# Patient Record
Sex: FEMALE | Race: BLACK OR AFRICAN AMERICAN | NOT HISPANIC OR LATINO
[De-identification: names, ages, dates, MRNs, and addresses within clinical notes are randomized per-mention and may not be internally consistent; named-entity substitution may affect disease eponyms.]

---

## 2017-01-16 ENCOUNTER — APPOINTMENT (OUTPATIENT)
Dept: NEPHROLOGY | Facility: CLINIC | Age: 77
End: 2017-01-16

## 2017-01-16 VITALS
DIASTOLIC BLOOD PRESSURE: 50 MMHG | HEIGHT: 62 IN | SYSTOLIC BLOOD PRESSURE: 110 MMHG | BODY MASS INDEX: 23.92 KG/M2 | WEIGHT: 130 LBS

## 2017-01-16 LAB
BILIRUB UR QL STRIP: NORMAL
GLUCOSE UR-MCNC: NORMAL
HCG UR QL: 0.2 EU/DL
HGB UR QL STRIP.AUTO: NORMAL
KETONES UR-MCNC: NORMAL
LEUKOCYTE ESTERASE UR QL STRIP: NORMAL
NITRITE UR QL STRIP: NORMAL
PH UR STRIP: 7.5
PROT UR STRIP-MCNC: NORMAL
SP GR UR STRIP: 1.01

## 2017-01-17 LAB
ALBUMIN SERPL ELPH-MCNC: 4.7 G/DL
ANION GAP SERPL CALC-SCNC: 11 MMOL/L
BASOPHILS # BLD AUTO: 0.04 K/UL
BASOPHILS NFR BLD AUTO: 1 %
BUN SERPL-MCNC: 13 MG/DL
CALCIUM SERPL-MCNC: 9.5 MG/DL
CHLORIDE SERPL-SCNC: 93 MMOL/L
CO2 SERPL-SCNC: 30 MMOL/L
CREAT SERPL-MCNC: 1.12 MG/DL
CREAT SPEC-SCNC: 30 MG/DL
CREAT/PROT UR: <0.1 RATIO
EOSINOPHIL # BLD AUTO: 0.17 K/UL
EOSINOPHIL NFR BLD AUTO: 4.4 %
GLUCOSE SERPL-MCNC: 75 MG/DL
HCT VFR BLD CALC: 36.7 %
HGB BLD-MCNC: 11.8 G/DL
IMM GRANULOCYTES NFR BLD AUTO: 0.8 %
LYMPHOCYTES # BLD AUTO: 1.8 K/UL
LYMPHOCYTES NFR BLD AUTO: 46.6 %
MAN DIFF?: NORMAL
MCHC RBC-ENTMCNC: 28.6 PG
MCHC RBC-ENTMCNC: 32.2 GM/DL
MCV RBC AUTO: 88.9 FL
MONOCYTES # BLD AUTO: 0.57 K/UL
MONOCYTES NFR BLD AUTO: 14.8 %
NEUTROPHILS # BLD AUTO: 1.25 K/UL
NEUTROPHILS NFR BLD AUTO: 32.4 %
PHOSPHATE SERPL-MCNC: 3.4 MG/DL
PLATELET # BLD AUTO: 278 K/UL
POTASSIUM SERPL-SCNC: 4.3 MMOL/L
PROT UR-MCNC: <4 MG/DL
RBC # BLD: 4.13 M/UL
RBC # FLD: 13 %
SODIUM SERPL-SCNC: 134 MMOL/L
WBC # FLD AUTO: 3.86 K/UL

## 2017-01-19 ENCOUNTER — FORM ENCOUNTER (OUTPATIENT)
Age: 77
End: 2017-01-19

## 2017-01-20 ENCOUNTER — OUTPATIENT (OUTPATIENT)
Dept: OUTPATIENT SERVICES | Facility: HOSPITAL | Age: 77
LOS: 1 days | End: 2017-01-20
Payer: MEDICARE

## 2017-01-20 ENCOUNTER — APPOINTMENT (OUTPATIENT)
Dept: ULTRASOUND IMAGING | Facility: CLINIC | Age: 77
End: 2017-01-20

## 2017-01-20 DIAGNOSIS — Z00.8 ENCOUNTER FOR OTHER GENERAL EXAMINATION: ICD-10-CM

## 2017-01-20 PROCEDURE — 76775 US EXAM ABDO BACK WALL LIM: CPT

## 2017-01-20 RX ORDER — CALCIUM CARBONATE/VITAMIN D3 500MG-5MCG
500-5 TABLET ORAL
Qty: 170 | Refills: 0 | Status: ACTIVE | COMMUNITY
Start: 2016-07-19

## 2018-02-19 ENCOUNTER — RX RENEWAL (OUTPATIENT)
Age: 78
End: 2018-02-19

## 2018-02-20 ENCOUNTER — RX RENEWAL (OUTPATIENT)
Age: 78
End: 2018-02-20

## 2018-09-05 ENCOUNTER — APPOINTMENT (OUTPATIENT)
Dept: OBGYN | Facility: CLINIC | Age: 78
End: 2018-09-05
Payer: COMMERCIAL

## 2018-09-05 VITALS
HEIGHT: 62 IN | DIASTOLIC BLOOD PRESSURE: 62 MMHG | WEIGHT: 133 LBS | SYSTOLIC BLOOD PRESSURE: 130 MMHG | BODY MASS INDEX: 24.48 KG/M2

## 2018-09-05 DIAGNOSIS — Z90.710 ACQUIRED ABSENCE OF BOTH CERVIX AND UTERUS: ICD-10-CM

## 2018-09-05 DIAGNOSIS — Z90.722 ACQUIRED ABSENCE OF BOTH CERVIX AND UTERUS: ICD-10-CM

## 2018-09-05 DIAGNOSIS — N95.2 POSTMENOPAUSAL ATROPHIC VAGINITIS: ICD-10-CM

## 2018-09-05 DIAGNOSIS — Z90.79 ACQUIRED ABSENCE OF BOTH CERVIX AND UTERUS: ICD-10-CM

## 2018-09-05 LAB
DATE COLLECTED: NORMAL
HEMOCCULT SP1 STL QL: NEGATIVE
QUALITY CONTROL: YES

## 2018-09-05 PROCEDURE — 82270 OCCULT BLOOD FECES: CPT

## 2018-09-05 PROCEDURE — 99204 OFFICE O/P NEW MOD 45 MIN: CPT | Mod: 25

## 2018-09-05 PROCEDURE — G0101: CPT

## 2018-09-11 LAB
CYTOLOGY CVX/VAG DOC THIN PREP: NORMAL
HPV HIGH+LOW RISK DNA PNL CVX: NOT DETECTED

## 2018-10-16 ENCOUNTER — APPOINTMENT (OUTPATIENT)
Dept: SURGERY | Facility: CLINIC | Age: 78
End: 2018-10-16
Payer: COMMERCIAL

## 2018-10-16 VITALS
HEART RATE: 78 BPM | DIASTOLIC BLOOD PRESSURE: 81 MMHG | OXYGEN SATURATION: 97 % | HEIGHT: 62 IN | TEMPERATURE: 97.5 F | SYSTOLIC BLOOD PRESSURE: 174 MMHG

## 2018-10-16 DIAGNOSIS — Z00.00 ENCOUNTER FOR GENERAL ADULT MEDICAL EXAMINATION W/OUT ABNORMAL FINDINGS: ICD-10-CM

## 2018-10-16 PROCEDURE — 99204 OFFICE O/P NEW MOD 45 MIN: CPT

## 2018-10-16 RX ORDER — AMOXICILLIN AND CLAVULANATE POTASSIUM 875; 125 MG/1; MG/1
875-125 TABLET, COATED ORAL
Qty: 20 | Refills: 0 | Status: DISCONTINUED | COMMUNITY
Start: 2016-11-19 | End: 2018-10-16

## 2018-10-16 RX ORDER — POTASSIUM CHLORIDE 1500 MG/1
20 TABLET, EXTENDED RELEASE ORAL
Qty: 180 | Refills: 0 | Status: DISCONTINUED | COMMUNITY
Start: 2016-01-04 | End: 2018-10-16

## 2018-10-29 ENCOUNTER — APPOINTMENT (OUTPATIENT)
Dept: MAMMOGRAPHY | Facility: CLINIC | Age: 78
End: 2018-10-29

## 2018-10-29 ENCOUNTER — APPOINTMENT (OUTPATIENT)
Dept: ULTRASOUND IMAGING | Facility: CLINIC | Age: 78
End: 2018-10-29

## 2018-10-29 ENCOUNTER — OUTPATIENT (OUTPATIENT)
Dept: OUTPATIENT SERVICES | Facility: HOSPITAL | Age: 78
LOS: 1 days | End: 2018-10-29
Payer: COMMERCIAL

## 2018-10-29 DIAGNOSIS — R92.8 OTHER ABNORMAL AND INCONCLUSIVE FINDINGS ON DIAGNOSTIC IMAGING OF BREAST: ICD-10-CM

## 2018-10-29 PROCEDURE — 76641 ULTRASOUND BREAST COMPLETE: CPT

## 2018-10-29 PROCEDURE — G0279: CPT

## 2018-10-29 PROCEDURE — G0279: CPT | Mod: 26

## 2018-10-29 PROCEDURE — 77066 DX MAMMO INCL CAD BI: CPT | Mod: 26

## 2018-10-29 PROCEDURE — 76641 ULTRASOUND BREAST COMPLETE: CPT | Mod: 26,50

## 2018-10-29 PROCEDURE — 77066 DX MAMMO INCL CAD BI: CPT

## 2018-11-02 ENCOUNTER — APPOINTMENT (OUTPATIENT)
Dept: ULTRASOUND IMAGING | Facility: CLINIC | Age: 78
End: 2018-11-02
Payer: COMMERCIAL

## 2018-11-02 ENCOUNTER — OUTPATIENT (OUTPATIENT)
Dept: OUTPATIENT SERVICES | Facility: HOSPITAL | Age: 78
LOS: 1 days | End: 2018-11-02
Payer: COMMERCIAL

## 2018-11-02 ENCOUNTER — RESULT REVIEW (OUTPATIENT)
Age: 78
End: 2018-11-02

## 2018-11-02 DIAGNOSIS — Z00.8 ENCOUNTER FOR OTHER GENERAL EXAMINATION: ICD-10-CM

## 2018-11-02 PROCEDURE — 77065 DX MAMMO INCL CAD UNI: CPT

## 2018-11-02 PROCEDURE — A4648: CPT

## 2018-11-02 PROCEDURE — 77065 DX MAMMO INCL CAD UNI: CPT | Mod: 26,LT

## 2018-11-02 PROCEDURE — 19083 BX BREAST 1ST LESION US IMAG: CPT

## 2018-11-02 PROCEDURE — 19083 BX BREAST 1ST LESION US IMAG: CPT | Mod: LT

## 2018-11-06 LAB — SURGICAL PATHOLOGY FINAL REPORT - CH: SIGNIFICANT CHANGE UP

## 2018-11-12 ENCOUNTER — APPOINTMENT (OUTPATIENT)
Dept: SURGERY | Facility: CLINIC | Age: 78
End: 2018-11-12
Payer: COMMERCIAL

## 2018-11-12 VITALS
DIASTOLIC BLOOD PRESSURE: 78 MMHG | WEIGHT: 130 LBS | BODY MASS INDEX: 23.92 KG/M2 | SYSTOLIC BLOOD PRESSURE: 136 MMHG | HEART RATE: 78 BPM | OXYGEN SATURATION: 98 % | HEIGHT: 62 IN | TEMPERATURE: 98.7 F

## 2018-11-12 PROCEDURE — 99215 OFFICE O/P EST HI 40 MIN: CPT

## 2018-11-16 ENCOUNTER — APPOINTMENT (OUTPATIENT)
Dept: MRI IMAGING | Facility: CLINIC | Age: 78
End: 2018-11-16
Payer: COMMERCIAL

## 2018-11-16 ENCOUNTER — OUTPATIENT (OUTPATIENT)
Dept: OUTPATIENT SERVICES | Facility: HOSPITAL | Age: 78
LOS: 1 days | End: 2018-11-16
Payer: COMMERCIAL

## 2018-11-16 DIAGNOSIS — C50.912 MALIGNANT NEOPLASM OF UNSPECIFIED SITE OF LEFT FEMALE BREAST: ICD-10-CM

## 2018-11-16 DIAGNOSIS — Z00.8 ENCOUNTER FOR OTHER GENERAL EXAMINATION: ICD-10-CM

## 2018-11-16 PROCEDURE — C8908: CPT

## 2018-11-16 PROCEDURE — A9585: CPT

## 2018-11-16 PROCEDURE — 0159T: CPT | Mod: 26

## 2018-11-16 PROCEDURE — C8937: CPT

## 2018-11-16 PROCEDURE — 77059 MRI BREAST BILATERAL: CPT | Mod: 26

## 2018-11-20 ENCOUNTER — APPOINTMENT (OUTPATIENT)
Dept: PLASTIC SURGERY | Facility: CLINIC | Age: 78
End: 2018-11-20

## 2018-11-26 ENCOUNTER — APPOINTMENT (OUTPATIENT)
Dept: SURGERY | Facility: CLINIC | Age: 78
End: 2018-11-26
Payer: COMMERCIAL

## 2018-11-26 VITALS
HEIGHT: 62 IN | HEART RATE: 87 BPM | TEMPERATURE: 97.6 F | DIASTOLIC BLOOD PRESSURE: 75 MMHG | BODY MASS INDEX: 23.92 KG/M2 | OXYGEN SATURATION: 97 % | SYSTOLIC BLOOD PRESSURE: 172 MMHG | WEIGHT: 130 LBS

## 2018-11-26 DIAGNOSIS — R92.8 OTHER ABNORMAL AND INCONCLUSIVE FINDINGS ON DIAGNOSTIC IMAGING OF BREAST: ICD-10-CM

## 2018-11-26 PROCEDURE — 99214 OFFICE O/P EST MOD 30 MIN: CPT

## 2018-11-30 ENCOUNTER — OTHER (OUTPATIENT)
Age: 78
End: 2018-11-30

## 2018-12-04 ENCOUNTER — APPOINTMENT (OUTPATIENT)
Dept: PLASTIC SURGERY | Facility: CLINIC | Age: 78
End: 2018-12-04
Payer: COMMERCIAL

## 2018-12-04 VITALS
HEART RATE: 72 BPM | RESPIRATION RATE: 16 BRPM | OXYGEN SATURATION: 99 % | BODY MASS INDEX: 23.19 KG/M2 | SYSTOLIC BLOOD PRESSURE: 136 MMHG | DIASTOLIC BLOOD PRESSURE: 79 MMHG | WEIGHT: 126.03 LBS | HEIGHT: 62 IN

## 2018-12-04 PROCEDURE — 99204 OFFICE O/P NEW MOD 45 MIN: CPT

## 2018-12-11 ENCOUNTER — OUTPATIENT (OUTPATIENT)
Dept: OUTPATIENT SERVICES | Facility: HOSPITAL | Age: 78
LOS: 1 days | End: 2018-12-11
Payer: MEDICARE

## 2018-12-11 VITALS
HEART RATE: 76 BPM | TEMPERATURE: 98 F | RESPIRATION RATE: 16 BRPM | DIASTOLIC BLOOD PRESSURE: 77 MMHG | WEIGHT: 123.46 LBS | SYSTOLIC BLOOD PRESSURE: 182 MMHG | HEIGHT: 62 IN

## 2018-12-11 DIAGNOSIS — C50.912 MALIGNANT NEOPLASM OF UNSPECIFIED SITE OF LEFT FEMALE BREAST: ICD-10-CM

## 2018-12-11 DIAGNOSIS — C50.919 MALIGNANT NEOPLASM OF UNSPECIFIED SITE OF UNSPECIFIED FEMALE BREAST: ICD-10-CM

## 2018-12-11 DIAGNOSIS — Z90.710 ACQUIRED ABSENCE OF BOTH CERVIX AND UTERUS: Chronic | ICD-10-CM

## 2018-12-11 DIAGNOSIS — Z01.818 ENCOUNTER FOR OTHER PREPROCEDURAL EXAMINATION: ICD-10-CM

## 2018-12-11 PROBLEM — Z86.79 HISTORY OF HYPERTENSION: Status: RESOLVED | Noted: 2018-12-04 | Resolved: 2018-12-11

## 2018-12-11 PROBLEM — Z85.3 HISTORY OF MALIGNANT NEOPLASM OF BREAST: Status: RESOLVED | Noted: 2018-12-04 | Resolved: 2018-12-11

## 2018-12-11 PROBLEM — Z86.39 HISTORY OF HIGH CHOLESTEROL: Status: RESOLVED | Noted: 2018-12-04 | Resolved: 2018-12-11

## 2018-12-11 LAB
ANION GAP SERPL CALC-SCNC: 15 MMOL/L — SIGNIFICANT CHANGE UP (ref 5–17)
BUN SERPL-MCNC: 10 MG/DL — SIGNIFICANT CHANGE UP (ref 8–20)
CALCIUM SERPL-MCNC: 9.8 MG/DL — SIGNIFICANT CHANGE UP (ref 8.6–10.2)
CHLORIDE SERPL-SCNC: 89 MMOL/L — LOW (ref 98–107)
CO2 SERPL-SCNC: 26 MMOL/L — SIGNIFICANT CHANGE UP (ref 22–29)
CREAT SERPL-MCNC: 0.85 MG/DL — SIGNIFICANT CHANGE UP (ref 0.5–1.3)
GLUCOSE SERPL-MCNC: 113 MG/DL — SIGNIFICANT CHANGE UP (ref 70–115)
HCT VFR BLD CALC: 34.4 % — LOW (ref 37–47)
HGB BLD-MCNC: 11.6 G/DL — LOW (ref 12–16)
MCHC RBC-ENTMCNC: 28.7 PG — SIGNIFICANT CHANGE UP (ref 27–31)
MCHC RBC-ENTMCNC: 33.7 G/DL — SIGNIFICANT CHANGE UP (ref 32–36)
MCV RBC AUTO: 85.1 FL — SIGNIFICANT CHANGE UP (ref 81–99)
PLATELET # BLD AUTO: 256 K/UL — SIGNIFICANT CHANGE UP (ref 150–400)
POTASSIUM SERPL-MCNC: 3.4 MMOL/L — LOW (ref 3.5–5.3)
POTASSIUM SERPL-SCNC: 3.4 MMOL/L — LOW (ref 3.5–5.3)
RBC # BLD: 4.04 M/UL — LOW (ref 4.4–5.2)
RBC # FLD: 13.1 % — SIGNIFICANT CHANGE UP (ref 11–15.6)
SODIUM SERPL-SCNC: 130 MMOL/L — LOW (ref 135–145)
WBC # BLD: 5.2 K/UL — SIGNIFICANT CHANGE UP (ref 4.8–10.8)
WBC # FLD AUTO: 5.2 K/UL — SIGNIFICANT CHANGE UP (ref 4.8–10.8)

## 2018-12-11 PROCEDURE — 93005 ELECTROCARDIOGRAM TRACING: CPT

## 2018-12-11 PROCEDURE — 93010 ELECTROCARDIOGRAM REPORT: CPT

## 2018-12-11 PROCEDURE — 80048 BASIC METABOLIC PNL TOTAL CA: CPT

## 2018-12-11 PROCEDURE — 85027 COMPLETE CBC AUTOMATED: CPT

## 2018-12-11 PROCEDURE — 36415 COLL VENOUS BLD VENIPUNCTURE: CPT

## 2018-12-11 NOTE — H&P PST ADULT - ASSESSMENT
78 year old female presents with left breast cancer scheduled for a left mastectomy on 18.  CAPRINI SCORE [CLOT]    AGE RELATED RISK FACTORS                                                       MOBILITY RELATED FACTORS  [ ] Age 41-60 years                                            (1 Point)                  [ ] Bed rest                                                        (1 Point)  [ ] Age: 61-74 years                                           (2 Points)                 [ ] Plaster cast                                                   (2 Points)  [x ] Age= 75 years                                              (3 Points)                 [ ] Bed bound for more than 72 hours                 (2 Points)    DISEASE RELATED RISK FACTORS                                               GENDER SPECIFIC FACTORS  [ ] Edema in the lower extremities                       (1 Point)                  [ ] Pregnancy                                                     (1 Point)  [ ] Varicose veins                                               (1 Point)                  [ ] Post-partum < 6 weeks                                   (1 Point)             [ ] BMI > 25 Kg/m2                                            (1 Point)                  [ ] Hormonal therapy  or oral contraception          (1 Point)                 [ ] Sepsis (in the previous month)                        (1 Point)                  [ ] History of pregnancy complications                 (1 point)  [ ] Pneumonia or serious lung disease                                               [ ] Unexplained or recurrent                     (1 Point)           (in the previous month)                               (1 Point)  [ ] Abnormal pulmonary function test                     (1 Point)                 SURGERY RELATED RISK FACTORS  [ ] Acute myocardial infarction                              (1 Point)                 [ ]  Section                                             (1 Point)  [ ] Congestive heart failure (in the previous month)  (1 Point)               [x ] Minor surgery                                                  (1 Point)   [ ] Inflammatory bowel disease                             (1 Point)                 [ ] Arthroscopic surgery                                        (2 Points)  [ ] Central venous access                                      (2 Points)                [ ] General surgery lasting more than 45 minutes   (2 Points)       [ ] Stroke (in the previous month)                          (5 Points)               [ ] Elective arthroplasty                                         (5 Points)                                                                                                                                               HEMATOLOGY RELATED FACTORS                                                 TRAUMA RELATED RISK FACTORS  [ ] Prior episodes of VTE                                     (3 Points)                 [ ] Fracture of the hip, pelvis, or leg                       (5 Points)  [ ] Positive family history for VTE                         (3 Points)                 [ ] Acute spinal cord injury (in the previous month)  (5 Points)  [ ] Prothrombin 52331 A                                     (3 Points)                 [ ] Paralysis  (less than 1 month)                             (5 Points)  [ ] Factor V Leiden                                             (3 Points)                  [ ] Multiple Trauma within 1 month                        (5 Points)  [ ] Lupus anticoagulants                                     (3 Points)                                                           [ ] Anticardiolipin antibodies                               (3 Points)                                                       [ ] High homocysteine in the blood                      (3 Points)                                             [ ] Other congenital or acquired thrombophilia      (3 Points)                                                [ ] Heparin induced thrombocytopenia                  (3 Points)                                          Total Score [4]  OPIOID RISK TOOL    JACKI EACH BOX THAT APPLIES AND ADD TOTALS AT THE END    FAMILY HISTORY OF SUBSTANCE ABUSE                 FEMALE         MALE                                                Alcohol                             [  ]1 pt          [  ]3pts                                               Illegal Drugs                     [  ]2 pts        [  ]3pts                                               Rx Drugs                           [  ]4 pts        [  ]4 pts    PERSONAL HISTORY OF SUBSTANCE ABUSE                                                                                          Alcohol                             [  ]3 pts       [  ]3 pts                                               Illegal Drugs                     [  ]4 pts        [  ]4 pts                                               Rx Drugs                           [  ]5 pts        [  ]5 pts    AGE BETWEEN 16-45 YEARS                                      [  ]1 pt         [  ]1 pt    HISTORY OF PREADOLESCENT   SEXUAL ABUSE                                                             [  ]3 pts        [  ]0pts    PSYCHOLOGICAL DISEASE                     ADD, OCD, Bipolar, Schizophrenia        [  ]2 pts         [  ]2 pts                      Depression                                               [  ]1 pt           [  ]1 pt           SCORING TOTAL   (add numbers and type here)              (***)                                     A score of 3 or lower indicated LOW risk for future opioid abuse  A score of 4 to 7 indicated moderate risk for future opioid abuse  A score of 8 or higher indicates a high risk for opioid abuse 78 year old female presents with left breast cancer scheduled for a left mastectomy on 18.  CAPRINI SCORE [CLOT]    AGE RELATED RISK FACTORS                                                       MOBILITY RELATED FACTORS  [ ] Age 41-60 years                                            (1 Point)                  [ ] Bed rest                                                        (1 Point)  [ ] Age: 61-74 years                                           (2 Points)                 [ ] Plaster cast                                                   (2 Points)  [x ] Age= 75 years                                              (3 Points)                 [ ] Bed bound for more than 72 hours                 (2 Points)    DISEASE RELATED RISK FACTORS                                               GENDER SPECIFIC FACTORS  [ ] Edema in the lower extremities                       (1 Point)                  [ ] Pregnancy                                                     (1 Point)  [ ] Varicose veins                                               (1 Point)                  [ ] Post-partum < 6 weeks                                   (1 Point)             [ ] BMI > 25 Kg/m2                                            (1 Point)                  [ ] Hormonal therapy  or oral contraception          (1 Point)                 [ ] Sepsis (in the previous month)                        (1 Point)                  [ ] History of pregnancy complications                 (1 point)  [ ] Pneumonia or serious lung disease                                               [ ] Unexplained or recurrent                     (1 Point)           (in the previous month)                               (1 Point)  [ ] Abnormal pulmonary function test                     (1 Point)                 SURGERY RELATED RISK FACTORS  [ ] Acute myocardial infarction                              (1 Point)                 [ ]  Section                                             (1 Point)  [ ] Congestive heart failure (in the previous month)  (1 Point)               [x ] Minor surgery                                                  (1 Point)   [ ] Inflammatory bowel disease                             (1 Point)                 [ ] Arthroscopic surgery                                        (2 Points)  [ ] Central venous access                                      (2 Points)                [ ] General surgery lasting more than 45 minutes   (2 Points)       [ ] Stroke (in the previous month)                          (5 Points)               [ ] Elective arthroplasty                                         (5 Points)                                                                                                                                               HEMATOLOGY RELATED FACTORS                                                 TRAUMA RELATED RISK FACTORS  [ ] Prior episodes of VTE                                     (3 Points)                 [ ] Fracture of the hip, pelvis, or leg                       (5 Points)  [ ] Positive family history for VTE                         (3 Points)                 [ ] Acute spinal cord injury (in the previous month)  (5 Points)  [ ] Prothrombin 60223 A                                     (3 Points)                 [ ] Paralysis  (less than 1 month)                             (5 Points)  [ ] Factor V Leiden                                             (3 Points)                  [ ] Multiple Trauma within 1 month                        (5 Points)  [ ] Lupus anticoagulants                                     (3 Points)                                                           [ ] Anticardiolipin antibodies                               (3 Points)                                                       [ ] High homocysteine in the blood                      (3 Points)                                             [ ] Other congenital or acquired thrombophilia      (3 Points)                                                [ ] Heparin induced thrombocytopenia                  (3 Points)                                          Total Score [4]  OPIOID RISK TOOL    JACKI EACH BOX THAT APPLIES AND ADD TOTALS AT THE END    FAMILY HISTORY OF SUBSTANCE ABUSE                 FEMALE         MALE                                                Alcohol                             [  ]1 pt          [  ]3pts                                               Illegal Drugs                     [  ]2 pts        [  ]3pts                                               Rx Drugs                           [  ]4 pts        [  ]4 pts    PERSONAL HISTORY OF SUBSTANCE ABUSE                                                                                          Alcohol                             [  ]3 pts       [  ]3 pts                                               Illegal Drugs                     [  ]4 pts        [  ]4 pts                                               Rx Drugs                           [  ]5 pts        [  ]5 pts    AGE BETWEEN 16-45 YEARS                                      [  ]1 pt         [  ]1 pt    HISTORY OF PREADOLESCENT   SEXUAL ABUSE                                                             [  ]3 pts        [  ]0pts    PSYCHOLOGICAL DISEASE                     ADD, OCD, Bipolar, Schizophrenia        [  ]2 pts         [  ]2 pts                      Depression                                               [  ]1 pt           [  ]1 pt           SCORING TOTAL   (add numbers and type here)              (0)                                     A score of 3 or lower indicated LOW risk for future opioid abuse  A score of 4 to 7 indicated moderate risk for future opioid abuse  A score of 8 or higher indicates a high risk for opioid abuse

## 2018-12-11 NOTE — H&P PST ADULT - ATTENDING COMMENTS
Patient understands risks v benefits, technical aspects of procedure and potential complications. She understands alternative to surgery

## 2018-12-11 NOTE — H&P PST ADULT - NSANTHOSAYNRD_GEN_A_CORE
No. ISMA screening performed.  STOP BANG Legend: 0-2 = LOW Risk; 3-4 = INTERMEDIATE Risk; 5-8 = HIGH Risk

## 2018-12-11 NOTE — H&P PST ADULT - HISTORY OF PRESENT ILLNESS
78 year old female with a history of hypertension, GERD,  dyslipidemia, and osteoporosis presents with breast cancer scheduled for a left mastectomy and sentinel node biopsy on 12/14/18.

## 2018-12-11 NOTE — H&P PST ADULT - PMH
Breast cancer    Dyslipidemia    GERD (gastroesophageal reflux disease)    Hypertension    Osteoporosis

## 2018-12-12 ENCOUNTER — APPOINTMENT (OUTPATIENT)
Dept: INTERNAL MEDICINE | Facility: CLINIC | Age: 78
End: 2018-12-12
Payer: COMMERCIAL

## 2018-12-12 ENCOUNTER — APPOINTMENT (OUTPATIENT)
Dept: INTERNAL MEDICINE | Facility: CLINIC | Age: 78
End: 2018-12-12

## 2018-12-12 VITALS
DIASTOLIC BLOOD PRESSURE: 82 MMHG | HEIGHT: 62 IN | WEIGHT: 124 LBS | BODY MASS INDEX: 22.82 KG/M2 | RESPIRATION RATE: 12 BRPM | HEART RATE: 78 BPM | SYSTOLIC BLOOD PRESSURE: 140 MMHG

## 2018-12-12 VITALS — HEIGHT: 62 IN | WEIGHT: 124 LBS | BODY MASS INDEX: 22.82 KG/M2

## 2018-12-12 DIAGNOSIS — Z86.79 PERSONAL HISTORY OF OTHER DISEASES OF THE CIRCULATORY SYSTEM: ICD-10-CM

## 2018-12-12 DIAGNOSIS — Z86.39 PERSONAL HISTORY OF OTHER ENDOCRINE, NUTRITIONAL AND METABOLIC DISEASE: ICD-10-CM

## 2018-12-12 DIAGNOSIS — E87.6 HYPOKALEMIA: ICD-10-CM

## 2018-12-12 DIAGNOSIS — E78.5 HYPERLIPIDEMIA, UNSPECIFIED: ICD-10-CM

## 2018-12-12 DIAGNOSIS — I10 ESSENTIAL (PRIMARY) HYPERTENSION: ICD-10-CM

## 2018-12-12 DIAGNOSIS — Z85.3 PERSONAL HISTORY OF MALIGNANT NEOPLASM OF BREAST: ICD-10-CM

## 2018-12-12 PROCEDURE — 99204 OFFICE O/P NEW MOD 45 MIN: CPT

## 2018-12-12 RX ORDER — IRON/IRON ASP GLY/FA/MV-MIN 38 125-25-1MG
TABLET ORAL
Refills: 0 | Status: ACTIVE | COMMUNITY

## 2018-12-12 RX ORDER — DIAZEPAM 5 MG/1
5 TABLET ORAL
Qty: 5 | Refills: 0 | Status: DISCONTINUED | COMMUNITY
Start: 2018-11-15 | End: 2018-12-12

## 2018-12-12 NOTE — ASSESSMENT
[Patient Optimized for Surgery] : Patient optimized for surgery [No Further Testing Recommended] : no further testing recommended [FreeTextEntry4] : Patient is medically stable for planned procedure.  Patient has borderline low potassium to take extra potassium today.  She has no contraindications for surgery and has no history of ashd.  She is high functioning.   [FreeTextEntry7] : hold aspirin, must take bp meds prior to procedure

## 2018-12-12 NOTE — COUNSELING
[Weight management counseling provided] : Weight management [Healthy eating counseling provided] : healthy eating [None] : None

## 2018-12-12 NOTE — HISTORY OF PRESENT ILLNESS
[(Patient denies any chest pain, claudication, dyspnea on exertion, orthopnea, palpitations or syncope)] : Patient denies any chest pain, claudication, dyspnea on exertion, orthopnea, palpitations or syncope [No Adverse Anesthesia Reaction] : no adverse anesthesia reaction in self or family member [Aortic Stenosis] : no aortic stenosis [Atrial Fibrillation] : no atrial fibrillation [Recent Myocardial Infarction] : no recent myocardial infarction [Implantable Device/Pacemaker] : no implantable device/pacemaker [Asthma] : no asthma [COPD] : no COPD [Sleep Apnea] : no sleep apnea [Smoker] : not a smoker [Family Member] : no family member with adverse anesthesia reaction/sudden death [Self] : no previous adverse anesthesia reaction [Chronic Anticoagulation] : no chronic anticoagulation [Chronic Kidney Disease] : no chronic kidney disease [Diabetes] : no diabetes [FreeTextEntry1] : Left Breast Masectomy with sentinel node biopsy and possible axillary dissection [FreeTextEntry2] : 12/14/18 [FreeTextEntry3] : Dr. Carpenter  [FreeTextEntry4] : 78 year old female with recent diagnosis of infiltrating ductal carcinoma of left breast who will undergo left breast mastectomy.  She has a history of\par htn and hld.  She is currently at her baselilne.  She has no symptoms of chest pain sob nvd or palpitations and her cardiac status appears\par stable

## 2018-12-12 NOTE — RESULTS/DATA
[] : results reviewed [de-identified] : b 11.8 [de-identified] : potassium 3.4 [de-identified] : nsr no acute changes, nonspecific twave changes

## 2018-12-14 ENCOUNTER — OUTPATIENT (OUTPATIENT)
Dept: INPATIENT UNIT | Facility: HOSPITAL | Age: 78
LOS: 1 days | End: 2018-12-14
Payer: MEDICARE

## 2018-12-14 ENCOUNTER — APPOINTMENT (OUTPATIENT)
Dept: SURGERY | Facility: HOSPITAL | Age: 78
End: 2018-12-14

## 2018-12-14 ENCOUNTER — RESULT REVIEW (OUTPATIENT)
Age: 78
End: 2018-12-14

## 2018-12-14 VITALS
HEIGHT: 62 IN | OXYGEN SATURATION: 100 % | TEMPERATURE: 97 F | HEART RATE: 81 BPM | RESPIRATION RATE: 16 BRPM | WEIGHT: 123.9 LBS | SYSTOLIC BLOOD PRESSURE: 143 MMHG | DIASTOLIC BLOOD PRESSURE: 82 MMHG

## 2018-12-14 DIAGNOSIS — Z90.710 ACQUIRED ABSENCE OF BOTH CERVIX AND UTERUS: Chronic | ICD-10-CM

## 2018-12-14 DIAGNOSIS — C50.912 MALIGNANT NEOPLASM OF UNSPECIFIED SITE OF LEFT FEMALE BREAST: ICD-10-CM

## 2018-12-14 LAB
ABO RH CONFIRMATION: SIGNIFICANT CHANGE UP
BLD GP AB SCN SERPL QL: SIGNIFICANT CHANGE UP
TYPE + AB SCN PNL BLD: SIGNIFICANT CHANGE UP

## 2018-12-14 PROCEDURE — 88307 TISSUE EXAM BY PATHOLOGIST: CPT | Mod: 26

## 2018-12-14 PROCEDURE — 19303 MAST SIMPLE COMPLETE: CPT | Mod: AS,LT

## 2018-12-14 PROCEDURE — 19303 MAST SIMPLE COMPLETE: CPT | Mod: LT

## 2018-12-14 PROCEDURE — 38525 BIOPSY/REMOVAL LYMPH NODES: CPT | Mod: LT

## 2018-12-14 PROCEDURE — 38792 RA TRACER ID OF SENTINL NODE: CPT | Mod: LT

## 2018-12-14 PROCEDURE — 19366: CPT | Mod: LT

## 2018-12-14 RX ORDER — SODIUM CHLORIDE 9 MG/ML
1000 INJECTION, SOLUTION INTRAVENOUS
Qty: 0 | Refills: 0 | Status: DISCONTINUED | OUTPATIENT
Start: 2018-12-14 | End: 2018-12-14

## 2018-12-14 RX ORDER — LABETALOL HCL 100 MG
200 TABLET ORAL DAILY
Qty: 0 | Refills: 0 | Status: DISCONTINUED | OUTPATIENT
Start: 2018-12-14 | End: 2018-12-29

## 2018-12-14 RX ORDER — CEFAZOLIN SODIUM 1 G
2000 VIAL (EA) INJECTION ONCE
Qty: 0 | Refills: 0 | Status: COMPLETED | OUTPATIENT
Start: 2018-12-14 | End: 2018-12-14

## 2018-12-14 RX ORDER — HYDROMORPHONE HYDROCHLORIDE 2 MG/ML
0.5 INJECTION INTRAMUSCULAR; INTRAVENOUS; SUBCUTANEOUS
Qty: 0 | Refills: 0 | Status: DISCONTINUED | OUTPATIENT
Start: 2018-12-14 | End: 2018-12-14

## 2018-12-14 RX ORDER — LISINOPRIL 2.5 MG/1
40 TABLET ORAL DAILY
Qty: 0 | Refills: 0 | Status: DISCONTINUED | OUTPATIENT
Start: 2018-12-14 | End: 2018-12-29

## 2018-12-14 RX ORDER — ONDANSETRON 8 MG/1
4 TABLET, FILM COATED ORAL EVERY 8 HOURS
Qty: 0 | Refills: 0 | Status: DISCONTINUED | OUTPATIENT
Start: 2018-12-14 | End: 2018-12-14

## 2018-12-14 RX ORDER — OXYCODONE AND ACETAMINOPHEN 5; 325 MG/1; MG/1
1 TABLET ORAL EVERY 4 HOURS
Qty: 0 | Refills: 0 | Status: DISCONTINUED | OUTPATIENT
Start: 2018-12-14 | End: 2018-12-14

## 2018-12-14 RX ORDER — AMLODIPINE BESYLATE 2.5 MG/1
10 TABLET ORAL DAILY
Qty: 0 | Refills: 0 | Status: DISCONTINUED | OUTPATIENT
Start: 2018-12-14 | End: 2018-12-29

## 2018-12-14 RX ORDER — SODIUM CHLORIDE 9 MG/ML
3 INJECTION INTRAMUSCULAR; INTRAVENOUS; SUBCUTANEOUS ONCE
Qty: 0 | Refills: 0 | Status: COMPLETED | OUTPATIENT
Start: 2018-12-14 | End: 2018-12-15

## 2018-12-14 RX ORDER — SIMVASTATIN 20 MG/1
20 TABLET, FILM COATED ORAL AT BEDTIME
Qty: 0 | Refills: 0 | Status: DISCONTINUED | OUTPATIENT
Start: 2018-12-14 | End: 2018-12-29

## 2018-12-14 RX ORDER — OXYCODONE AND ACETAMINOPHEN 5; 325 MG/1; MG/1
2 TABLET ORAL EVERY 6 HOURS
Qty: 0 | Refills: 0 | Status: DISCONTINUED | OUTPATIENT
Start: 2018-12-14 | End: 2018-12-14

## 2018-12-14 RX ADMIN — SIMVASTATIN 20 MILLIGRAM(S): 20 TABLET, FILM COATED ORAL at 21:37

## 2018-12-14 NOTE — BRIEF OPERATIVE NOTE - PROCEDURE
<<-----Click on this checkbox to enter Procedure Closure of wound by plastic surgery  12/14/2018    Active  [facility]

## 2018-12-14 NOTE — ASU DISCHARGE PLAN (ADULT/PEDIATRIC). - SPECIAL INSTRUCTIONS
call offices of Dr. Carpenter ( [phone] ) and Dr. Ferraro ( [phone] ) on Monday ( 12/17/18 ) to schedule follow up appointment during week of 12/17/18.

## 2018-12-14 NOTE — BRIEF OPERATIVE NOTE - PROCEDURE
<<-----Click on this checkbox to enter Procedure Left mastectomy with sentinel lymph node biopsy using gamma probe  12/14/2018  ( 2 sentinal nodes )  Active  [facility]

## 2018-12-15 ENCOUNTER — TRANSCRIPTION ENCOUNTER (OUTPATIENT)
Age: 78
End: 2018-12-15

## 2018-12-15 VITALS
RESPIRATION RATE: 19 BRPM | OXYGEN SATURATION: 98 % | SYSTOLIC BLOOD PRESSURE: 116 MMHG | HEART RATE: 77 BPM | TEMPERATURE: 98 F | DIASTOLIC BLOOD PRESSURE: 57 MMHG

## 2018-12-15 PROCEDURE — 86850 RBC ANTIBODY SCREEN: CPT

## 2018-12-15 PROCEDURE — 36415 COLL VENOUS BLD VENIPUNCTURE: CPT

## 2018-12-15 PROCEDURE — 86901 BLOOD TYPING SEROLOGIC RH(D): CPT

## 2018-12-15 PROCEDURE — A9541: CPT

## 2018-12-15 PROCEDURE — 38792 RA TRACER ID OF SENTINL NODE: CPT

## 2018-12-15 PROCEDURE — 19304: CPT | Mod: LT

## 2018-12-15 PROCEDURE — 88307 TISSUE EXAM BY PATHOLOGIST: CPT

## 2018-12-15 PROCEDURE — 86900 BLOOD TYPING SEROLOGIC ABO: CPT

## 2018-12-15 PROCEDURE — 19366: CPT | Mod: LT

## 2018-12-15 PROCEDURE — 38525 BIOPSY/REMOVAL LYMPH NODES: CPT | Mod: LT

## 2018-12-15 RX ADMIN — AMLODIPINE BESYLATE 10 MILLIGRAM(S): 2.5 TABLET ORAL at 06:01

## 2018-12-15 RX ADMIN — Medication 200 MILLIGRAM(S): at 06:01

## 2018-12-15 RX ADMIN — SODIUM CHLORIDE 3 MILLILITER(S): 9 INJECTION INTRAMUSCULAR; INTRAVENOUS; SUBCUTANEOUS at 06:44

## 2018-12-15 RX ADMIN — LISINOPRIL 40 MILLIGRAM(S): 2.5 TABLET ORAL at 06:01

## 2018-12-15 NOTE — DISCHARGE NOTE ADULT - HOSPITAL COURSE
presented to [facility] for elective surgery.  Underwent successful surgery.  Uneventful post operative course, full follow up instructions given and discharged home .

## 2018-12-15 NOTE — DISCHARGE NOTE ADULT - CARE PLAN
Principal Discharge DX:	Breast cancer  Goal:	wound healing  Assessment and plan of treatment:	improved, out patient follow up

## 2018-12-15 NOTE — DISCHARGE NOTE ADULT - CARE PROVIDER_API CALL
Aure Carpenter), Surgery  [address] [place] [address]  Phone: [phone]  Fax: [phone]    Omar Ferraro (MD), Plastic Surgery; Surgery; Surgery of the Hand  [address]  [address]  [place] [address]  Phone: [phone]  Fax: [phone]

## 2018-12-15 NOTE — DISCHARGE NOTE ADULT - NS AS ACTIVITY OBS
Walking-Outdoors allowed/No Heavy lifting/straining/Walking-Indoors allowed/Do not make important decisions/Do not drive or operate machinery/Stairs allowed

## 2018-12-15 NOTE — DISCHARGE NOTE ADULT - ADDITIONAL INSTRUCTIONS
call office Dr. Carpenter ( [phone] ) and Dr. Ferraro ( [phone] ) Monday ( 12/17/18 ) to schedule follow up week of 12/17/18.

## 2018-12-15 NOTE — DISCHARGE NOTE ADULT - PATIENT PORTAL LINK FT
You can access the [facility] Portal, offered by [facility], by registering with the following website: [email or web address]

## 2018-12-15 NOTE — DISCHARGE NOTE ADULT - MEDICATION SUMMARY - MEDICATIONS TO TAKE
I will START or STAY ON the medications listed below when I get home from the hospital:    Percocet 5/325 oral tablet  -- 1 tab(s) by mouth every 4 hours, As Needed -Mild Pain (1 - 3) - for moderate pain MDD:5   -- Indication: For per PMD    aspirin 81 mg oral tablet  -- 1 tab(s) by mouth once a day  -- Indication: For per PMD    lisinopril 40 mg oral tablet  -- 1 tab(s) by mouth once a day  -- Indication: For per PMD    simvastatin 20 mg oral tablet  -- 1 tab(s) by mouth once a day (at bedtime)  -- Indication: For per PMD    labetalol 200 mg oral tablet  -- orally once a day  -- Indication: For per PMD    alendronate 70 mg oral tablet  -- 1 tab(s) by mouth once a week  -- Indication: For per PMD    amLODIPine 10 mg oral tablet  -- 1 tab(s) by mouth once a day  -- Indication: For per PMD    indapamide 2.5 mg oral tablet  -- 1 tab(s) by mouth once a day (in the morning)  -- Indication: For per PMD    ferrous sulfate 325 mg (65 mg elemental iron) oral delayed release tablet  -- 1 tab(s) by mouth once a day  -- Indication: For per PMD    omeprazole 40 mg oral delayed release capsule  -- 1 cap(s) by mouth once a day  -- Indication: For per PMD    B Complex 50 oral tablet, extended release  -- 1 tab(s) by mouth once a day  -- Indication: For per PMD    Calcium 600+D 600 mg-200 intl units oral tablet  -- orally once a day  -- Indication: For per PMD    Oysco 500 with D 500 mg-200 intl units oral tablet  -- orally once a day  -- Indication: For per PMD

## 2018-12-18 PROBLEM — K21.9 GASTRO-ESOPHAGEAL REFLUX DISEASE WITHOUT ESOPHAGITIS: Chronic | Status: ACTIVE | Noted: 2018-12-11

## 2018-12-18 PROBLEM — I10 ESSENTIAL (PRIMARY) HYPERTENSION: Chronic | Status: ACTIVE | Noted: 2018-12-11

## 2018-12-18 PROBLEM — C50.919 MALIGNANT NEOPLASM OF UNSPECIFIED SITE OF UNSPECIFIED FEMALE BREAST: Chronic | Status: ACTIVE | Noted: 2018-12-11

## 2018-12-18 PROBLEM — E78.5 HYPERLIPIDEMIA, UNSPECIFIED: Chronic | Status: ACTIVE | Noted: 2018-12-11

## 2018-12-18 PROBLEM — M81.0 AGE-RELATED OSTEOPOROSIS WITHOUT CURRENT PATHOLOGICAL FRACTURE: Chronic | Status: ACTIVE | Noted: 2018-12-11

## 2018-12-20 ENCOUNTER — APPOINTMENT (OUTPATIENT)
Dept: PLASTIC SURGERY | Facility: CLINIC | Age: 78
End: 2018-12-20

## 2018-12-20 VITALS
WEIGHT: 124 LBS | HEIGHT: 62 IN | DIASTOLIC BLOOD PRESSURE: 77 MMHG | OXYGEN SATURATION: 99 % | SYSTOLIC BLOOD PRESSURE: 151 MMHG | BODY MASS INDEX: 22.82 KG/M2 | HEART RATE: 76 BPM | TEMPERATURE: 98.1 F

## 2018-12-20 LAB — SURGICAL PATHOLOGY FINAL REPORT - CH: SIGNIFICANT CHANGE UP

## 2019-01-02 ENCOUNTER — APPOINTMENT (OUTPATIENT)
Dept: BREAST CENTER | Facility: CLINIC | Age: 79
End: 2019-01-02
Payer: COMMERCIAL

## 2019-01-02 VITALS
TEMPERATURE: 98 F | RESPIRATION RATE: 16 BRPM | HEART RATE: 71 BPM | SYSTOLIC BLOOD PRESSURE: 172 MMHG | DIASTOLIC BLOOD PRESSURE: 78 MMHG

## 2019-01-02 PROCEDURE — 99024 POSTOP FOLLOW-UP VISIT: CPT

## 2019-01-10 ENCOUNTER — APPOINTMENT (OUTPATIENT)
Dept: RADIATION ONCOLOGY | Facility: CLINIC | Age: 79
End: 2019-01-10
Payer: COMMERCIAL

## 2019-01-10 ENCOUNTER — OUTPATIENT (OUTPATIENT)
Dept: OUTPATIENT SERVICES | Facility: HOSPITAL | Age: 79
LOS: 1 days | Discharge: HOME | End: 2019-01-10

## 2019-01-10 VITALS
BODY MASS INDEX: 23 KG/M2 | OXYGEN SATURATION: 98 % | SYSTOLIC BLOOD PRESSURE: 175 MMHG | HEIGHT: 62 IN | TEMPERATURE: 98 F | WEIGHT: 125 LBS | RESPIRATION RATE: 16 BRPM | HEART RATE: 78 BPM | DIASTOLIC BLOOD PRESSURE: 75 MMHG

## 2019-01-10 DIAGNOSIS — Z90.710 ACQUIRED ABSENCE OF BOTH CERVIX AND UTERUS: Chronic | ICD-10-CM

## 2019-01-10 DIAGNOSIS — Z80.9 FAMILY HISTORY OF MALIGNANT NEOPLASM, UNSPECIFIED: ICD-10-CM

## 2019-01-10 PROCEDURE — 99205 OFFICE O/P NEW HI 60 MIN: CPT | Mod: 25

## 2019-01-10 NOTE — DISEASE MANAGEMENT
[Pathological] : TNM Stage: p [II] : II [TTNM] : 2 [NTNM] : 0 [MTNM] : 0 [de-identified] : 5000cGy [de-identified] : left chest wall

## 2019-01-10 NOTE — VITALS
[Least Pain Intensity: 0/10] : 0/10 [NoTreatment Scheduled] : no treatment scheduled [80: Normal activity with effort; some signs or symptoms of disease.] : 80: Normal activity with effort; some signs or symptoms of disease.  [1 - Distress Level] : Distress Level: 1 [Maximal Pain Intensity: 1/10] : 1/10 [ECOG Performance Status: 1 - Restricted in physically strenuous activity but ambulatory and able to carry out work of a light or sedentary nature] : Performance Status: 1 - Restricted in physically strenuous activity but ambulatory and able to carry out work of a light or sedentary nature, e.g., light house work, office work

## 2019-01-10 NOTE — REASON FOR VISIT
[Consideration of Curative Therapy] : consideration of curative therapy for [Breast Cancer] : breast cancer [Other: _____] : [unfilled]

## 2019-01-10 NOTE — LETTER CLOSING
[Consult Closing:] : Thank you for allowing me to participate in the care of this patient.  If you have any questions, please do not hesitate to contact me. [Sincerely yours,] : Sincerely yours, [FreeTextEntry3] : Chalino Lomeli MD\par Physician in Chief\par Department of Radiation Medicine\par [facility] [place]\par [facility] [place]\par \par  of Radiation Medicine\par [facility]\par at  [facility]\par \par Radiation \par [facility]/\par [facility] [place]\par [address]\par [place], [place] [address]\par \par Tel: [phone]\par Fax: [phone]\par

## 2019-01-10 NOTE — HISTORY OF PRESENT ILLNESS
[FreeTextEntry1] : Tenzin Nieto presents to office for radiation medicine consultation, unaccompanied.  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   She reports a lateral left breast lump which she has had for years but recently felt different.  She reports having mammogram every year since age 40.\par She recently underwent MRI which demonstrated enhancement of 5.3  cm area.  She is now s/p left mastectomy with left SLN biopsy an oncoplastic closure by Dr. Carpenter on 12/14/2018.12/14/2018.\par \par  Final Surgical Pathology:\par 1. Left [place] lymph node #1 hot and blue: One lymph node, negative for metastatic carcinoma. (0/1)\par 2. Left sentinel lymph node #2 blue: One lymph node, negative for metastatic carcinoma.\par 3. 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 Deep surgical resection margin-corresponding to the chest wall, is positive. The anterior surgical resection margin corresponding to anterior skin surface. Both anterior and posterior positive margin belong to the upper outer quadrant, see also gross descriptions. Ductal carcinoma in situ, intermediate nuclear grade, solid/cribriform, with focal calcifications and necrosis.  Negative nipple areolar complex and representative skin.\par \par Of note, specimen is ER/DE negative and Her2 negative. She also has a positive deep margin of resection.\par \par \par \par \par \par

## 2019-01-10 NOTE — PHYSICAL EXAM
[Normal] : normal skin color and pigmentation and no rash [de-identified] : s/p left MRM . surgical scar well healed. [FreeTextEntry1] : deferred [de-identified] : deferred

## 2019-01-10 NOTE — LETTER GREETING
[Dear Doctor] : Dear Doctor, [Consult Letter:] : Your patient, [unfilled] was seen in my office today for consultation. [Please see my note below.] : Please see my note below. [FreeTextEntry2] : Jayjay Kaiser MD\par Aure Carpenter MD

## 2019-01-15 ENCOUNTER — APPOINTMENT (OUTPATIENT)
Dept: PLASTIC SURGERY | Facility: CLINIC | Age: 79
End: 2019-01-15
Payer: COMMERCIAL

## 2019-01-15 VITALS
DIASTOLIC BLOOD PRESSURE: 76 MMHG | TEMPERATURE: 98.8 F | OXYGEN SATURATION: 96 % | HEART RATE: 76 BPM | WEIGHT: 126 LBS | SYSTOLIC BLOOD PRESSURE: 171 MMHG | BODY MASS INDEX: 23.19 KG/M2 | HEIGHT: 62 IN

## 2019-01-15 DIAGNOSIS — N63.0 UNSPECIFIED LUMP IN UNSPECIFIED BREAST: ICD-10-CM

## 2019-01-15 PROCEDURE — 99024 POSTOP FOLLOW-UP VISIT: CPT

## 2019-01-15 NOTE — HISTORY OF PRESENT ILLNESS
[FreeTextEntry1] : 79 y/o woman with left breast cancer s/p left mastectomy with oncoplastic closure on 12/14/18.\par \par She is doing well. \par She c/o tenderness and firmness left breast.

## 2019-01-15 NOTE — PHYSICAL EXAM
[de-identified] : Left incision healing well.\par Mild firmness and discoloration left lower breast.\par HB was present during exam.

## 2019-01-15 NOTE — ADDENDUM
[FreeTextEntry1] : I, Taco Goode, acted solely as a scribe for Dr. Omar Ferraro on this date 1/15/19.

## 2019-01-17 NOTE — ASU PATIENT PROFILE, ADULT - LEARNING ASSESSMENT (PATIENT) ADDITIONAL COMMENTS
01/17/19 [facility] 11:57 01/17/19 [facility]OM 11:57 1/18/19- telephone interview with pt, instructed pt on pre-op instructions, tips for safer surgery, pain management scale, take Amlodipine, Labetalol, Indapamide, Lisinopril with a sip of water the morning of surgery, stop ASA 5 days prior to surgery, understanding of all instructions verbalized.

## 2019-01-23 ENCOUNTER — OUTPATIENT (OUTPATIENT)
Dept: INPATIENT UNIT | Facility: HOSPITAL | Age: 79
LOS: 1 days | Discharge: HOME | End: 2019-01-23
Payer: MEDICARE

## 2019-01-23 VITALS
HEART RATE: 76 BPM | WEIGHT: 126.99 LBS | DIASTOLIC BLOOD PRESSURE: 74 MMHG | HEIGHT: 62 IN | TEMPERATURE: 97 F | SYSTOLIC BLOOD PRESSURE: 160 MMHG | OXYGEN SATURATION: 100 % | RESPIRATION RATE: 16 BRPM

## 2019-01-23 VITALS
OXYGEN SATURATION: 98 % | HEART RATE: 72 BPM | RESPIRATION RATE: 16 BRPM | SYSTOLIC BLOOD PRESSURE: 138 MMHG | DIASTOLIC BLOOD PRESSURE: 70 MMHG

## 2019-01-23 DIAGNOSIS — E83.10 DISORDER OF IRON METABOLISM, UNSPECIFIED: ICD-10-CM

## 2019-01-23 DIAGNOSIS — N18.1 CHRONIC KIDNEY DISEASE, STAGE 1: ICD-10-CM

## 2019-01-23 DIAGNOSIS — Z90.710 ACQUIRED ABSENCE OF BOTH CERVIX AND UTERUS: Chronic | ICD-10-CM

## 2019-01-23 DIAGNOSIS — C50.412 MALIGNANT NEOPLASM OF UPPER-OUTER QUADRANT OF LEFT FEMALE BREAST: ICD-10-CM

## 2019-01-23 PROCEDURE — 36561 INSERT TUNNELED CV CATH: CPT

## 2019-01-23 PROCEDURE — C1788: CPT

## 2019-01-23 PROCEDURE — 77001 FLUOROGUIDE FOR VEIN DEVICE: CPT

## 2019-01-23 PROCEDURE — 77001 FLUOROGUIDE FOR VEIN DEVICE: CPT | Mod: 26

## 2019-01-23 PROCEDURE — C1769: CPT

## 2019-01-23 PROCEDURE — 76937 US GUIDE VASCULAR ACCESS: CPT | Mod: 26

## 2019-01-23 PROCEDURE — 76942 ECHO GUIDE FOR BIOPSY: CPT

## 2019-01-23 RX ORDER — CEFAZOLIN SODIUM 1 G
2000 VIAL (EA) INJECTION ONCE
Qty: 0 | Refills: 0 | Status: DISCONTINUED | OUTPATIENT
Start: 2019-01-23 | End: 2019-02-07

## 2019-01-23 RX ORDER — SODIUM CHLORIDE 9 MG/ML
3 INJECTION INTRAMUSCULAR; INTRAVENOUS; SUBCUTANEOUS EVERY 8 HOURS
Qty: 0 | Refills: 0 | Status: DISCONTINUED | OUTPATIENT
Start: 2019-01-23 | End: 2019-02-07

## 2019-01-23 NOTE — ASU DISCHARGE PLAN (ADULT/PEDIATRIC). - MEDICATION SUMMARY - MEDICATIONS TO TAKE
I will START or STAY ON the medications listed below when I get home from the hospital:    Percocet 5/325 oral tablet  -- 1 tab(s) by mouth every 4 hours, As Needed -Mild Pain (1 - 3) - for moderate pain MDD:5   -- Indication: For per primary care physician    aspirin 81 mg oral tablet  -- 1 tab(s) by mouth once a day  -- Indication: For per primary care physician    lisinopril 40 mg oral tablet  -- 1 tab(s) by mouth once a day  -- Indication: For per primary care physician    simvastatin 20 mg oral tablet  -- 1 tab(s) by mouth once a day (at bedtime)  -- Indication: For per primary care physician    labetalol 200 mg oral tablet  -- orally once a day  -- Indication: For per primary care physician    alendronate 70 mg oral tablet  -- 1 tab(s) by mouth once a week  -- Indication: For per primary care physician    amLODIPine 10 mg oral tablet  -- 1 tab(s) by mouth once a day  -- Indication: For per primary care physician    indapamide 2.5 mg oral tablet  -- 1 tab(s) by mouth once a day (in the morning)  -- Indication: For per primary care physician    ferrous sulfate 325 mg (65 mg elemental iron) oral delayed release tablet  -- 1 tab(s) by mouth once a day  -- Indication: For per primary care physician    omeprazole 40 mg oral delayed release capsule  -- 1 cap(s) by mouth once a day  -- Indication: For per primary care physician    B Complex 50 oral tablet, extended release  -- 1 tab(s) by mouth once a day  -- Indication: For per primary care physician    Calcium 600+D 600 mg-200 intl units oral tablet  -- orally once a day  -- Indication: For per primary care physician    Oysco 500 with D 500 mg-200 intl units oral tablet  -- orally once a day  -- Indication: For per primary care physician

## 2019-01-23 NOTE — ASU DISCHARGE PLAN (ADULT/PEDIATRIC). - NOTIFY
Bleeding that does not stop/Swelling that continues/Pain not relieved by Medications/Persistent Nausea and Vomiting/Fever greater than 101

## 2019-01-23 NOTE — CHART NOTE - NSCHARTNOTEFT_GEN_A_CORE
Interventional Radiology Brief- Operative Note    Operators: Francois Miranda MD    Procedure: RIJ port catheter placement    Post-op Dx: left breast cancer    EBL: 10 mL    Medications: 1% lidocaine with epinephrine; MAC    Contrast: 0 mL    Complications: no immediate complications    Findings/Plan:  Patent RIJ  Successful RIJ approach port catheter placement with the tip in the superior cavoatrial junction  Port is ready for immediate use

## 2019-01-23 NOTE — ASU DISCHARGE PLAN (ADULT/PEDIATRIC). - SPECIAL INSTRUCTIONS
Please go to the nearest ED if you are having any bleeding, worsening pain, swelling, or redness at the port site

## 2019-01-29 ENCOUNTER — APPOINTMENT (OUTPATIENT)
Dept: SURGERY | Facility: CLINIC | Age: 79
End: 2019-01-29
Payer: COMMERCIAL

## 2019-01-29 VITALS
HEIGHT: 62 IN | WEIGHT: 7.88 LBS | TEMPERATURE: 98.4 F | SYSTOLIC BLOOD PRESSURE: 166 MMHG | DIASTOLIC BLOOD PRESSURE: 85 MMHG | HEART RATE: 77 BPM | BODY MASS INDEX: 1.45 KG/M2 | OXYGEN SATURATION: 96 %

## 2019-01-29 PROCEDURE — 99024 POSTOP FOLLOW-UP VISIT: CPT

## 2019-02-01 ENCOUNTER — OUTPATIENT (OUTPATIENT)
Dept: OUTPATIENT SERVICES | Facility: HOSPITAL | Age: 79
LOS: 1 days | End: 2019-02-01
Payer: COMMERCIAL

## 2019-02-01 DIAGNOSIS — E83.10 DISORDER OF IRON METABOLISM, UNSPECIFIED: ICD-10-CM

## 2019-02-01 DIAGNOSIS — C50.412 MALIGNANT NEOPLASM OF UPPER-OUTER QUADRANT OF LEFT FEMALE BREAST: ICD-10-CM

## 2019-02-01 DIAGNOSIS — N18.1 CHRONIC KIDNEY DISEASE, STAGE 1: ICD-10-CM

## 2019-02-01 DIAGNOSIS — Z90.710 ACQUIRED ABSENCE OF BOTH CERVIX AND UTERUS: Chronic | ICD-10-CM

## 2019-02-01 PROCEDURE — A9560: CPT

## 2019-02-01 PROCEDURE — 78472 GATED HEART PLANAR SINGLE: CPT | Mod: 26

## 2019-02-01 PROCEDURE — 78472 GATED HEART PLANAR SINGLE: CPT

## 2019-02-06 NOTE — PHYSICAL EXAM
[Normocephalic] : normocephalic [Atraumatic] : atraumatic [EOMI] : extra ocular movement intact [PERRL] : pupils equal, round and reactive to light [Sclera nonicteric] : sclera nonicteric [Supple] : supple [No Supraclavicular Adenopathy] : no supraclavicular adenopathy [Examined in the supine and seated position] : examined in the supine and seated position [No dominant masses] : no dominant masses in right breast  [No Nipple Retraction] : no right nipple retraction [No Nipple Discharge] : no right nipple discharge [Breast Nipple Inversion Right] : nipple not inverted [Breast Nipple Retraction Right] : nipple not retracted [Breast Nipple Flattening Right] : nipple not flattened [Breast Nipple Fissures Right] : nipple not fissured [Breast Abnormal Lactation (Galactorrhea) Right] : no galactorrhea [Breast Abnormal Secretion Bloody Fluid Right] : no bloody discharge [Breast Abnormal Secretion Serous Fluid Right] : no serous discharge [Breast Abnormal Secretion Opalescent Fluid Right] : no milky discharge [No Axillary Lymphadenopathy] : no left axillary lymphadenopathy [No Edema] : no edema [No Rashes] : no rashes [No Ulceration] : no ulceration [de-identified] : No supraclavicular or axillary adenopathy. No dominant masses, normal to palpation. Everted nipple without discharge. No skin changes.\par  [de-identified] : Mastectomy flap healing well/

## 2019-02-06 NOTE — ASSESSMENT
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  Gerson is healing well with no signs and symptoms of infection.  She understands the invasive tumor measured 2.6 cm in length and 0/2 lymph nodes were negative for metastatic carcinoma.  She further understands the deep surgical resection margin corresponding to the chest wall, is positive.  Both anterior and posterior positive margins belong to the upper outer quadrant.  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  She met with both medical and radiation oncologist.  She will proceed with chemotherapy followed by radiation.  Recommendation for follow up in 8 weeks.\par 1. Follow up with medical oncologist\par 2. Follow up with radiation oncologist\par 3. Follow up in 8 weeks\par 4.  Continue annual screening mammogram/sonogram right breast

## 2019-02-06 NOTE — HISTORY OF PRESENT ILLNESS
[FreeTextEntry1] : Referring Physician: Dr. Patton\par I had the pleasure of seeing RAÚL JASMINE in the office today for a post operative visit secondary left breast mastectomy with left SLNB with oncoplastic closure on 12/14/2018.\par \par Tenzin Nieto is kristine 77 yo postmenopausal female recently diagnosed with left breast 1 cm invasive ductal carcinoma grade 2 ER negative VA negative Her2 negative from irregular mass seen on ultrasound.  She recently underwent an MRI which demonstrated enhancement of 5.3cm area and discussion of mastectomy v lumpectomy was thoroughly undertaken.  She underwent left breast mastectomy with left SLNB and oncoplastic closure on 12/14/2018.\par \par She denies skin changes or nipple discharge. She reports a left breast lump which she has had for years but recently felt different. She undergoes mammogram every year since age 40.\par \par G0. Menses began at age 12.\par She denies personal history of malignancy.\par Family history is significant for sister diagnosed with colon cancer.\par \par Imaging: [facility] at [place]\par MRI of the breast\par 11/16/2018 At the 2:00 posteriorly an irregular mass is seen which spans 5.3 x 1.8 x 3.1 cm.  No axillary adenopathy noted. BIRADS 6\par \par \par [facility]\par 10/30/2017 Bilateral screening mammogram with 3-D imaging\par Impression: Heterogenously dense breasts. Focal asymmetric density in the lateral left breast. No evidence of suspicious calcifications. Recommend diagnostic mammogram of left breast and bilateral breast ultrasound. BIRAS 0 incomplete.\par \par 12/1/2017 Unilateral diagnostic mammography of the left breast including 3-D imaging. \par Impression: No evidence of mass lesions. Please see separate breast ultrasound report. The patient may return to annual screening mammography. BIRADS 1 negative.\par \par 12/1/2017 Ultrasound of both breasts\par Impression: Normal ultrasound examination of both breasts. BIRADS 1 negative.\par \par 10/29/2018 Diagnostic mammogram and sonogram\par Impression: 1 cm irregular palpable mass in the left breast at the 2:00 position, corresponding to mammographically visible findings. Ultrasound guided core biopsy advised. BIRADS 5\par \par 12/14/2018  Final Surgical Pathology\par 1.  Left [place] lymph node #1 hot and blue:  One lymph node, negative for metastatic carcinoma. (0/1)\par 2.  Left sentinel lymph node #2 blue:  One lymph node, negative for metastatic carcinoma.\par 3.  Left breast short superior long lateral, mastectomy:  Invasive mammary duct carcinoma, intermediate histological grade 2/3, SBR score 6/9, measuring 2.6 cm in length.  Deep surgical resection margin-corresponding to the chest wall, is positive.  The anterior surgical resection margin corresponding to anterior skin surface.  Both anterior and posterior positive margin belong to the upper outer quadrant, see also gross descriptions.  Ductal carcinoma in situ, intermediate nuclear grade, solid/cribriform, with focal calcifications and necrosis.Negative nipple areolar complex and representative skin.\par \par \par We reviewed and discussed clinical exam and final surgical pathology.  Raúl is healing well with no signs and symptoms of infection.  She understands the invasive tumor measured 2.6 cm in length and 0/2 lymph nodes were negative for metastatic carcinoma.  She further understands the deep surgical resection margin corresponding to the chest wall, is positive.  Both anterior and posterior positive margins belong to the upper outer quadrant.  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  She met with radiation oncologist and recommendation for radiation after chemotherapy for triple negative breast cancer is having care done at [facility] with Dr. Jayjay Kaiser.  She had her port placed and is undergoing echocardiogram on Friday.  Recommendation for follow up in 8 weeks.  She understands and agrees to plan.  All questions answered.

## 2019-02-07 ENCOUNTER — APPOINTMENT (OUTPATIENT)
Dept: ULTRASOUND IMAGING | Facility: CLINIC | Age: 79
End: 2019-02-07
Payer: COMMERCIAL

## 2019-02-07 ENCOUNTER — OUTPATIENT (OUTPATIENT)
Dept: OUTPATIENT SERVICES | Facility: HOSPITAL | Age: 79
LOS: 1 days | End: 2019-02-07
Payer: COMMERCIAL

## 2019-02-07 DIAGNOSIS — N63.0 UNSPECIFIED LUMP IN UNSPECIFIED BREAST: ICD-10-CM

## 2019-02-07 DIAGNOSIS — Z90.710 ACQUIRED ABSENCE OF BOTH CERVIX AND UTERUS: Chronic | ICD-10-CM

## 2019-02-07 PROCEDURE — 76641 ULTRASOUND BREAST COMPLETE: CPT

## 2019-02-07 PROCEDURE — 76641 ULTRASOUND BREAST COMPLETE: CPT | Mod: 26,LT

## 2019-02-12 ENCOUNTER — OUTPATIENT (OUTPATIENT)
Dept: OUTPATIENT SERVICES | Facility: HOSPITAL | Age: 79
LOS: 1 days | Discharge: HOME | End: 2019-02-12

## 2019-02-12 DIAGNOSIS — Z90.710 ACQUIRED ABSENCE OF BOTH CERVIX AND UTERUS: Chronic | ICD-10-CM

## 2019-02-12 DIAGNOSIS — C50.919 MALIGNANT NEOPLASM OF UNSPECIFIED SITE OF UNSPECIFIED FEMALE BREAST: ICD-10-CM

## 2019-02-13 ENCOUNTER — RX RENEWAL (OUTPATIENT)
Age: 79
End: 2019-02-13

## 2019-02-21 ENCOUNTER — RESULT REVIEW (OUTPATIENT)
Age: 79
End: 2019-02-21

## 2019-02-21 ENCOUNTER — APPOINTMENT (OUTPATIENT)
Dept: HEMATOLOGY ONCOLOGY | Facility: CLINIC | Age: 79
End: 2019-02-21
Payer: COMMERCIAL

## 2019-02-21 VITALS
DIASTOLIC BLOOD PRESSURE: 62 MMHG | BODY MASS INDEX: 23.38 KG/M2 | OXYGEN SATURATION: 95 % | SYSTOLIC BLOOD PRESSURE: 117 MMHG | WEIGHT: 127.04 LBS | HEART RATE: 78 BPM | HEIGHT: 62 IN

## 2019-02-21 LAB
ANISOCYTOSIS BLD QL: SLIGHT — SIGNIFICANT CHANGE UP
BASO STIPL BLD QL SMEAR: PRESENT — SIGNIFICANT CHANGE UP
BASOPHILS # BLD AUTO: 0.1 K/UL — SIGNIFICANT CHANGE UP (ref 0–0.2)
BASOPHILS NFR BLD AUTO: 1 % — SIGNIFICANT CHANGE UP (ref 0–2)
EOSINOPHIL # BLD AUTO: 0.2 K/UL — SIGNIFICANT CHANGE UP (ref 0–0.5)
EOSINOPHIL NFR BLD AUTO: 4 % — SIGNIFICANT CHANGE UP (ref 0–6)
GIANT PLATELETS BLD QL SMEAR: PRESENT — SIGNIFICANT CHANGE UP
HCT VFR BLD CALC: 35.3 % — SIGNIFICANT CHANGE UP (ref 34.5–45)
HGB BLD-MCNC: 11.8 G/DL — SIGNIFICANT CHANGE UP (ref 11.5–15.5)
LG PLATELETS BLD QL AUTO: SLIGHT — SIGNIFICANT CHANGE UP
LYMPHOCYTES # BLD AUTO: 1.7 K/UL — SIGNIFICANT CHANGE UP (ref 1–3.3)
LYMPHOCYTES # BLD AUTO: 22 % — SIGNIFICANT CHANGE UP (ref 13–44)
MCHC RBC-ENTMCNC: 29.2 PG — SIGNIFICANT CHANGE UP (ref 27–34)
MCHC RBC-ENTMCNC: 33.4 G/DL — SIGNIFICANT CHANGE UP (ref 32–36)
MCV RBC AUTO: 87.2 FL — SIGNIFICANT CHANGE UP (ref 80–100)
METAMYELOCYTES # FLD: 4 % — HIGH (ref 0–0)
MONOCYTES # BLD AUTO: 1.2 K/UL — HIGH (ref 0–0.9)
MONOCYTES NFR BLD AUTO: 15 % — HIGH (ref 2–14)
MYELOCYTES NFR BLD: 2 % — HIGH (ref 0–0)
NEUTROPHILS # BLD AUTO: 2.5 K/UL — SIGNIFICANT CHANGE UP (ref 1.8–7.4)
NEUTROPHILS NFR BLD AUTO: 49 % — SIGNIFICANT CHANGE UP (ref 43–77)
NEUTS BAND # BLD: 2 % — SIGNIFICANT CHANGE UP (ref 0–8)
NRBC # BLD: 1 /100 — HIGH (ref 0–0)
PLAT MORPH BLD: NORMAL — SIGNIFICANT CHANGE UP
PLATELET # BLD AUTO: 194 K/UL — SIGNIFICANT CHANGE UP (ref 150–400)
POLYCHROMASIA BLD QL SMEAR: SLIGHT — SIGNIFICANT CHANGE UP
PROMYELOCYTES # FLD: 1 % — HIGH (ref 0–0)
RBC # BLD: 4.04 M/UL — SIGNIFICANT CHANGE UP (ref 3.8–5.2)
RBC # FLD: 11.8 % — SIGNIFICANT CHANGE UP (ref 10.3–14.5)
RBC BLD AUTO: NORMAL — SIGNIFICANT CHANGE UP
WBC # BLD: 5.7 K/UL — SIGNIFICANT CHANGE UP (ref 3.8–10.5)
WBC # FLD AUTO: 5.7 K/UL — SIGNIFICANT CHANGE UP (ref 3.8–10.5)

## 2019-02-21 PROCEDURE — 99204 OFFICE O/P NEW MOD 45 MIN: CPT

## 2019-02-22 ENCOUNTER — RESULT REVIEW (OUTPATIENT)
Age: 79
End: 2019-02-22

## 2019-02-24 LAB
ALBUMIN SERPL ELPH-MCNC: 4.5 G/DL
ALP BLD-CCNC: 51 U/L
ALT SERPL-CCNC: 20 U/L
ANION GAP SERPL CALC-SCNC: 12 MMOL/L
APTT BLD: 28.3 SEC
AST SERPL-CCNC: 30 U/L
BILIRUB SERPL-MCNC: 0.4 MG/DL
BUN SERPL-MCNC: 14 MG/DL
CALCIUM SERPL-MCNC: 9.5 MG/DL
CHLORIDE SERPL-SCNC: 87 MMOL/L
CO2 SERPL-SCNC: 28 MMOL/L
CREAT SERPL-MCNC: 1.15 MG/DL
GLUCOSE SERPL-MCNC: 110 MG/DL
HBV CORE IGG+IGM SER QL: NONREACTIVE
HBV SURFACE AB SER QL: NONREACTIVE
HBV SURFACE AG SER QL: NONREACTIVE
HCV AB SER QL: NONREACTIVE
HCV S/CO RATIO: 0.13 S/CO
INR PPP: 1.07 RATIO
POTASSIUM SERPL-SCNC: 3.8 MMOL/L
PROT SERPL-MCNC: 7.7 G/DL
PT BLD: 12.3 SEC
SODIUM SERPL-SCNC: 127 MMOL/L

## 2019-02-24 NOTE — ADDENDUM
[FreeTextEntry1] : All medical record entries made by hang Rothman were at Dr. Conner Vee's direction and personally dictated by me on (2/21/2019).

## 2019-02-24 NOTE — HISTORY OF PRESENT ILLNESS
[de-identified] : Ms. Gerson Dave is a 78 year old female presenting for an initial consultation for invasive ductal carcinoma of the left breast. Gerson originally discovered an irregular mass via ultrasound, and then completed an MRI and left core needle biopsy. 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 She then underwent a left breast mastectomy on 12/14/18 with left SLNB and oncoplastic closure.\par \par Breast ultrasound on 10/29/18 revealed: 1.0 cm irregular palpable hypoechoic mass in the left breast at the 2:00 position, corresponding to mammographically visible finding of a left breast mass\par \par Pathology report on 11/6/18 of ultrasound guided left breast core needle biopsy revealed: infiltrating ductal carcinoma, Lena score = 6/9, positive E-Cadherin staining, ER/ID negative, HER2 negative\par \par MRI on 11/16/18 revealed: known left breast carcinoma with enhancement spanning 5.3 cm in the AP dimension. No suspicious enhancement of right breast, and no axillary or internal mammary adenopathy.\par \par Pathology report on 12/21/18 of left breast short superior long lateral mastectomy revealed: ([id number]) invasive mammary duct carcinoma, histological grade 2/3, SBR score 6/9, measuring 2.6 cm in length. Deep surgical resection margin is positive. Anterior surgical resection margin is positive. Both anterior and posterior positive margins belong to upper outer quadrant. Ductal carcinoma in situ, intermediate nuclear grade, solid/cribriform, with calcifications and necrosis. Two sentinel lymph nodes negative for metastatic carcinoma.\par \par  [de-identified] : She states that she transferred care from Dr. Kaiser due to insurance changes\par She mentions that Dr. Kaiser discussed chemotherapy with her, and that she was willing to proceed\par She denies having any cardiac history, but has h/o high blood pressure\par She reports that she generally feels well and believes she healed well from surgery

## 2019-02-26 ENCOUNTER — APPOINTMENT (OUTPATIENT)
Dept: SURGERY | Facility: CLINIC | Age: 79
End: 2019-02-26

## 2019-02-26 ENCOUNTER — RX RENEWAL (OUTPATIENT)
Age: 79
End: 2019-02-26

## 2019-02-26 ENCOUNTER — APPOINTMENT (OUTPATIENT)
Dept: BREAST CENTER | Facility: CLINIC | Age: 79
End: 2019-02-26
Payer: COMMERCIAL

## 2019-02-26 VITALS
TEMPERATURE: 97.8 F | DIASTOLIC BLOOD PRESSURE: 74 MMHG | HEART RATE: 73 BPM | WEIGHT: 127.06 LBS | SYSTOLIC BLOOD PRESSURE: 172 MMHG | BODY MASS INDEX: 23.38 KG/M2 | HEIGHT: 62 IN

## 2019-02-26 PROCEDURE — 99024 POSTOP FOLLOW-UP VISIT: CPT

## 2019-02-26 NOTE — PHYSICAL EXAM
[Normocephalic] : normocephalic [Atraumatic] : atraumatic [EOMI] : extra ocular movement intact [PERRL] : pupils equal, round and reactive to light [Sclera nonicteric] : sclera nonicteric [Supple] : supple [No Supraclavicular Adenopathy] : no supraclavicular adenopathy [Examined in the supine and seated position] : examined in the supine and seated position [No dominant masses] : no dominant masses in right breast  [No Nipple Retraction] : no right nipple retraction [No Nipple Discharge] : no right nipple discharge [Breast Nipple Inversion Right] : nipple not inverted [Breast Nipple Retraction Right] : nipple not retracted [Breast Nipple Flattening Right] : nipple not flattened [Breast Nipple Fissures Right] : nipple not fissured [Breast Abnormal Lactation (Galactorrhea) Right] : no galactorrhea [Breast Abnormal Secretion Bloody Fluid Right] : no bloody discharge [Breast Abnormal Secretion Serous Fluid Right] : no serous discharge [Breast Abnormal Secretion Opalescent Fluid Right] : no milky discharge [No Axillary Lymphadenopathy] : no left axillary lymphadenopathy [No Edema] : no edema [No Rashes] : no rashes [No Ulceration] : no ulceration [de-identified] : No supraclavicular or axillary adenopathy. No dominant masses, normal to palpation. Everted nipple without discharge. No skin changes.\par  [de-identified] : Mastectomy flap healing well/

## 2019-02-26 NOTE — HISTORY OF PRESENT ILLNESS
[FreeTextEntry1] : Referring Physician: Dr. Patton\par I had the pleasure of seeing GERSON JASMINE in the office today for a post operative visit secondary left breast mastectomy with left SLNB with oncoplastic closure on 12/14/2018.\par \par Tenzin Nieto is kristine 77 yo postmenopausal female recently diagnosed with left breast 1 cm invasive ductal carcinoma grade 2 ER negative PA negative Her2 negative from irregular mass seen on ultrasound.  She recently underwent an MRI which demonstrated enhancement of 5.3cm area and discussion of mastectomy v lumpectomy was thoroughly undertaken.  She underwent left breast mastectomy with left SLNB and oncoplastic closure on 12/14/2018.  She is to begin chemotherapy on March 7th 2019  and will be followed by radiation due to positive margins of mastectomy.\par \par She denies skin changes or nipple discharge. She reports a left breast lump which she has had for years but recently felt different. She undergoes mammogram every year since age 40.\par \par G0. Menses began at age 12.\par She denies personal history of malignancy.\par Family history is significant for sister diagnosed with colon cancer.\par \par Imaging: [facility] at [place]\par MRI of the breast\par 11/16/2018 At the 2:00 posteriorly an irregular mass is seen which spans 5.3 x 1.8 x 3.1 cm.  No axillary adenopathy noted. BIRADS 6\par \par \par [facility]\par 10/30/2017 Bilateral screening mammogram with 3-D imaging\par Impression: Heterogenously dense breasts. Focal asymmetric density in the lateral left breast. No evidence of suspicious calcifications. Recommend diagnostic mammogram of left breast and bilateral breast ultrasound. BIRAS 0 incomplete.\par \par 12/1/2017 Unilateral diagnostic mammography of the left breast including 3-D imaging. \par Impression: No evidence of mass lesions. Please see separate breast ultrasound report. The patient may return to annual screening mammography. BIRADS 1 negative.\par \par 12/1/2017 Ultrasound of both breasts\par Impression: Normal ultrasound examination of both breasts. BIRADS 1 negative.\par \par 10/29/2018 Diagnostic mammogram and sonogram\par Impression: 1 cm irregular palpable mass in the left breast at the 2:00 position, corresponding to mammographically visible findings. Ultrasound guided core biopsy advised. BIRADS 5\par \par 12/14/2018  Final Surgical Pathology\par 1.  Left [place] lymph node #1 hot and blue:  One lymph node, negative for metastatic carcinoma. (0/1)\par 2.  Left sentinel lymph node #2 blue:  One lymph node, negative for metastatic carcinoma.\par 3.  Left breast short superior long lateral, mastectomy:  Invasive mammary duct carcinoma, intermediate histological grade 2/3, SBR score 6/9, measuring 2.6 cm in length.  Deep surgical resection margin-corresponding to the chest wall, is positive.  The anterior surgical resection margin corresponding to anterior skin surface.  Both anterior and posterior positive margin belong to the upper outer quadrant, see also gross descriptions.  Ductal carcinoma in situ, intermediate nuclear grade, solid/cribriform, with focal calcifications and necrosis.Negative nipple areolar complex and representative skin.\par \par \par We reviewed and discussed clinical exam and final surgical pathology.  Gerson is healing well with no signs and symptoms of infection.  She understands the invasive tumor measured 2.6 cm in length and 0/2 lymph nodes were negative for metastatic carcinoma.  She further understands the deep surgical resection margin corresponding to the chest wall, is positive.  Both anterior and posterior positive margins belong to the upper outer quadrant.  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  She met with radiation oncologist and recommendation for radiation after chemotherapy for triple negative breast cancer (Dr. Vee is medical oncologist).  She is to begin Chemotherapy on March 7th and anticipates to complete by 5/9/2019.  She will then undergo radiation.  Recommendation for follow up at the end of April.  She understands and agrees to plan.  All questions answered.

## 2019-02-26 NOTE — ASSESSMENT
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  Gerson is healing well with no signs and symptoms of infection.  She understands the invasive tumor measured 2.6 cm in length and 0/2 lymph nodes were negative for metastatic carcinoma.  She further understands the deep surgical resection margin corresponding to the chest wall, is positive.  Both anterior and posterior positive margins belong to the upper outer quadrant.  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  She met with both medical and radiation oncologist.  She will proceed with chemotherapy followed by radiation.  Recommendation for follow up in 8 weeks.\par 1. Follow up with medical oncologist (Chemotherapy 3/7-5/9/2019\par 2. Follow up with radiation oncologist\par 3. Follow up in 8 weeks\par 4.  Continue annual screening mammogram/sonogram right breast

## 2019-03-07 ENCOUNTER — APPOINTMENT (OUTPATIENT)
Age: 79
End: 2019-03-07

## 2019-03-07 ENCOUNTER — RESULT REVIEW (OUTPATIENT)
Age: 79
End: 2019-03-07

## 2019-03-07 ENCOUNTER — LABORATORY RESULT (OUTPATIENT)
Age: 79
End: 2019-03-07

## 2019-03-07 LAB
BASOPHILS # BLD AUTO: 0 K/UL — SIGNIFICANT CHANGE UP (ref 0–0.2)
BASOPHILS NFR BLD AUTO: 0.1 % — SIGNIFICANT CHANGE UP (ref 0–2)
EOSINOPHIL # BLD AUTO: 0 K/UL — SIGNIFICANT CHANGE UP (ref 0–0.5)
EOSINOPHIL NFR BLD AUTO: 0.1 % — SIGNIFICANT CHANGE UP (ref 0–6)
HCT VFR BLD CALC: 36 % — SIGNIFICANT CHANGE UP (ref 34.5–45)
HGB BLD-MCNC: 11.7 G/DL — SIGNIFICANT CHANGE UP (ref 11.5–15.5)
LYMPHOCYTES # BLD AUTO: 1.1 K/UL — SIGNIFICANT CHANGE UP (ref 1–3.3)
LYMPHOCYTES # BLD AUTO: 13.3 % — SIGNIFICANT CHANGE UP (ref 13–44)
MCHC RBC-ENTMCNC: 28.5 PG — SIGNIFICANT CHANGE UP (ref 27–34)
MCHC RBC-ENTMCNC: 32.4 G/DL — SIGNIFICANT CHANGE UP (ref 32–36)
MCV RBC AUTO: 87.9 FL — SIGNIFICANT CHANGE UP (ref 80–100)
MONOCYTES # BLD AUTO: 0.5 K/UL — SIGNIFICANT CHANGE UP (ref 0–0.9)
MONOCYTES NFR BLD AUTO: 6 % — SIGNIFICANT CHANGE UP (ref 2–14)
NEUTROPHILS # BLD AUTO: 6.7 K/UL — SIGNIFICANT CHANGE UP (ref 1.8–7.4)
NEUTROPHILS NFR BLD AUTO: 80.6 % — HIGH (ref 43–77)
PLATELET # BLD AUTO: 249 K/UL — SIGNIFICANT CHANGE UP (ref 150–400)
RBC # BLD: 4.09 M/UL — SIGNIFICANT CHANGE UP (ref 3.8–5.2)
RBC # FLD: 11.9 % — SIGNIFICANT CHANGE UP (ref 10.3–14.5)
WBC # BLD: 8.3 K/UL — SIGNIFICANT CHANGE UP (ref 3.8–10.5)
WBC # FLD AUTO: 8.3 K/UL — SIGNIFICANT CHANGE UP (ref 3.8–10.5)

## 2019-03-07 RX ORDER — AMLODIPINE BESYLATE 2.5 MG/1
1 TABLET ORAL
Qty: 0 | Refills: 0 | COMMUNITY

## 2019-03-07 RX ORDER — FERROUS SULFATE 325(65) MG
1 TABLET ORAL
Qty: 0 | Refills: 0 | COMMUNITY

## 2019-03-07 RX ORDER — SIMVASTATIN 20 MG/1
1 TABLET, FILM COATED ORAL
Qty: 0 | Refills: 0 | COMMUNITY

## 2019-03-07 RX ORDER — ALENDRONATE SODIUM 70 MG/1
1 TABLET ORAL
Qty: 0 | Refills: 0 | COMMUNITY

## 2019-03-07 RX ORDER — INDAPAMIDE 1.25 MG
1 TABLET ORAL
Qty: 0 | Refills: 0 | COMMUNITY

## 2019-03-07 RX ORDER — LISINOPRIL 2.5 MG/1
1 TABLET ORAL
Qty: 0 | Refills: 0 | COMMUNITY

## 2019-03-08 DIAGNOSIS — R11.2 NAUSEA WITH VOMITING, UNSPECIFIED: ICD-10-CM

## 2019-03-08 DIAGNOSIS — Z51.11 ENCOUNTER FOR ANTINEOPLASTIC CHEMOTHERAPY: ICD-10-CM

## 2019-03-08 DIAGNOSIS — Z51.89 ENCOUNTER FOR OTHER SPECIFIED AFTERCARE: ICD-10-CM

## 2019-03-11 ENCOUNTER — OUTPATIENT (OUTPATIENT)
Dept: OUTPATIENT SERVICES | Facility: HOSPITAL | Age: 79
LOS: 1 days | Discharge: HOME | End: 2019-03-11

## 2019-03-11 DIAGNOSIS — Z51.89 ENCOUNTER FOR OTHER SPECIFIED AFTERCARE: ICD-10-CM

## 2019-03-11 DIAGNOSIS — C50.919 MALIGNANT NEOPLASM OF UNSPECIFIED SITE OF UNSPECIFIED FEMALE BREAST: ICD-10-CM

## 2019-03-11 DIAGNOSIS — Z90.710 ACQUIRED ABSENCE OF BOTH CERVIX AND UTERUS: Chronic | ICD-10-CM

## 2019-03-20 ENCOUNTER — APPOINTMENT (OUTPATIENT)
Dept: HEMATOLOGY ONCOLOGY | Facility: CLINIC | Age: 79
End: 2019-03-20
Payer: COMMERCIAL

## 2019-03-20 ENCOUNTER — RESULT REVIEW (OUTPATIENT)
Age: 79
End: 2019-03-20

## 2019-03-20 VITALS
OXYGEN SATURATION: 98 % | TEMPERATURE: 97.8 F | BODY MASS INDEX: 23.42 KG/M2 | WEIGHT: 127.25 LBS | SYSTOLIC BLOOD PRESSURE: 143 MMHG | DIASTOLIC BLOOD PRESSURE: 68 MMHG | HEIGHT: 62 IN | HEART RATE: 87 BPM

## 2019-03-20 LAB
ANISOCYTOSIS BLD QL: SLIGHT — SIGNIFICANT CHANGE UP
BASOPHILS # BLD AUTO: 0.2 K/UL — SIGNIFICANT CHANGE UP (ref 0–0.2)
EOSINOPHIL # BLD AUTO: 0 K/UL — SIGNIFICANT CHANGE UP (ref 0–0.5)
GIANT PLATELETS BLD QL SMEAR: PRESENT — SIGNIFICANT CHANGE UP
HCT VFR BLD CALC: 32.4 % — LOW (ref 34.5–45)
HGB BLD-MCNC: 10.5 G/DL — LOW (ref 11.5–15.5)
HYPOCHROMIA BLD QL: SLIGHT — SIGNIFICANT CHANGE UP
LG PLATELETS BLD QL AUTO: SLIGHT — SIGNIFICANT CHANGE UP
LYMPHOCYTES # BLD AUTO: 1.6 K/UL — SIGNIFICANT CHANGE UP (ref 1–3.3)
LYMPHOCYTES # BLD AUTO: 7 % — LOW (ref 13–44)
MACROCYTES BLD QL: SLIGHT — SIGNIFICANT CHANGE UP
MCHC RBC-ENTMCNC: 28.8 PG — SIGNIFICANT CHANGE UP (ref 27–34)
MCHC RBC-ENTMCNC: 32.5 G/DL — SIGNIFICANT CHANGE UP (ref 32–36)
MCV RBC AUTO: 88.5 FL — SIGNIFICANT CHANGE UP (ref 80–100)
METAMYELOCYTES # FLD: 2 % — HIGH (ref 0–0)
MICROCYTES BLD QL: SLIGHT — SIGNIFICANT CHANGE UP
MONOCYTES # BLD AUTO: 1.7 K/UL — HIGH (ref 0–0.9)
MONOCYTES NFR BLD AUTO: 4 % — SIGNIFICANT CHANGE UP (ref 2–14)
MYELOCYTES NFR BLD: 1 % — HIGH (ref 0–0)
NEUTROPHILS # BLD AUTO: 18 K/UL — HIGH (ref 1.8–7.4)
NEUTROPHILS NFR BLD AUTO: 84 % — HIGH (ref 43–77)
NEUTS BAND # BLD: 2 % — SIGNIFICANT CHANGE UP (ref 0–8)
NRBC # BLD: 1 /100 — HIGH (ref 0–0)
PLAT MORPH BLD: NORMAL — SIGNIFICANT CHANGE UP
PLATELET # BLD AUTO: 207 K/UL — SIGNIFICANT CHANGE UP (ref 150–400)
POIKILOCYTOSIS BLD QL AUTO: SLIGHT — SIGNIFICANT CHANGE UP
POLYCHROMASIA BLD QL SMEAR: SLIGHT — SIGNIFICANT CHANGE UP
RBC # BLD: 3.66 M/UL — LOW (ref 3.8–5.2)
RBC # FLD: 12 % — SIGNIFICANT CHANGE UP (ref 10.3–14.5)
RBC BLD AUTO: SIGNIFICANT CHANGE UP
TOXIC GRANULES BLD QL SMEAR: PRESENT — SIGNIFICANT CHANGE UP
WBC # BLD: 21.6 K/UL — HIGH (ref 3.8–10.5)
WBC # FLD AUTO: 21.6 K/UL — HIGH (ref 3.8–10.5)

## 2019-03-20 PROCEDURE — 99213 OFFICE O/P EST LOW 20 MIN: CPT

## 2019-03-20 NOTE — HISTORY OF PRESENT ILLNESS
[de-identified] : Ms. Gerson Dave is a 78 year old female following up for invasive ductal carcinoma of the left breast. Gerson originally discovered an irregular mass via ultrasound, and then completed an MRI and left core needle biopsy. 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 She then underwent a left breast mastectomy on 12/14/18 with left SLNB and oncoplastic closure. Gerson has started treatment with Taxotere/Cytoxan chemotherapy, and is anticipated to undergo radiation therapy with Dr. Lomeli afterwards due to positive margins.\par \par Pathology report on 11/6/18 of ultrasound guided left breast core needle biopsy revealed: infiltrating ductal carcinoma, Brie score = 6/9, positive E-Cadherin staining, ER/SC negative, HER2 negative\par \par Pathology report on 12/21/18 of left breast short superior long lateral mastectomy revealed: ([id number]) invasive mammary duct carcinoma, histological grade 2/3, SBR score 6/9, measuring 2.6 cm in length. Deep surgical resection margin is positive. Anterior surgical resection margin is positive. Both anterior and posterior positive margins belong to upper outer quadrant. Ductal carcinoma in situ, intermediate nuclear grade, solid/cribriform, with calcifications and necrosis. Two sentinel lymph nodes negative for metastatic carcinoma.\par \par  [de-identified] : She presents as a follow up s/p her first cycle of chemotherapy\par She states that she was fatigued 2 days after her cycle, and this lasted about 2 days. Otherwise, she tolerated well\par She mentions that her hair has been falling out

## 2019-03-20 NOTE — ASSESSMENT
[FreeTextEntry1] : Triple negative invasive mammary duct carcinoma of left breast, positive margins, s/p mastectomy. Started on Taxotere/Cytoxan, anticipating RT afterwards.\par \par Plan:\par -Continue chemotherapy regimen with Taxotere/Cytoxan\par -follow up based on treatment schedule\par

## 2019-03-20 NOTE — ADDENDUM
[FreeTextEntry1] : All medical record entries made by hang Rothman were at Dr. Conner Vee's direction and personally dictated by me on (3/20/2019).

## 2019-03-21 LAB
ALBUMIN SERPL ELPH-MCNC: 4.2 G/DL
ALP BLD-CCNC: 116 U/L
ALT SERPL-CCNC: 23 U/L
ANION GAP SERPL CALC-SCNC: 19 MMOL/L
AST SERPL-CCNC: 29 U/L
BILIRUB SERPL-MCNC: 0.2 MG/DL
BUN SERPL-MCNC: 11 MG/DL
CALCIUM SERPL-MCNC: 9.3 MG/DL
CHLORIDE SERPL-SCNC: 94 MMOL/L
CO2 SERPL-SCNC: 24 MMOL/L
CREAT SERPL-MCNC: 1.05 MG/DL
POTASSIUM SERPL-SCNC: 3.3 MMOL/L
PROT SERPL-MCNC: 6.8 G/DL
SODIUM SERPL-SCNC: 137 MMOL/L

## 2019-03-28 ENCOUNTER — RESULT REVIEW (OUTPATIENT)
Age: 79
End: 2019-03-28

## 2019-03-28 ENCOUNTER — APPOINTMENT (OUTPATIENT)
Age: 79
End: 2019-03-28

## 2019-03-28 ENCOUNTER — LABORATORY RESULT (OUTPATIENT)
Age: 79
End: 2019-03-28

## 2019-03-28 LAB
BASOPHILS # BLD AUTO: 0.1 K/UL — SIGNIFICANT CHANGE UP (ref 0–0.2)
BASOPHILS NFR BLD AUTO: 1.3 % — SIGNIFICANT CHANGE UP (ref 0–2)
EOSINOPHIL # BLD AUTO: 0 K/UL — SIGNIFICANT CHANGE UP (ref 0–0.5)
EOSINOPHIL NFR BLD AUTO: 0.1 % — SIGNIFICANT CHANGE UP (ref 0–6)
HCT VFR BLD CALC: 32.5 % — LOW (ref 34.5–45)
HGB BLD-MCNC: 10.4 G/DL — LOW (ref 11.5–15.5)
LYMPHOCYTES # BLD AUTO: 0.9 K/UL — LOW (ref 1–3.3)
LYMPHOCYTES # BLD AUTO: 9 % — LOW (ref 13–44)
MCHC RBC-ENTMCNC: 28 PG — SIGNIFICANT CHANGE UP (ref 27–34)
MCHC RBC-ENTMCNC: 31.9 G/DL — LOW (ref 32–36)
MCV RBC AUTO: 87.7 FL — SIGNIFICANT CHANGE UP (ref 80–100)
MONOCYTES # BLD AUTO: 0.8 K/UL — SIGNIFICANT CHANGE UP (ref 0–0.9)
MONOCYTES NFR BLD AUTO: 8.2 % — SIGNIFICANT CHANGE UP (ref 2–14)
NEUTROPHILS # BLD AUTO: 8 K/UL — HIGH (ref 1.8–7.4)
NEUTROPHILS NFR BLD AUTO: 81.4 % — HIGH (ref 43–77)
PLATELET # BLD AUTO: 364 K/UL — SIGNIFICANT CHANGE UP (ref 150–400)
RBC # BLD: 3.71 M/UL — LOW (ref 3.8–5.2)
RBC # FLD: 12.9 % — SIGNIFICANT CHANGE UP (ref 10.3–14.5)
WBC # BLD: 9.8 K/UL — SIGNIFICANT CHANGE UP (ref 3.8–10.5)
WBC # FLD AUTO: 9.8 K/UL — SIGNIFICANT CHANGE UP (ref 3.8–10.5)

## 2019-03-29 DIAGNOSIS — Z51.11 ENCOUNTER FOR ANTINEOPLASTIC CHEMOTHERAPY: ICD-10-CM

## 2019-03-29 DIAGNOSIS — R11.2 NAUSEA WITH VOMITING, UNSPECIFIED: ICD-10-CM

## 2019-04-09 ENCOUNTER — OUTPATIENT (OUTPATIENT)
Dept: OUTPATIENT SERVICES | Facility: HOSPITAL | Age: 79
LOS: 1 days | Discharge: HOME | End: 2019-04-09

## 2019-04-09 DIAGNOSIS — C50.919 MALIGNANT NEOPLASM OF UNSPECIFIED SITE OF UNSPECIFIED FEMALE BREAST: ICD-10-CM

## 2019-04-09 DIAGNOSIS — Z90.710 ACQUIRED ABSENCE OF BOTH CERVIX AND UTERUS: Chronic | ICD-10-CM

## 2019-04-10 ENCOUNTER — APPOINTMENT (OUTPATIENT)
Dept: HEMATOLOGY ONCOLOGY | Facility: CLINIC | Age: 79
End: 2019-04-10
Payer: MEDICARE

## 2019-04-10 ENCOUNTER — RESULT REVIEW (OUTPATIENT)
Age: 79
End: 2019-04-10

## 2019-04-10 VITALS
WEIGHT: 125.04 LBS | BODY MASS INDEX: 23.01 KG/M2 | DIASTOLIC BLOOD PRESSURE: 65 MMHG | TEMPERATURE: 98.1 F | HEIGHT: 62 IN | HEART RATE: 87 BPM | OXYGEN SATURATION: 94 % | SYSTOLIC BLOOD PRESSURE: 131 MMHG

## 2019-04-10 LAB
ANISOCYTOSIS BLD QL: SLIGHT — SIGNIFICANT CHANGE UP
BASOPHILS # BLD AUTO: 0.4 K/UL — HIGH (ref 0–0.2)
BASOPHILS NFR BLD AUTO: 2 % — SIGNIFICANT CHANGE UP (ref 0–2)
EOSINOPHIL # BLD AUTO: 0.1 K/UL — SIGNIFICANT CHANGE UP (ref 0–0.5)
EOSINOPHIL NFR BLD AUTO: 2 % — SIGNIFICANT CHANGE UP (ref 0–6)
GIANT PLATELETS BLD QL SMEAR: PRESENT — SIGNIFICANT CHANGE UP
HCT VFR BLD CALC: 28.9 % — LOW (ref 34.5–45)
HGB BLD-MCNC: 9.6 G/DL — LOW (ref 11.5–15.5)
HYPOCHROMIA BLD QL: SLIGHT — SIGNIFICANT CHANGE UP
LG PLATELETS BLD QL AUTO: SLIGHT — SIGNIFICANT CHANGE UP
LYMPHOCYTES # BLD AUTO: 2 K/UL — SIGNIFICANT CHANGE UP (ref 1–3.3)
LYMPHOCYTES # BLD AUTO: 8 % — LOW (ref 13–44)
MACROCYTES BLD QL: SLIGHT — SIGNIFICANT CHANGE UP
MCHC RBC-ENTMCNC: 29.3 PG — SIGNIFICANT CHANGE UP (ref 27–34)
MCHC RBC-ENTMCNC: 33.1 G/DL — SIGNIFICANT CHANGE UP (ref 32–36)
MCV RBC AUTO: 88.5 FL — SIGNIFICANT CHANGE UP (ref 80–100)
METAMYELOCYTES # FLD: 1 % — HIGH (ref 0–0)
MICROCYTES BLD QL: SLIGHT — SIGNIFICANT CHANGE UP
MONOCYTES # BLD AUTO: 1.4 K/UL — HIGH (ref 0–0.9)
MONOCYTES NFR BLD AUTO: 7 % — SIGNIFICANT CHANGE UP (ref 2–14)
MYELOCYTES NFR BLD: 4 % — HIGH (ref 0–0)
NEUTROPHILS # BLD AUTO: 16.6 K/UL — HIGH (ref 1.8–7.4)
NEUTROPHILS NFR BLD AUTO: 76 % — SIGNIFICANT CHANGE UP (ref 43–77)
NRBC # BLD: 2 /100 — HIGH (ref 0–0)
PLAT MORPH BLD: NORMAL — SIGNIFICANT CHANGE UP
PLATELET # BLD AUTO: 295 K/UL — SIGNIFICANT CHANGE UP (ref 150–400)
POIKILOCYTOSIS BLD QL AUTO: SLIGHT — SIGNIFICANT CHANGE UP
POLYCHROMASIA BLD QL SMEAR: SLIGHT — SIGNIFICANT CHANGE UP
RBC # BLD: 3.26 M/UL — LOW (ref 3.8–5.2)
RBC # FLD: 13 % — SIGNIFICANT CHANGE UP (ref 10.3–14.5)
RBC BLD AUTO: SIGNIFICANT CHANGE UP
TOXIC GRANULES BLD QL SMEAR: PRESENT — SIGNIFICANT CHANGE UP
WBC # BLD: 20.6 K/UL — HIGH (ref 3.8–10.5)
WBC # FLD AUTO: 20.6 K/UL — HIGH (ref 3.8–10.5)

## 2019-04-10 PROCEDURE — 99214 OFFICE O/P EST MOD 30 MIN: CPT

## 2019-04-12 NOTE — ADDENDUM
[FreeTextEntry1] : All medical record entries made by hang Rothman were at Dr. Conner Vee's direction and personally dictated by me on (4/10/2019).

## 2019-04-12 NOTE — HISTORY OF PRESENT ILLNESS
[de-identified] : She presents as a follow up s/p her second cycle of chemotherapy\par She states that the second cycle was more difficult than the first\par She complains that after chemotherapy she is unable to properly taste food\par She mentions that her diet is not as healthy now\par She reports she had one incident where minimal blood was seen on a tissue after blowing nose [de-identified] : Ms. Gerson Dave is a 79 year old female following up for invasive ductal carcinoma of the left breast. Gerson originally discovered an irregular mass via ultrasound, and then completed an MRI and left core needle biopsy. 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 She then underwent a left breast mastectomy on 12/14/18 with left SLNB and oncoplastic closure, however, margins were positive. Gerson has started treatment with Taxotere/Cytoxan chemotherapy, and is anticipated to undergo radiation therapy with Dr. Lomeli afterwards due to positive margins.\par \par Pathology report on 11/6/18 of ultrasound guided left breast core needle biopsy revealed: infiltrating ductal carcinoma, Brie score = 6/9, positive E-Cadherin staining, ER/MA negative, HER2 negative\par \par Pathology report on 12/21/18 of left breast short superior long lateral mastectomy revealed: ([id number]) invasive mammary duct carcinoma, histological grade 2/3, SBR score 6/9, measuring 2.6 cm in length. Deep surgical resection margin is positive. Anterior surgical resection margin is positive. Both anterior and posterior positive margins belong to upper outer quadrant. Ductal carcinoma in situ, intermediate nuclear grade, solid/cribriform, with calcifications and necrosis. Two sentinel lymph nodes negative for metastatic carcinoma.\par \par

## 2019-04-12 NOTE — ASSESSMENT
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 Tolerating Taxotere/Cytoxan well, planned for 4 cycles, followed by RT with Dr. Lomeli.\par \par Plan:\par -Continue chemotherapy regimen with Taxotere/Cytoxan\par -Follow up based on treatment schedule\par

## 2019-04-18 ENCOUNTER — LABORATORY RESULT (OUTPATIENT)
Age: 79
End: 2019-04-18

## 2019-04-18 ENCOUNTER — APPOINTMENT (OUTPATIENT)
Age: 79
End: 2019-04-18

## 2019-04-18 ENCOUNTER — RESULT REVIEW (OUTPATIENT)
Age: 79
End: 2019-04-18

## 2019-04-18 LAB
BASOPHILS # BLD AUTO: 0.3 K/UL — HIGH (ref 0–0.2)
BASOPHILS NFR BLD AUTO: 2.3 % — HIGH (ref 0–2)
EOSINOPHIL # BLD AUTO: 0.1 K/UL — SIGNIFICANT CHANGE UP (ref 0–0.5)
EOSINOPHIL NFR BLD AUTO: 0.5 % — SIGNIFICANT CHANGE UP (ref 0–6)
HCT VFR BLD CALC: 29.3 % — LOW (ref 34.5–45)
HGB BLD-MCNC: 9.5 G/DL — LOW (ref 11.5–15.5)
LYMPHOCYTES # BLD AUTO: 1.5 K/UL — SIGNIFICANT CHANGE UP (ref 1–3.3)
LYMPHOCYTES # BLD AUTO: 12.4 % — LOW (ref 13–44)
MCHC RBC-ENTMCNC: 28.8 PG — SIGNIFICANT CHANGE UP (ref 27–34)
MCHC RBC-ENTMCNC: 32.6 G/DL — SIGNIFICANT CHANGE UP (ref 32–36)
MCV RBC AUTO: 88.3 FL — SIGNIFICANT CHANGE UP (ref 80–100)
MONOCYTES # BLD AUTO: 1 K/UL — HIGH (ref 0–0.9)
MONOCYTES NFR BLD AUTO: 8 % — SIGNIFICANT CHANGE UP (ref 2–14)
NEUTROPHILS # BLD AUTO: 9.3 K/UL — HIGH (ref 1.8–7.4)
NEUTROPHILS NFR BLD AUTO: 76.9 % — SIGNIFICANT CHANGE UP (ref 43–77)
PLATELET # BLD AUTO: 332 K/UL — SIGNIFICANT CHANGE UP (ref 150–400)
RBC # BLD: 3.32 M/UL — LOW (ref 3.8–5.2)
RBC # FLD: 14 % — SIGNIFICANT CHANGE UP (ref 10.3–14.5)
WBC # BLD: 12 K/UL — HIGH (ref 3.8–10.5)
WBC # FLD AUTO: 12 K/UL — HIGH (ref 3.8–10.5)

## 2019-04-19 DIAGNOSIS — Z51.11 ENCOUNTER FOR ANTINEOPLASTIC CHEMOTHERAPY: ICD-10-CM

## 2019-04-19 DIAGNOSIS — R11.2 NAUSEA WITH VOMITING, UNSPECIFIED: ICD-10-CM

## 2019-04-19 DIAGNOSIS — Z51.89 ENCOUNTER FOR OTHER SPECIFIED AFTERCARE: ICD-10-CM

## 2019-04-30 ENCOUNTER — APPOINTMENT (OUTPATIENT)
Dept: SURGERY | Facility: CLINIC | Age: 79
End: 2019-04-30

## 2019-04-30 ENCOUNTER — APPOINTMENT (OUTPATIENT)
Dept: SURGERY | Facility: CLINIC | Age: 79
End: 2019-04-30
Payer: COMMERCIAL

## 2019-04-30 VITALS
BODY MASS INDEX: 23.23 KG/M2 | HEIGHT: 62 IN | SYSTOLIC BLOOD PRESSURE: 117 MMHG | WEIGHT: 126.25 LBS | DIASTOLIC BLOOD PRESSURE: 66 MMHG | HEART RATE: 88 BPM

## 2019-04-30 PROCEDURE — 99214 OFFICE O/P EST MOD 30 MIN: CPT

## 2019-05-02 ENCOUNTER — RESULT REVIEW (OUTPATIENT)
Age: 79
End: 2019-05-02

## 2019-05-02 ENCOUNTER — APPOINTMENT (OUTPATIENT)
Dept: HEMATOLOGY ONCOLOGY | Facility: CLINIC | Age: 79
End: 2019-05-02
Payer: MEDICARE

## 2019-05-02 VITALS
BODY MASS INDEX: 23.19 KG/M2 | TEMPERATURE: 98.2 F | WEIGHT: 126 LBS | OXYGEN SATURATION: 98 % | HEIGHT: 62 IN | HEART RATE: 76 BPM | DIASTOLIC BLOOD PRESSURE: 72 MMHG | SYSTOLIC BLOOD PRESSURE: 129 MMHG

## 2019-05-02 LAB
BASOPHILS # BLD AUTO: 0.1 K/UL — SIGNIFICANT CHANGE UP (ref 0–0.2)
BASOPHILS NFR BLD AUTO: 1 % — SIGNIFICANT CHANGE UP (ref 0–2)
EOSINOPHIL # BLD AUTO: 0.1 K/UL — SIGNIFICANT CHANGE UP (ref 0–0.5)
EOSINOPHIL NFR BLD AUTO: 0.5 % — SIGNIFICANT CHANGE UP (ref 0–6)
HCT VFR BLD CALC: 29.6 % — LOW (ref 34.5–45)
HGB BLD-MCNC: 9.3 G/DL — LOW (ref 11.5–15.5)
LYMPHOCYTES # BLD AUTO: 1.7 K/UL — SIGNIFICANT CHANGE UP (ref 1–3.3)
LYMPHOCYTES # BLD AUTO: 14.5 % — SIGNIFICANT CHANGE UP (ref 13–44)
MCHC RBC-ENTMCNC: 28.5 PG — SIGNIFICANT CHANGE UP (ref 27–34)
MCHC RBC-ENTMCNC: 31.4 G/DL — LOW (ref 32–36)
MCV RBC AUTO: 90.6 FL — SIGNIFICANT CHANGE UP (ref 80–100)
MONOCYTES # BLD AUTO: 0.9 K/UL — SIGNIFICANT CHANGE UP (ref 0–0.9)
MONOCYTES NFR BLD AUTO: 7.7 % — SIGNIFICANT CHANGE UP (ref 2–14)
NEUTROPHILS # BLD AUTO: 9.1 K/UL — HIGH (ref 1.8–7.4)
NEUTROPHILS NFR BLD AUTO: 76.3 % — SIGNIFICANT CHANGE UP (ref 43–77)
PLATELET # BLD AUTO: 277 K/UL — SIGNIFICANT CHANGE UP (ref 150–400)
RBC # BLD: 3.26 M/UL — LOW (ref 3.8–5.2)
RBC # FLD: 14.8 % — HIGH (ref 10.3–14.5)
WBC # BLD: 11.9 K/UL — HIGH (ref 3.8–10.5)
WBC # FLD AUTO: 11.9 K/UL — HIGH (ref 3.8–10.5)

## 2019-05-02 PROCEDURE — 99214 OFFICE O/P EST MOD 30 MIN: CPT

## 2019-05-03 LAB
ALBUMIN SERPL ELPH-MCNC: 4.1 G/DL
ALP BLD-CCNC: 91 U/L
ALT SERPL-CCNC: 16 U/L
ANION GAP SERPL CALC-SCNC: 14 MMOL/L
AST SERPL-CCNC: 18 U/L
BILIRUB SERPL-MCNC: 0.4 MG/DL
BUN SERPL-MCNC: 13 MG/DL
CALCIUM SERPL-MCNC: 9.5 MG/DL
CHLORIDE SERPL-SCNC: 95 MMOL/L
CO2 SERPL-SCNC: 26 MMOL/L
CREAT SERPL-MCNC: 1.05 MG/DL
GLUCOSE SERPL-MCNC: 80 MG/DL
POTASSIUM SERPL-SCNC: 3.6 MMOL/L
PROT SERPL-MCNC: 6.6 G/DL
SODIUM SERPL-SCNC: 135 MMOL/L

## 2019-05-04 NOTE — ASSESSMENT
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 Tolerating Taxotere/Cytoxan well, planned for 4 cycles, followed by RT with Dr. Lomeli.\par \par Plan:\par -Continue chemotherapy regimen with Taxotere/Cytoxan (one more cycle)\par -Follow up with Dr. Lomeli \par -RTO based on treatment schedule\par

## 2019-05-04 NOTE — HISTORY OF PRESENT ILLNESS
[de-identified] : Ms. Gerson Dave is a 79 year old female following up for invasive ductal carcinoma of the left breast. Originally discovered an irregular mass via ultrasound, and then completed an MRI and left core needle biopsy. 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 She then underwent a left breast mastectomy on 12/14/18 with left SLNB and oncoplastic closure, however, margins were positive. Initiated treatment with Taxotere/Cytoxan chemotherapy, and is anticipated to undergo radiation therapy with Dr. Lomeli afterwards due to positive margins.\par \par Pathology report on 11/6/18 of ultrasound guided left breast core needle biopsy revealed: infiltrating ductal carcinoma, Brie score = 6/9, positive E-Cadherin staining, ER/NV negative, HER2 negative\par \par Pathology report on 12/21/18 of left breast short superior long lateral mastectomy revealed: ([id number]) invasive mammary duct carcinoma, histological grade 2/3, SBR score 6/9, measuring 2.6 cm in length. Deep surgical resection margin is positive. Anterior surgical resection margin is positive. Both anterior and posterior positive margins belong to upper outer quadrant. Ductal carcinoma in situ, intermediate nuclear grade, solid/cribriform, with calcifications and necrosis. Two sentinel lymph nodes negative for metastatic carcinoma.\par \par  [de-identified] : Feeling well.\par Has poor appetite the first week after treatment, but eats well once appetite returns.

## 2019-05-04 NOTE — ADDENDUM
[FreeTextEntry1] : All medical record entries made by hang Rothman were at Dr. Conner Vee's direction and personally dictated by me on (5/2/2019).

## 2019-05-09 ENCOUNTER — LABORATORY RESULT (OUTPATIENT)
Age: 79
End: 2019-05-09

## 2019-05-09 ENCOUNTER — RESULT REVIEW (OUTPATIENT)
Age: 79
End: 2019-05-09

## 2019-05-09 ENCOUNTER — APPOINTMENT (OUTPATIENT)
Age: 79
End: 2019-05-09

## 2019-05-09 LAB
BASOPHILS # BLD AUTO: 0.1 K/UL — SIGNIFICANT CHANGE UP (ref 0–0.2)
BASOPHILS NFR BLD AUTO: 1 % — SIGNIFICANT CHANGE UP (ref 0–2)
EOSINOPHIL # BLD AUTO: 0 K/UL — SIGNIFICANT CHANGE UP (ref 0–0.5)
EOSINOPHIL NFR BLD AUTO: 0 % — SIGNIFICANT CHANGE UP (ref 0–6)
HCT VFR BLD CALC: 30.6 % — LOW (ref 34.5–45)
HGB BLD-MCNC: 9.6 G/DL — LOW (ref 11.5–15.5)
LYMPHOCYTES # BLD AUTO: 0.7 K/UL — LOW (ref 1–3.3)
LYMPHOCYTES # BLD AUTO: 7.3 % — LOW (ref 13–44)
MCHC RBC-ENTMCNC: 28.4 PG — SIGNIFICANT CHANGE UP (ref 27–34)
MCHC RBC-ENTMCNC: 31.3 G/DL — LOW (ref 32–36)
MCV RBC AUTO: 91 FL — SIGNIFICANT CHANGE UP (ref 80–100)
MONOCYTES # BLD AUTO: 1.1 K/UL — HIGH (ref 0–0.9)
MONOCYTES NFR BLD AUTO: 12.3 % — SIGNIFICANT CHANGE UP (ref 2–14)
NEUTROPHILS # BLD AUTO: 7.1 K/UL — SIGNIFICANT CHANGE UP (ref 1.8–7.4)
NEUTROPHILS NFR BLD AUTO: 79.4 % — HIGH (ref 43–77)
PLATELET # BLD AUTO: 256 K/UL — SIGNIFICANT CHANGE UP (ref 150–400)
RBC # BLD: 3.37 M/UL — LOW (ref 3.8–5.2)
RBC # FLD: 15.3 % — HIGH (ref 10.3–14.5)
WBC # BLD: 9 K/UL — SIGNIFICANT CHANGE UP (ref 3.8–10.5)
WBC # FLD AUTO: 9 K/UL — SIGNIFICANT CHANGE UP (ref 3.8–10.5)

## 2019-05-11 ENCOUNTER — RX RENEWAL (OUTPATIENT)
Age: 79
End: 2019-05-11

## 2019-05-21 ENCOUNTER — OUTPATIENT (OUTPATIENT)
Dept: OUTPATIENT SERVICES | Facility: HOSPITAL | Age: 79
LOS: 1 days | Discharge: HOME | End: 2019-05-21

## 2019-05-21 DIAGNOSIS — Z90.710 ACQUIRED ABSENCE OF BOTH CERVIX AND UTERUS: Chronic | ICD-10-CM

## 2019-05-21 DIAGNOSIS — C50.919 MALIGNANT NEOPLASM OF UNSPECIFIED SITE OF UNSPECIFIED FEMALE BREAST: ICD-10-CM

## 2019-05-23 ENCOUNTER — APPOINTMENT (OUTPATIENT)
Dept: HEMATOLOGY ONCOLOGY | Facility: CLINIC | Age: 79
End: 2019-05-23
Payer: MEDICARE

## 2019-05-23 VITALS
OXYGEN SATURATION: 100 % | BODY MASS INDEX: 23.38 KG/M2 | SYSTOLIC BLOOD PRESSURE: 102 MMHG | DIASTOLIC BLOOD PRESSURE: 62 MMHG | HEIGHT: 62 IN | TEMPERATURE: 98.3 F | HEART RATE: 76 BPM | WEIGHT: 127.02 LBS

## 2019-05-23 PROCEDURE — 99214 OFFICE O/P EST MOD 30 MIN: CPT

## 2019-05-28 ENCOUNTER — OUTPATIENT (OUTPATIENT)
Dept: OUTPATIENT SERVICES | Facility: HOSPITAL | Age: 79
LOS: 1 days | Discharge: HOME | End: 2019-05-28
Payer: MEDICARE

## 2019-05-28 DIAGNOSIS — Z90.710 ACQUIRED ABSENCE OF BOTH CERVIX AND UTERUS: Chronic | ICD-10-CM

## 2019-05-28 NOTE — HISTORY OF PRESENT ILLNESS
[de-identified] : 79 year old female following up for invasive ductal carcinoma of the left breast. Originally discovered an irregular mass via ultrasound, and then completed an MRI and left core needle biopsy. 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 She then underwent a left breast mastectomy on 12/14/18 with left SLNB and oncoplastic closure, however, margins were positive. Initiated treatment with Taxotere/Cytoxan chemotherapy, and is anticipated to undergo radiation therapy with Dr. Lomeli afterwards due to positive margins.\par \par Pathology report on 11/6/18 of ultrasound guided left breast core needle biopsy revealed: infiltrating ductal carcinoma, Brie score = 6/9, positive E-Cadherin staining, ER/MI negative, HER2 negative\par \par Pathology report on 12/21/18 of left breast short superior long lateral mastectomy revealed: ([id number]) invasive mammary duct carcinoma, histological grade 2/3, SBR score 6/9, measuring 2.6 cm in length. Deep surgical resection margin is positive. Anterior surgical resection margin is positive. Both anterior and posterior positive margins belong to upper outer quadrant. Ductal carcinoma in situ, intermediate nuclear grade, solid/cribriform, with calcifications and necrosis. Two sentinel lymph nodes negative for metastatic carcinoma.\par \par  [de-identified] : Feeling well. She is 2 weeks out from her last treatment and complaining of bilateral eye excess tear production. \par \par Labs taken 5/9/2019: \par o CMP: Sodium 134, potassium 4.0, chloride 97, CO2 23, Glucose 97, BUN 17, Creatinine 1.08, TP 7.0, Albumin 4.0, serum calcium 9.6, total bilirubin 0.4, AST 25, ALT 13, Alk Phos 59\par o CBC: WBC: 9.0, HGB 9.6, RBC 3.37, HCT 30.6, \par

## 2019-05-28 NOTE — ASSESSMENT
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 Tolerated Taxotere/Cytoxan well. Finished last treatment 2 weeks ago. She is followed by RT with Dr. Lomeli.\par \par Plan:\par -s/p chemotherapy with Taxotere/Cytoxan, last cycle 2 wks ago\par -Follow up with Dr. Lomeli \par -Dexamethasone ophthalmic drops were prescribed\par -Pt will FU here in 3 months.

## 2019-05-28 NOTE — ADDENDUM
[FreeTextEntry1] : I, Mendoza Mary, acted solely as a scribe for Dr. Conner Vee on this date 05/23/2019.

## 2019-05-30 ENCOUNTER — APPOINTMENT (OUTPATIENT)
Dept: RADIATION ONCOLOGY | Facility: CLINIC | Age: 79
End: 2019-05-30
Payer: MEDICARE

## 2019-05-30 ENCOUNTER — APPOINTMENT (OUTPATIENT)
Age: 79
End: 2019-05-30

## 2019-05-30 VITALS
BODY MASS INDEX: 24.29 KG/M2 | DIASTOLIC BLOOD PRESSURE: 62 MMHG | HEART RATE: 88 BPM | OXYGEN SATURATION: 98 % | TEMPERATURE: 97.5 F | HEIGHT: 62 IN | SYSTOLIC BLOOD PRESSURE: 98 MMHG | RESPIRATION RATE: 16 BRPM | WEIGHT: 132 LBS

## 2019-05-30 PROCEDURE — 77263 THER RADIOLOGY TX PLNG CPLX: CPT

## 2019-05-30 PROCEDURE — 99213 OFFICE O/P EST LOW 20 MIN: CPT | Mod: 25

## 2019-05-30 PROCEDURE — 99215 OFFICE O/P EST HI 40 MIN: CPT | Mod: 25

## 2019-05-31 NOTE — DISEASE MANAGEMENT
[Pathological] : TNM Stage: p [IIA] : IIA [FreeTextEntry4] : IDCA [TTNM] : 2 [NTNM] : 0 [MTNM] : 0 [de-identified] : 4706 [de-identified] : left chest

## 2019-05-31 NOTE — PHYSICAL EXAM
[Normal] : no palpable adenopathy [de-identified] : status post left mastectomy [FreeTextEntry1] : deferred [de-identified] : deferred [de-identified] : b/l ankle edema, right > left

## 2019-05-31 NOTE — ASSESSMENT
[No evidence of disease] : No evidence of disease [FreeTextEntry1] : minimal chemotherapy related sequelae.

## 2019-05-31 NOTE — REVIEW OF SYSTEMS
[Patient Intake Form Reviewed] : Patient intake form was reviewed [Negative] : Heme/Lymph [de-identified] : s/p chemotherapy

## 2019-05-31 NOTE — LETTER CLOSING
[Sincerely yours,] : Sincerely yours, [FreeTextEntry3] : Chalino Lomeli MD\par Physician in Chief\par Department of Radiation Medicine\par [facility] [place]\par [facility] [place]\par \par  of Radiation Medicine\par [facility]\par at  [facility]\par \par Radiation \par [facility]/\par [facility] [place]\par [address]\par [place], [place] [address]\par \par Tel: [phone]\par Fax: [phone]\par

## 2019-05-31 NOTE — HISTORY OF PRESENT ILLNESS
[FreeTextEntry1] : This 79 year-old female presents for follow-up for invasive ductal carcinoma of the left breast. Originally,  an irregular mass was discovered via ultrasound, and then completed an MRI and left core needle biopsy. 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 She then underwent a left breast mastectomy on 12/14/18 with left SLNB and oncoplastic closure, tumor size 2.6cm , 2 negative sentinel LNs,  however, margins were positive.  Dr. Vee Initiated treatment with Taxotere/Cytoxan chemotherapy.  She is now s/p 4 cycles chemotherapy and is ready to plan for radiation therapy.\par \par \par  \par

## 2019-05-31 NOTE — LETTER GREETING
[Dear Doctor] : Dear Doctor, [Follow-Up] : Your patient, [unfilled] was seen in my office today for follow-up [Please see my note below.] : Please see my note below. [FreeTextEntry2] : Conner Vee MD

## 2019-06-06 PROCEDURE — 77334 RADIATION TREATMENT AID(S): CPT | Mod: 26

## 2019-06-06 PROCEDURE — 77290 THER RAD SIMULAJ FIELD CPLX: CPT | Mod: 26

## 2019-06-13 PROCEDURE — 77334 RADIATION TREATMENT AID(S): CPT | Mod: 26

## 2019-06-13 PROCEDURE — 77300 RADIATION THERAPY DOSE PLAN: CPT | Mod: 26

## 2019-06-13 PROCEDURE — 77295 3-D RADIOTHERAPY PLAN: CPT | Mod: 26

## 2019-06-18 PROCEDURE — 77280 THER RAD SIMULAJ FIELD SMPL: CPT | Mod: 26

## 2019-06-19 PROCEDURE — G6017: CPT

## 2019-06-20 PROCEDURE — G6017: CPT

## 2019-06-21 PROCEDURE — G6017: CPT

## 2019-06-24 VITALS
WEIGHT: 127 LBS | RESPIRATION RATE: 16 BRPM | BODY MASS INDEX: 23.37 KG/M2 | OXYGEN SATURATION: 97 % | DIASTOLIC BLOOD PRESSURE: 92 MMHG | SYSTOLIC BLOOD PRESSURE: 161 MMHG | TEMPERATURE: 97.5 F | HEART RATE: 79 BPM | HEIGHT: 62 IN

## 2019-06-24 PROCEDURE — G6017: CPT

## 2019-06-25 PROCEDURE — G6017: CPT

## 2019-06-25 PROCEDURE — 77427 RADIATION TX MANAGEMENT X5: CPT

## 2019-06-26 PROCEDURE — G6017: CPT

## 2019-06-27 PROCEDURE — G6017: CPT

## 2019-06-28 PROCEDURE — G6017: CPT

## 2019-07-01 VITALS
HEART RATE: 79 BPM | OXYGEN SATURATION: 97 % | TEMPERATURE: 98.5 F | RESPIRATION RATE: 16 BRPM | WEIGHT: 127 LBS | HEIGHT: 62 IN | BODY MASS INDEX: 23.37 KG/M2 | DIASTOLIC BLOOD PRESSURE: 68 MMHG | SYSTOLIC BLOOD PRESSURE: 134 MMHG

## 2019-07-01 PROCEDURE — G6017: CPT

## 2019-07-01 NOTE — PHYSICAL EXAM
[Normal] : no JVD, no calf tenderness [de-identified] : Mastectomy left chest, incision well healed.  mild hyerpigmentation left chest wall; no desquamation

## 2019-07-01 NOTE — VITALS
[Least Pain Intensity: 0/10] : 0/10 [Maximal Pain Intensity: 0/10] : 0/10 [NoTreatment Scheduled] : no treatment scheduled [90: Able to carry normal activity; minor signs or symptoms of disease.] : 90: Able to carry normal activity; minor signs or symptoms of disease.

## 2019-07-01 NOTE — REVIEW OF SYSTEMS
[Skin Hyperpigmentation: Grade 1 - Hyperpigmentation covering <10% BSA; no psychosocial impact] : Skin Hyperpigmentation: Grade 1 - Hyperpigmentation covering <10% BSA; no psychosocial impact

## 2019-07-01 NOTE — DISEASE MANAGEMENT
[TTNM] : 2 [NTNM] : 0 [MTNM] : 0 [de-identified] : 1,800  cGy [de-identified] : 5,000 cGy [de-identified] : left chest wall

## 2019-07-01 NOTE — PHYSICAL EXAM
[Normal] : no palpable adenopathy [de-identified] : Mastectomy left chest, incision well healed.  mild hyperpigmentation of left cw; no desquamation [de-identified] : left chest wall skin intact without erythema.

## 2019-07-01 NOTE — DISEASE MANAGEMENT
[Pathological] : TNM Stage: p [IIA] : IIA [TTNM] : 2 [NTNM] : 0 [MTNM] : 0 [de-identified] : 600 cGy [de-identified] : 5,000 cGy [de-identified] : left chest wall

## 2019-07-02 PROCEDURE — G6017: CPT

## 2019-07-02 PROCEDURE — 77427 RADIATION TX MANAGEMENT X5: CPT

## 2019-07-03 ENCOUNTER — INPATIENT (INPATIENT)
Facility: HOSPITAL | Age: 79
LOS: 8 days | Discharge: NURSING, REHAB OR LONG-TERM CARE | DRG: 964 | End: 2019-07-12
Attending: SURGERY | Admitting: SURGERY
Payer: COMMERCIAL

## 2019-07-03 VITALS
TEMPERATURE: 98 F | SYSTOLIC BLOOD PRESSURE: 154 MMHG | HEIGHT: 62 IN | OXYGEN SATURATION: 92 % | RESPIRATION RATE: 18 BRPM | HEART RATE: 81 BPM | DIASTOLIC BLOOD PRESSURE: 75 MMHG | WEIGHT: 126.99 LBS

## 2019-07-03 DIAGNOSIS — Z90.12 ACQUIRED ABSENCE OF LEFT BREAST AND NIPPLE: Chronic | ICD-10-CM

## 2019-07-03 DIAGNOSIS — S32.9XXA FRACTURE OF UNSPECIFIED PARTS OF LUMBOSACRAL SPINE AND PELVIS, INITIAL ENCOUNTER FOR CLOSED FRACTURE: ICD-10-CM

## 2019-07-03 DIAGNOSIS — Z90.710 ACQUIRED ABSENCE OF BOTH CERVIX AND UTERUS: Chronic | ICD-10-CM

## 2019-07-03 LAB
ALBUMIN SERPL ELPH-MCNC: 3.9 G/DL — SIGNIFICANT CHANGE UP (ref 3.3–5.2)
ALBUMIN SERPL ELPH-MCNC: 4.1 G/DL — SIGNIFICANT CHANGE UP (ref 3.3–5.2)
ALP SERPL-CCNC: 55 U/L — SIGNIFICANT CHANGE UP (ref 40–120)
ALP SERPL-CCNC: 65 U/L — SIGNIFICANT CHANGE UP (ref 40–120)
ALT FLD-CCNC: 109 U/L — HIGH
ALT FLD-CCNC: 122 U/L — HIGH
ANION GAP SERPL CALC-SCNC: 11 MMOL/L — SIGNIFICANT CHANGE UP (ref 5–17)
ANION GAP SERPL CALC-SCNC: 15 MMOL/L — SIGNIFICANT CHANGE UP (ref 5–17)
ANISOCYTOSIS BLD QL: SLIGHT — SIGNIFICANT CHANGE UP
APTT BLD: 28.6 SEC — SIGNIFICANT CHANGE UP (ref 27.5–36.3)
AST SERPL-CCNC: 188 U/L — HIGH
AST SERPL-CCNC: 209 U/L — HIGH
BASE EXCESS BLDA CALC-SCNC: 1.8 MMOL/L — SIGNIFICANT CHANGE UP (ref -3–3)
BASOPHILS # BLD AUTO: 0 K/UL — SIGNIFICANT CHANGE UP (ref 0–0.2)
BASOPHILS # BLD AUTO: 0.02 K/UL — SIGNIFICANT CHANGE UP (ref 0–0.2)
BASOPHILS NFR BLD AUTO: 0 % — SIGNIFICANT CHANGE UP (ref 0–2)
BASOPHILS NFR BLD AUTO: 0.2 % — SIGNIFICANT CHANGE UP (ref 0–2)
BILIRUB SERPL-MCNC: 0.8 MG/DL — SIGNIFICANT CHANGE UP (ref 0.4–2)
BILIRUB SERPL-MCNC: 1 MG/DL — SIGNIFICANT CHANGE UP (ref 0.4–2)
BLD GP AB SCN SERPL QL: SIGNIFICANT CHANGE UP
BLOOD GAS COMMENTS ARTERIAL: SIGNIFICANT CHANGE UP
BUN SERPL-MCNC: 15 MG/DL — SIGNIFICANT CHANGE UP (ref 8–20)
BUN SERPL-MCNC: 16 MG/DL — SIGNIFICANT CHANGE UP (ref 8–20)
CALCIUM SERPL-MCNC: 9.1 MG/DL — SIGNIFICANT CHANGE UP (ref 8.6–10.2)
CALCIUM SERPL-MCNC: 9.7 MG/DL — SIGNIFICANT CHANGE UP (ref 8.6–10.2)
CHLORIDE SERPL-SCNC: 94 MMOL/L — LOW (ref 98–107)
CHLORIDE SERPL-SCNC: 94 MMOL/L — LOW (ref 98–107)
CO2 SERPL-SCNC: 23 MMOL/L — SIGNIFICANT CHANGE UP (ref 22–29)
CO2 SERPL-SCNC: 25 MMOL/L — SIGNIFICANT CHANGE UP (ref 22–29)
CREAT SERPL-MCNC: 0.96 MG/DL — SIGNIFICANT CHANGE UP (ref 0.5–1.3)
CREAT SERPL-MCNC: 0.97 MG/DL — SIGNIFICANT CHANGE UP (ref 0.5–1.3)
ELLIPTOCYTES BLD QL SMEAR: SLIGHT — SIGNIFICANT CHANGE UP
EOSINOPHIL # BLD AUTO: 0 K/UL — SIGNIFICANT CHANGE UP (ref 0–0.5)
EOSINOPHIL # BLD AUTO: 0.01 K/UL — SIGNIFICANT CHANGE UP (ref 0–0.5)
EOSINOPHIL NFR BLD AUTO: 0 % — SIGNIFICANT CHANGE UP (ref 0–6)
EOSINOPHIL NFR BLD AUTO: 0.1 % — SIGNIFICANT CHANGE UP (ref 0–6)
GAS PNL BLDA: SIGNIFICANT CHANGE UP
GIANT PLATELETS BLD QL SMEAR: PRESENT — SIGNIFICANT CHANGE UP
GLUCOSE SERPL-MCNC: 118 MG/DL — HIGH (ref 70–115)
GLUCOSE SERPL-MCNC: 119 MG/DL — HIGH (ref 70–115)
HCO3 BLDA-SCNC: 26 MMOL/L — SIGNIFICANT CHANGE UP (ref 20–26)
HCT VFR BLD CALC: 30.9 % — LOW (ref 34.5–45)
HCT VFR BLD CALC: 32.4 % — LOW (ref 34.5–45)
HGB BLD-MCNC: 10.1 G/DL — LOW (ref 11.5–15.5)
HGB BLD-MCNC: 10.7 G/DL — LOW (ref 11.5–15.5)
HOROWITZ INDEX BLDA+IHG-RTO: 21 — SIGNIFICANT CHANGE UP
IMM GRANULOCYTES NFR BLD AUTO: 0.3 % — SIGNIFICANT CHANGE UP (ref 0–1.5)
INR BLD: 1.17 RATIO — HIGH (ref 0.88–1.16)
LYMPHOCYTES # BLD AUTO: 0.19 K/UL — LOW (ref 1–3.3)
LYMPHOCYTES # BLD AUTO: 0.4 K/UL — LOW (ref 1–3.3)
LYMPHOCYTES # BLD AUTO: 1.7 % — LOW (ref 13–44)
LYMPHOCYTES # BLD AUTO: 4.6 % — LOW (ref 13–44)
MACROCYTES BLD QL: SLIGHT — SIGNIFICANT CHANGE UP
MANUAL SMEAR VERIFICATION: SIGNIFICANT CHANGE UP
MCHC RBC-ENTMCNC: 29 PG — SIGNIFICANT CHANGE UP (ref 27–34)
MCHC RBC-ENTMCNC: 29.9 PG — SIGNIFICANT CHANGE UP (ref 27–34)
MCHC RBC-ENTMCNC: 32.7 GM/DL — SIGNIFICANT CHANGE UP (ref 32–36)
MCHC RBC-ENTMCNC: 33 GM/DL — SIGNIFICANT CHANGE UP (ref 32–36)
MCV RBC AUTO: 88.8 FL — SIGNIFICANT CHANGE UP (ref 80–100)
MCV RBC AUTO: 90.5 FL — SIGNIFICANT CHANGE UP (ref 80–100)
MICROCYTES BLD QL: SLIGHT — SIGNIFICANT CHANGE UP
MONOCYTES # BLD AUTO: 0.4 K/UL — SIGNIFICANT CHANGE UP (ref 0–0.9)
MONOCYTES # BLD AUTO: 0.41 K/UL — SIGNIFICANT CHANGE UP (ref 0–0.9)
MONOCYTES NFR BLD AUTO: 3.5 % — SIGNIFICANT CHANGE UP (ref 2–14)
MONOCYTES NFR BLD AUTO: 4.7 % — SIGNIFICANT CHANGE UP (ref 2–14)
NEUTROPHILS # BLD AUTO: 10.87 K/UL — HIGH (ref 1.8–7.4)
NEUTROPHILS # BLD AUTO: 7.92 K/UL — HIGH (ref 1.8–7.4)
NEUTROPHILS NFR BLD AUTO: 90.1 % — HIGH (ref 43–77)
NEUTROPHILS NFR BLD AUTO: 94.8 % — HIGH (ref 43–77)
OVALOCYTES BLD QL SMEAR: SLIGHT — SIGNIFICANT CHANGE UP
PCO2 BLDA: 34 MMHG — LOW (ref 35–45)
PH BLDA: 7.48 — HIGH (ref 7.35–7.45)
PLAT MORPH BLD: NORMAL — SIGNIFICANT CHANGE UP
PLATELET # BLD AUTO: 126 K/UL — LOW (ref 150–400)
PLATELET # BLD AUTO: 152 K/UL — SIGNIFICANT CHANGE UP (ref 150–400)
PO2 BLDA: 64 MMHG — LOW (ref 83–108)
POTASSIUM SERPL-MCNC: 3.7 MMOL/L — SIGNIFICANT CHANGE UP (ref 3.5–5.3)
POTASSIUM SERPL-MCNC: 3.8 MMOL/L — SIGNIFICANT CHANGE UP (ref 3.5–5.3)
POTASSIUM SERPL-SCNC: 3.7 MMOL/L — SIGNIFICANT CHANGE UP (ref 3.5–5.3)
POTASSIUM SERPL-SCNC: 3.8 MMOL/L — SIGNIFICANT CHANGE UP (ref 3.5–5.3)
PROT SERPL-MCNC: 7 G/DL — SIGNIFICANT CHANGE UP (ref 6.6–8.7)
PROT SERPL-MCNC: 7 G/DL — SIGNIFICANT CHANGE UP (ref 6.6–8.7)
PROTHROM AB SERPL-ACNC: 13.5 SEC — HIGH (ref 10–12.9)
RBC # BLD: 3.48 M/UL — LOW (ref 3.8–5.2)
RBC # BLD: 3.58 M/UL — LOW (ref 3.8–5.2)
RBC # FLD: 12.6 % — SIGNIFICANT CHANGE UP (ref 10.3–14.5)
RBC # FLD: 12.6 % — SIGNIFICANT CHANGE UP (ref 10.3–14.5)
RBC BLD AUTO: ABNORMAL
SAO2 % BLDA: 94 % — LOW (ref 95–99)
SCHISTOCYTES BLD QL AUTO: SLIGHT — SIGNIFICANT CHANGE UP
SODIUM SERPL-SCNC: 130 MMOL/L — LOW (ref 135–145)
SODIUM SERPL-SCNC: 132 MMOL/L — LOW (ref 135–145)
TYPE + AB SCN PNL BLD: SIGNIFICANT CHANGE UP
WBC # BLD: 11.47 K/UL — HIGH (ref 3.8–10.5)
WBC # BLD: 8.79 K/UL — SIGNIFICANT CHANGE UP (ref 3.8–10.5)
WBC # FLD AUTO: 11.47 K/UL — HIGH (ref 3.8–10.5)
WBC # FLD AUTO: 8.79 K/UL — SIGNIFICANT CHANGE UP (ref 3.8–10.5)

## 2019-07-03 PROCEDURE — 71101 X-RAY EXAM UNILAT RIBS/CHEST: CPT | Mod: 26

## 2019-07-03 PROCEDURE — 72125 CT NECK SPINE W/O DYE: CPT | Mod: 26

## 2019-07-03 PROCEDURE — 74177 CT ABD & PELVIS W/CONTRAST: CPT | Mod: 26

## 2019-07-03 PROCEDURE — 99291 CRITICAL CARE FIRST HOUR: CPT

## 2019-07-03 PROCEDURE — 71260 CT THORAX DX C+: CPT | Mod: 26

## 2019-07-03 PROCEDURE — 73502 X-RAY EXAM HIP UNI 2-3 VIEWS: CPT | Mod: 26,RT

## 2019-07-03 PROCEDURE — G6017: CPT

## 2019-07-03 PROCEDURE — 99223 1ST HOSP IP/OBS HIGH 75: CPT

## 2019-07-03 PROCEDURE — 99233 SBSQ HOSP IP/OBS HIGH 50: CPT

## 2019-07-03 PROCEDURE — 70450 CT HEAD/BRAIN W/O DYE: CPT | Mod: 26

## 2019-07-03 RX ORDER — PANTOPRAZOLE SODIUM 20 MG/1
40 TABLET, DELAYED RELEASE ORAL
Refills: 0 | Status: DISCONTINUED | OUTPATIENT
Start: 2019-07-03 | End: 2019-07-12

## 2019-07-03 RX ORDER — ONDANSETRON 8 MG/1
4 TABLET, FILM COATED ORAL EVERY 6 HOURS
Refills: 0 | Status: DISCONTINUED | OUTPATIENT
Start: 2019-07-03 | End: 2019-07-12

## 2019-07-03 RX ORDER — ACETAMINOPHEN 500 MG
650 TABLET ORAL EVERY 6 HOURS
Refills: 0 | Status: DISCONTINUED | OUTPATIENT
Start: 2019-07-03 | End: 2019-07-12

## 2019-07-03 RX ORDER — ACETAMINOPHEN 500 MG
650 TABLET ORAL ONCE
Refills: 0 | Status: COMPLETED | OUTPATIENT
Start: 2019-07-03 | End: 2019-07-03

## 2019-07-03 RX ORDER — MORPHINE SULFATE 50 MG/1
2 CAPSULE, EXTENDED RELEASE ORAL ONCE
Refills: 0 | Status: DISCONTINUED | OUTPATIENT
Start: 2019-07-03 | End: 2019-07-03

## 2019-07-03 RX ORDER — SODIUM CHLORIDE 9 MG/ML
1000 INJECTION, SOLUTION INTRAVENOUS
Refills: 0 | Status: DISCONTINUED | OUTPATIENT
Start: 2019-07-03 | End: 2019-07-03

## 2019-07-03 RX ORDER — SODIUM CHLORIDE 9 MG/ML
1000 INJECTION INTRAMUSCULAR; INTRAVENOUS; SUBCUTANEOUS
Refills: 0 | Status: DISCONTINUED | OUTPATIENT
Start: 2019-07-03 | End: 2019-07-04

## 2019-07-03 RX ORDER — OXYCODONE HYDROCHLORIDE 5 MG/1
5 TABLET ORAL EVERY 4 HOURS
Refills: 0 | Status: DISCONTINUED | OUTPATIENT
Start: 2019-07-03 | End: 2019-07-06

## 2019-07-03 RX ORDER — HYDROMORPHONE HYDROCHLORIDE 2 MG/ML
1 INJECTION INTRAMUSCULAR; INTRAVENOUS; SUBCUTANEOUS EVERY 4 HOURS
Refills: 0 | Status: DISCONTINUED | OUTPATIENT
Start: 2019-07-03 | End: 2019-07-05

## 2019-07-03 RX ORDER — LIDOCAINE 4 G/100G
1 CREAM TOPICAL EVERY 24 HOURS
Refills: 0 | Status: DISCONTINUED | OUTPATIENT
Start: 2019-07-03 | End: 2019-07-04

## 2019-07-03 RX ORDER — SENNA PLUS 8.6 MG/1
2 TABLET ORAL AT BEDTIME
Refills: 0 | Status: DISCONTINUED | OUTPATIENT
Start: 2019-07-03 | End: 2019-07-12

## 2019-07-03 RX ORDER — DOCUSATE SODIUM 100 MG
100 CAPSULE ORAL EVERY 8 HOURS
Refills: 0 | Status: DISCONTINUED | OUTPATIENT
Start: 2019-07-03 | End: 2019-07-12

## 2019-07-03 RX ADMIN — Medication 650 MILLIGRAM(S): at 14:48

## 2019-07-03 RX ADMIN — MORPHINE SULFATE 2 MILLIGRAM(S): 50 CAPSULE, EXTENDED RELEASE ORAL at 16:32

## 2019-07-03 RX ADMIN — SODIUM CHLORIDE 100 MILLILITER(S): 9 INJECTION INTRAMUSCULAR; INTRAVENOUS; SUBCUTANEOUS at 23:28

## 2019-07-03 RX ADMIN — LIDOCAINE 1 PATCH: 4 CREAM TOPICAL at 23:27

## 2019-07-03 RX ADMIN — Medication 650 MILLIGRAM(S): at 15:18

## 2019-07-03 RX ADMIN — Medication 100 MILLIGRAM(S): at 23:26

## 2019-07-03 RX ADMIN — SODIUM CHLORIDE 100 MILLILITER(S): 9 INJECTION INTRAMUSCULAR; INTRAVENOUS; SUBCUTANEOUS at 20:29

## 2019-07-03 RX ADMIN — SENNA PLUS 2 TABLET(S): 8.6 TABLET ORAL at 23:27

## 2019-07-03 RX ADMIN — MORPHINE SULFATE 2 MILLIGRAM(S): 50 CAPSULE, EXTENDED RELEASE ORAL at 17:00

## 2019-07-03 RX ADMIN — Medication 650 MILLIGRAM(S): at 23:27

## 2019-07-03 NOTE — H&P ADULT - NSHPPHYSICALEXAM_GEN_ALL_CORE
Constitutional: Well-developed well nourished female in no acute distress  HEENT: Head is normocephalic and atraumatic, maxillofacial structures stable, no blood or discharge from nares or oral cavity, no knowles sign / raccoon eyes, EOMI b/l, pupils 3mm round and reactive to light b/l, no active drainage or redness  Neck: trachea midline  Respiratory: Breath sounds CTA b/l respirations are unlabored, no accessory muscle use, no conversational dyspnea  Cardiovascular: Regular rate & rhythm, +S1, S2  Chest: Chest wall is non-tender to palpation, no subQ emphysema or crepitus palpated  Gastrointestinal: Abdomen soft, non-tender, non-distended, no rebound tenderness / guarding, no ecchymosis or external signs of abdominal trauma  Extremities: moving all extremities spontaneously, no point tenderness or deformity noted to upper or lower extremities b/l. ecchymosis noted over right lateral hip  Pelvis: stable  Vascular: 2+ radial, femoral, and DP pulses b/l  Neurological: GCS: 15 (4/5/6). A&O x 3; no gross sensory / motor / coordination deficits  Musculoskeletal: 5/5 strength of upper and lower extremities b/l  Back: no C/T/LS spine tenderness to palpation, no step-offs or signs of external trauma to the back

## 2019-07-03 NOTE — ED ADULT NURSE REASSESSMENT NOTE - NS ED NURSE REASSESS COMMENT FT1
pt medicated with morphine for pain, pt aware of hip fracture, waiting to see ortho pa's. family at bedside updated on plan of care. questions asked and answered.
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
## 2019-07-03 NOTE — H&P ADULT - NSICDXPASTMEDICALHX_GEN_ALL_CORE_FT
PAST MEDICAL HISTORY:  Breast cancer     Dyslipidemia     GERD (gastroesophageal reflux disease)     Hypertension     Osteoporosis

## 2019-07-03 NOTE — H&P ADULT - NSHPREVIEWOFSYSTEMS_GEN_ALL_CORE
denies fevers, chills, headache, blurry vision, chest pain, shortness of breath, palpitations, abdominal pain, dysuria, diarrhea, myalgias
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
## 2019-07-03 NOTE — ED PROVIDER NOTE - CARE PLAN
Principal Discharge DX:	Pelvic fracture  Secondary Diagnosis:	Rib fractures  Secondary Diagnosis:	Pulmonary contusion  Secondary Diagnosis:	Subcapsular hematoma of liver
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
## 2019-07-03 NOTE — CONSULT NOTE ADULT - ATTENDING COMMENTS
Xrays show right iliac wing comminuted fracture without obvious intra-articular or pelvic ring extension.  RLE NVID on exam.  Recommend CT scan to further evaluate extent of fracture.  WB recs pending CT scan.  Likely nonoperative management.

## 2019-07-03 NOTE — ED PROVIDER NOTE - PHYSICAL EXAMINATION
Gen: No acute distress, non toxic  HEENT: Mucous membranes moist, pink conjunctivae, EOMI  CV: RRR, nl s1/s2. mild tenderness right rib cage  Resp: CTAB, normal rate and effort  GI: Abdomen soft, NT, ND. No rebound, no guarding  : No CVAT  Neuro: A&O x 3, moving all 4 extremities  MSK: right hip tender, no shortening/rotation. pain with flexion/movement. neurovasuclarly intact, no other extremity pain  Skin: abrasino right elbow and posterior calf without significant ttp.
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
## 2019-07-03 NOTE — ED ADULT NURSE REASSESSMENT NOTE - NS ED NURSE REASSESS COMMENT FT1
pt placed on bedpan, pericare provide, pt turned and repositioned, due to pain in her hip. pt yevgeniy it well.

## 2019-07-03 NOTE — ED ADULT NURSE NOTE - NSIMPLEMENTINTERV_GEN_ALL_ED
Implemented All Fall with Harm Risk Interventions:  [place] to call system. Call bell, personal items and telephone within reach. Instruct patient to call for assistance. Room bathroom lighting operational. Non-slip footwear when patient is off stretcher. Physically safe environment: no spills, clutter or unnecessary equipment. Stretcher in lowest position, wheels locked, appropriate side rails in place. Provide visual cue, wrist band, yellow gown, etc. Monitor gait and stability. Monitor for mental status changes and reorient to person, place, and time. Review medications for side effects contributing to fall risk. Reinforce activity limits and safety measures with patient and family. Provide visual clues: red socks.
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
## 2019-07-04 LAB
ALBUMIN SERPL ELPH-MCNC: 3.9 G/DL — SIGNIFICANT CHANGE UP (ref 3.3–5.2)
ALP SERPL-CCNC: 51 U/L — SIGNIFICANT CHANGE UP (ref 40–120)
ALT FLD-CCNC: 97 U/L — HIGH
ANION GAP SERPL CALC-SCNC: 12 MMOL/L — SIGNIFICANT CHANGE UP (ref 5–17)
ANION GAP SERPL CALC-SCNC: 13 MMOL/L — SIGNIFICANT CHANGE UP (ref 5–17)
APTT BLD: 27.9 SEC — SIGNIFICANT CHANGE UP (ref 27.5–36.3)
AST SERPL-CCNC: 163 U/L — HIGH
BASOPHILS # BLD AUTO: 0.01 K/UL — SIGNIFICANT CHANGE UP (ref 0–0.2)
BASOPHILS # BLD AUTO: 0.02 K/UL — SIGNIFICANT CHANGE UP (ref 0–0.2)
BASOPHILS NFR BLD AUTO: 0.1 % — SIGNIFICANT CHANGE UP (ref 0–2)
BASOPHILS NFR BLD AUTO: 0.3 % — SIGNIFICANT CHANGE UP (ref 0–2)
BILIRUB SERPL-MCNC: 1 MG/DL — SIGNIFICANT CHANGE UP (ref 0.4–2)
BUN SERPL-MCNC: 18 MG/DL — SIGNIFICANT CHANGE UP (ref 8–20)
BUN SERPL-MCNC: 19 MG/DL — SIGNIFICANT CHANGE UP (ref 8–20)
CALCIUM SERPL-MCNC: 8.6 MG/DL — SIGNIFICANT CHANGE UP (ref 8.6–10.2)
CALCIUM SERPL-MCNC: 8.9 MG/DL — SIGNIFICANT CHANGE UP (ref 8.6–10.2)
CHLORIDE SERPL-SCNC: 95 MMOL/L — LOW (ref 98–107)
CHLORIDE SERPL-SCNC: 96 MMOL/L — LOW (ref 98–107)
CO2 SERPL-SCNC: 24 MMOL/L — SIGNIFICANT CHANGE UP (ref 22–29)
CO2 SERPL-SCNC: 24 MMOL/L — SIGNIFICANT CHANGE UP (ref 22–29)
CREAT SERPL-MCNC: 0.84 MG/DL — SIGNIFICANT CHANGE UP (ref 0.5–1.3)
CREAT SERPL-MCNC: 0.98 MG/DL — SIGNIFICANT CHANGE UP (ref 0.5–1.3)
EOSINOPHIL # BLD AUTO: 0.02 K/UL — SIGNIFICANT CHANGE UP (ref 0–0.5)
EOSINOPHIL # BLD AUTO: 0.1 K/UL — SIGNIFICANT CHANGE UP (ref 0–0.5)
EOSINOPHIL NFR BLD AUTO: 0.3 % — SIGNIFICANT CHANGE UP (ref 0–6)
EOSINOPHIL NFR BLD AUTO: 1.4 % — SIGNIFICANT CHANGE UP (ref 0–6)
GLUCOSE SERPL-MCNC: 117 MG/DL — HIGH (ref 70–115)
GLUCOSE SERPL-MCNC: 128 MG/DL — HIGH (ref 70–115)
HCT VFR BLD CALC: 26.5 % — LOW (ref 34.5–45)
HCT VFR BLD CALC: 28 % — LOW (ref 34.5–45)
HGB BLD-MCNC: 8.9 G/DL — LOW (ref 11.5–15.5)
HGB BLD-MCNC: 9.3 G/DL — LOW (ref 11.5–15.5)
IMM GRANULOCYTES NFR BLD AUTO: 0.6 % — SIGNIFICANT CHANGE UP (ref 0–1.5)
IMM GRANULOCYTES NFR BLD AUTO: 0.6 % — SIGNIFICANT CHANGE UP (ref 0–1.5)
INR BLD: 1.2 RATIO — HIGH (ref 0.88–1.16)
LYMPHOCYTES # BLD AUTO: 0.49 K/UL — LOW (ref 1–3.3)
LYMPHOCYTES # BLD AUTO: 0.6 K/UL — LOW (ref 1–3.3)
LYMPHOCYTES # BLD AUTO: 7.3 % — LOW (ref 13–44)
LYMPHOCYTES # BLD AUTO: 8.6 % — LOW (ref 13–44)
MAGNESIUM SERPL-MCNC: 1.7 MG/DL — SIGNIFICANT CHANGE UP (ref 1.6–2.6)
MCHC RBC-ENTMCNC: 29.5 PG — SIGNIFICANT CHANGE UP (ref 27–34)
MCHC RBC-ENTMCNC: 29.9 PG — SIGNIFICANT CHANGE UP (ref 27–34)
MCHC RBC-ENTMCNC: 33.2 GM/DL — SIGNIFICANT CHANGE UP (ref 32–36)
MCHC RBC-ENTMCNC: 33.6 GM/DL — SIGNIFICANT CHANGE UP (ref 32–36)
MCV RBC AUTO: 88.9 FL — SIGNIFICANT CHANGE UP (ref 80–100)
MCV RBC AUTO: 88.9 FL — SIGNIFICANT CHANGE UP (ref 80–100)
MONOCYTES # BLD AUTO: 0.44 K/UL — SIGNIFICANT CHANGE UP (ref 0–0.9)
MONOCYTES # BLD AUTO: 0.58 K/UL — SIGNIFICANT CHANGE UP (ref 0–0.9)
MONOCYTES NFR BLD AUTO: 6.5 % — SIGNIFICANT CHANGE UP (ref 2–14)
MONOCYTES NFR BLD AUTO: 8.3 % — SIGNIFICANT CHANGE UP (ref 2–14)
NEUTROPHILS # BLD AUTO: 5.66 K/UL — SIGNIFICANT CHANGE UP (ref 1.8–7.4)
NEUTROPHILS # BLD AUTO: 5.72 K/UL — SIGNIFICANT CHANGE UP (ref 1.8–7.4)
NEUTROPHILS NFR BLD AUTO: 81 % — HIGH (ref 43–77)
NEUTROPHILS NFR BLD AUTO: 85 % — HIGH (ref 43–77)
PHOSPHATE SERPL-MCNC: 5 MG/DL — HIGH (ref 2.4–4.7)
PLATELET # BLD AUTO: 109 K/UL — LOW (ref 150–400)
PLATELET # BLD AUTO: 121 K/UL — LOW (ref 150–400)
POTASSIUM SERPL-MCNC: 3.4 MMOL/L — LOW (ref 3.5–5.3)
POTASSIUM SERPL-MCNC: 3.6 MMOL/L — SIGNIFICANT CHANGE UP (ref 3.5–5.3)
POTASSIUM SERPL-SCNC: 3.4 MMOL/L — LOW (ref 3.5–5.3)
POTASSIUM SERPL-SCNC: 3.6 MMOL/L — SIGNIFICANT CHANGE UP (ref 3.5–5.3)
PROT SERPL-MCNC: 6.6 G/DL — SIGNIFICANT CHANGE UP (ref 6.6–8.7)
PROTHROM AB SERPL-ACNC: 13.9 SEC — HIGH (ref 10–12.9)
RBC # BLD: 2.98 M/UL — LOW (ref 3.8–5.2)
RBC # BLD: 3.15 M/UL — LOW (ref 3.8–5.2)
RBC # FLD: 12.6 % — SIGNIFICANT CHANGE UP (ref 10.3–14.5)
RBC # FLD: 12.7 % — SIGNIFICANT CHANGE UP (ref 10.3–14.5)
SODIUM SERPL-SCNC: 131 MMOL/L — LOW (ref 135–145)
SODIUM SERPL-SCNC: 133 MMOL/L — LOW (ref 135–145)
TROPONIN T SERPL-MCNC: <0.01 NG/ML — SIGNIFICANT CHANGE UP (ref 0–0.06)
WBC # BLD: 6.73 K/UL — SIGNIFICANT CHANGE UP (ref 3.8–10.5)
WBC # BLD: 6.99 K/UL — SIGNIFICANT CHANGE UP (ref 3.8–10.5)
WBC # FLD AUTO: 6.73 K/UL — SIGNIFICANT CHANGE UP (ref 3.8–10.5)
WBC # FLD AUTO: 6.99 K/UL — SIGNIFICANT CHANGE UP (ref 3.8–10.5)

## 2019-07-04 PROCEDURE — 93010 ELECTROCARDIOGRAM REPORT: CPT

## 2019-07-04 PROCEDURE — 99233 SBSQ HOSP IP/OBS HIGH 50: CPT

## 2019-07-04 PROCEDURE — 71045 X-RAY EXAM CHEST 1 VIEW: CPT | Mod: 26

## 2019-07-04 RX ORDER — LABETALOL HCL 100 MG
100 TABLET ORAL
Refills: 0 | Status: DISCONTINUED | OUTPATIENT
Start: 2019-07-04 | End: 2019-07-05

## 2019-07-04 RX ORDER — POTASSIUM CHLORIDE 20 MEQ
10 PACKET (EA) ORAL
Refills: 0 | Status: DISCONTINUED | OUTPATIENT
Start: 2019-07-04 | End: 2019-07-04

## 2019-07-04 RX ORDER — MAGNESIUM SULFATE 500 MG/ML
2 VIAL (ML) INJECTION ONCE
Refills: 0 | Status: COMPLETED | OUTPATIENT
Start: 2019-07-04 | End: 2019-07-04

## 2019-07-04 RX ORDER — ENOXAPARIN SODIUM 100 MG/ML
40 INJECTION SUBCUTANEOUS
Refills: 0 | Status: DISCONTINUED | OUTPATIENT
Start: 2019-07-04 | End: 2019-07-12

## 2019-07-04 RX ORDER — LIDOCAINE 4 G/100G
2 CREAM TOPICAL DAILY
Refills: 0 | Status: DISCONTINUED | OUTPATIENT
Start: 2019-07-04 | End: 2019-07-12

## 2019-07-04 RX ORDER — POTASSIUM CHLORIDE 20 MEQ
20 PACKET (EA) ORAL ONCE
Refills: 0 | Status: COMPLETED | OUTPATIENT
Start: 2019-07-04 | End: 2019-07-04

## 2019-07-04 RX ORDER — POTASSIUM CHLORIDE 20 MEQ
40 PACKET (EA) ORAL ONCE
Refills: 0 | Status: COMPLETED | OUTPATIENT
Start: 2019-07-04 | End: 2019-07-04

## 2019-07-04 RX ORDER — POTASSIUM CHLORIDE 20 MEQ
40 PACKET (EA) ORAL ONCE
Refills: 0 | Status: DISCONTINUED | OUTPATIENT
Start: 2019-07-04 | End: 2019-07-04

## 2019-07-04 RX ORDER — GABAPENTIN 400 MG/1
100 CAPSULE ORAL THREE TIMES A DAY
Refills: 0 | Status: DISCONTINUED | OUTPATIENT
Start: 2019-07-04 | End: 2019-07-12

## 2019-07-04 RX ADMIN — Medication 50 GRAM(S): at 08:37

## 2019-07-04 RX ADMIN — OXYCODONE HYDROCHLORIDE 5 MILLIGRAM(S): 5 TABLET ORAL at 16:45

## 2019-07-04 RX ADMIN — Medication 100 MILLIGRAM(S): at 13:32

## 2019-07-04 RX ADMIN — Medication 100 MILLIGRAM(S): at 06:26

## 2019-07-04 RX ADMIN — Medication 1 DROP(S): at 21:59

## 2019-07-04 RX ADMIN — Medication 40 MILLIEQUIVALENT(S): at 08:36

## 2019-07-04 RX ADMIN — OXYCODONE HYDROCHLORIDE 5 MILLIGRAM(S): 5 TABLET ORAL at 12:00

## 2019-07-04 RX ADMIN — Medication 650 MILLIGRAM(S): at 00:00

## 2019-07-04 RX ADMIN — SENNA PLUS 2 TABLET(S): 8.6 TABLET ORAL at 21:58

## 2019-07-04 RX ADMIN — Medication 20 MILLIEQUIVALENT(S): at 15:48

## 2019-07-04 RX ADMIN — Medication 100 MILLIGRAM(S): at 17:01

## 2019-07-04 RX ADMIN — GABAPENTIN 100 MILLIGRAM(S): 400 CAPSULE ORAL at 13:32

## 2019-07-04 RX ADMIN — OXYCODONE HYDROCHLORIDE 5 MILLIGRAM(S): 5 TABLET ORAL at 15:45

## 2019-07-04 RX ADMIN — ENOXAPARIN SODIUM 40 MILLIGRAM(S): 100 INJECTION SUBCUTANEOUS at 21:59

## 2019-07-04 RX ADMIN — OXYCODONE HYDROCHLORIDE 5 MILLIGRAM(S): 5 TABLET ORAL at 10:23

## 2019-07-04 RX ADMIN — LIDOCAINE 1 PATCH: 4 CREAM TOPICAL at 12:19

## 2019-07-04 RX ADMIN — GABAPENTIN 100 MILLIGRAM(S): 400 CAPSULE ORAL at 21:59

## 2019-07-04 RX ADMIN — Medication 100 MILLIGRAM(S): at 21:58

## 2019-07-04 RX ADMIN — PANTOPRAZOLE SODIUM 40 MILLIGRAM(S): 20 TABLET, DELAYED RELEASE ORAL at 06:26

## 2019-07-04 RX ADMIN — LIDOCAINE 1 PATCH: 4 CREAM TOPICAL at 08:32

## 2019-07-04 RX ADMIN — SODIUM CHLORIDE 100 MILLILITER(S): 9 INJECTION INTRAMUSCULAR; INTRAVENOUS; SUBCUTANEOUS at 06:26

## 2019-07-04 RX ADMIN — Medication 650 MILLIGRAM(S): at 17:47

## 2019-07-04 RX ADMIN — Medication 650 MILLIGRAM(S): at 07:00

## 2019-07-04 RX ADMIN — Medication 650 MILLIGRAM(S): at 17:00

## 2019-07-04 RX ADMIN — Medication 650 MILLIGRAM(S): at 06:26

## 2019-07-04 NOTE — PATIENT PROFILE ADULT - FALL HARM RISK
bones(Osteoporosis,prev fx,steroid use,metastatic bone ca)/coagulation(Bleeding disorder R/T clinical cond/anti-coags)/other

## 2019-07-04 NOTE — CHART NOTE - NSCHARTNOTEFT_GEN_A_CORE
Serial Abd Exam Note:          Patient rexamined at bedside. Patient HR and BP WNL. Saturating 97% on RA. Mild right sided chest and abd tenderness, rates it as 2/10. Abd soft, nondistended, no rebound/guarding.  Will continue to monitor

## 2019-07-04 NOTE — PROGRESS NOTE ADULT - SUBJECTIVE AND OBJECTIVE BOX
CT reviewed.  May be WBAT RLE and follow up in 2 weeks as outpatient for repeat radiographs.  DVT ppx per primary team.
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
## 2019-07-05 LAB
ALBUMIN SERPL ELPH-MCNC: 3.5 G/DL — SIGNIFICANT CHANGE UP (ref 3.3–5.2)
ALP SERPL-CCNC: 48 U/L — SIGNIFICANT CHANGE UP (ref 40–120)
ALT FLD-CCNC: 72 U/L — HIGH
ANION GAP SERPL CALC-SCNC: 10 MMOL/L — SIGNIFICANT CHANGE UP (ref 5–17)
ANISOCYTOSIS BLD QL: SIGNIFICANT CHANGE UP
AST SERPL-CCNC: 104 U/L — HIGH
BASO STIPL BLD QL SMEAR: PRESENT — SIGNIFICANT CHANGE UP
BASOPHILS # BLD AUTO: 0.02 K/UL — SIGNIFICANT CHANGE UP (ref 0–0.2)
BASOPHILS NFR BLD AUTO: 0.3 % — SIGNIFICANT CHANGE UP (ref 0–2)
BILIRUB SERPL-MCNC: 0.5 MG/DL — SIGNIFICANT CHANGE UP (ref 0.4–2)
BUN SERPL-MCNC: 18 MG/DL — SIGNIFICANT CHANGE UP (ref 8–20)
CALCIUM SERPL-MCNC: 8.8 MG/DL — SIGNIFICANT CHANGE UP (ref 8.6–10.2)
CHLORIDE SERPL-SCNC: 98 MMOL/L — SIGNIFICANT CHANGE UP (ref 98–107)
CHLORIDE UR-SCNC: 42 MMOL/L — SIGNIFICANT CHANGE UP
CO2 SERPL-SCNC: 26 MMOL/L — SIGNIFICANT CHANGE UP (ref 22–29)
CREAT ?TM UR-MCNC: 69 MG/DL — SIGNIFICANT CHANGE UP
CREAT SERPL-MCNC: 0.93 MG/DL — SIGNIFICANT CHANGE UP (ref 0.5–1.3)
EOSINOPHIL # BLD AUTO: 0.3 K/UL — SIGNIFICANT CHANGE UP (ref 0–0.5)
EOSINOPHIL NFR BLD AUTO: 4.2 % — SIGNIFICANT CHANGE UP (ref 0–6)
GIANT PLATELETS BLD QL SMEAR: PRESENT — SIGNIFICANT CHANGE UP
GLUCOSE SERPL-MCNC: 95 MG/DL — SIGNIFICANT CHANGE UP (ref 70–115)
HCT VFR BLD CALC: 24.1 % — LOW (ref 34.5–45)
HGB BLD-MCNC: 8 G/DL — LOW (ref 11.5–15.5)
IMM GRANULOCYTES NFR BLD AUTO: 0.4 % — SIGNIFICANT CHANGE UP (ref 0–1.5)
LYMPHOCYTES # BLD AUTO: 1.08 K/UL — SIGNIFICANT CHANGE UP (ref 1–3.3)
LYMPHOCYTES # BLD AUTO: 15.1 % — SIGNIFICANT CHANGE UP (ref 13–44)
MACROCYTES BLD QL: SIGNIFICANT CHANGE UP
MAGNESIUM SERPL-MCNC: 2.2 MG/DL — SIGNIFICANT CHANGE UP (ref 1.8–2.6)
MANUAL SMEAR VERIFICATION: SIGNIFICANT CHANGE UP
MCHC RBC-ENTMCNC: 29.9 PG — SIGNIFICANT CHANGE UP (ref 27–34)
MCHC RBC-ENTMCNC: 33.2 GM/DL — SIGNIFICANT CHANGE UP (ref 32–36)
MCV RBC AUTO: 89.9 FL — SIGNIFICANT CHANGE UP (ref 80–100)
MONOCYTES # BLD AUTO: 0.79 K/UL — SIGNIFICANT CHANGE UP (ref 0–0.9)
MONOCYTES NFR BLD AUTO: 11.1 % — SIGNIFICANT CHANGE UP (ref 2–14)
NEUTROPHILS # BLD AUTO: 4.92 K/UL — SIGNIFICANT CHANGE UP (ref 1.8–7.4)
NEUTROPHILS NFR BLD AUTO: 68.9 % — SIGNIFICANT CHANGE UP (ref 43–77)
OSMOLALITY SERPL: 284 MOSM/KG — SIGNIFICANT CHANGE UP (ref 280–300)
OSMOLALITY UR: 377 MOSM/KG — SIGNIFICANT CHANGE UP (ref 300–1000)
OVALOCYTES BLD QL SMEAR: SLIGHT — SIGNIFICANT CHANGE UP
PHOSPHATE SERPL-MCNC: 3.1 MG/DL — SIGNIFICANT CHANGE UP (ref 2.4–4.7)
PLAT MORPH BLD: NORMAL — SIGNIFICANT CHANGE UP
PLATELET # BLD AUTO: 69 K/UL — LOW (ref 150–400)
PLATELET COUNT - ESTIMATE: ABNORMAL
POTASSIUM SERPL-MCNC: 3.5 MMOL/L — SIGNIFICANT CHANGE UP (ref 3.5–5.3)
POTASSIUM SERPL-SCNC: 3.5 MMOL/L — SIGNIFICANT CHANGE UP (ref 3.5–5.3)
PROT SERPL-MCNC: 6.4 G/DL — LOW (ref 6.6–8.7)
RBC # BLD: 2.68 M/UL — LOW (ref 3.8–5.2)
RBC # FLD: 12.7 % — SIGNIFICANT CHANGE UP (ref 10.3–14.5)
RBC BLD AUTO: ABNORMAL
SODIUM SERPL-SCNC: 134 MMOL/L — LOW (ref 135–145)
SODIUM UR-SCNC: <30 MMOL/L — SIGNIFICANT CHANGE UP
TROPONIN T SERPL-MCNC: <0.01 NG/ML — SIGNIFICANT CHANGE UP (ref 0–0.06)
WBC # BLD: 7.14 K/UL — SIGNIFICANT CHANGE UP (ref 3.8–10.5)
WBC # FLD AUTO: 7.14 K/UL — SIGNIFICANT CHANGE UP (ref 3.8–10.5)

## 2019-07-05 PROCEDURE — 99233 SBSQ HOSP IP/OBS HIGH 50: CPT

## 2019-07-05 RX ORDER — OXYCODONE HYDROCHLORIDE 5 MG/1
10 TABLET ORAL EVERY 4 HOURS
Refills: 0 | Status: DISCONTINUED | OUTPATIENT
Start: 2019-07-05 | End: 2019-07-12

## 2019-07-05 RX ORDER — POTASSIUM CHLORIDE 20 MEQ
40 PACKET (EA) ORAL EVERY 4 HOURS
Refills: 0 | Status: COMPLETED | OUTPATIENT
Start: 2019-07-05 | End: 2019-07-05

## 2019-07-05 RX ORDER — IPRATROPIUM/ALBUTEROL SULFATE 18-103MCG
3 AEROSOL WITH ADAPTER (GRAM) INHALATION EVERY 6 HOURS
Refills: 0 | Status: DISCONTINUED | OUTPATIENT
Start: 2019-07-05 | End: 2019-07-10

## 2019-07-05 RX ORDER — POTASSIUM CHLORIDE 20 MEQ
40 PACKET (EA) ORAL
Refills: 0 | Status: DISCONTINUED | OUTPATIENT
Start: 2019-07-05 | End: 2019-07-05

## 2019-07-05 RX ORDER — LABETALOL HCL 100 MG
200 TABLET ORAL
Refills: 0 | Status: DISCONTINUED | OUTPATIENT
Start: 2019-07-05 | End: 2019-07-12

## 2019-07-05 RX ADMIN — GABAPENTIN 100 MILLIGRAM(S): 400 CAPSULE ORAL at 05:57

## 2019-07-05 RX ADMIN — OXYCODONE HYDROCHLORIDE 5 MILLIGRAM(S): 5 TABLET ORAL at 15:44

## 2019-07-05 RX ADMIN — Medication 650 MILLIGRAM(S): at 17:36

## 2019-07-05 RX ADMIN — SENNA PLUS 2 TABLET(S): 8.6 TABLET ORAL at 21:42

## 2019-07-05 RX ADMIN — GABAPENTIN 100 MILLIGRAM(S): 400 CAPSULE ORAL at 21:44

## 2019-07-05 RX ADMIN — Medication 1 DROP(S): at 14:06

## 2019-07-05 RX ADMIN — Medication 650 MILLIGRAM(S): at 12:46

## 2019-07-05 RX ADMIN — PANTOPRAZOLE SODIUM 40 MILLIGRAM(S): 20 TABLET, DELAYED RELEASE ORAL at 06:01

## 2019-07-05 RX ADMIN — Medication 100 MILLIGRAM(S): at 14:18

## 2019-07-05 RX ADMIN — Medication 100 MILLIGRAM(S): at 05:57

## 2019-07-05 RX ADMIN — Medication 200 MILLIGRAM(S): at 17:38

## 2019-07-05 RX ADMIN — Medication 650 MILLIGRAM(S): at 13:26

## 2019-07-05 RX ADMIN — LIDOCAINE 2 PATCH: 4 CREAM TOPICAL at 21:25

## 2019-07-05 RX ADMIN — Medication 3 MILLILITER(S): at 20:26

## 2019-07-05 RX ADMIN — Medication 1 DROP(S): at 05:58

## 2019-07-05 RX ADMIN — Medication 650 MILLIGRAM(S): at 18:06

## 2019-07-05 RX ADMIN — Medication 3 MILLILITER(S): at 15:45

## 2019-07-05 RX ADMIN — LIDOCAINE 2 PATCH: 4 CREAM TOPICAL at 12:47

## 2019-07-05 RX ADMIN — Medication 650 MILLIGRAM(S): at 05:57

## 2019-07-05 RX ADMIN — Medication 100 MILLIGRAM(S): at 21:42

## 2019-07-05 RX ADMIN — Medication 40 MILLIEQUIVALENT(S): at 10:07

## 2019-07-05 RX ADMIN — Medication 40 MILLIEQUIVALENT(S): at 08:16

## 2019-07-05 RX ADMIN — Medication 650 MILLIGRAM(S): at 02:00

## 2019-07-05 RX ADMIN — Medication 650 MILLIGRAM(S): at 06:45

## 2019-07-05 RX ADMIN — Medication 1 DROP(S): at 21:46

## 2019-07-05 RX ADMIN — Medication 650 MILLIGRAM(S): at 01:15

## 2019-07-05 RX ADMIN — ENOXAPARIN SODIUM 40 MILLIGRAM(S): 100 INJECTION SUBCUTANEOUS at 21:42

## 2019-07-05 RX ADMIN — LIDOCAINE 2 PATCH: 4 CREAM TOPICAL at 23:11

## 2019-07-05 RX ADMIN — GABAPENTIN 100 MILLIGRAM(S): 400 CAPSULE ORAL at 14:18

## 2019-07-05 RX ADMIN — OXYCODONE HYDROCHLORIDE 5 MILLIGRAM(S): 5 TABLET ORAL at 15:14

## 2019-07-05 NOTE — CHART NOTE - NSCHARTNOTEFT_GEN_A_CORE
Upon Nutritional Assessment by the Registered Dietitian your patient was determined to meet criteria / has evidence of the following diagnosis/diagnoses:          [ ]  Mild Protein Calorie Malnutrition        [x ]  Moderate Protein Calorie Malnutrition        [ ] Severe Protein Calorie Malnutrition        [ ] Unspecified Protein Calorie Malnutrition        [ ] Underweight / BMI <19        [ ] Morbid Obesity / BMI > 40      Findings as based on:  •  Comprehensive nutrition assessment and consultation  •  Calorie counts (nutrient intake analysis)  •  Food acceptance and intake status from observations by staff  •  Follow up  •  Patient education  •  Intervention secondary to interdisciplinary rounds  •   concerns      Treatment:    The following diet has been recommended:    Ensure TID   PROVIDER Section:     By signing this assessment you are acknowledging and agree with the diagnosis/diagnoses assigned by the Registered Dietitian    Comments:
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
## 2019-07-05 NOTE — PROGRESS NOTE ADULT - SUBJECTIVE AND OBJECTIVE BOX
78yo F examined at bedside and found in no acute distress. No overnight events. Started on Lovenox yesterday. Tolerating diet. No complaints except for tender right hip on movement. Denies fever, chills, nausea, vomiting, diarrhea, constipation, chest pain, and sob.     Vital Signs Last 24 Hrs  T(C): 37 (2019 16:00), Max: 37.1 (2019 08:00)  T(F): 98.6 (2019 16:00), Max: 98.7 (2019 08:00)  HR: 102 (2019 17:00) (80 - 109)  BP: 124/59 (2019 17:00) (100/75 - 162/76)  BP(mean): 84 (:00) (72 - 110)  RR: 19 (2019 17:00) (14 - 42)  SpO2: 97% (2019 17:00) (97% - 100%)    I&O's Detail    2019 07:01  -  2019 07:00  --------------------------------------------------------  IN:    Oral Fluid: 980 mL    sodium chloride 0.9%: 900 mL  Total IN: 1880 mL    OUT:    Incontinent per Collection Ba mL    Voided: 250 mL  Total OUT: 1650 mL    Total NET: 230 mL      2019 07:01  -  2019 17:21  --------------------------------------------------------  IN:    Oral Fluid: 480 mL  Total IN: 480 mL  OUT:  Total OUT: 0 mL  Total NET: 480 mL    Ins - IVF type & rate, TNP, PO, TUbe feeds type and rate   Outs - Urine, BM, Drains, NG tube, Chest tube (amount & kind) FEMI#1 right put out 75cc serosang fluid    Constitutional: patient resting comfortably in bed, in no acute distress  Neuro: GCS: 15  HEENT: EOMI / PERRLA, MMM  Respiratory: non labored breathing. IS: 500  Cardiovascular: RRR  Gastrointestinal: Abdomen soft, non-tender, non-distended, no rebound tenderness / guarding  Musculoskeletal: RLE tender to palpation on movements because of left hip fracture.   LABS:                        8.0    7.14  )-----------( 69       ( 2019 05:30 )             24.1     07-05    134<L>  |  98  |  18.0  ----------------------------<  95  3.5   |  26.0  |  0.93    Ca    8.8      2019 05:30  Phos  3.1     07-05  Mg     2.2     07-05    TPro  6.4<L>  /  Alb  3.5  /  TBili  0.5  /  DBili  x   /  AST  104<H>  /  ALT  72<H>  /  AlkPhos  48  07-05    PT/INR - ( 2019 04:05 )   PT: 13.9 sec;   INR: 1.20 ratio         PTT - ( 2019 04:05 )  PTT:27.9 sec      MEDICATIONS  (STANDING):  acetaminophen   Tablet .. 650 milliGRAM(s) Oral every 6 hours  ALBUTerol/ipratropium for Nebulization 3 milliLiter(s) Nebulizer every 6 hours  artificial tears (preservative free) Ophthalmic Solution 1 Drop(s) Both EYES three times a day  docusate sodium 100 milliGRAM(s) Oral every 8 hours  enoxaparin Injectable 40 milliGRAM(s) SubCutaneous <User Schedule>  gabapentin 100 milliGRAM(s) Oral three times a day  labetalol 200 milliGRAM(s) Oral two times a day  lidocaine   Patch 2 Patch Transdermal daily  pantoprazole    Tablet 40 milliGRAM(s) Oral before breakfast  senna 2 Tablet(s) Oral at bedtime    MEDICATIONS  (PRN):  ondansetron Injectable 4 milliGRAM(s) IV Push every 6 hours PRN Nausea and/or Vomiting  oxyCODONE    IR 5 milliGRAM(s) Oral every 4 hours PRN Moderate Pain (4 - 6)  oxyCODONE    IR 10 milliGRAM(s) Oral every 4 hours PRN Severe Pain (7 - 10)
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
## 2019-07-05 NOTE — PROGRESS NOTE ADULT - SUBJECTIVE AND OBJECTIVE BOX
INTERVAL HPI/OVERNIGHT EVENTS/SUBJECTIVE:  No events.  Pain controlled.      ICU Vital Signs Last 24 Hrs  T(C): 37.1 (2019 08:00), Max: 37.3 (2019 16:00)  T(F): 98.7 (2019 08:00), Max: 99.1 (2019 16:00)  HR: 84 (2019 09:00) (80 - 108)  BP: 139/62 (2019 09:00) (100/75 - 165/79)  BP(mean): 89 (2019 09:00) (72 - 118)  ABP: --  ABP(mean): --  RR: 22 (2019 09:00) (14 - 41)  SpO2: 100% (2019 09:00) (95% - 100%)      I&O's Detail    2019 07:01  -  2019 07:00  --------------------------------------------------------  IN:    Oral Fluid: 980 mL    sodium chloride 0.9%: 900 mL  Total IN: 1880 mL    OUT:    Incontinent per Collection Ba mL    Voided: 250 mL  Total OUT: 1650 mL    Total NET: 230 mL      2019 07:01  -  2019 09:22  --------------------------------------------------------  IN:    Oral Fluid: 120 mL  Total IN: 120 mL    OUT:  Total OUT: 0 mL    Total NET: 120 mL                           MEDICATIONS  (STANDING):  acetaminophen   Tablet .. 650 milliGRAM(s) Oral every 6 hours  ALBUTerol/ipratropium for Nebulization 3 milliLiter(s) Nebulizer every 6 hours  artificial tears (preservative free) Ophthalmic Solution 1 Drop(s) Both EYES three times a day  docusate sodium 100 milliGRAM(s) Oral every 8 hours  enoxaparin Injectable 40 milliGRAM(s) SubCutaneous <User Schedule>  gabapentin 100 milliGRAM(s) Oral three times a day  labetalol 100 milliGRAM(s) Oral two times a day  lidocaine   Patch 2 Patch Transdermal daily  pantoprazole    Tablet 40 milliGRAM(s) Oral before breakfast  potassium chloride    Tablet ER 40 milliEquivalent(s) Oral every 4 hours  senna 2 Tablet(s) Oral at bedtime    MEDICATIONS  (PRN):  HYDROmorphone  Injectable 1 milliGRAM(s) IV Push every 4 hours PRN Severe Pain (7 - 10)  ondansetron Injectable 4 milliGRAM(s) IV Push every 6 hours PRN Nausea and/or Vomiting  oxyCODONE    IR 5 milliGRAM(s) Oral every 4 hours PRN Moderate Pain (4 - 6)        PHYSICAL EXAM:    Gen:  NAD    Eyes: PERRLA    Neurological: RASS 0, aaox4.  non focal    ENMT: MMM    Neck: Trachea midline      Pulmonary: Tachypnea, shallow resp.      Cardiovascular: RRR    Gastrointestinal: Soft        LABS:  CBC Full  -  ( 2019 05:30 )  WBC Count : 7.14 K/uL  RBC Count : 2.68 M/uL  Hemoglobin : 8.0 g/dL  Hematocrit : 24.1 %  Platelet Count - Automated : 69 K/uL  Mean Cell Volume : 89.9 fl  Mean Cell Hemoglobin : 29.9 pg  Mean Cell Hemoglobin Concentration : 33.2 gm/dL  Auto Neutrophil # : 4.92 K/uL  Auto Lymphocyte # : 1.08 K/uL  Auto Monocyte # : 0.79 K/uL  Auto Eosinophil # : 0.30 K/uL  Auto Basophil # : 0.02 K/uL  Auto Neutrophil % : 68.9 %  Auto Lymphocyte % : 15.1 %  Auto Monocyte % : 11.1 %  Auto Eosinophil % : 4.2 %  Auto Basophil % : 0.3 %    07-    134<L>  |  98  |  18.0  ----------------------------<  95  3.5   |  26.0  |  0.93    Ca    8.8      2019 05:30  Phos  3.1     07-05  Mg     2.2     07-05    TPro  6.4<L>  /  Alb  3.5  /  TBili  0.5  /  DBili  x   /  AST  104<H>  /  ALT  72<H>  /  AlkPhos  48  07-05    PT/INR - ( 2019 04:05 )   PT: 13.9 sec;   INR: 1.20 ratio         PTT - ( 2019 04:05 )  PTT:27.9 sec    RECENT CULTURES:      LIVER FUNCTIONS - ( 2019 05:30 )  Alb: 3.5 g/dL / Pro: 6.4 g/dL / ALK PHOS: 48 U/L / ALT: 72 U/L / AST: 104 U/L / GGT: x           CARDIAC MARKERS ( 2019 05:30 )  x     / <0.01 ng/mL / x     / x     / x      CARDIAC MARKERS ( 2019 12:42 )  x     / <0.01 ng/mL / x     / x     / x          CAPILLARY BLOOD GLUCOSE      RADIOLOGY & ADDITIONAL STUDIES:
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
## 2019-07-05 NOTE — PHYSICAL THERAPY INITIAL EVALUATION ADULT - CRITERIA FOR SKILLED THERAPEUTIC INTERVENTIONS
rehab potential/anticipated discharge recommendation/functional limitations in following categories/impairments found

## 2019-07-05 NOTE — DIETITIAN INITIAL EVALUATION ADULT. - PERTINENT LABORATORY DATA
07-05 Na134 mmol/L<L> Glu 95 mg/dL K+ 3.5 mmol/L Cr  0.93 mg/dL BUN 18.0 mg/dL Phos 3.1 mg/dL Alb 3.5 g/dL PAB n/a     n/a  HgbA1C
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
## 2019-07-05 NOTE — DIETITIAN INITIAL EVALUATION ADULT. - MALNUTRITION
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

## 2019-07-06 LAB
ANION GAP SERPL CALC-SCNC: 9 MMOL/L — SIGNIFICANT CHANGE UP (ref 5–17)
BASOPHILS # BLD AUTO: 0.02 K/UL — SIGNIFICANT CHANGE UP (ref 0–0.2)
BASOPHILS NFR BLD AUTO: 0.3 % — SIGNIFICANT CHANGE UP (ref 0–2)
BUN SERPL-MCNC: 12 MG/DL — SIGNIFICANT CHANGE UP (ref 8–20)
CALCIUM SERPL-MCNC: 8.8 MG/DL — SIGNIFICANT CHANGE UP (ref 8.6–10.2)
CHLORIDE SERPL-SCNC: 99 MMOL/L — SIGNIFICANT CHANGE UP (ref 98–107)
CO2 SERPL-SCNC: 26 MMOL/L — SIGNIFICANT CHANGE UP (ref 22–29)
CREAT SERPL-MCNC: 0.81 MG/DL — SIGNIFICANT CHANGE UP (ref 0.5–1.3)
EOSINOPHIL # BLD AUTO: 0.2 K/UL — SIGNIFICANT CHANGE UP (ref 0–0.5)
EOSINOPHIL NFR BLD AUTO: 2.7 % — SIGNIFICANT CHANGE UP (ref 0–6)
GLUCOSE SERPL-MCNC: 119 MG/DL — HIGH (ref 70–115)
HCT VFR BLD CALC: 21.5 % — LOW (ref 34.5–45)
HCT VFR BLD CALC: 27.7 % — LOW (ref 34.5–45)
HGB BLD-MCNC: 7 G/DL — CRITICAL LOW (ref 11.5–15.5)
HGB BLD-MCNC: 9.1 G/DL — LOW (ref 11.5–15.5)
IMM GRANULOCYTES NFR BLD AUTO: 0.4 % — SIGNIFICANT CHANGE UP (ref 0–1.5)
INR BLD: 1.09 RATIO — SIGNIFICANT CHANGE UP (ref 0.88–1.16)
LYMPHOCYTES # BLD AUTO: 0.94 K/UL — LOW (ref 1–3.3)
LYMPHOCYTES # BLD AUTO: 12.8 % — LOW (ref 13–44)
MAGNESIUM SERPL-MCNC: 2 MG/DL — SIGNIFICANT CHANGE UP (ref 1.6–2.6)
MCHC RBC-ENTMCNC: 29.5 PG — SIGNIFICANT CHANGE UP (ref 27–34)
MCHC RBC-ENTMCNC: 29.8 PG — SIGNIFICANT CHANGE UP (ref 27–34)
MCHC RBC-ENTMCNC: 32.6 GM/DL — SIGNIFICANT CHANGE UP (ref 32–36)
MCHC RBC-ENTMCNC: 32.9 GM/DL — SIGNIFICANT CHANGE UP (ref 32–36)
MCV RBC AUTO: 90.7 FL — SIGNIFICANT CHANGE UP (ref 80–100)
MCV RBC AUTO: 90.8 FL — SIGNIFICANT CHANGE UP (ref 80–100)
MONOCYTES # BLD AUTO: 0.85 K/UL — SIGNIFICANT CHANGE UP (ref 0–0.9)
MONOCYTES NFR BLD AUTO: 11.6 % — SIGNIFICANT CHANGE UP (ref 2–14)
NEUTROPHILS # BLD AUTO: 5.3 K/UL — SIGNIFICANT CHANGE UP (ref 1.8–7.4)
NEUTROPHILS NFR BLD AUTO: 72.2 % — SIGNIFICANT CHANGE UP (ref 43–77)
PHOSPHATE SERPL-MCNC: 2.7 MG/DL — SIGNIFICANT CHANGE UP (ref 2.4–4.7)
PLATELET # BLD AUTO: 105 K/UL — LOW (ref 150–400)
PLATELET # BLD AUTO: 96 K/UL — LOW (ref 150–400)
POTASSIUM SERPL-MCNC: 3.8 MMOL/L — SIGNIFICANT CHANGE UP (ref 3.5–5.3)
POTASSIUM SERPL-SCNC: 3.8 MMOL/L — SIGNIFICANT CHANGE UP (ref 3.5–5.3)
PROTHROM AB SERPL-ACNC: 12.6 SEC — SIGNIFICANT CHANGE UP (ref 10–12.9)
RBC # BLD: 2.37 M/UL — LOW (ref 3.8–5.2)
RBC # BLD: 3.05 M/UL — LOW (ref 3.8–5.2)
RBC # FLD: 12.9 % — SIGNIFICANT CHANGE UP (ref 10.3–14.5)
RBC # FLD: 12.9 % — SIGNIFICANT CHANGE UP (ref 10.3–14.5)
SODIUM SERPL-SCNC: 134 MMOL/L — LOW (ref 135–145)
WBC # BLD: 6.15 K/UL — SIGNIFICANT CHANGE UP (ref 3.8–10.5)
WBC # BLD: 7.34 K/UL — SIGNIFICANT CHANGE UP (ref 3.8–10.5)
WBC # FLD AUTO: 6.15 K/UL — SIGNIFICANT CHANGE UP (ref 3.8–10.5)
WBC # FLD AUTO: 7.34 K/UL — SIGNIFICANT CHANGE UP (ref 3.8–10.5)

## 2019-07-06 PROCEDURE — 99231 SBSQ HOSP IP/OBS SF/LOW 25: CPT

## 2019-07-06 RX ORDER — SODIUM,POTASSIUM PHOSPHATES 278-250MG
1 POWDER IN PACKET (EA) ORAL ONCE
Refills: 0 | Status: COMPLETED | OUTPATIENT
Start: 2019-07-06 | End: 2019-07-07

## 2019-07-06 RX ADMIN — Medication 200 MILLIGRAM(S): at 17:47

## 2019-07-06 RX ADMIN — SENNA PLUS 2 TABLET(S): 8.6 TABLET ORAL at 22:20

## 2019-07-06 RX ADMIN — Medication 3 MILLILITER(S): at 15:23

## 2019-07-06 RX ADMIN — Medication 650 MILLIGRAM(S): at 22:21

## 2019-07-06 RX ADMIN — Medication 100 MILLIGRAM(S): at 05:00

## 2019-07-06 RX ADMIN — Medication 650 MILLIGRAM(S): at 17:47

## 2019-07-06 RX ADMIN — Medication 100 MILLIGRAM(S): at 22:20

## 2019-07-06 RX ADMIN — LIDOCAINE 2 PATCH: 4 CREAM TOPICAL at 11:57

## 2019-07-06 RX ADMIN — Medication 650 MILLIGRAM(S): at 22:51

## 2019-07-06 RX ADMIN — Medication 650 MILLIGRAM(S): at 18:29

## 2019-07-06 RX ADMIN — Medication 1 DROP(S): at 22:23

## 2019-07-06 RX ADMIN — Medication 200 MILLIGRAM(S): at 05:00

## 2019-07-06 RX ADMIN — Medication 3 MILLILITER(S): at 08:54

## 2019-07-06 RX ADMIN — GABAPENTIN 100 MILLIGRAM(S): 400 CAPSULE ORAL at 04:59

## 2019-07-06 RX ADMIN — Medication 650 MILLIGRAM(S): at 11:57

## 2019-07-06 RX ADMIN — LIDOCAINE 2 PATCH: 4 CREAM TOPICAL at 21:56

## 2019-07-06 RX ADMIN — ENOXAPARIN SODIUM 40 MILLIGRAM(S): 100 INJECTION SUBCUTANEOUS at 22:20

## 2019-07-06 RX ADMIN — OXYCODONE HYDROCHLORIDE 5 MILLIGRAM(S): 5 TABLET ORAL at 01:30

## 2019-07-06 RX ADMIN — Medication 650 MILLIGRAM(S): at 05:01

## 2019-07-06 RX ADMIN — GABAPENTIN 100 MILLIGRAM(S): 400 CAPSULE ORAL at 22:22

## 2019-07-06 RX ADMIN — Medication 650 MILLIGRAM(S): at 00:54

## 2019-07-06 RX ADMIN — Medication 3 MILLILITER(S): at 03:31

## 2019-07-06 RX ADMIN — LIDOCAINE 2 PATCH: 4 CREAM TOPICAL at 17:38

## 2019-07-06 RX ADMIN — Medication 3 MILLILITER(S): at 20:18

## 2019-07-06 RX ADMIN — Medication 650 MILLIGRAM(S): at 01:24

## 2019-07-06 RX ADMIN — Medication 650 MILLIGRAM(S): at 12:45

## 2019-07-06 RX ADMIN — Medication 1 DROP(S): at 05:01

## 2019-07-06 RX ADMIN — PANTOPRAZOLE SODIUM 40 MILLIGRAM(S): 20 TABLET, DELAYED RELEASE ORAL at 05:00

## 2019-07-06 RX ADMIN — Medication 1 DROP(S): at 13:49

## 2019-07-06 RX ADMIN — GABAPENTIN 100 MILLIGRAM(S): 400 CAPSULE ORAL at 13:49

## 2019-07-06 RX ADMIN — Medication 100 MILLIGRAM(S): at 13:49

## 2019-07-06 RX ADMIN — OXYCODONE HYDROCHLORIDE 5 MILLIGRAM(S): 5 TABLET ORAL at 00:53

## 2019-07-06 NOTE — PROVIDER CONTACT NOTE (CRITICAL VALUE NOTIFICATION) - TEST AND RESULT REPORTED:
Anticoagulation Clinic Progress Note    Anticoagulation Summary  As of 3/4/2019    INR goal:   2.0-3.0   TTR:   35.8 % (4.5 mo)   INR used for dosin.43! (3/4/2019)   Warfarin maintenance plan:   5 mg on Sun, Tue, Thu; 2.5 mg all other days   Weekly warfarin total:   25 mg   Plan last modified:   Bob Oconnor RPH (2019)   Next INR check:   3/11/2019   Priority:   High   Target end date:   Indefinite    Indications    Long term (current) use of anticoagulants [Z79.01]             Anticoagulation Episode Summary     INR check location:       Preferred lab:       Send INR reminders to:    [facility]    Comments:         Anticoagulation Care Providers     Provider Role Specialty Phone number    Amrit Mast MD Referring Cardiology [phone]          Clinic Interview:      INR History:  Anticoagulation Monitoring 2019 2019 3/4/2019   INR 4.8 3.5 4.43   INR Date 2019 2019 3/4/2019   INR Goal 2.0-3.0 2.0-3.0 2.0-3.0   Trend Down Down Same   Last Week Total 32.5 mg 25 mg 30 mg   Next Week Total 25 mg 25 mg 17.5 mg   Sun 5 mg 5 mg 5 mg   Mon 5 mg () 2.5 mg Hold (3/4)   Tue Hold () 5 mg Hold (3/5)   Wed 2.5 mg 2.5 mg 2.5 mg   Thu 5 mg 5 mg 5 mg   Fri 2.5 mg 2.5 mg 2.5 mg   Sat 5 mg 2.5 mg 2.5 mg   Visit Report - - -   Some recent data might be hidden       Plan:  1. INR is Supratherapeutic today- see above in Anticoagulation Summary.  Patient was taking warfarin per previous dosing which was higher than we had noted as instructions given.  Will instruct Hernando Caro to hold x 2 days and Change their warfarin regimen- see above in Anticoagulation Summary.  2. Follow up in 1 week  3. Patient declines warfarin refills.  4. Verbal and written information provided. Patient expresses understanding and has no further questions at this time.    Jil Lerner [facility]   Hgb 7.0 Hct 21.5

## 2019-07-06 NOTE — PROGRESS NOTE ADULT - SUBJECTIVE AND OBJECTIVE BOX
HPI/OVERNIGHT EVENTS:    Patient examined at bedside sitting comfortably in bed.  No acute overnight events.  Patient endorses generalized body pain managed with meds.  She is voiding appropriately with Hopper in place.    Denies n /v /fever/chills /sob /dysuria/ hematuria  MEDICATIONS  (STANDING):  acetaminophen   Tablet .. 650 milliGRAM(s) Oral every 6 hours  ALBUTerol/ipratropium for Nebulization 3 milliLiter(s) Nebulizer every 6 hours  artificial tears (preservative free) Ophthalmic Solution 1 Drop(s) Both EYES three times a day  docusate sodium 100 milliGRAM(s) Oral every 8 hours  enoxaparin Injectable 40 milliGRAM(s) SubCutaneous <User Schedule>  gabapentin 100 milliGRAM(s) Oral three times a day  labetalol 200 milliGRAM(s) Oral two times a day  lidocaine   Patch 2 Patch Transdermal daily  pantoprazole    Tablet 40 milliGRAM(s) Oral before breakfast  senna 2 Tablet(s) Oral at bedtime    MEDICATIONS  (PRN):  ondansetron Injectable 4 milliGRAM(s) IV Push every 6 hours PRN Nausea and/or Vomiting  oxyCODONE    IR 5 milliGRAM(s) Oral every 4 hours PRN Moderate Pain (4 - 6)  oxyCODONE    IR 10 milliGRAM(s) Oral every 4 hours PRN Severe Pain (7 - 10)      Vital Signs Last 24 Hrs  T(C): 37.2 (2019 01:00), Max: 37.2 (2019 01:00)  T(F): 99 (2019 01:00), Max: 99 (2019 01:00)  HR: 97 (2019 03:34) (81 - 109)  BP: 137/67 (2019 01:00) (107/53 - 162/76)  BP(mean): 97 (2019 22:00) (74 - 109)  RR: 18 (2019 01:00) (14 - 42)  SpO2: 97% (2019 03:34) (94% - 100%)    Constitutional: patient resting comfortably in bed, in no acute distress  HEENT: EOMI / PERRL b/l, no active drainage or redness  Neck: No JVD, full ROM without pain  Respiratory: respirations are unlabored, no accessory muscle use, no conversational dyspnea  Cardiovascular: regular rate & rhythm  Gastrointestinal: Abdomen soft, non-tender, non-distended, no rebound tenderness / guarding  Neurological: A&O x 3; no gross sensory / motor / coordination deficits  Psychiatric: Normal mood, normal affect  Musculoskeletal: No joint pain, swelling or deformity; no limitation of movement      I&O's Detail    2019 07:01  -  2019 07:00  --------------------------------------------------------  IN:    Oral Fluid: 980 mL    sodium chloride 0.9%: 900 mL  Total IN: 1880 mL    OUT:    Incontinent per Collection Ba mL    Voided: 250 mL  Total OUT: 1650 mL    Total NET: 230 mL      2019 07:01  -  2019 04:23  --------------------------------------------------------  IN:    Oral Fluid: 600 mL  Total IN: 600 mL    OUT:    Incontinent per Collection Ba mL  Total OUT: 850 mL    Total NET: -250 mL          LABS:                        8.0    7.14  )-----------( 69       ( 2019 05:30 )             24.1     07-05    134<L>  |  98  |  18.0  ----------------------------<  95  3.5   |  26.0  |  0.93    Ca    8.8      2019 05:30  Phos  3.1     07-05  Mg     2.2     07-05    TPro  6.4<L>  /  Alb  3.5  /  TBili  0.5  /  DBili  x   /  AST  104<H>  /  ALT  72<H>  /  AlkPhos  48  07-05

## 2019-07-07 LAB
ANION GAP SERPL CALC-SCNC: 13 MMOL/L — SIGNIFICANT CHANGE UP (ref 5–17)
BASOPHILS # BLD AUTO: 0.04 K/UL — SIGNIFICANT CHANGE UP (ref 0–0.2)
BASOPHILS NFR BLD AUTO: 0.5 % — SIGNIFICANT CHANGE UP (ref 0–2)
BUN SERPL-MCNC: 13 MG/DL — SIGNIFICANT CHANGE UP (ref 8–20)
CALCIUM SERPL-MCNC: 9 MG/DL — SIGNIFICANT CHANGE UP (ref 8.6–10.2)
CHLORIDE SERPL-SCNC: 100 MMOL/L — SIGNIFICANT CHANGE UP (ref 98–107)
CO2 SERPL-SCNC: 23 MMOL/L — SIGNIFICANT CHANGE UP (ref 22–29)
CREAT SERPL-MCNC: 0.71 MG/DL — SIGNIFICANT CHANGE UP (ref 0.5–1.3)
EOSINOPHIL # BLD AUTO: 0.36 K/UL — SIGNIFICANT CHANGE UP (ref 0–0.5)
EOSINOPHIL NFR BLD AUTO: 4.4 % — SIGNIFICANT CHANGE UP (ref 0–6)
GLUCOSE SERPL-MCNC: 102 MG/DL — SIGNIFICANT CHANGE UP (ref 70–115)
HCT VFR BLD CALC: 28.5 % — LOW (ref 34.5–45)
HGB BLD-MCNC: 9.4 G/DL — LOW (ref 11.5–15.5)
IMM GRANULOCYTES NFR BLD AUTO: 0.4 % — SIGNIFICANT CHANGE UP (ref 0–1.5)
LYMPHOCYTES # BLD AUTO: 0.78 K/UL — LOW (ref 1–3.3)
LYMPHOCYTES # BLD AUTO: 9.5 % — LOW (ref 13–44)
MAGNESIUM SERPL-MCNC: 1.8 MG/DL — SIGNIFICANT CHANGE UP (ref 1.6–2.6)
MCHC RBC-ENTMCNC: 30.1 PG — SIGNIFICANT CHANGE UP (ref 27–34)
MCHC RBC-ENTMCNC: 33 GM/DL — SIGNIFICANT CHANGE UP (ref 32–36)
MCV RBC AUTO: 91.3 FL — SIGNIFICANT CHANGE UP (ref 80–100)
MONOCYTES # BLD AUTO: 0.78 K/UL — SIGNIFICANT CHANGE UP (ref 0–0.9)
MONOCYTES NFR BLD AUTO: 9.5 % — SIGNIFICANT CHANGE UP (ref 2–14)
NEUTROPHILS # BLD AUTO: 6.24 K/UL — SIGNIFICANT CHANGE UP (ref 1.8–7.4)
NEUTROPHILS NFR BLD AUTO: 75.7 % — SIGNIFICANT CHANGE UP (ref 43–77)
PHOSPHATE SERPL-MCNC: 4 MG/DL — SIGNIFICANT CHANGE UP (ref 2.4–4.7)
PLATELET # BLD AUTO: 124 K/UL — LOW (ref 150–400)
POTASSIUM SERPL-MCNC: 3.8 MMOL/L — SIGNIFICANT CHANGE UP (ref 3.5–5.3)
POTASSIUM SERPL-SCNC: 3.8 MMOL/L — SIGNIFICANT CHANGE UP (ref 3.5–5.3)
RBC # BLD: 3.12 M/UL — LOW (ref 3.8–5.2)
RBC # FLD: 13.1 % — SIGNIFICANT CHANGE UP (ref 10.3–14.5)
SODIUM SERPL-SCNC: 136 MMOL/L — SIGNIFICANT CHANGE UP (ref 135–145)
WBC # BLD: 8.23 K/UL — SIGNIFICANT CHANGE UP (ref 3.8–10.5)
WBC # FLD AUTO: 8.23 K/UL — SIGNIFICANT CHANGE UP (ref 3.8–10.5)

## 2019-07-07 PROCEDURE — 99233 SBSQ HOSP IP/OBS HIGH 50: CPT

## 2019-07-07 RX ORDER — POTASSIUM CHLORIDE 20 MEQ
20 PACKET (EA) ORAL ONCE
Refills: 0 | Status: COMPLETED | OUTPATIENT
Start: 2019-07-07 | End: 2019-07-07

## 2019-07-07 RX ORDER — MAGNESIUM OXIDE 400 MG ORAL TABLET 241.3 MG
400 TABLET ORAL ONCE
Refills: 0 | Status: COMPLETED | OUTPATIENT
Start: 2019-07-07 | End: 2019-07-07

## 2019-07-07 RX ADMIN — GABAPENTIN 100 MILLIGRAM(S): 400 CAPSULE ORAL at 23:07

## 2019-07-07 RX ADMIN — GABAPENTIN 100 MILLIGRAM(S): 400 CAPSULE ORAL at 05:59

## 2019-07-07 RX ADMIN — OXYCODONE HYDROCHLORIDE 10 MILLIGRAM(S): 5 TABLET ORAL at 00:38

## 2019-07-07 RX ADMIN — MAGNESIUM OXIDE 400 MG ORAL TABLET 400 MILLIGRAM(S): 241.3 TABLET ORAL at 12:26

## 2019-07-07 RX ADMIN — Medication 20 MILLIEQUIVALENT(S): at 12:26

## 2019-07-07 RX ADMIN — Medication 650 MILLIGRAM(S): at 13:00

## 2019-07-07 RX ADMIN — Medication 100 MILLIGRAM(S): at 05:59

## 2019-07-07 RX ADMIN — Medication 1 DROP(S): at 23:08

## 2019-07-07 RX ADMIN — Medication 650 MILLIGRAM(S): at 12:25

## 2019-07-07 RX ADMIN — LIDOCAINE 2 PATCH: 4 CREAM TOPICAL at 23:08

## 2019-07-07 RX ADMIN — PANTOPRAZOLE SODIUM 40 MILLIGRAM(S): 20 TABLET, DELAYED RELEASE ORAL at 06:00

## 2019-07-07 RX ADMIN — Medication 650 MILLIGRAM(S): at 23:34

## 2019-07-07 RX ADMIN — Medication 1 DROP(S): at 06:00

## 2019-07-07 RX ADMIN — Medication 3 MILLILITER(S): at 20:12

## 2019-07-07 RX ADMIN — Medication 1 PACKET(S): at 05:58

## 2019-07-07 RX ADMIN — Medication 200 MILLIGRAM(S): at 05:59

## 2019-07-07 RX ADMIN — Medication 650 MILLIGRAM(S): at 23:04

## 2019-07-07 RX ADMIN — Medication 200 MILLIGRAM(S): at 16:50

## 2019-07-07 RX ADMIN — Medication 10 MILLIGRAM(S): at 12:26

## 2019-07-07 RX ADMIN — OXYCODONE HYDROCHLORIDE 10 MILLIGRAM(S): 5 TABLET ORAL at 01:08

## 2019-07-07 RX ADMIN — Medication 650 MILLIGRAM(S): at 05:59

## 2019-07-07 RX ADMIN — Medication 1 DROP(S): at 12:27

## 2019-07-07 RX ADMIN — GABAPENTIN 100 MILLIGRAM(S): 400 CAPSULE ORAL at 12:26

## 2019-07-07 RX ADMIN — ENOXAPARIN SODIUM 40 MILLIGRAM(S): 100 INJECTION SUBCUTANEOUS at 23:05

## 2019-07-07 RX ADMIN — SENNA PLUS 2 TABLET(S): 8.6 TABLET ORAL at 23:04

## 2019-07-07 RX ADMIN — Medication 100 MILLIGRAM(S): at 12:26

## 2019-07-07 RX ADMIN — Medication 100 MILLIGRAM(S): at 23:05

## 2019-07-07 RX ADMIN — Medication 650 MILLIGRAM(S): at 16:50

## 2019-07-07 RX ADMIN — LIDOCAINE 2 PATCH: 4 CREAM TOPICAL at 12:26

## 2019-07-07 RX ADMIN — Medication 650 MILLIGRAM(S): at 17:34

## 2019-07-07 NOTE — PROGRESS NOTE ADULT - SUBJECTIVE AND OBJECTIVE BOX
HPI/OVERNIGHT EVENTS:    Hemoglobin reported 7.0. The pt got a transfusion of 1U of RBC. Type and screen done less than 72h ago before the transfusion.  Hemoglobin in the afternoon reported as 9.1.       MEDICATIONS  (STANDING):  acetaminophen   Tablet .. 650 milliGRAM(s) Oral every 6 hours  ALBUTerol/ipratropium for Nebulization 3 milliLiter(s) Nebulizer every 6 hours  artificial tears (preservative free) Ophthalmic Solution 1 Drop(s) Both EYES three times a day  docusate sodium 100 milliGRAM(s) Oral every 8 hours  enoxaparin Injectable 40 milliGRAM(s) SubCutaneous <User Schedule>  gabapentin 100 milliGRAM(s) Oral three times a day  labetalol 200 milliGRAM(s) Oral two times a day  lidocaine   Patch 2 Patch Transdermal daily  pantoprazole    Tablet 40 milliGRAM(s) Oral before breakfast  potassium phosphate / sodium phosphate powder 1 Packet(s) Oral once  senna 2 Tablet(s) Oral at bedtime    MEDICATIONS  (PRN):  ondansetron Injectable 4 milliGRAM(s) IV Push every 6 hours PRN Nausea and/or Vomiting  oxyCODONE    IR 5 milliGRAM(s) Oral every 4 hours PRN Moderate Pain (4 - 6)  oxyCODONE    IR 10 milliGRAM(s) Oral every 4 hours PRN Severe Pain (7 - 10)      Vital Signs Last 24 Hrs  T(C): 37.3 (2019 23:21), Max: 37.5 (2019 11:30)  T(F): 99.2 (2019 23:21), Max: 99.5 (2019 11:30)  HR: 100 (2019 23:21) (82 - 112)  BP: 150/77 (2019 23:21) (110/70 - 154/83)  BP(mean): --  RR: 18 (2019 23:21) (16 - 18)  SpO2: 96% (2019 20:20) (95% - 99%)    Constitutional: patient resting comfortably in bed, in no acute distress  HEENT: EOMI / PERRL b/l, no active drainage or redness  Neck: No JVD, full ROM without pain  Respiratory: respirations are unlabored, no accessory muscle use, no conversational dyspnea  Cardiovascular: regular rate & rhythm  Gastrointestinal: Abdomen soft, non-tender, non-distended, no rebound tenderness / guarding  Neurological: GCS: 15 (4/5/6). A&O x 3; no gross sensory / motor / coordination deficits  Psychiatric: Normal mood, normal affect  Musculoskeletal: No joint pain, swelling or deformity; no limitation of movement      I&O's Detail    2019 07:01  -  2019 07:00  --------------------------------------------------------  IN:    Oral Fluid: 600 mL  Total IN: 600 mL    OUT:    Incontinent per Collection Ba mL    Voided: 600 mL  Total OUT: 1450 mL    Total NET: -850 mL      2019 07:  -  2019 02:01  --------------------------------------------------------  IN:    Oral Fluid: 480 mL    Packed Red Blood Cells: 307 mL  Total IN: 787 mL    OUT:    Incontinent per Collection Ba mL  Total OUT: 850 mL    Total NET: -63 mL          LABS:                        9.1    7.34  )-----------( 105      ( 2019 18:22 )             27.7     07-06    134<L>  |  99  |  12.0  ----------------------------<  119<H>  3.8   |  26.0  |  0.81    Ca    8.8      2019 09:42  Phos  2.7     07-06  Mg     2.0     07-06    TPro  6.4<L>  /  Alb  3.5  /  TBili  0.5  /  DBili  x   /  AST  104<H>  /  ALT  72<H>  /  AlkPhos  48  07-05    PT/INR - ( 2019 09:42 )   PT: 12.6 sec;   INR: 1.09 ratio

## 2019-07-08 LAB
ANION GAP SERPL CALC-SCNC: 12 MMOL/L — SIGNIFICANT CHANGE UP (ref 5–17)
BUN SERPL-MCNC: 16 MG/DL — SIGNIFICANT CHANGE UP (ref 8–20)
CALCIUM SERPL-MCNC: 8.9 MG/DL — SIGNIFICANT CHANGE UP (ref 8.6–10.2)
CHLORIDE SERPL-SCNC: 98 MMOL/L — SIGNIFICANT CHANGE UP (ref 98–107)
CO2 SERPL-SCNC: 23 MMOL/L — SIGNIFICANT CHANGE UP (ref 22–29)
CREAT SERPL-MCNC: 0.76 MG/DL — SIGNIFICANT CHANGE UP (ref 0.5–1.3)
GLUCOSE SERPL-MCNC: 141 MG/DL — HIGH (ref 70–115)
HCT VFR BLD CALC: 28.7 % — LOW (ref 34.5–45)
HGB BLD-MCNC: 9.3 G/DL — LOW (ref 11.5–15.5)
MAGNESIUM SERPL-MCNC: 2.1 MG/DL — SIGNIFICANT CHANGE UP (ref 1.6–2.6)
MCHC RBC-ENTMCNC: 30.1 PG — SIGNIFICANT CHANGE UP (ref 27–34)
MCHC RBC-ENTMCNC: 32.4 GM/DL — SIGNIFICANT CHANGE UP (ref 32–36)
MCV RBC AUTO: 92.9 FL — SIGNIFICANT CHANGE UP (ref 80–100)
PHOSPHATE SERPL-MCNC: 3.7 MG/DL — SIGNIFICANT CHANGE UP (ref 2.4–4.7)
PLATELET # BLD AUTO: 122 K/UL — LOW (ref 150–400)
POTASSIUM SERPL-MCNC: 3.7 MMOL/L — SIGNIFICANT CHANGE UP (ref 3.5–5.3)
POTASSIUM SERPL-SCNC: 3.7 MMOL/L — SIGNIFICANT CHANGE UP (ref 3.5–5.3)
RBC # BLD: 3.09 M/UL — LOW (ref 3.8–5.2)
RBC # FLD: 13.2 % — SIGNIFICANT CHANGE UP (ref 10.3–14.5)
SODIUM SERPL-SCNC: 133 MMOL/L — LOW (ref 135–145)
WBC # BLD: 7.41 K/UL — SIGNIFICANT CHANGE UP (ref 3.8–10.5)
WBC # FLD AUTO: 7.41 K/UL — SIGNIFICANT CHANGE UP (ref 3.8–10.5)

## 2019-07-08 PROCEDURE — 99223 1ST HOSP IP/OBS HIGH 75: CPT | Mod: GC

## 2019-07-08 PROCEDURE — 99231 SBSQ HOSP IP/OBS SF/LOW 25: CPT

## 2019-07-08 RX ORDER — POTASSIUM CHLORIDE 20 MEQ
40 PACKET (EA) ORAL ONCE
Refills: 0 | Status: COMPLETED | OUTPATIENT
Start: 2019-07-08 | End: 2019-07-08

## 2019-07-08 RX ADMIN — Medication 650 MILLIGRAM(S): at 17:47

## 2019-07-08 RX ADMIN — Medication 1 DROP(S): at 06:15

## 2019-07-08 RX ADMIN — Medication 100 MILLIGRAM(S): at 22:29

## 2019-07-08 RX ADMIN — Medication 650 MILLIGRAM(S): at 06:40

## 2019-07-08 RX ADMIN — Medication 650 MILLIGRAM(S): at 12:44

## 2019-07-08 RX ADMIN — SENNA PLUS 2 TABLET(S): 8.6 TABLET ORAL at 22:29

## 2019-07-08 RX ADMIN — Medication 3 MILLILITER(S): at 14:52

## 2019-07-08 RX ADMIN — Medication 200 MILLIGRAM(S): at 06:11

## 2019-07-08 RX ADMIN — Medication 100 MILLIGRAM(S): at 06:10

## 2019-07-08 RX ADMIN — GABAPENTIN 100 MILLIGRAM(S): 400 CAPSULE ORAL at 14:25

## 2019-07-08 RX ADMIN — LIDOCAINE 2 PATCH: 4 CREAM TOPICAL at 19:00

## 2019-07-08 RX ADMIN — PANTOPRAZOLE SODIUM 40 MILLIGRAM(S): 20 TABLET, DELAYED RELEASE ORAL at 06:11

## 2019-07-08 RX ADMIN — Medication 3 MILLILITER(S): at 20:20

## 2019-07-08 RX ADMIN — Medication 100 MILLIGRAM(S): at 14:25

## 2019-07-08 RX ADMIN — Medication 650 MILLIGRAM(S): at 06:10

## 2019-07-08 RX ADMIN — Medication 650 MILLIGRAM(S): at 18:17

## 2019-07-08 RX ADMIN — GABAPENTIN 100 MILLIGRAM(S): 400 CAPSULE ORAL at 22:29

## 2019-07-08 RX ADMIN — Medication 40 MILLIEQUIVALENT(S): at 14:25

## 2019-07-08 RX ADMIN — Medication 1 DROP(S): at 22:35

## 2019-07-08 RX ADMIN — LIDOCAINE 2 PATCH: 4 CREAM TOPICAL at 12:16

## 2019-07-08 RX ADMIN — Medication 1 DROP(S): at 14:29

## 2019-07-08 RX ADMIN — Medication 200 MILLIGRAM(S): at 17:47

## 2019-07-08 RX ADMIN — ENOXAPARIN SODIUM 40 MILLIGRAM(S): 100 INJECTION SUBCUTANEOUS at 22:30

## 2019-07-08 RX ADMIN — Medication 650 MILLIGRAM(S): at 12:14

## 2019-07-08 RX ADMIN — GABAPENTIN 100 MILLIGRAM(S): 400 CAPSULE ORAL at 06:15

## 2019-07-08 NOTE — CHART NOTE - NSCHARTNOTEFT_GEN_A_CORE
Source: Patient    Current Diet: DASH + Ensure TID  (likes and takes supplements--at least 1 per day)     Patient reports good appetite/intake. Menu assistance provided.     PO intake:  50-75%   (normal for Patient)     Current Weight:   (7/3) 57kg    % Weight Change     Pertinent Medications: MEDICATIONS  (STANDING):  acetaminophen   Tablet .. 650 milliGRAM(s) Oral every 6 hours  ALBUTerol/ipratropium for Nebulization 3 milliLiter(s) Nebulizer every 6 hours  artificial tears (preservative free) Ophthalmic Solution 1 Drop(s) Both EYES three times a day  docusate sodium 100 milliGRAM(s) Oral every 8 hours  enoxaparin Injectable 40 milliGRAM(s) SubCutaneous <User Schedule>  gabapentin 100 milliGRAM(s) Oral three times a day  labetalol 200 milliGRAM(s) Oral two times a day  lidocaine   Patch 2 Patch Transdermal daily  pantoprazole    Tablet 40 milliGRAM(s) Oral before breakfast  potassium chloride    Tablet ER 40 milliEquivalent(s) Oral once  senna 2 Tablet(s) Oral at bedtime    MEDICATIONS  (PRN):  ondansetron Injectable 4 milliGRAM(s) IV Push every 6 hours PRN Nausea and/or Vomiting  oxyCODONE    IR 5 milliGRAM(s) Oral every 4 hours PRN Moderate Pain (4 - 6)  oxyCODONE    IR 10 milliGRAM(s) Oral every 4 hours PRN Severe Pain (7 - 10)    Pertinent Labs: CBC Full  -  ( 08 Jul 2019 09:56 )  WBC Count : 7.41 K/uL  RBC Count : 3.09 M/uL  Hemoglobin : 9.3 g/dL  Hematocrit : 28.7 %  Platelet Count - Automated : 122 K/uL  Mean Cell Volume : 92.9 fl  Mean Cell Hemoglobin : 30.1 pg  Mean Cell Hemoglobin Concentration : 32.4 gm/dL      Skin: intact     Nutrition focused physical exam previously conducted - found signs of malnutrition [ ]absent [ x]present    Subcutaneous fat loss: [ ] Orbital fat pads region, [ ]Buccal fat region, [ x]Triceps region,  [ ]Ribs region    Muscle wasting: [ ]Temples region, [x ]Clavicle region, [x ]Shoulder region, [ ]Scapula region, [ ]Interosseous region,  [ ]thigh region, [ ]Calf region    Estimated Needs:   [ x] no change since previous assessment  [ ] recalculated:     Current Nutrition Diagnosis: Malnutrition moderate-chronic Related to inadequate protein-energy intake with increased needs in setting of breast cancer on radiation, now admitted with multi trauma as evidenced by wt loss of 5#(3.8%)over pst few weeks, and physical signs of muscle/fat loss.    Recommendations:   1. Continue current nutrition plan  2. Encourage PO     Monitoring and Evaluation:   [ x] PO intake [ x] Tolerance to diet prescription [X] Weights  [X] Follow up per protocol [X] Labs:
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
## 2019-07-08 NOTE — PROGRESS NOTE ADULT - SUBJECTIVE AND OBJECTIVE BOX
No acute events overnight. Pain is controlled. No further transfusions required. tolerating diet. passing gas and having bowel function. pulling 1000 on IS  denies fevers, chills, nausea, vomiting, chest pain, shortness of breath, dysuria, myalgias      MEDICATIONS  (STANDING):  acetaminophen   Tablet .. 650 milliGRAM(s) Oral every 6 hours  ALBUTerol/ipratropium for Nebulization 3 milliLiter(s) Nebulizer every 6 hours  artificial tears (preservative free) Ophthalmic Solution 1 Drop(s) Both EYES three times a day  docusate sodium 100 milliGRAM(s) Oral every 8 hours  enoxaparin Injectable 40 milliGRAM(s) SubCutaneous <User Schedule>  gabapentin 100 milliGRAM(s) Oral three times a day  labetalol 200 milliGRAM(s) Oral two times a day  lidocaine   Patch 2 Patch Transdermal daily  pantoprazole    Tablet 40 milliGRAM(s) Oral before breakfast  senna 2 Tablet(s) Oral at bedtime    MEDICATIONS  (PRN):  ondansetron Injectable 4 milliGRAM(s) IV Push every 6 hours PRN Nausea and/or Vomiting  oxyCODONE    IR 5 milliGRAM(s) Oral every 4 hours PRN Moderate Pain (4 - 6)  oxyCODONE    IR 10 milliGRAM(s) Oral every 4 hours PRN Severe Pain (7 - 10)      Vital Signs Last 24 Hrs  T(C): 36.8 (2019 23:02), Max: 37.1 (2019 07:00)  T(F): 98.3 (2019 23:02), Max: 98.8 (2019 07:00)  HR: 98 (2019 23:02) (92 - 102)  BP: 155/81 (2019 23:02) (148/71 - 155/81)  BP(mean): --  RR: 18 (2019 23:02) (18 - 18)  SpO2: 93% (2019 23:02) (93% - 98%)    Physical Exam:    general: no acute distress, AOx3  Respiratory: Breath Sounds equal & clear to auscultation, no accessory muscle use  Cardiovascular: Regular rate & rhythm, normal S1, S2; no murmurs, gallops or rubs  Gastrointestinal: Soft, non-tender, normal bowel sounds  Extremities: No peripheral edema, No cyanosis, clubbing   Vascular: Equal and normal pulses: 2+ peripheral pulses throughout  Musculoskeletal: No joint pain, swelling or deformity; no limitation of movement    Skin: No rashes      I&O's Detail    2019 07:01  -  2019 07:00  --------------------------------------------------------  IN:    Oral Fluid: 480 mL    Packed Red Blood Cells: 307 mL  Total IN: 787 mL    OUT:    Incontinent per Collection Ba mL  Total OUT: 1200 mL    Total NET: -413 mL          LABS:                        9.4    8.23  )-----------( 124      ( 2019 08:39 )             28.5     07-07    136  |  100  |  13.0  ----------------------------<  102  3.8   |  23.0  |  0.71    Ca    9.0      2019 08:39  Phos  4.0     07-07  Mg     1.8     07-07      PT/INR - ( 2019 09:42 )   PT: 12.6 sec;   INR: 1.09 ratio               RADIOLOGY & ADDITIONAL STUDIES:

## 2019-07-08 NOTE — CONSULT NOTE ADULT - ATTENDING COMMENTS
Patient seen and examined and cased discussed with resident. Agree with recommendations.    As above, patient meets criteria for AR, however, her dispo will likely be limited to [facility] if a local facility is willing to provide her with transportation.     CM made aware.     Will continue to follow. Functional progress will determine ongoing rehab dispo recommendations, which may change.    Continue bedside therapy as well as OOB throughout the day with mobilization by staff to maintain cardiopulmonary function and prevention of secondary complications related to debility. Patient seen and examined and cased discussed with resident. Agree with recommendations.    As above, patient meets criteria for AR. However, patient has one of two options in considering rehab. Either patient chooses 1) AR with the understanding that her RT will have be placed on hold until safe for DC HOME or 2) a local [facility] that will provide ongoing transportation to continue ongoing RT while receiving rehab.     CM made aware. Clarfied above to ACS.     Will continue to follow. Functional progress will determine ongoing rehab dispo recommendations, which may change.    Continue bedside therapy as well as OOB throughout the day with mobilization by staff to maintain cardiopulmonary function and prevention of secondary complications related to debility.

## 2019-07-09 ENCOUNTER — TRANSCRIPTION ENCOUNTER (OUTPATIENT)
Age: 79
End: 2019-07-09

## 2019-07-09 PROCEDURE — 99231 SBSQ HOSP IP/OBS SF/LOW 25: CPT

## 2019-07-09 PROCEDURE — 99232 SBSQ HOSP IP/OBS MODERATE 35: CPT

## 2019-07-09 RX ADMIN — Medication 650 MILLIGRAM(S): at 17:10

## 2019-07-09 RX ADMIN — GABAPENTIN 100 MILLIGRAM(S): 400 CAPSULE ORAL at 13:20

## 2019-07-09 RX ADMIN — OXYCODONE HYDROCHLORIDE 10 MILLIGRAM(S): 5 TABLET ORAL at 00:10

## 2019-07-09 RX ADMIN — Medication 650 MILLIGRAM(S): at 00:11

## 2019-07-09 RX ADMIN — Medication 100 MILLIGRAM(S): at 20:50

## 2019-07-09 RX ADMIN — Medication 3 MILLILITER(S): at 20:04

## 2019-07-09 RX ADMIN — Medication 650 MILLIGRAM(S): at 06:26

## 2019-07-09 RX ADMIN — Medication 650 MILLIGRAM(S): at 11:51

## 2019-07-09 RX ADMIN — Medication 1 DROP(S): at 13:21

## 2019-07-09 RX ADMIN — Medication 200 MILLIGRAM(S): at 06:26

## 2019-07-09 RX ADMIN — Medication 650 MILLIGRAM(S): at 11:50

## 2019-07-09 RX ADMIN — SENNA PLUS 2 TABLET(S): 8.6 TABLET ORAL at 20:50

## 2019-07-09 RX ADMIN — Medication 650 MILLIGRAM(S): at 00:40

## 2019-07-09 RX ADMIN — Medication 100 MILLIGRAM(S): at 13:20

## 2019-07-09 RX ADMIN — PANTOPRAZOLE SODIUM 40 MILLIGRAM(S): 20 TABLET, DELAYED RELEASE ORAL at 06:27

## 2019-07-09 RX ADMIN — OXYCODONE HYDROCHLORIDE 10 MILLIGRAM(S): 5 TABLET ORAL at 00:40

## 2019-07-09 RX ADMIN — GABAPENTIN 100 MILLIGRAM(S): 400 CAPSULE ORAL at 06:27

## 2019-07-09 RX ADMIN — Medication 650 MILLIGRAM(S): at 17:09

## 2019-07-09 RX ADMIN — Medication 1 DROP(S): at 20:51

## 2019-07-09 RX ADMIN — LIDOCAINE 2 PATCH: 4 CREAM TOPICAL at 00:00

## 2019-07-09 RX ADMIN — Medication 650 MILLIGRAM(S): at 23:12

## 2019-07-09 RX ADMIN — Medication 100 MILLIGRAM(S): at 06:26

## 2019-07-09 RX ADMIN — LIDOCAINE 2 PATCH: 4 CREAM TOPICAL at 11:51

## 2019-07-09 RX ADMIN — GABAPENTIN 100 MILLIGRAM(S): 400 CAPSULE ORAL at 20:50

## 2019-07-09 RX ADMIN — ENOXAPARIN SODIUM 40 MILLIGRAM(S): 100 INJECTION SUBCUTANEOUS at 20:51

## 2019-07-09 RX ADMIN — Medication 1 DROP(S): at 06:27

## 2019-07-09 RX ADMIN — Medication 200 MILLIGRAM(S): at 17:09

## 2019-07-09 NOTE — DISCHARGE NOTE PROVIDER - NSDCCPCAREPLAN_GEN_ALL_CORE_FT
PRINCIPAL DISCHARGE DIAGNOSIS  Diagnosis: Rib fractures  Assessment and Plan of Treatment: Follow up: Please call and make an appointment with the Acute Care Surgery Clinic 10-14 days after discharge. Also, please call and make an appointment with your primary care physician as per your usual schedule.   Activity: Refrain from heavy lifting. WBAT to b/l lower extremities. Continue physical therapy at rehab. DO NOT participate in any high impact / strenuous activities / contact sports.   Diet: May continue regular diet.  Medications: Please take all home medications as prescribed by your primary care doctor. Pain medication has been prescribed for you. Please, take it as it has been prescribed, do not drive or operate heavy machinery while taking narcotics.  You are encouraged to take over-the-counter tylenol and/or ibuprofen for pain relief when you feel your pain no longer warrants the use of narcotic pain medications, however DO NOT TAKE percocet and tylenol at the same time as they contain the same active ingredient (acetaminophen). Take only percocet OR tylenol.  Wound Care: Please, keep wound site clean and dry. You may shower, but do not bathe.  Patient is advised to RETURN TO THE EMERGENCY DEPARTMENT for any of the following - worsening pain, fever/chills, nausea/vomiting, altered mental status, chest pain, shortness of breath, or any other new / worsening symptom.      SECONDARY DISCHARGE DIAGNOSES  Diagnosis: Pelvic fracture  Assessment and Plan of Treatment: Please call and make a follow-up appointment with orthopedic surgeon Dr. Ly 2 weeks after discharge. You may remain weight bearing as tolerated.    Diagnosis: Subcapsular hematoma of liver  Assessment and Plan of Treatment: DO NOT participate in any high impact / strenuous activities / contact sports. Follow-up at the acute care surgery clinic 10-14 days after discharge.    Diagnosis: Infiltrating duct carcinoma  Assessment and Plan of Treatment: Please continue radiation therapy & follow-up with your oncologist as per your usual schedule.

## 2019-07-09 NOTE — DISCHARGE NOTE PROVIDER - CARE PROVIDER_API CALL
Redd Ly)  Orthopaedic Surgery  [address]  [place] [address]  Phone: [phone]  Fax: [phone]  Follow Up Time:     [facility],   [address] [place] [address] - [address] [place] [address]  Phone: [phone]  Fax: (   )    -  Follow Up Time:
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
## 2019-07-09 NOTE — DISCHARGE NOTE PROVIDER - CARE PROVIDERS DIRECT ADDRESSES
[email or web address]

## 2019-07-09 NOTE — PROGRESS NOTE ADULT - SUBJECTIVE AND OBJECTIVE BOX
HPI/OVERNIGHT EVENTS:    No acute events overnight. Pain is controlled. No further transfusions required. tolerating diet. passing gas and having bowel function. Denies fevers, chills, nausea, vomiting, chest pain, shortness of breath, lightheadedness.    MEDICATIONS  (STANDING):  acetaminophen   Tablet .. 650 milliGRAM(s) Oral every 6 hours  ALBUTerol/ipratropium for Nebulization 3 milliLiter(s) Nebulizer every 6 hours  artificial tears (preservative free) Ophthalmic Solution 1 Drop(s) Both EYES three times a day  docusate sodium 100 milliGRAM(s) Oral every 8 hours  enoxaparin Injectable 40 milliGRAM(s) SubCutaneous <User Schedule>  gabapentin 100 milliGRAM(s) Oral three times a day  labetalol 200 milliGRAM(s) Oral two times a day  lidocaine   Patch 2 Patch Transdermal daily  pantoprazole    Tablet 40 milliGRAM(s) Oral before breakfast  senna 2 Tablet(s) Oral at bedtime    MEDICATIONS  (PRN):  ondansetron Injectable 4 milliGRAM(s) IV Push every 6 hours PRN Nausea and/or Vomiting  oxyCODONE    IR 5 milliGRAM(s) Oral every 4 hours PRN Moderate Pain (4 - 6)  oxyCODONE    IR 10 milliGRAM(s) Oral every 4 hours PRN Severe Pain (7 - 10)      Vital Signs Last 24 Hrs  T(C): 36.9 (2019 08:40), Max: 36.9 (2019 08:40)  T(F): 98.4 (2019 08:40), Max: 98.4 (2019 08:40)  HR: 92 (2019 08:40) (71 - 103)  BP: 146/83 (2019 08:40) (146/83 - 158/79)  BP(mean): --  RR: 18 (2019 08:40) (18 - 18)  SpO2: 98% (2019 08:40) (97% - 98%)    Constitutional: patient resting comfortably in bed, in no acute distress  HEENT: EOMI / PERRL b/l, no active drainage or redness  Neck: No JVD, full ROM without pain  Respiratory: respirations are unlabored, no accessory muscle use, no conversational dyspnea  Cardiovascular: regular rate & rhythm  Gastrointestinal: Abdomen soft, non-tender, non-distended, no rebound tenderness / guarding  Neurological: GCS: 15 (4/5/6). A&O x 3; no gross sensory / motor / coordination deficits  Psychiatric: Normal mood, normal affect  Musculoskeletal: No joint pain, swelling or deformity; no limitation of movement    I&O's Detail    2019 07:01  -  2019 07:00  --------------------------------------------------------  IN:  Total IN: 0 mL    OUT:    Incontinent per Collection Ba mL  Total OUT: 650 mL    Total NET: -650 mL          LABS:                        9.3    7.41  )-----------( 122      ( 2019 09:56 )             28.7     07-08    133<L>  |  98  |  16.0  ----------------------------<  141<H>  3.7   |  23.0  |  0.76    Ca    8.9      2019 09:56  Phos  3.7     07-08  Mg     2.1     07-08
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
## 2019-07-09 NOTE — DISCHARGE NOTE PROVIDER - PROVIDER TOKENS
PROVIDER:[TOKEN:[[id number]]],FREE:[LAST:[[facility]],PHONE:[phone]],FAX:[(   [phone]    -],ADDRESS:[[address] [place] [address]]]

## 2019-07-10 PROCEDURE — 99231 SBSQ HOSP IP/OBS SF/LOW 25: CPT

## 2019-07-10 PROCEDURE — 99232 SBSQ HOSP IP/OBS MODERATE 35: CPT

## 2019-07-10 RX ORDER — IPRATROPIUM/ALBUTEROL SULFATE 18-103MCG
3 AEROSOL WITH ADAPTER (GRAM) INHALATION EVERY 6 HOURS
Refills: 0 | Status: DISCONTINUED | OUTPATIENT
Start: 2019-07-10 | End: 2019-07-12

## 2019-07-10 RX ORDER — TIOTROPIUM BROMIDE 18 UG/1
1 CAPSULE ORAL; RESPIRATORY (INHALATION) DAILY
Refills: 0 | Status: DISCONTINUED | OUTPATIENT
Start: 2019-07-10 | End: 2019-07-12

## 2019-07-10 RX ORDER — ALBUTEROL 90 UG/1
1 AEROSOL, METERED ORAL EVERY 4 HOURS
Refills: 0 | Status: DISCONTINUED | OUTPATIENT
Start: 2019-07-10 | End: 2019-07-12

## 2019-07-10 RX ADMIN — Medication 3 MILLILITER(S): at 08:45

## 2019-07-10 RX ADMIN — Medication 650 MILLIGRAM(S): at 14:45

## 2019-07-10 RX ADMIN — Medication 200 MILLIGRAM(S): at 05:47

## 2019-07-10 RX ADMIN — GABAPENTIN 100 MILLIGRAM(S): 400 CAPSULE ORAL at 21:43

## 2019-07-10 RX ADMIN — Medication 650 MILLIGRAM(S): at 13:51

## 2019-07-10 RX ADMIN — PANTOPRAZOLE SODIUM 40 MILLIGRAM(S): 20 TABLET, DELAYED RELEASE ORAL at 05:49

## 2019-07-10 RX ADMIN — LIDOCAINE 2 PATCH: 4 CREAM TOPICAL at 21:46

## 2019-07-10 RX ADMIN — Medication 1 DROP(S): at 21:46

## 2019-07-10 RX ADMIN — GABAPENTIN 100 MILLIGRAM(S): 400 CAPSULE ORAL at 13:53

## 2019-07-10 RX ADMIN — Medication 650 MILLIGRAM(S): at 17:10

## 2019-07-10 RX ADMIN — Medication 100 MILLIGRAM(S): at 13:50

## 2019-07-10 RX ADMIN — Medication 1 DROP(S): at 05:49

## 2019-07-10 RX ADMIN — SENNA PLUS 2 TABLET(S): 8.6 TABLET ORAL at 21:43

## 2019-07-10 RX ADMIN — Medication 650 MILLIGRAM(S): at 05:47

## 2019-07-10 RX ADMIN — ENOXAPARIN SODIUM 40 MILLIGRAM(S): 100 INJECTION SUBCUTANEOUS at 21:44

## 2019-07-10 RX ADMIN — GABAPENTIN 100 MILLIGRAM(S): 400 CAPSULE ORAL at 05:48

## 2019-07-10 RX ADMIN — Medication 1 DROP(S): at 13:54

## 2019-07-10 RX ADMIN — LIDOCAINE 2 PATCH: 4 CREAM TOPICAL at 13:51

## 2019-07-10 RX ADMIN — Medication 200 MILLIGRAM(S): at 17:09

## 2019-07-10 RX ADMIN — Medication 100 MILLIGRAM(S): at 05:47
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
## 2019-07-10 NOTE — PROGRESS NOTE ADULT - SUBJECTIVE AND OBJECTIVE BOX
SUBJECTIVE:  No acute events overnight. Pain is controlled. Tolerating diet. Passing flatus and having bowel function. Denies fevers, chills, nausea, vomiting, chest pain, shortness of breath, lightheadedness.    MEDICATIONS  (STANDING):  acetaminophen   Tablet .. 650 milliGRAM(s) Oral every 6 hours  ALBUTerol/ipratropium for Nebulization 3 milliLiter(s) Nebulizer every 6 hours  artificial tears (preservative free) Ophthalmic Solution 1 Drop(s) Both EYES three times a day  docusate sodium 100 milliGRAM(s) Oral every 8 hours  enoxaparin Injectable 40 milliGRAM(s) SubCutaneous <User Schedule>  gabapentin 100 milliGRAM(s) Oral three times a day  labetalol 200 milliGRAM(s) Oral two times a day  lidocaine   Patch 2 Patch Transdermal daily  pantoprazole    Tablet 40 milliGRAM(s) Oral before breakfast  senna 2 Tablet(s) Oral at bedtime    MEDICATIONS  (PRN):  ondansetron Injectable 4 milliGRAM(s) IV Push every 6 hours PRN Nausea and/or Vomiting  oxyCODONE    IR 5 milliGRAM(s) Oral every 4 hours PRN Moderate Pain (4 - 6)  oxyCODONE    IR 10 milliGRAM(s) Oral every 4 hours PRN Severe Pain (7 - 10)      Vital Signs Last 24 Hrs  T(C): 36.8 (2019 23:11), Max: 36.9 (2019 08:40)  T(F): 98.3 (2019 23:11), Max: 98.4 (2019 08:40)  HR: 90 (2019 23:11) (80 - 92)  BP: 147/64 (2019 23:11) (146/83 - 147/64)  BP(mean): --  RR: 18 (2019 23:11) (18 - 18)  SpO2: 98% (2019 23:11) (96% - 98%)    PE  Constitutional: patient resting comfortably in bed, in no acute distress  HEENT: EOMI / PERRL b/l, no active drainage or redness  Neck: No JVD, full ROM without pain  Respiratory: respirations are unlabored, no accessory muscle use, no conversational dyspnea  Cardiovascular: regular rate & rhythm  Gastrointestinal: Abdomen soft, non-tender, non-distended, no rebound tenderness / guarding  Neurological: GCS: 15 (4/5/6). A&O x 3; no gross sensory / motor / coordination deficits  Psychiatric: Normal mood, normal affect  Musculoskeletal: No joint pain, swelling or deformity; no limitation of movement    I&O's Detail    2019 07:01  -  2019 07:00  --------------------------------------------------------  IN:  Total IN: 0 mL    OUT:    Incontinent per Collection Ba mL  Total OUT: 650 mL    Total NET: -650 mL          LABS:                        9.3    7.41  )-----------( 122      ( 2019 09:56 )             28.7     07-08    133<L>  |  98  |  16.0  ----------------------------<  141<H>  3.7   |  23.0  |  0.76    Ca    8.9      2019 09:56  Phos  3.7     07-08  Mg     2.1     07-08            RADIOLOGY & ADDITIONAL STUDIES:

## 2019-07-11 ENCOUNTER — TRANSCRIPTION ENCOUNTER (OUTPATIENT)
Age: 79
End: 2019-07-11

## 2019-07-11 PROCEDURE — 99231 SBSQ HOSP IP/OBS SF/LOW 25: CPT

## 2019-07-11 PROCEDURE — 99232 SBSQ HOSP IP/OBS MODERATE 35: CPT

## 2019-07-11 RX ORDER — OXYCODONE HYDROCHLORIDE 5 MG/1
1 TABLET ORAL
Qty: 0 | Refills: 0 | DISCHARGE
Start: 2019-07-11

## 2019-07-11 RX ORDER — GABAPENTIN 400 MG/1
1 CAPSULE ORAL
Qty: 0 | Refills: 0 | DISCHARGE
Start: 2019-07-11

## 2019-07-11 RX ORDER — ACETAMINOPHEN 500 MG
2 TABLET ORAL
Qty: 0 | Refills: 0 | DISCHARGE
Start: 2019-07-11

## 2019-07-11 RX ORDER — TIOTROPIUM BROMIDE 18 UG/1
1 CAPSULE ORAL; RESPIRATORY (INHALATION)
Qty: 0 | Refills: 0 | DISCHARGE
Start: 2019-07-11

## 2019-07-11 RX ORDER — LIDOCAINE 4 G/100G
2 CREAM TOPICAL
Qty: 0 | Refills: 0 | DISCHARGE
Start: 2019-07-11

## 2019-07-11 RX ORDER — PANTOPRAZOLE SODIUM 20 MG/1
1 TABLET, DELAYED RELEASE ORAL
Qty: 0 | Refills: 0 | DISCHARGE
Start: 2019-07-11

## 2019-07-11 RX ORDER — LABETALOL HCL 100 MG
0 TABLET ORAL
Qty: 0 | Refills: 0 | DISCHARGE

## 2019-07-11 RX ORDER — ASPIRIN/CALCIUM CARB/MAGNESIUM 324 MG
1 TABLET ORAL
Qty: 0 | Refills: 0 | DISCHARGE

## 2019-07-11 RX ORDER — IPRATROPIUM/ALBUTEROL SULFATE 18-103MCG
3 AEROSOL WITH ADAPTER (GRAM) INHALATION
Qty: 0 | Refills: 0 | DISCHARGE
Start: 2019-07-11

## 2019-07-11 RX ORDER — LABETALOL HCL 100 MG
1 TABLET ORAL
Qty: 0 | Refills: 0 | DISCHARGE
Start: 2019-07-11

## 2019-07-11 RX ORDER — METOPROLOL TARTRATE 50 MG
5 TABLET ORAL ONCE
Refills: 0 | Status: COMPLETED | OUTPATIENT
Start: 2019-07-11 | End: 2019-07-11

## 2019-07-11 RX ORDER — ENOXAPARIN SODIUM 100 MG/ML
40 INJECTION SUBCUTANEOUS
Qty: 0 | Refills: 0 | DISCHARGE
Start: 2019-07-11

## 2019-07-11 RX ORDER — ALBUTEROL 90 UG/1
1 AEROSOL, METERED ORAL
Qty: 0 | Refills: 0 | DISCHARGE
Start: 2019-07-11

## 2019-07-11 RX ORDER — DOCUSATE SODIUM 100 MG
1 CAPSULE ORAL
Qty: 0 | Refills: 0 | DISCHARGE
Start: 2019-07-11

## 2019-07-11 RX ORDER — LABETALOL HCL 100 MG
200 TABLET ORAL ONCE
Refills: 0 | Status: COMPLETED | OUTPATIENT
Start: 2019-07-11 | End: 2019-07-11

## 2019-07-11 RX ORDER — OMEPRAZOLE 10 MG/1
1 CAPSULE, DELAYED RELEASE ORAL
Qty: 0 | Refills: 0 | DISCHARGE

## 2019-07-11 RX ORDER — SENNA PLUS 8.6 MG/1
2 TABLET ORAL
Qty: 0 | Refills: 0 | DISCHARGE
Start: 2019-07-11

## 2019-07-11 RX ADMIN — LIDOCAINE 2 PATCH: 4 CREAM TOPICAL at 11:36

## 2019-07-11 RX ADMIN — Medication 1 DROP(S): at 13:13

## 2019-07-11 RX ADMIN — PANTOPRAZOLE SODIUM 40 MILLIGRAM(S): 20 TABLET, DELAYED RELEASE ORAL at 05:25

## 2019-07-11 RX ADMIN — Medication 650 MILLIGRAM(S): at 11:36

## 2019-07-11 RX ADMIN — Medication 200 MILLIGRAM(S): at 05:25

## 2019-07-11 RX ADMIN — LIDOCAINE 2 PATCH: 4 CREAM TOPICAL at 01:25

## 2019-07-11 RX ADMIN — Medication 650 MILLIGRAM(S): at 01:26

## 2019-07-11 RX ADMIN — Medication 200 MILLIGRAM(S): at 15:11

## 2019-07-11 RX ADMIN — Medication 100 MILLIGRAM(S): at 21:11

## 2019-07-11 RX ADMIN — ENOXAPARIN SODIUM 40 MILLIGRAM(S): 100 INJECTION SUBCUTANEOUS at 21:11

## 2019-07-11 RX ADMIN — Medication 100 MILLIGRAM(S): at 05:25

## 2019-07-11 RX ADMIN — Medication 650 MILLIGRAM(S): at 17:39

## 2019-07-11 RX ADMIN — Medication 650 MILLIGRAM(S): at 00:20

## 2019-07-11 RX ADMIN — Medication 5 MILLIGRAM(S): at 17:37

## 2019-07-11 RX ADMIN — Medication 1 DROP(S): at 06:52

## 2019-07-11 RX ADMIN — GABAPENTIN 100 MILLIGRAM(S): 400 CAPSULE ORAL at 21:11

## 2019-07-11 RX ADMIN — GABAPENTIN 100 MILLIGRAM(S): 400 CAPSULE ORAL at 13:13

## 2019-07-11 RX ADMIN — GABAPENTIN 100 MILLIGRAM(S): 400 CAPSULE ORAL at 05:25

## 2019-07-11 RX ADMIN — Medication 650 MILLIGRAM(S): at 05:25

## 2019-07-11 RX ADMIN — Medication 1 DROP(S): at 21:12

## 2019-07-11 RX ADMIN — Medication 100 MILLIGRAM(S): at 11:35

## 2019-07-11 NOTE — DISCHARGE NOTE NURSING/CASE MANAGEMENT/SOCIAL WORK - NSDCPNDISPN_GEN_ALL_CORE
Activities of daily living, including home environment that might     exacerbate pain or reduce effectiveness of the pain management plan of care as well as strategies to address these issues/Side effects of pain management treatment/Education provided on the pain management plan of care
WDL

## 2019-07-11 NOTE — PROGRESS NOTE ADULT - SUBJECTIVE AND OBJECTIVE BOX
INTERVAL HPI/OVERNIGHT EVENTS:    SUBJECTIVE: No acute events. Being prepped for discharge and receiveing PT daily; Walked 50 feet with roller yesterday; May potentially be able to go home within next few days. No complaints      MEDICATIONS  (STANDING):  acetaminophen   Tablet .. 650 milliGRAM(s) Oral every 6 hours  ALBUTerol    90 MICROgram(s) HFA Inhaler 1 Puff(s) Inhalation every 4 hours  artificial tears (preservative free) Ophthalmic Solution 1 Drop(s) Both EYES three times a day  docusate sodium 100 milliGRAM(s) Oral every 8 hours  enoxaparin Injectable 40 milliGRAM(s) SubCutaneous <User Schedule>  gabapentin 100 milliGRAM(s) Oral three times a day  labetalol 200 milliGRAM(s) Oral two times a day  lidocaine   Patch 2 Patch Transdermal daily  pantoprazole    Tablet 40 milliGRAM(s) Oral before breakfast  senna 2 Tablet(s) Oral at bedtime  tiotropium 18 MICROgram(s) Capsule 1 Capsule(s) Inhalation daily    MEDICATIONS  (PRN):  ALBUTerol/ipratropium for Nebulization 3 milliLiter(s) Nebulizer every 6 hours PRN Shortness of Breath and/or Wheezing  ondansetron Injectable 4 milliGRAM(s) IV Push every 6 hours PRN Nausea and/or Vomiting  oxyCODONE    IR 5 milliGRAM(s) Oral every 4 hours PRN Moderate Pain (4 - 6)  oxyCODONE    IR 10 milliGRAM(s) Oral every 4 hours PRN Severe Pain (7 - 10)      Vital Signs Last 24 Hrs  T(C): 37.2 (10 Jul 2019 23:58), Max: 37.2 (10 Jul 2019 23:58)  T(F): 98.9 (10 Jul 2019 23:58), Max: 98.9 (10 Jul 2019 23:58)  HR: 101 (10 Jul 2019 23:58) (80 - 101)  BP: 132/69 (10 Jul 2019 23:58) (132/69 - 154/92)  BP(mean): --  RR: 18 (10 Jul 2019 23:58) (18 - 18)  SpO2: 98% (10 Jul 2019 23:58) (97% - 98%)      PE  Constitutional: patient resting comfortably in bed, in no acute distress  HEENT: EOMI / PERRL b/l, no active drainage or redness  Neck: No JVD, full ROM without pain  Respiratory: respirations are unlabored, no accessory muscle use, no conversational dyspnea  Cardiovascular: regular rate & rhythm  Gastrointestinal: Abdomen soft, non-tender, non-distended, no rebound tenderness / guarding  Neurological: GCS: 15 (4/5/6). A&O x 3; no gross sensory / motor / coordination deficits  Psychiatric: Normal mood, normal affect  Musculoskeletal: No joint pain, swelling or deformity; no limitation of movement        I&O's Detail    2019 07:01  -  10 Jul 2019 07:00  --------------------------------------------------------  IN:  Total IN: 0 mL    OUT:    Incontinent per Collection Ba mL  Total OUT: 600 mL    Total NET: -600 mL      10 Jul 2019 07:01  -  2019 02:01  --------------------------------------------------------  IN:  Total IN: 0 mL    OUT:    Incontinent per Collection Ba mL  Total OUT: 1100 mL    Total NET: -1100 mL          LABS:                RADIOLOGY & ADDITIONAL STUDIES:

## 2019-07-11 NOTE — DISCHARGE NOTE NURSING/CASE MANAGEMENT/SOCIAL WORK - NSDCDPATPORTLINK_GEN_ALL_CORE
You can access the [facility] Portal, offered by [facility], by registering with the following website: [email or web address]
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
## 2019-07-12 VITALS
HEART RATE: 88 BPM | TEMPERATURE: 98 F | SYSTOLIC BLOOD PRESSURE: 152 MMHG | OXYGEN SATURATION: 98 % | RESPIRATION RATE: 19 BRPM | DIASTOLIC BLOOD PRESSURE: 80 MMHG

## 2019-07-12 PROCEDURE — 85610 PROTHROMBIN TIME: CPT

## 2019-07-12 PROCEDURE — 99285 EMERGENCY DEPT VISIT HI MDM: CPT | Mod: 25

## 2019-07-12 PROCEDURE — 83735 ASSAY OF MAGNESIUM: CPT

## 2019-07-12 PROCEDURE — 84100 ASSAY OF PHOSPHORUS: CPT

## 2019-07-12 PROCEDURE — 97167 OT EVAL HIGH COMPLEX 60 MIN: CPT

## 2019-07-12 PROCEDURE — 71045 X-RAY EXAM CHEST 1 VIEW: CPT

## 2019-07-12 PROCEDURE — 82803 BLOOD GASES ANY COMBINATION: CPT

## 2019-07-12 PROCEDURE — 83935 ASSAY OF URINE OSMOLALITY: CPT

## 2019-07-12 PROCEDURE — 84300 ASSAY OF URINE SODIUM: CPT

## 2019-07-12 PROCEDURE — 94640 AIRWAY INHALATION TREATMENT: CPT

## 2019-07-12 PROCEDURE — 84484 ASSAY OF TROPONIN QUANT: CPT

## 2019-07-12 PROCEDURE — 71101 X-RAY EXAM UNILAT RIBS/CHEST: CPT

## 2019-07-12 PROCEDURE — 93306 TTE W/DOPPLER COMPLETE: CPT

## 2019-07-12 PROCEDURE — 99232 SBSQ HOSP IP/OBS MODERATE 35: CPT

## 2019-07-12 PROCEDURE — 72125 CT NECK SPINE W/O DYE: CPT

## 2019-07-12 PROCEDURE — 85027 COMPLETE CBC AUTOMATED: CPT

## 2019-07-12 PROCEDURE — 70450 CT HEAD/BRAIN W/O DYE: CPT

## 2019-07-12 PROCEDURE — 94760 N-INVAS EAR/PLS OXIMETRY 1: CPT

## 2019-07-12 PROCEDURE — 82570 ASSAY OF URINE CREATININE: CPT

## 2019-07-12 PROCEDURE — 85730 THROMBOPLASTIN TIME PARTIAL: CPT

## 2019-07-12 PROCEDURE — 80048 BASIC METABOLIC PNL TOTAL CA: CPT

## 2019-07-12 PROCEDURE — 97116 GAIT TRAINING THERAPY: CPT

## 2019-07-12 PROCEDURE — 86901 BLOOD TYPING SEROLOGIC RH(D): CPT

## 2019-07-12 PROCEDURE — 93005 ELECTROCARDIOGRAM TRACING: CPT

## 2019-07-12 PROCEDURE — 74177 CT ABD & PELVIS W/CONTRAST: CPT

## 2019-07-12 PROCEDURE — 36430 TRANSFUSION BLD/BLD COMPNT: CPT

## 2019-07-12 PROCEDURE — 83930 ASSAY OF BLOOD OSMOLALITY: CPT

## 2019-07-12 PROCEDURE — 36415 COLL VENOUS BLD VENIPUNCTURE: CPT

## 2019-07-12 PROCEDURE — 86923 COMPATIBILITY TEST ELECTRIC: CPT

## 2019-07-12 PROCEDURE — 97163 PT EVAL HIGH COMPLEX 45 MIN: CPT

## 2019-07-12 PROCEDURE — 97530 THERAPEUTIC ACTIVITIES: CPT

## 2019-07-12 PROCEDURE — P9016: CPT

## 2019-07-12 PROCEDURE — 71260 CT THORAX DX C+: CPT

## 2019-07-12 PROCEDURE — 97535 SELF CARE MNGMENT TRAINING: CPT

## 2019-07-12 PROCEDURE — 86900 BLOOD TYPING SEROLOGIC ABO: CPT

## 2019-07-12 PROCEDURE — 96374 THER/PROPH/DIAG INJ IV PUSH: CPT | Mod: XU

## 2019-07-12 PROCEDURE — 80053 COMPREHEN METABOLIC PANEL: CPT

## 2019-07-12 PROCEDURE — 99024 POSTOP FOLLOW-UP VISIT: CPT

## 2019-07-12 PROCEDURE — 97110 THERAPEUTIC EXERCISES: CPT

## 2019-07-12 PROCEDURE — 73502 X-RAY EXAM HIP UNI 2-3 VIEWS: CPT

## 2019-07-12 PROCEDURE — 86850 RBC ANTIBODY SCREEN: CPT

## 2019-07-12 PROCEDURE — 82436 ASSAY OF URINE CHLORIDE: CPT

## 2019-07-12 RX ADMIN — Medication 650 MILLIGRAM(S): at 11:51

## 2019-07-12 RX ADMIN — Medication 100 MILLIGRAM(S): at 06:11

## 2019-07-12 RX ADMIN — PANTOPRAZOLE SODIUM 40 MILLIGRAM(S): 20 TABLET, DELAYED RELEASE ORAL at 06:11

## 2019-07-12 RX ADMIN — LIDOCAINE 2 PATCH: 4 CREAM TOPICAL at 11:50

## 2019-07-12 RX ADMIN — Medication 200 MILLIGRAM(S): at 06:11

## 2019-07-12 RX ADMIN — Medication 1 DROP(S): at 06:13

## 2019-07-12 RX ADMIN — Medication 650 MILLIGRAM(S): at 06:11

## 2019-07-12 RX ADMIN — GABAPENTIN 100 MILLIGRAM(S): 400 CAPSULE ORAL at 06:11

## 2019-07-12 RX ADMIN — LIDOCAINE 2 PATCH: 4 CREAM TOPICAL at 00:11

## 2019-07-12 NOTE — PROGRESS NOTE ADULT - SUBJECTIVE AND OBJECTIVE BOX
HPI/OVERNIGHT EVENTS:    No acute events, no complains.    MEDICATIONS  (STANDING):  acetaminophen   Tablet .. 650 milliGRAM(s) Oral every 6 hours  ALBUTerol    90 MICROgram(s) HFA Inhaler 1 Puff(s) Inhalation every 4 hours  artificial tears (preservative free) Ophthalmic Solution 1 Drop(s) Both EYES three times a day  docusate sodium 100 milliGRAM(s) Oral every 8 hours  enoxaparin Injectable 40 milliGRAM(s) SubCutaneous <User Schedule>  gabapentin 100 milliGRAM(s) Oral three times a day  labetalol 200 milliGRAM(s) Oral two times a day  lidocaine   Patch 2 Patch Transdermal daily  pantoprazole    Tablet 40 milliGRAM(s) Oral before breakfast  senna 2 Tablet(s) Oral at bedtime  tiotropium 18 MICROgram(s) Capsule 1 Capsule(s) Inhalation daily    MEDICATIONS  (PRN):  ALBUTerol/ipratropium for Nebulization 3 milliLiter(s) Nebulizer every 6 hours PRN Shortness of Breath and/or Wheezing  ondansetron Injectable 4 milliGRAM(s) IV Push every 6 hours PRN Nausea and/or Vomiting  oxyCODONE    IR 10 milliGRAM(s) Oral every 4 hours PRN Severe Pain (7 - 10)      Vital Signs Last 24 Hrs  T(C): 36.9 (2019 23:05), Max: 37.2 (2019 14:30)  T(F): 98.4 (2019 23:05), Max: 98.9 (2019 14:30)  HR: 97 (2019 23:05) (84 - 97)  BP: 161/74 (2019 23:05) (130/68 - 187/83)  BP(mean): --  RR: 20 (2019 23:05) (18 - 20)  SpO2: 97% (2019 23:05) (96% - 98%)    Constitutional: patient resting comfortably in bed, in no acute distress  HEENT: EOMI / PERRL b/l, no active drainage or redness  Neck: No JVD, full ROM without pain  Respiratory: respirations are unlabored, no accessory muscle use, no conversational dyspnea  Cardiovascular: regular rate & rhythm  Gastrointestinal: Abdomen soft, non-tender, non-distended, no rebound tenderness / guarding  Neurological: GCS: 15 (4/5/6). A&O x 3; no gross sensory / motor / coordination deficits  Psychiatric: Normal mood, normal affect  Musculoskeletal: No joint pain, swelling or deformity; no limitation of movement      I&O's Detail    10 Jul 2019 07:01  -  2019 07:00  --------------------------------------------------------  IN:  Total IN: 0 mL    OUT:    Incontinent per Collection Ba mL  Total OUT: 1100 mL    Total NET: -1100 mL          LABS:
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
## 2019-07-15 VITALS
HEIGHT: 62 IN | SYSTOLIC BLOOD PRESSURE: 158 MMHG | OXYGEN SATURATION: 96 % | DIASTOLIC BLOOD PRESSURE: 78 MMHG | HEART RATE: 97 BPM | RESPIRATION RATE: 16 BRPM

## 2019-07-15 PROCEDURE — G6017: CPT

## 2019-07-15 NOTE — PHYSICAL EXAM
[Normal] : no palpable adenopathy [de-identified] : Mastectomy left chest, incision well healed.  mild hyperpigmentation of left cw; no desquamation [de-identified] : left chest wall skin intact without erythema.

## 2019-07-15 NOTE — REVIEW OF SYSTEMS
[Dyspepsia: Grade 0] : Dyspepsia: Grade 0 [Dysphagia: Grade 0] : Dysphagia: Grade 0 [Esophagitis: Grade 0] : Esophagitis: Grade 0 [Fatigue: Grade 0] : Fatigue: Grade 0 [Breast Pain: Grade 0] : Breast Pain: Grade 0 [Skin Hyperpigmentation: Grade 1 - Hyperpigmentation covering <10% BSA; no psychosocial impact] : Skin Hyperpigmentation: Grade 1 - Hyperpigmentation covering <10% BSA; no psychosocial impact [Dermatitis Radiation: Grade 0] : Dermatitis Radiation: Grade 0

## 2019-07-15 NOTE — VITALS
[Maximal Pain Intensity: 0/10] : 0/10 [Least Pain Intensity: 0/10] : 0/10 [Pain Duration: ___] : Pain duration: [unfilled] [Pain Interferes with ADLs] : Pain does not interfere with activities of daily living [80: Normal activity with effort; some signs or symptoms of disease.] : 80: Normal activity with effort; some signs or symptoms of disease.

## 2019-07-15 NOTE — DISEASE MANAGEMENT
[Pathological] : TNM Stage: p [TTNM] : 2 [NTNM] : 0 [MTNM] : 0 [IIA] : IIA [de-identified] : 2,200  cGy [de-identified] : 5,000 cGy [de-identified] : left chest wall

## 2019-07-16 ENCOUNTER — CHART COPY (OUTPATIENT)
Age: 79
End: 2019-07-16

## 2019-07-16 PROCEDURE — G6017: CPT

## 2019-07-17 PROCEDURE — G6017: CPT

## 2019-07-18 PROCEDURE — 77427 RADIATION TX MANAGEMENT X5: CPT

## 2019-07-18 PROCEDURE — G6017: CPT

## 2019-07-19 PROCEDURE — G6017: CPT

## 2019-07-22 VITALS
RESPIRATION RATE: 16 BRPM | OXYGEN SATURATION: 97 % | HEART RATE: 78 BPM | WEIGHT: 117 LBS | DIASTOLIC BLOOD PRESSURE: 78 MMHG | BODY MASS INDEX: 21.53 KG/M2 | SYSTOLIC BLOOD PRESSURE: 135 MMHG | HEIGHT: 62 IN

## 2019-07-22 PROCEDURE — G6017: CPT

## 2019-07-22 NOTE — PHYSICAL EXAM
[Normal] : no palpable adenopathy [de-identified] : Mastectomy left chest, incision well healed.  mild hyperpigmentation of left cw; no desquamation [de-identified] : left chest wall skin intact without erythema.

## 2019-07-22 NOTE — VITALS
[Maximal Pain Intensity: 0/10] : 0/10 [Least Pain Intensity: 0/10] : 0/10 [Pain Duration: ___] : Pain duration: [unfilled] [80: Normal activity with effort; some signs or symptoms of disease.] : 80: Normal activity with effort; some signs or symptoms of disease.  [Pain Interferes with ADLs] : Pain does not interfere with activities of daily living

## 2019-07-22 NOTE — DISEASE MANAGEMENT
[Pathological] : TNM Stage: p [IIA] : IIA [TTNM] : 2 [NTNM] : 0 [MTNM] : 0 [de-identified] : 3,400  cGy [de-identified] : 5,000 cGy [de-identified] : left chest wall

## 2019-07-23 ENCOUNTER — APPOINTMENT (OUTPATIENT)
Dept: SURGERY | Facility: CLINIC | Age: 79
End: 2019-07-23

## 2019-07-23 PROCEDURE — G6017: CPT

## 2019-07-24 PROCEDURE — G6017: CPT

## 2019-07-25 ENCOUNTER — APPOINTMENT (OUTPATIENT)
Dept: TRAUMA SURGERY | Facility: CLINIC | Age: 79
End: 2019-07-25
Payer: COMMERCIAL

## 2019-07-25 VITALS
SYSTOLIC BLOOD PRESSURE: 145 MMHG | DIASTOLIC BLOOD PRESSURE: 75 MMHG | HEIGHT: 62 IN | HEART RATE: 78 BPM | OXYGEN SATURATION: 98 %

## 2019-07-25 DIAGNOSIS — S36.114S: ICD-10-CM

## 2019-07-25 PROCEDURE — 99024 POSTOP FOLLOW-UP VISIT: CPT

## 2019-07-25 PROCEDURE — G6017: CPT

## 2019-07-25 PROCEDURE — 77427 RADIATION TX MANAGEMENT X5: CPT

## 2019-07-25 NOTE — PHYSICAL EXAM
[Normal Breath Sounds] : Normal breath sounds [de-identified] : NAD,  [de-identified] : soft, ND, NT
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
## 2019-07-26 PROCEDURE — G6017: CPT

## 2019-07-30 ENCOUNTER — OTHER (OUTPATIENT)
Age: 79
End: 2019-07-30

## 2019-07-30 ENCOUNTER — APPOINTMENT (OUTPATIENT)
Dept: ORTHOPEDIC SURGERY | Facility: CLINIC | Age: 79
End: 2019-07-30
Payer: MEDICARE

## 2019-07-30 VITALS
OXYGEN SATURATION: 97 % | HEIGHT: 62 IN | HEART RATE: 80 BPM | DIASTOLIC BLOOD PRESSURE: 66 MMHG | SYSTOLIC BLOOD PRESSURE: 103 MMHG | WEIGHT: 115 LBS | RESPIRATION RATE: 16 BRPM | BODY MASS INDEX: 21.16 KG/M2 | TEMPERATURE: 98.1 F

## 2019-07-30 VITALS
DIASTOLIC BLOOD PRESSURE: 63 MMHG | SYSTOLIC BLOOD PRESSURE: 121 MMHG | HEART RATE: 79 BPM | WEIGHT: 115 LBS | TEMPERATURE: 98.3 F | HEIGHT: 61 IN | BODY MASS INDEX: 21.71 KG/M2

## 2019-07-30 PROCEDURE — G6017: CPT

## 2019-07-30 PROCEDURE — 72190 X-RAY EXAM OF PELVIS: CPT

## 2019-07-30 PROCEDURE — 27197 CLSD TX PELVIC RING FX: CPT

## 2019-07-30 PROCEDURE — 99204 OFFICE O/P NEW MOD 45 MIN: CPT | Mod: 25
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
## 2019-07-30 NOTE — PHYSICAL EXAM
[de-identified] : Physical Exam:\par General: Well appearing, no acute distress, A&O\par Neurologic: A&Ox3, No focal deficits\par Head: NCAT without abrasions, lacerations, or ecchymosis to head, face, or scalp\par Eyes: No scleral icterus, no gross abnormalities\par Respiratory: Equal chest wall expansion bilaterally, no accessory muscle use\par Lymphatic: No lymphadenopathy palpated\par Skin: Warm and dry\par Psychiatric: Normal mood and affect\par \par Exam of the right hip and LE - pt is able to make a SLR.  ROM of hip without pain.  TTP to Right iliac crest, along IT band .  Knee ROM without pain.  Distal motor and sensation intact.  [de-identified] : pelvis xray today show healing iliac crest/wing fracture
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
## 2019-07-30 NOTE — DISEASE MANAGEMENT
[Pathological] : TNM Stage: p [IIA] : IIA [TTNM] : 2 [MTNM] : 0 [NTNM] : 0 [de-identified] : 4,400  cGy [de-identified] : 5,000 cGy [de-identified] : left chest wall

## 2019-07-30 NOTE — PHYSICAL EXAM
[Normal] : no palpable adenopathy [de-identified] : left chest wall skin intact without erythema. [de-identified] : Mastectomy left chest, incision well healed.  mild hyperpigmentation of left cw; no desquamation

## 2019-07-31 PROCEDURE — G6017: CPT

## 2019-07-31 NOTE — DISCUSSION/SUMMARY
[Cancer Type / Location / Histology Subtype: ________] : Cancer Type / Location / Histology Subtype: [unfilled] [Diagnosis Date (year): ____] : Diagnosis Date (year): [unfilled] [II] : II [Surgery] : Surgery: Yes [Surgery Date(s) (year): ____] : Surgery Date(s) (year): [unfilled] [Surgical Procedure / Location / Findings: _________] : Surgical Procedure / Location / Findings: [unfilled] [Radiation] : Radiation: Yes [Body Area Treated: _________] : Body Area Treated: [unfilled] [End Date (year): ____] : End Date (year): [unfilled] [Follow up with Radiation MD in _____] : Follow up with Radiation MD in [unfilled] [FreeTextEntry8] : Marci Price

## 2019-08-01 PROCEDURE — G6017: CPT

## 2019-08-01 PROCEDURE — 77280 THER RAD SIMULAJ FIELD SMPL: CPT | Mod: 26

## 2019-08-02 PROCEDURE — 77427 RADIATION TX MANAGEMENT X5: CPT

## 2019-08-02 PROCEDURE — G6017: CPT

## 2019-08-05 VITALS
OXYGEN SATURATION: 97 % | RESPIRATION RATE: 16 BRPM | HEART RATE: 76 BPM | HEIGHT: 61 IN | DIASTOLIC BLOOD PRESSURE: 73 MMHG | BODY MASS INDEX: 21.71 KG/M2 | TEMPERATURE: 98 F | SYSTOLIC BLOOD PRESSURE: 120 MMHG | WEIGHT: 115 LBS

## 2019-08-05 DIAGNOSIS — L58.0 ACUTE RADIODERMATITIS: ICD-10-CM

## 2019-08-05 NOTE — PHYSICAL EXAM
[Normal] : no palpable adenopathy [de-identified] : Mastectomy left chest, incision well healed.  Brisk erythema of left chest wall and left axilla with dry desquamation.   [de-identified] : see breast

## 2019-08-05 NOTE — DISEASE MANAGEMENT
[Pathological] : TNM Stage: p [IIA] : IIA [TTNM] : 2 [NTNM] : 0 [MTNM] : 0 [de-identified] : 4,400  cGy [de-identified] : 5,000 cGy [de-identified] : left chest wall

## 2019-08-05 NOTE — REVIEW OF SYSTEMS
[Dyspepsia: Grade 0] : Dyspepsia: Grade 0 [Dysphagia: Grade 0] : Dysphagia: Grade 0 [Esophagitis: Grade 0] : Esophagitis: Grade 0 [Fatigue: Grade 0] : Fatigue: Grade 0 [Breast Pain: Grade 0] : Breast Pain: Grade 0 [Skin Hyperpigmentation: Grade 1 - Hyperpigmentation covering <10% BSA; no psychosocial impact] : Skin Hyperpigmentation: Grade 1 - Hyperpigmentation covering <10% BSA; no psychosocial impact [Dermatitis Radiation: Grade 2 - Moderate to brisk erythema; patchy moist desquamation, mostly confined to skin folds and creases; moderate edema] : Dermatitis Radiation: Grade 2 - Moderate to brisk erythema; patchy moist desquamation, mostly confined to skin folds and creases; moderate edema

## 2019-08-06 ENCOUNTER — RX RENEWAL (OUTPATIENT)
Age: 79
End: 2019-08-06

## 2019-08-06 RX ORDER — ASPIRIN 81 MG/1
81 TABLET, CHEWABLE ORAL DAILY
Qty: 90 | Refills: 0 | Status: ACTIVE | COMMUNITY
Start: 2017-01-16 | End: 1900-01-01

## 2019-08-09 PROCEDURE — 77427 RADIATION TX MANAGEMENT X5: CPT

## 2019-08-20 ENCOUNTER — OUTPATIENT (OUTPATIENT)
Dept: OUTPATIENT SERVICES | Facility: HOSPITAL | Age: 79
LOS: 1 days | Discharge: HOME | End: 2019-08-20

## 2019-08-20 DIAGNOSIS — Z90.710 ACQUIRED ABSENCE OF BOTH CERVIX AND UTERUS: Chronic | ICD-10-CM

## 2019-08-20 DIAGNOSIS — C50.919 MALIGNANT NEOPLASM OF UNSPECIFIED SITE OF UNSPECIFIED FEMALE BREAST: ICD-10-CM

## 2019-08-20 DIAGNOSIS — Z90.12 ACQUIRED ABSENCE OF LEFT BREAST AND NIPPLE: Chronic | ICD-10-CM

## 2019-08-21 ENCOUNTER — APPOINTMENT (OUTPATIENT)
Dept: HEMATOLOGY ONCOLOGY | Facility: CLINIC | Age: 79
End: 2019-08-21

## 2019-08-26 NOTE — ASSESSMENT
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  Gerson is healing well with no signs and symptoms of infection.  She understands the invasive tumor measured 2.6 cm in length and 0/2 lymph nodes were negative for metastatic carcinoma.  She further understands the deep surgical resection margin corresponding to the chest wall, is positive.  Both anterior and posterior positive margins belong to the upper outer quadrant.  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  She will be completing chemotherapy on 5/9/2019 and will be followed by radiation therapy.  She is to follow up in July after radiation is completed. \par 1. Follow up with medical oncologist\par 2. Followup with radiation oncologist\par 3. Follow up in 12 weeks\par 4.  Continue annual screening mammogram/sonogram right breast 10/30/2019

## 2019-08-26 NOTE — HISTORY OF PRESENT ILLNESS
[FreeTextEntry1] : Referring Physician: Dr. 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  She recently underwent an MRI which demonstrated enhancement of 5.3cm area and discussion of mastectomy v lumpectomy was thoroughly undertaken.  She underwent left breast mastectomy with left SLNB and oncoplastic closure on 12/14/2018.\par \par She denies skin changes or nipple discharge. She reports a left breast lump which she has had for years but recently felt different. She undergoes mammogram every year since age 40.\par \par G0. Menses began at age 12.\par She denies personal history of malignancy.\par Family history is significant for sister diagnosed with colon cancer.\par \par Imaging: [facility] [place]\par MRI of the breast\par 11/16/2018 At the 2:00 posteriorly an irregular mass is seen which spans 5.3 x 1.8 x 3.1 cm.  No axillary adenopathy noted. BIRADS 6\par \par \par [facility]\par 10/30/2017 Bilateral screening mammogram with 3-D imaging\par Impression: Heterogenously dense breasts. Focal asymmetric density in the lateral left breast. No evidence of suspicious calcifications. Recommend diagnostic mammogram of left breast and bilateral breast ultrasound. BIRAS 0 incomplete.\par \par 12/1/2017 Unilateral diagnostic mammography of the left breast including 3-D imaging. \par Impression: No evidence of mass lesions. Please see separate breast ultrasound report. The patient may return to annual screening mammography. BIRADS 1 negative.\par \par 12/1/2017 Ultrasound of both breasts\par Impression: Normal ultrasound examination of both breasts. BIRADS 1 negative.\par \par 10/29/2018 Diagnostic mammogram and sonogram\par Impression: 1 cm irregular palpable mass in the left breast at the 2:00 position, corresponding to mammographically visible findings. Ultrasound guided core biopsy advised. BIRADS 5\par \par 12/14/2018  Final Surgical Pathology\par 1.  Left [place] lymph node #1 hot and blue:  One lymph node, negative for metastatic carcinoma. (0/1)\par 2.  Left sentinel lymph node #2 blue:  One lymph node, negative for metastatic carcinoma.\par 3.  Left breast short superior long lateral, mastectomy:  Invasive mammary duct carcinoma, intermediate histological grade 2/3, SBR score 6/9, measuring 2.6 cm in length.  Deep surgical resection margin-corresponding to the chest wall, is positive.  The anterior surgical resection margin corresponding to anterior skin surface.  Both anterior and posterior positive margin belong to the upper outer quadrant, see also gross descriptions.  Ductal carcinoma in situ, intermediate nuclear grade, solid/cribriform, with focal calcifications and necrosis.Negative nipple areolar complex and representative skin.\par \par \par We reviewed and discussed clinical breast exam.  She is well healed.  She is undergoing chemotherapy by Dr. Vee and anticipates to finish on 5/9/2019.  She will then undergo radiation treatment and will have follow up with radiation oncologist.  Recommendation for follow up in July after completion of chemotherapy and radiation therapy.  She understands and agrees to plan.  All questions answered.

## 2019-08-26 NOTE — PHYSICAL EXAM
[Normocephalic] : normocephalic [Atraumatic] : atraumatic [EOMI] : extra ocular movement intact [Sclera nonicteric] : sclera nonicteric [PERRL] : pupils equal, round and reactive to light [No Supraclavicular Adenopathy] : no supraclavicular adenopathy [Supple] : supple [No dominant masses] : no dominant masses in right breast  [Examined in the supine and seated position] : examined in the supine and seated position [No Nipple Discharge] : no right nipple discharge [No Nipple Retraction] : no right nipple retraction [Breast Nipple Retraction Right] : nipple not retracted [Breast Nipple Inversion Right] : nipple not inverted [Breast Nipple Flattening Right] : nipple not flattened [Breast Nipple Fissures Right] : nipple not fissured [Breast Abnormal Lactation (Galactorrhea) Right] : no galactorrhea [Breast Abnormal Secretion Serous Fluid Right] : no serous discharge [Breast Abnormal Secretion Bloody Fluid Right] : no bloody discharge [Breast Abnormal Secretion Opalescent Fluid Right] : no milky discharge [No Axillary Lymphadenopathy] : no left axillary lymphadenopathy [No Ulceration] : no ulceration [No Edema] : no edema [No Rashes] : no rashes [de-identified] : No supraclavicular or axillary adenopathy. No dominant masses, normal to palpation. Everted nipple without discharge. No skin changes.\par  [de-identified] : Mastectomy flap healing well/

## 2019-09-11 ENCOUNTER — APPOINTMENT (OUTPATIENT)
Dept: OBGYN | Facility: CLINIC | Age: 79
End: 2019-09-11
Payer: MEDICARE

## 2019-09-11 VITALS
DIASTOLIC BLOOD PRESSURE: 64 MMHG | BODY MASS INDEX: 22.66 KG/M2 | HEIGHT: 61 IN | SYSTOLIC BLOOD PRESSURE: 146 MMHG | WEIGHT: 120 LBS

## 2019-09-11 LAB
DATE COLLECTED: NORMAL
HEMOCCULT SP1 STL QL: NEGATIVE
QUALITY CONTROL: YES

## 2019-09-11 PROCEDURE — 99215 OFFICE O/P EST HI 40 MIN: CPT

## 2019-09-11 PROCEDURE — 82270 OCCULT BLOOD FECES: CPT

## 2019-09-11 PROCEDURE — 36415 COLL VENOUS BLD VENIPUNCTURE: CPT

## 2019-09-11 RX ORDER — ENOXAPARIN SODIUM 300 MG/3ML
INJECTION INTRAVENOUS; SUBCUTANEOUS
Refills: 0 | Status: DISCONTINUED | COMMUNITY
End: 2019-09-11

## 2019-09-11 RX ORDER — DEXAMETHASONE 4 MG/1
4 TABLET ORAL
Qty: 24 | Refills: 3 | Status: DISCONTINUED | COMMUNITY
Start: 2019-02-26 | End: 2019-09-11

## 2019-09-11 RX ORDER — DEXAMETHASONE SODIUM PHOSPHATE 1 MG/ML
0.1 SOLUTION/ DROPS OPHTHALMIC TWICE DAILY
Qty: 1 | Refills: 1 | Status: DISCONTINUED | COMMUNITY
Start: 2019-05-23 | End: 2019-09-11

## 2019-09-11 RX ORDER — ONDANSETRON 8 MG/1
8 TABLET ORAL
Qty: 30 | Refills: 5 | Status: DISCONTINUED | COMMUNITY
Start: 2019-02-26 | End: 2019-09-11

## 2019-09-11 RX ORDER — MONTELUKAST 10 MG/1
10 TABLET, FILM COATED ORAL
Qty: 90 | Refills: 0 | Status: DISCONTINUED | COMMUNITY
Start: 2016-11-01 | End: 2019-09-11

## 2019-09-11 RX ORDER — OMEPRAZOLE 20 MG/1
20 CAPSULE, DELAYED RELEASE ORAL
Qty: 180 | Refills: 0 | Status: DISCONTINUED | COMMUNITY
Start: 2016-09-20 | End: 2019-09-11

## 2019-09-11 RX ORDER — PROCHLORPERAZINE MALEATE 10 MG/1
10 TABLET ORAL EVERY 6 HOURS
Qty: 28 | Refills: 2 | Status: DISCONTINUED | COMMUNITY
Start: 2019-02-26 | End: 2019-09-11

## 2019-09-11 NOTE — PHYSICAL EXAM
[Awake] : awake [Alert] : alert [Soft] : soft [Oriented x3] : oriented to person, place, and time [Normal] : vagina [Labia Majora] : labia major [Labia Minora] : labia minora [No Bleeding] : there was no active vaginal bleeding [Absent] : absent [Adnexa Absent] : absent bilaterally [No Tenderness] : no rectal tenderness [Acute Distress] : no acute distress [LAD] : no lymphadenopathy [Thyroid Nodule] : no thyroid nodule [Goiter] : no goiter [Mass] : no breast mass [Nipple Discharge] : no nipple discharge [Distended] : not distended [Tender] : non tender [Axillary LAD] : no axillary lymphadenopathy [H/Smegaly] : no hepatosplenomegaly [Flat Affect] : affect not flat [Depressed Mood] : not depressed [Adnexa Tenderness] : were not tender [Occult Blood] : occult blood test from digital rectal exam was negative [Ovarian Mass (___ Cm)] : there were no adnexal masses [de-identified] : breast exam: supine and upright    had L mastectomy   L breast skin scabs from recent radiation

## 2019-09-17 ENCOUNTER — APPOINTMENT (OUTPATIENT)
Dept: RADIATION ONCOLOGY | Facility: CLINIC | Age: 79
End: 2019-09-17
Payer: MEDICARE

## 2019-09-17 VITALS
WEIGHT: 124 LBS | HEIGHT: 61 IN | OXYGEN SATURATION: 97 % | RESPIRATION RATE: 16 BRPM | SYSTOLIC BLOOD PRESSURE: 123 MMHG | HEART RATE: 81 BPM | BODY MASS INDEX: 23.41 KG/M2 | DIASTOLIC BLOOD PRESSURE: 69 MMHG

## 2019-09-17 PROCEDURE — 99024 POSTOP FOLLOW-UP VISIT: CPT

## 2019-09-17 NOTE — DISEASE MANAGEMENT
[Pathological] : TNM Stage: p [IIA] : IIA [TTNM] : 2 [NTNM] : 0 [MTNM] : 0 [de-identified] : 5,000 [de-identified] : left chest wall

## 2019-09-17 NOTE — ASSESSMENT
[No evidence of disease] : No evidence of disease [FreeTextEntry1] : no significant treatment related sequelae.

## 2019-09-17 NOTE — VITALS
[Least Pain Intensity: 0/10] : 0/10 [Maximal Pain Intensity: 0/10] : 0/10 [NoTreatment Scheduled] : no treatment scheduled [ECOG Performance Status: 1 - Restricted in physically strenuous activity but ambulatory and able to carry out work of a light or sedentary nature] : Performance Status: 1 - Restricted in physically strenuous activity but ambulatory and able to carry out work of a light or sedentary nature, e.g., light house work, office work [80: Normal activity with effort; some signs or symptoms of disease.] : 80: Normal activity with effort; some signs or symptoms of disease.

## 2019-09-17 NOTE — PHYSICAL EXAM
[Normal] : oriented to person, place and time, the affect was normal, the mood was normal and not anxious [de-identified] : mastectomy left chest, incisions well healed.

## 2019-09-17 NOTE — HISTORY OF PRESENT ILLNESS
[FreeTextEntry1] : This 79 year-old woman presents for post-treatment evaluation.  Completed radiation therapy to the left chest wall for IDC [id number], stage IIA, following left mastectomy/SLNBx and adjuvant taxotere/cytoxan chemotherapy x4.\par \par Denies hand or arm edema, fatigue, pain.\par \par Mastectomy left chest, incision well healed.  Brisk erythema of left chest wall and left axilla with dry desquamation resolved. Small scab remains on left chest wall.  Continue Silvadene on this area, daily moisturizer other parts.  \par

## 2019-10-10 ENCOUNTER — APPOINTMENT (OUTPATIENT)
Dept: OBGYN | Facility: CLINIC | Age: 79
End: 2019-10-10
Payer: MEDICARE

## 2019-10-10 DIAGNOSIS — Z15.09 GENETIC SUSCEPTIBILITY TO OTHER DISEASE: ICD-10-CM

## 2019-10-10 DIAGNOSIS — Z15.89 GENETIC SUSCEPTIBILITY TO OTHER DISEASE: ICD-10-CM

## 2019-10-10 PROCEDURE — 99214 OFFICE O/P EST MOD 30 MIN: CPT

## 2019-10-16 ENCOUNTER — OUTPATIENT (OUTPATIENT)
Dept: OUTPATIENT SERVICES | Facility: HOSPITAL | Age: 79
LOS: 1 days | End: 2019-10-16
Payer: MEDICARE

## 2019-10-16 ENCOUNTER — APPOINTMENT (OUTPATIENT)
Dept: MAMMOGRAPHY | Facility: CLINIC | Age: 79
End: 2019-10-16
Payer: MEDICARE

## 2019-10-16 ENCOUNTER — APPOINTMENT (OUTPATIENT)
Dept: ULTRASOUND IMAGING | Facility: CLINIC | Age: 79
End: 2019-10-16
Payer: MEDICARE

## 2019-10-16 DIAGNOSIS — Z90.710 ACQUIRED ABSENCE OF BOTH CERVIX AND UTERUS: Chronic | ICD-10-CM

## 2019-10-16 DIAGNOSIS — Z00.8 ENCOUNTER FOR OTHER GENERAL EXAMINATION: ICD-10-CM

## 2019-10-16 DIAGNOSIS — Z90.12 ACQUIRED ABSENCE OF LEFT BREAST AND NIPPLE: Chronic | ICD-10-CM

## 2019-10-16 PROCEDURE — G0279: CPT | Mod: 26

## 2019-10-16 PROCEDURE — 76641 ULTRASOUND BREAST COMPLETE: CPT | Mod: 26,RT

## 2019-10-16 PROCEDURE — 76641 ULTRASOUND BREAST COMPLETE: CPT

## 2019-10-16 PROCEDURE — G0279: CPT

## 2019-10-16 PROCEDURE — 77065 DX MAMMO INCL CAD UNI: CPT | Mod: 26,RT

## 2019-10-16 PROCEDURE — 77065 DX MAMMO INCL CAD UNI: CPT

## 2019-10-17 ENCOUNTER — OUTPATIENT (OUTPATIENT)
Dept: OUTPATIENT SERVICES | Facility: HOSPITAL | Age: 79
LOS: 1 days | Discharge: HOME | End: 2019-10-17

## 2019-10-17 DIAGNOSIS — Z90.12 ACQUIRED ABSENCE OF LEFT BREAST AND NIPPLE: Chronic | ICD-10-CM

## 2019-10-17 DIAGNOSIS — C50.919 MALIGNANT NEOPLASM OF UNSPECIFIED SITE OF UNSPECIFIED FEMALE BREAST: ICD-10-CM

## 2019-10-17 DIAGNOSIS — Z90.710 ACQUIRED ABSENCE OF BOTH CERVIX AND UTERUS: Chronic | ICD-10-CM

## 2019-10-21 ENCOUNTER — RESULT REVIEW (OUTPATIENT)
Age: 79
End: 2019-10-21

## 2019-10-21 ENCOUNTER — APPOINTMENT (OUTPATIENT)
Dept: HEMATOLOGY ONCOLOGY | Facility: CLINIC | Age: 79
End: 2019-10-21
Payer: MEDICARE

## 2019-10-21 VITALS
DIASTOLIC BLOOD PRESSURE: 83 MMHG | SYSTOLIC BLOOD PRESSURE: 183 MMHG | OXYGEN SATURATION: 98 % | HEIGHT: 61 IN | HEART RATE: 79 BPM | BODY MASS INDEX: 23.6 KG/M2 | TEMPERATURE: 98.9 F | WEIGHT: 125 LBS

## 2019-10-21 LAB
BASOPHILS # BLD AUTO: 0.1 K/UL — SIGNIFICANT CHANGE UP (ref 0–0.2)
BASOPHILS NFR BLD AUTO: 1.5 % — SIGNIFICANT CHANGE UP (ref 0–2)
EOSINOPHIL # BLD AUTO: 0.1 K/UL — SIGNIFICANT CHANGE UP (ref 0–0.5)
EOSINOPHIL NFR BLD AUTO: 3.4 % — SIGNIFICANT CHANGE UP (ref 0–6)
HCT VFR BLD CALC: 34.2 % — LOW (ref 34.5–45)
HGB BLD-MCNC: 11.1 G/DL — LOW (ref 11.5–15.5)
LYMPHOCYTES # BLD AUTO: 1.3 K/UL — SIGNIFICANT CHANGE UP (ref 1–3.3)
LYMPHOCYTES # BLD AUTO: 31.8 % — SIGNIFICANT CHANGE UP (ref 13–44)
MCHC RBC-ENTMCNC: 28.8 PG — SIGNIFICANT CHANGE UP (ref 27–34)
MCHC RBC-ENTMCNC: 32.4 G/DL — SIGNIFICANT CHANGE UP (ref 32–36)
MCV RBC AUTO: 88.7 FL — SIGNIFICANT CHANGE UP (ref 80–100)
MONOCYTES # BLD AUTO: 0.5 K/UL — SIGNIFICANT CHANGE UP (ref 0–0.9)
MONOCYTES NFR BLD AUTO: 12.3 % — SIGNIFICANT CHANGE UP (ref 2–14)
NEUTROPHILS # BLD AUTO: 2 K/UL — SIGNIFICANT CHANGE UP (ref 1.8–7.4)
NEUTROPHILS NFR BLD AUTO: 51 % — SIGNIFICANT CHANGE UP (ref 43–77)
PLATELET # BLD AUTO: 226 K/UL — SIGNIFICANT CHANGE UP (ref 150–400)
RBC # BLD: 3.86 M/UL — SIGNIFICANT CHANGE UP (ref 3.8–5.2)
RBC # FLD: 12.5 % — SIGNIFICANT CHANGE UP (ref 10.3–14.5)
WBC # BLD: 4 K/UL — SIGNIFICANT CHANGE UP (ref 3.8–10.5)
WBC # FLD AUTO: 4 K/UL — SIGNIFICANT CHANGE UP (ref 3.8–10.5)

## 2019-10-21 PROCEDURE — 99214 OFFICE O/P EST MOD 30 MIN: CPT

## 2019-10-21 NOTE — ASSESSMENT
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 Tolerated Taxotere/Cytoxan well. Finished last treatment. She received 5000 cGy RT with Dr. Lomeli to left chest wall.\par \par Plan:\par -Port removal ordered \par -Follow in 6 months - surveillance.
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
## 2019-10-22 LAB
ALBUMIN SERPL ELPH-MCNC: 4.5 G/DL
ALP BLD-CCNC: 76 U/L
ALT SERPL-CCNC: 12 U/L
ANION GAP SERPL CALC-SCNC: 13 MMOL/L
AST SERPL-CCNC: 24 U/L
BILIRUB SERPL-MCNC: 0.4 MG/DL
BUN SERPL-MCNC: 14 MG/DL
CALCIUM SERPL-MCNC: 9.7 MG/DL
CANCER AG27-29 SERPL-ACNC: 35.7 U/ML
CHLORIDE SERPL-SCNC: 97 MMOL/L
CO2 SERPL-SCNC: 28 MMOL/L
CREAT SERPL-MCNC: 1.01 MG/DL
GLUCOSE SERPL-MCNC: 84 MG/DL
POTASSIUM SERPL-SCNC: 3.4 MMOL/L
PROT SERPL-MCNC: 7.4 G/DL
SODIUM SERPL-SCNC: 138 MMOL/L

## 2019-10-29 ENCOUNTER — FORM ENCOUNTER (OUTPATIENT)
Age: 79
End: 2019-10-29

## 2019-10-30 ENCOUNTER — APPOINTMENT (OUTPATIENT)
Dept: INTERVENTIONAL RADIOLOGY/VASCULAR | Facility: CLINIC | Age: 79
End: 2019-10-30
Payer: MEDICARE

## 2019-10-30 ENCOUNTER — OUTPATIENT (OUTPATIENT)
Dept: OUTPATIENT SERVICES | Facility: HOSPITAL | Age: 79
LOS: 1 days | End: 2019-10-30

## 2019-10-30 DIAGNOSIS — Z90.710 ACQUIRED ABSENCE OF BOTH CERVIX AND UTERUS: Chronic | ICD-10-CM

## 2019-10-30 DIAGNOSIS — Z90.12 ACQUIRED ABSENCE OF LEFT BREAST AND NIPPLE: Chronic | ICD-10-CM

## 2019-10-30 DIAGNOSIS — C50.912 MALIGNANT NEOPLASM OF UNSPECIFIED SITE OF LEFT FEMALE BREAST: ICD-10-CM

## 2019-10-30 PROCEDURE — 77001 FLUOROGUIDE FOR VEIN DEVICE: CPT | Mod: 26

## 2019-10-30 PROCEDURE — 36590 REMOVAL TUNNELED CV CATH: CPT

## 2019-12-19 ENCOUNTER — APPOINTMENT (OUTPATIENT)
Dept: RADIATION ONCOLOGY | Facility: CLINIC | Age: 79
End: 2019-12-19
Payer: MEDICARE

## 2019-12-19 VITALS
OXYGEN SATURATION: 98 % | SYSTOLIC BLOOD PRESSURE: 172 MMHG | DIASTOLIC BLOOD PRESSURE: 82 MMHG | TEMPERATURE: 98 F | HEART RATE: 80 BPM | HEIGHT: 61 IN | WEIGHT: 125 LBS | BODY MASS INDEX: 23.6 KG/M2 | RESPIRATION RATE: 16 BRPM

## 2019-12-19 PROCEDURE — 99213 OFFICE O/P EST LOW 20 MIN: CPT

## 2019-12-19 RX ORDER — OXYCODONE 10 MG/1
10 TABLET ORAL
Refills: 0 | Status: DISCONTINUED | COMMUNITY
End: 2019-12-19

## 2019-12-19 NOTE — VITALS
[Maximal Pain Intensity: 0/10] : 0/10 [80: Normal activity with effort; some signs or symptoms of disease.] : 80: Normal activity with effort; some signs or symptoms of disease.  [NoTreatment Scheduled] : no treatment scheduled [Least Pain Intensity: 0/10] : 0/10 [ECOG Performance Status: 1 - Restricted in physically strenuous activity but ambulatory and able to carry out work of a light or sedentary nature] : Performance Status: 1 - Restricted in physically strenuous activity but ambulatory and able to carry out work of a light or sedentary nature, e.g., light house work, office work

## 2019-12-30 NOTE — HISTORY OF PRESENT ILLNESS
[FreeTextEntry1] : Ms Dave is a 79 year-old woman with Invasive ductal carcinoma [id number], stage IIA, s/p Left mastectomy and oncoplastic closure/SLNBx with positive margins and adjuvant Taxotere/Cytoxan chemotherapy x4. She received radiation therapy to the left chest wall to a total dose of 5000 cGy to left chest wall, completed on 8/2/19 and 1000 cGy boost completed on 8/9/19.\par \par She presents for follow up. Mastectomy left chest, incision well healed. Denies hand or arm edema, fatigue, pain. \par She followed up with Dr Vee on 10/21/19. CA 27.29  =35.6\par Left breast noted with mild hyperpigmentation. Well healed mastectomy site. \par Right breast sonogram done on 10/16/19 showed No sonographic evidence of malignancy. BIRADS1

## 2019-12-30 NOTE — PHYSICAL EXAM
[Normal] : oriented to person, place and time, the affect was normal, the mood was normal and not anxious [de-identified] : mastectomy left chest, incisions well healed.

## 2019-12-30 NOTE — LETTER GREETING
[Dear Doctor] : Dear Doctor, [Please see my note below.] : Please see my note below. [FreeTextEntry2] : Conner Vee MD

## 2019-12-30 NOTE — LETTER CLOSING
[Sincerely yours,] : Sincerely yours, [FreeTextEntry3] : Chalino Lomeli MD\par Physician in Chief\par Department of Radiation Medicine\par [facility]\par [facility]\par \par  of Radiation Medicine\par [facility]\par at  [facility]\par \par Radiation \par [facility]/\par [facility] [place]\par [address]\par [place], [place] [address]\par \par Tel: [phone]\par Fax: [phone]\par

## 2019-12-30 NOTE — DISEASE MANAGEMENT
[Pathological] : TNM Stage: p [IIA] : IIA [TTNM] : 2 [NTNM] : 0 [MTNM] : 0 [de-identified] : 6095 [de-identified] : left chest

## 2020-04-07 ENCOUNTER — OUTPATIENT (OUTPATIENT)
Dept: OUTPATIENT SERVICES | Facility: HOSPITAL | Age: 80
LOS: 1 days | Discharge: HOME | End: 2020-04-07

## 2020-04-07 DIAGNOSIS — C50.919 MALIGNANT NEOPLASM OF UNSPECIFIED SITE OF UNSPECIFIED FEMALE BREAST: ICD-10-CM

## 2020-04-07 DIAGNOSIS — Z90.710 ACQUIRED ABSENCE OF BOTH CERVIX AND UTERUS: Chronic | ICD-10-CM

## 2020-04-07 DIAGNOSIS — Z90.12 ACQUIRED ABSENCE OF LEFT BREAST AND NIPPLE: Chronic | ICD-10-CM

## 2020-04-13 ENCOUNTER — APPOINTMENT (OUTPATIENT)
Dept: HEMATOLOGY ONCOLOGY | Facility: CLINIC | Age: 80
End: 2020-04-13
Payer: MEDICARE

## 2020-04-13 PROCEDURE — 99441: CPT

## 2020-04-13 NOTE — ASSESSMENT
[FreeTextEntry1] : Triple negative breast cancer, S/P left mastectomy, post-op adjuvant T/C x 4.\par Currently doing well, asymptomatic.\par Quarantining at home during Covid crisis.\par \par Spent 10 minutes on phone call reviewing current status and discussing future care.

## 2020-04-13 NOTE — HISTORY OF PRESENT ILLNESS
[Home] : at home, [unfilled] , at the time of the visit. [Other Location: e.g. Home (Enter Location, City,State)___] : at [unfilled] [de-identified] : [place] consents to telephone visit during Covid crisis.\par \par She continues to do well.\par Energy, appetite excellent.\par No bone pain.\par Sheltering at home during Covid19 pandemic.

## 2020-06-22 ENCOUNTER — EMERGENCY (EMERGENCY)
Facility: HOSPITAL | Age: 80
LOS: 1 days | Discharge: HOME | End: 2020-06-22
Attending: EMERGENCY MEDICINE
Payer: MEDICARE

## 2020-06-22 VITALS
RESPIRATION RATE: 16 BRPM | SYSTOLIC BLOOD PRESSURE: 174 MMHG | DIASTOLIC BLOOD PRESSURE: 82 MMHG | OXYGEN SATURATION: 98 % | HEART RATE: 89 BPM | TEMPERATURE: 98 F

## 2020-06-22 VITALS
DIASTOLIC BLOOD PRESSURE: 46 MMHG | HEART RATE: 86 BPM | SYSTOLIC BLOOD PRESSURE: 199 MMHG | HEIGHT: 62 IN | WEIGHT: 125 LBS | OXYGEN SATURATION: 99 % | RESPIRATION RATE: 20 BRPM | TEMPERATURE: 98 F

## 2020-06-22 DIAGNOSIS — Z90.710 ACQUIRED ABSENCE OF BOTH CERVIX AND UTERUS: Chronic | ICD-10-CM

## 2020-06-22 DIAGNOSIS — Z90.12 ACQUIRED ABSENCE OF LEFT BREAST AND NIPPLE: Chronic | ICD-10-CM

## 2020-06-22 LAB
ALBUMIN SERPL ELPH-MCNC: 4.4 G/DL — SIGNIFICANT CHANGE UP (ref 3.3–5.2)
ALP SERPL-CCNC: 58 U/L — SIGNIFICANT CHANGE UP (ref 40–120)
ALT FLD-CCNC: 17 U/L — SIGNIFICANT CHANGE UP
ANION GAP SERPL CALC-SCNC: 12 MMOL/L — SIGNIFICANT CHANGE UP (ref 5–17)
APPEARANCE UR: CLEAR — SIGNIFICANT CHANGE UP
APTT BLD: 31.1 SEC — SIGNIFICANT CHANGE UP (ref 27.5–36.3)
AST SERPL-CCNC: 28 U/L — SIGNIFICANT CHANGE UP
BACTERIA # UR AUTO: NEGATIVE — SIGNIFICANT CHANGE UP
BASOPHILS # BLD AUTO: 0.04 K/UL — SIGNIFICANT CHANGE UP (ref 0–0.2)
BASOPHILS NFR BLD AUTO: 0.7 % — SIGNIFICANT CHANGE UP (ref 0–2)
BILIRUB SERPL-MCNC: 0.5 MG/DL — SIGNIFICANT CHANGE UP (ref 0.4–2)
BILIRUB UR-MCNC: NEGATIVE — SIGNIFICANT CHANGE UP
BUN SERPL-MCNC: 14 MG/DL — SIGNIFICANT CHANGE UP (ref 8–20)
CALCIUM SERPL-MCNC: 10.2 MG/DL — SIGNIFICANT CHANGE UP (ref 8.6–10.2)
CHLORIDE SERPL-SCNC: 94 MMOL/L — LOW (ref 98–107)
CO2 SERPL-SCNC: 27 MMOL/L — SIGNIFICANT CHANGE UP (ref 22–29)
COLOR SPEC: YELLOW — SIGNIFICANT CHANGE UP
CREAT SERPL-MCNC: 1.07 MG/DL — SIGNIFICANT CHANGE UP (ref 0.5–1.3)
DIFF PNL FLD: ABNORMAL
EOSINOPHIL # BLD AUTO: 0.17 K/UL — SIGNIFICANT CHANGE UP (ref 0–0.5)
EOSINOPHIL NFR BLD AUTO: 3.2 % — SIGNIFICANT CHANGE UP (ref 0–6)
EPI CELLS # UR: SIGNIFICANT CHANGE UP
GLUCOSE SERPL-MCNC: 101 MG/DL — HIGH (ref 70–99)
GLUCOSE UR QL: NEGATIVE MG/DL — SIGNIFICANT CHANGE UP
HCT VFR BLD CALC: 34.9 % — SIGNIFICANT CHANGE UP (ref 34.5–45)
HGB BLD-MCNC: 11.5 G/DL — SIGNIFICANT CHANGE UP (ref 11.5–15.5)
IMM GRANULOCYTES NFR BLD AUTO: 0.7 % — SIGNIFICANT CHANGE UP (ref 0–1.5)
INR BLD: 1.03 RATIO — SIGNIFICANT CHANGE UP (ref 0.88–1.16)
KETONES UR-MCNC: NEGATIVE — SIGNIFICANT CHANGE UP
LEUKOCYTE ESTERASE UR-ACNC: NEGATIVE — SIGNIFICANT CHANGE UP
LYMPHOCYTES # BLD AUTO: 1.12 K/UL — SIGNIFICANT CHANGE UP (ref 1–3.3)
LYMPHOCYTES # BLD AUTO: 20.9 % — SIGNIFICANT CHANGE UP (ref 13–44)
MCHC RBC-ENTMCNC: 29.2 PG — SIGNIFICANT CHANGE UP (ref 27–34)
MCHC RBC-ENTMCNC: 33 GM/DL — SIGNIFICANT CHANGE UP (ref 32–36)
MCV RBC AUTO: 88.6 FL — SIGNIFICANT CHANGE UP (ref 80–100)
MONOCYTES # BLD AUTO: 0.56 K/UL — SIGNIFICANT CHANGE UP (ref 0–0.9)
MONOCYTES NFR BLD AUTO: 10.4 % — SIGNIFICANT CHANGE UP (ref 2–14)
NEUTROPHILS # BLD AUTO: 3.43 K/UL — SIGNIFICANT CHANGE UP (ref 1.8–7.4)
NEUTROPHILS NFR BLD AUTO: 64.1 % — SIGNIFICANT CHANGE UP (ref 43–77)
NITRITE UR-MCNC: NEGATIVE — SIGNIFICANT CHANGE UP
PH UR: 7 — SIGNIFICANT CHANGE UP (ref 5–8)
PLATELET # BLD AUTO: 262 K/UL — SIGNIFICANT CHANGE UP (ref 150–400)
POTASSIUM SERPL-MCNC: 3.6 MMOL/L — SIGNIFICANT CHANGE UP (ref 3.5–5.3)
POTASSIUM SERPL-SCNC: 3.6 MMOL/L — SIGNIFICANT CHANGE UP (ref 3.5–5.3)
PROT SERPL-MCNC: 7.9 G/DL — SIGNIFICANT CHANGE UP (ref 6.6–8.7)
PROT UR-MCNC: NEGATIVE MG/DL — SIGNIFICANT CHANGE UP
PROTHROM AB SERPL-ACNC: 11.7 SEC — SIGNIFICANT CHANGE UP (ref 10–12.9)
RBC # BLD: 3.94 M/UL — SIGNIFICANT CHANGE UP (ref 3.8–5.2)
RBC # FLD: 13 % — SIGNIFICANT CHANGE UP (ref 10.3–14.5)
RBC CASTS # UR COMP ASSIST: SIGNIFICANT CHANGE UP /HPF (ref 0–4)
SODIUM SERPL-SCNC: 133 MMOL/L — LOW (ref 135–145)
SP GR SPEC: 1.01 — SIGNIFICANT CHANGE UP (ref 1.01–1.02)
TROPONIN T SERPL-MCNC: <0.01 NG/ML — SIGNIFICANT CHANGE UP (ref 0–0.06)
TROPONIN T SERPL-MCNC: <0.01 NG/ML — SIGNIFICANT CHANGE UP (ref 0–0.06)
UROBILINOGEN FLD QL: NEGATIVE MG/DL — SIGNIFICANT CHANGE UP
WBC # BLD: 5.36 K/UL — SIGNIFICANT CHANGE UP (ref 3.8–10.5)
WBC # FLD AUTO: 5.36 K/UL — SIGNIFICANT CHANGE UP (ref 3.8–10.5)
WBC UR QL: SIGNIFICANT CHANGE UP

## 2020-06-22 PROCEDURE — 99284 EMERGENCY DEPT VISIT MOD MDM: CPT | Mod: 25

## 2020-06-22 PROCEDURE — 81001 URINALYSIS AUTO W/SCOPE: CPT

## 2020-06-22 PROCEDURE — 85730 THROMBOPLASTIN TIME PARTIAL: CPT

## 2020-06-22 PROCEDURE — 93005 ELECTROCARDIOGRAM TRACING: CPT

## 2020-06-22 PROCEDURE — 70544 MR ANGIOGRAPHY HEAD W/O DYE: CPT | Mod: 26,59

## 2020-06-22 PROCEDURE — 85027 COMPLETE CBC AUTOMATED: CPT

## 2020-06-22 PROCEDURE — 84484 ASSAY OF TROPONIN QUANT: CPT

## 2020-06-22 PROCEDURE — 70547 MR ANGIOGRAPHY NECK W/O DYE: CPT | Mod: 26

## 2020-06-22 PROCEDURE — 80053 COMPREHEN METABOLIC PANEL: CPT

## 2020-06-22 PROCEDURE — 70551 MRI BRAIN STEM W/O DYE: CPT

## 2020-06-22 PROCEDURE — 85610 PROTHROMBIN TIME: CPT

## 2020-06-22 PROCEDURE — 70544 MR ANGIOGRAPHY HEAD W/O DYE: CPT

## 2020-06-22 PROCEDURE — 93010 ELECTROCARDIOGRAM REPORT: CPT

## 2020-06-22 PROCEDURE — 70547 MR ANGIOGRAPHY NECK W/O DYE: CPT

## 2020-06-22 PROCEDURE — G0378: CPT

## 2020-06-22 PROCEDURE — 71045 X-RAY EXAM CHEST 1 VIEW: CPT

## 2020-06-22 PROCEDURE — 70551 MRI BRAIN STEM W/O DYE: CPT | Mod: 26

## 2020-06-22 PROCEDURE — 99218: CPT

## 2020-06-22 PROCEDURE — 36415 COLL VENOUS BLD VENIPUNCTURE: CPT

## 2020-06-22 PROCEDURE — 87086 URINE CULTURE/COLONY COUNT: CPT

## 2020-06-22 PROCEDURE — U0003: CPT

## 2020-06-22 PROCEDURE — 70450 CT HEAD/BRAIN W/O DYE: CPT | Mod: 26

## 2020-06-22 PROCEDURE — 70450 CT HEAD/BRAIN W/O DYE: CPT

## 2020-06-22 PROCEDURE — 71045 X-RAY EXAM CHEST 1 VIEW: CPT | Mod: 26

## 2020-06-22 RX ORDER — INDAPAMIDE 1.25 MG
2.5 TABLET ORAL DAILY
Refills: 0 | Status: DISCONTINUED | OUTPATIENT
Start: 2020-06-23 | End: 2020-06-27

## 2020-06-22 RX ORDER — LISINOPRIL 2.5 MG/1
40 TABLET ORAL DAILY
Refills: 0 | Status: DISCONTINUED | OUTPATIENT
Start: 2020-06-23 | End: 2020-06-27

## 2020-06-22 RX ORDER — SIMVASTATIN 20 MG/1
20 TABLET, FILM COATED ORAL AT BEDTIME
Refills: 0 | Status: DISCONTINUED | OUTPATIENT
Start: 2020-06-22 | End: 2020-06-27

## 2020-06-22 RX ORDER — PANTOPRAZOLE SODIUM 20 MG/1
40 TABLET, DELAYED RELEASE ORAL
Refills: 0 | Status: DISCONTINUED | OUTPATIENT
Start: 2020-06-22 | End: 2020-06-27

## 2020-06-22 RX ORDER — LABETALOL HCL 100 MG
200 TABLET ORAL EVERY 12 HOURS
Refills: 0 | Status: DISCONTINUED | OUTPATIENT
Start: 2020-06-22 | End: 2020-06-27

## 2020-06-22 RX ORDER — AMLODIPINE BESYLATE 2.5 MG/1
10 TABLET ORAL DAILY
Refills: 0 | Status: DISCONTINUED | OUTPATIENT
Start: 2020-06-23 | End: 2020-06-27

## 2020-06-22 RX ADMIN — Medication 200 MILLIGRAM(S): at 18:55

## 2020-06-22 NOTE — ED CDU PROVIDER DISPOSITION NOTE - PATIENT PORTAL LINK FT
You can access the FollowMyHealth Patient Portal offered by [facility] by registering at the following website: [email or web address]. By joining [employer]’s FollowMyHealth portal, you will also be able to view your health information using other applications (apps) compatible with our system.

## 2020-06-22 NOTE — ED PROVIDER NOTE - ATTENDING CONTRIBUTION TO CARE
[facility]   Acute Rehabilitation Physical Therapy Progress Note    Date: 19  Patient Name: Conchis Mahan       Room: 4917/3989-68  MRN: [id number]   Account: [de-identified]   : 1981  (40 [facility] from [place] [address] ED 80 year old female PMHx 80 year old female PMHx HTN, breast cancer in remission c/o LLE paresthesia since this morning. PE: NAD, CV RRR, lungs clear, LLE decreased sensation, motor normal. I&P: paresthesia of LLE, r/o CVA, labs, CT, MRI, neuro consult

## 2020-06-22 NOTE — ED CDU PROVIDER INITIAL DAY NOTE - MEDICAL DECISION MAKING DETAILS
80y Female w/ hx breast CA s/p mastectomy, chemo and radiation,  HTN, HLD, GERD, OP  presenting for LLE numbness and dizziness early this morning 8am with feeling dizzy   serial trop , cardaic mon , MR head, MRA head and neck , home med , re-eval

## 2020-06-22 NOTE — ED ADULT TRIAGE NOTE - CHIEF COMPLAINT QUOTE
Patient arrived to ED today with c/o numbness to her left leg since 7am this morning and dizziness.  Patient states loss of sensation to left leg also since 7am.

## 2020-06-22 NOTE — ED ADULT NURSE NOTE - OBJECTIVE STATEMENT
pt presented to ED c/o episode of "right leg numbness and difficulty walking this am, but has now resolved. Pt is neurologically intact at this time. Ambulating with no difficulty. Alert and oriented x4. OPt seen by MD. IV placed labs sent VSS. On CM, NSR pt educated on plan of care, pt able to successfully teach back plan of care to RN, RN will continue to reeducate pt during hospital stay.

## 2020-06-22 NOTE — ED PROVIDER NOTE - OBJECTIVE STATEMENT
80y F w/ hx breast CA s/p mastectomy, chemo and radiation; HTN, HLD, presenting for LLE numbness and dizziness.  Pt says she developed paresthesias radiating from the L foot all the away up the posterior leg to the L buttock, starting at 8 AM today.  Symptoms lasted for around an hour.  However, when she went to stand up and walk, she felt dizzy and off-balance.  Pt reports resolution of symptoms at this time.  Denies headache, vision changes, chest pain, SOB, back pain, fever, chills.  Has no hx of prior similar episodes.

## 2020-06-22 NOTE — ED CDU PROVIDER INITIAL DAY NOTE - OBJECTIVE STATEMENT
80y Female w/ hx breast CA s/p mastectomy, chemo and radiation,  HTN, HLD, GERD, OP  presenting for LLE numbness and dizziness early this morning 8am  Pt says she developed paresthesias radiating from the L foot all the away up the posterior leg to the L buttock, starting at 8 AM too Symptoms lasted for around an hour.  However, when she went to stand up and walk, she felt dizzy and off-balance.  Pt reports resolution of symptoms at this time.  Denies headache, vision changes, chest pain, SOB, back pain, fever, chills.. she denies any hx of back pain ,   former smoker , denies using ETOH or any illicit drugs

## 2020-06-22 NOTE — ED ADULT NURSE REASSESSMENT NOTE - NS ED NURSE REASSESS COMMENT FT1
Care endorsed to Eloisa BOCANEGRA. Patient in NAD. Resting in comfort. VSS. RN with no further questions.
pt recived from ED RN alert and oriented @ 1900. Vital Signs Stable. Denies pain. After having MRI performed pt discharged home. MD orders active for discharge. Pt ready for discharge. Pt verbalized understanding of discharge instruction and medication susan. Discharge instruction sheet given to pt and one copy placed in chart. Pts vitals stable. Pts IV removed from R Rwrist with catheter intact and pt tolerated well. All of pts belongings were sent with pt. Pts escort at bedside. Documentation completed. Safe discharge completed. Safety maintained.
report given to DALJIT Jean in CDU. Medication picked up from pharmacy. Pt BP elevated, MD aware, Asymptomatic. Pt reports she missed her afternoon dose of labetaLOL. DALJIT Jean made aware.
Assumed care of the patient at 1850. Verbal report received from Vera BOCANEGRA ED. Patient transferred to observation unit CDU 11. Patient A&Ox4. No s/s of distress. No neuro deficits noted. Patient states numbness to b/l toes continues, no complaints of any other symptoms. Patient ambulatory with steady gait. NSR on CM. PIV patent. Pink band noted to RUE. Patient pending rpt bloodwork and MRI. Patient in understanding of plan of care. Patient with no further questions for the RN. Resting in comfort. Call bell within reach and encouraged to use when assistance needed. Will continue to monitor.

## 2020-06-22 NOTE — ED ADULT NURSE REASSESSMENT NOTE - COMFORT CARE
plan of care explained/po fluids offered/side rails up/repositioned/wait time explained/ambulated to bathroom

## 2020-06-22 NOTE — ED PROVIDER NOTE - PHYSICAL EXAMINATION
Constitutional: Awake, alert, in no acute distress  Eyes: no scleral icterus  HENT: normocephalic, atraumatic, moist oral mucosa  CV: RRR, no murmur  Pulm: non-labored respirations, CTAB  Abdomen: soft, non-tender, non-distended  Extremities: no edema, no deformity  Skin: no rash, no jaundice  Neuro: AAOx3, CNs II-XII intact, no facial asymmetry, 5/5 strength and sensation in all extremities, no finger-to-nose or heel-to-shin dysmetria, no pronator drift, negative Romberg's, stable gait.

## 2020-06-22 NOTE — ED CDU PROVIDER DISPOSITION NOTE - ATTENDING CONTRIBUTION TO CARE
I agree with the PA's note and was available for any issues/concerns. I was directly involved in patient care. My brief overall assessment is as follows: pt without complaints, neg work up, given close f/u and reutrn precautions. neuro itnact. stable for d/c.

## 2020-06-22 NOTE — ED CDU PROVIDER INITIAL DAY NOTE - OBSERVATION MONITORING PLAN
ED Record Reviewed
13 year old female with no PMH who presents with complaints of left sided facial swelling. Patient states that last night she felt like the left side of her face might be more swollen but did not have any pain. Today when she work up she noticed that the left side of her face was considerably more swollen than her right, she also states that's its painful to touch. Complains of intermittent pain with jaw movement and when she opens her mouth wide, but denies any trismus. Denies any sore throat, ear pain, fevers, or URI symptoms. Denies any recent illnesses or travel history. Denies any recent dental work or tooth infections. Was seen this afternoon at [facility] and was referred to the ED for further workup/imaging.     BirthHX: FT, no complications  PMH: none  allergies: NKDA  Meds: none  Immunizations: UTD  sxhx: none  HEADSS: is in 7th grade, does well at school, has good friend group, denies any bullying. Feels safe at home. Denies any alcohol use, cigarette, or drug use. Denies any sexual activity or SI.

## 2020-06-22 NOTE — ED CDU PROVIDER DISPOSITION NOTE - NSFOLLOWUPINSTRUCTIONS_ED_ALL_ED_FT
Patient education: Radiculopathy (The Basics)  View in Nepali  Written by the doctors and editors at [facility]  What is radiculopathy?  Radiculopathy is the medical term for the pain, numbness, or tingling that occurs when nerves coming from the spinal cord get pinched or damaged. Radiculopathy can affect different parts of the body, depending on which nerve or group of nerves is affected. People sometimes refer to radiculopathy as having a "pinched nerve."    Here are 2 common examples of radiculopathy:    ?Cervical radiculopathy – People with this type of radiculopathy have pain, numbness, or tingling down one or both arms. The condition happens when one or more of the nerves that go from the spine to the arm get pinched or damaged.    ?Lumbosacral radiculopathy – People with this type of radiculopathy have pain, numbness, or tingling in the buttocks or down the leg. The condition happens when one or more of the nerves that go from the spine to the foot and leg get pinched or damaged. (People sometimes refer to the symptoms of this type of radiculopathy as "sciatica.")    What causes radiculopathy?  Radiculopathy is usually caused by a problem with the back. To understand radiculopathy, it's helpful to first learn a little about the back and spine.    The back is made up of (figure 1):    ?Vertebrae – A stack of bones that sit on top of one another like a stack of coins. Each of these bones has a hole in the center. When stacked, the bones form a hollow tube that protects the spinal cord.    ?Spinal cord and nerves – The spinal cord is the highway of nerves that connects the brain to the rest of the body. It runs through the vertebrae. Nerves branch from the spinal cord and pass in between the vertebrae. From there they connect to the arms, the legs, and the organs. (This is why problems in the back can cause leg pain or bladder problems.)    ?Discs – Rubbery discs sit in between each of the vertebrae to add cushion and allow movement. The discs have a tough outer shell and jelly-like center.    ?Muscles, tendons, and ligaments – Together the muscles, tendons, and ligaments are called the "soft tissues" of the back. These soft tissues support the back and help hold it together.    Radiculopathy can happen when changes in the back cause a nerve to get pinched or damaged. This can happen if:    ?The vertebrae form bumps called bone spurs, which press on nearby nerves. (People with a condition called "spinal stenosis" often have this problem.)    ?The discs between the vertebrae break open and bulge out, causing them to press on or irritate nearby nerves. (A disc that breaks open and bulges is called a "herniated disc.")    ?Other medical conditions, such as diabetes, infection, inflammation, or a tumor injure the nerves near the spinal cord.    Should I see a doctor or nurse?  If you have new pain, numbness, or tingling that seems to spread out to your arms or legs from your spine, see a doctor or nurse.    Will I need tests?  Maybe. Doctors can tell a lot about a person's radiculopathy based on which parts of the body are affected and how. Because of that, you might not need any tests, especially if you have had symptoms only for a short time. Still, if your doctor or nurse is concerned about nerve damage, he or she might order one or more of these tests:    ?Imaging tests – Imaging tests, such as X-rays, MRIs, or CT scans, create pictures of the inside of the body. These imaging tests can show problems with the back like those described above.    ?Electromyography (also called "EMG") – During this test, a technician checks how well electrical pulses travel across nerves to the part of your body that has symptoms. The test helps show whether the nerves controlling that body part are working right.    How is radiculopathy treated?  Many people with radiculopathy do not need formal treatment. In some cases, the radiculopathy goes away as the back and nerves heal. In other cases, people find ways to cope with their symptoms.    When people do get treatment, the treatments can include:    ?Pain medicines that you can get without a prescription (If these do not work, stronger prescription pain medicines are available.)    ?Medicines to relax the muscles (called muscle relaxants)    ?Avoiding activities that make the pain worse    ?Injections of medicines that numb the back or reduce swelling    ?Physical therapy to learn special exercises and stretches    ?Surgery to repair the problem causing symptoms

## 2020-06-22 NOTE — ED CDU PROVIDER INITIAL DAY NOTE - PROGRESS NOTE DETAILS
PT signed out to BRADEN ennis after lengthy discussion about case with BRADEN Valdez. 3  Pt seen resting comfortable in no acute distress in bed, no acute complaints will follow CDU initial intake orders observe for change in pt condition, results and or consults. PT seen resting comfortably in no acute distress, no acute complaints at this time, PT tolerating PO intake,  PT informed of all results, TIFFANIE: A&O x 3, CN II-Xii GI, MAEx 4,  5/5/ motors, = sensation, no pronator drift or ataxia. PT with no acute findings, tiffanie intact, resolution of symptoms,  PT will be DC home with supportive care, pt educated about when to return to the ED if needed. PT verbalizes that he understands all instructions and results.

## 2020-06-22 NOTE — ED PROVIDER NOTE - NS ED ROS FT
Constitutional: no fever, no chills  Eyes: no vision changes  ENT: no nasal congestion, no sore throat  CV: no chest pain  Resp: no cough, no shortness of breath  GI: no abdominal pain, no vomiting, no diarrhea  : no dysuria  MSK: no joint pain  Skin: no rash  Neuro: no headache, no weakness, +paresthesias, +dizziness

## 2020-06-22 NOTE — ED CDU PROVIDER INITIAL DAY NOTE - CONSTITUTIONAL, MLM
normal... Well appearing, awake, alert, oriented to person, place, time/situation and in no apparent distress. thin appear

## 2020-06-22 NOTE — ED CDU PROVIDER DISPOSITION NOTE - CLINICAL COURSE
80y F w/ hx breast CA s/p mastectomy, chemo and radiation; HTN, HLD, presenting for LLE numbness and dizziness.  Pt says she developed paresthesias radiating from the L foot all the away up the posterior leg to the L buttock, starting at 8 AM today.  Symptoms lasted for around an hour. Pt placed in obs for MR, PT tolerating PO intake,  PT informed of all results, TIFFANIE: A&O x 3, CN II-Xii GI, MAEx 4,  5/5/ motors, = sensation, no pronator drift or ataxia. PT with no acute findings, tiffanie intact, resolution of symptoms,  PT will be DC home with supportive care, pt educated about when to return to the ED if needed. PT verbalizes that he understands all instructions and results.

## 2020-06-22 NOTE — ED CDU PROVIDER INITIAL DAY NOTE - ATTENDING CONTRIBUTION TO CARE
I agree with the PA's note and was available for any issues/concerns. I was directly involved in patient care. My brief overall assessment is as follows: hx as documented with numbness to left leg earlier today that has sinc eresolved. neg work up. steady gait, nl neuro. in obs for mri brain, mra head and neck.

## 2020-06-22 NOTE — ED PROVIDER NOTE - CLINICAL SUMMARY MEDICAL DECISION MAKING FREE TEXT BOX
80y F w/ hx breast CA, HTN, HLD, presenting for transient LLE paresthesia and dizziness described as feeling off-balance earlier today.  Pt currently in no distress, no focal neurologic deficits on exam.  Will obtain CT head, EKG, CXR, labs.  Plan for observation for further workup.
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
## 2020-06-22 NOTE — ED CDU PROVIDER INITIAL DAY NOTE - MUSCULOSKELETAL, MLM
Spine appears normal,  no midline C/T/L spine TTP , LE and UE strength and light touch sensation grossly intact .negative straight leg  .

## 2020-06-23 ENCOUNTER — APPOINTMENT (OUTPATIENT)
Dept: RADIATION ONCOLOGY | Facility: CLINIC | Age: 80
End: 2020-06-23
Payer: MEDICARE

## 2020-06-23 LAB
CULTURE RESULTS: SIGNIFICANT CHANGE UP
SARS-COV-2 RNA SPEC QL NAA+PROBE: SIGNIFICANT CHANGE UP
SPECIMEN SOURCE: SIGNIFICANT CHANGE UP

## 2020-06-23 PROCEDURE — 99442: CPT

## 2020-06-23 NOTE — DATA REVIEWED
[No studies available for review at this time.] : No studies available for review at this time. [FreeTextEntry1] : brain MRI

## 2020-06-23 NOTE — DISEASE MANAGEMENT
[Pathological] : TNM Stage: p [IIA] : IIA [FreeTextEntry4] : invasive ductal carcinoma [TTNM] : 2 [NTNM] : 0 [MTNM] : 0 [de-identified] : 8721 [de-identified] : left chest

## 2020-06-23 NOTE — VITALS
[Least Pain Intensity: 0/10] : 0/10 [NoTreatment Scheduled] : no treatment scheduled [80: Normal activity with effort; some signs or symptoms of disease.] : 80: Normal activity with effort; some signs or symptoms of disease.  [ECOG Performance Status: 1 - Restricted in physically strenuous activity but ambulatory and able to carry out work of a light or sedentary nature] : Performance Status: 1 - Restricted in physically strenuous activity but ambulatory and able to carry out work of a light or sedentary nature, e.g., light house work, office work [Maximal Pain Intensity: 6/10] : 6/10 [OTC] : OTC [Pain Location: ___] : Pain Location: [unfilled]

## 2020-06-23 NOTE — HISTORY OF PRESENT ILLNESS
[Home] : at home, [unfilled] , at the time of the visit. [Medical Office: ([facility])___] : at the medical office located in  [Verbal consent obtained from patient] : the patient, [unfilled] [FreeTextEntry1] : \par Ms. Dave is a 79 year-old woman with Invasive ductal carcinoma [id number], stage IIA, ERs/p Left mastectomy and oncoplastic closure/SLNBx with positive margins and adjuvant Taxotere/Cytoxan chemotherapy x4 with Dr. Vee. She received radiation therapy to the left chest wall to a total dose of 5000 cGy to left chest wall, completed on 8/2/19 and 1000 cGy boost completed on 8/9/19.\par \par She will be conferencing today for routine radiation follow up.  \par At last visit, mastectomy left chest, incision well healed. \par Denied hand or arm edema, fatigue, pain. \par She follows with Dr Vee.\par Right breast sonogram done on 10/16/19 showed no sonographic evidence of malignancy. BIRADS 1\par \par Interval history: She notes developing some dizziness and right leg pain yesterday that prompted her to go to the ED. She had a CVA work up including brain MRI that was all negative. She is better now after pain meds and has follow up with Neurology , Dr. Snowden.\par She has no chest or arm complaints. Skin tone over left chest nearly back to baseline.now.\par

## 2020-06-23 NOTE — ADDENDUM
[FreeTextEntry1] : At the patient's request , this encounter was via phone call only, and lasted 12 min.

## 2020-06-23 NOTE — REASON FOR VISIT
[Routine Follow-Up] : routine follow-up visit for [Other: _____] : [unfilled] [Breast Cancer] : breast cancer

## 2020-06-23 NOTE — REVIEW OF SYSTEMS
[Negative] : Allergic/Immunologic [Dizziness] : dizziness [FreeTextEntry9] : s/p left mastectomy; status post right lower extremity pain from heel to butttock, much better now. [de-identified] : recent,but resolved [de-identified] : s/p chemotherapy

## 2020-06-23 NOTE — LETTER CLOSING
[Sincerely yours,] : Sincerely yours, [FreeTextEntry3] : Chalino Lomeli MD\par Physician in Chief\par Department of Radiation Medicine\par [facility] [place]\par [facility] [place]\par \par  of Radiation Medicine\par [facility]\par at  [facility]\par \par Radiation \par [facility]/\par [facility] [place]\par [address]\par [place], [place] [address]\par \par Tel: [phone]\par Fax: [phone]\par

## 2020-07-20 ENCOUNTER — APPOINTMENT (OUTPATIENT)
Dept: NEUROLOGY | Facility: CLINIC | Age: 80
End: 2020-07-20
Payer: MEDICARE

## 2020-07-20 VITALS — TEMPERATURE: 97.9 F | HEIGHT: 61 IN | WEIGHT: 120 LBS | BODY MASS INDEX: 22.66 KG/M2

## 2020-07-20 DIAGNOSIS — R20.2 PARESTHESIA OF SKIN: ICD-10-CM

## 2020-07-20 DIAGNOSIS — M54.16 RADICULOPATHY, LUMBAR REGION: ICD-10-CM

## 2020-07-20 PROCEDURE — 99204 OFFICE O/P NEW MOD 45 MIN: CPT

## 2020-07-20 RX ORDER — VITAMIN B COMPLEX
CAPSULE ORAL
Refills: 0 | Status: ACTIVE | COMMUNITY

## 2020-09-14 ENCOUNTER — APPOINTMENT (OUTPATIENT)
Dept: OBGYN | Facility: CLINIC | Age: 80
End: 2020-09-14
Payer: MEDICARE

## 2020-09-14 VITALS
SYSTOLIC BLOOD PRESSURE: 128 MMHG | BODY MASS INDEX: 23 KG/M2 | WEIGHT: 125 LBS | HEIGHT: 62 IN | DIASTOLIC BLOOD PRESSURE: 84 MMHG

## 2020-09-14 LAB
DATE COLLECTED: NORMAL
HEMOCCULT SP1 STL QL: NEGATIVE
QUALITY CONTROL: YES

## 2020-09-14 PROCEDURE — 82270 OCCULT BLOOD FECES: CPT

## 2020-09-14 PROCEDURE — 99214 OFFICE O/P EST MOD 30 MIN: CPT

## 2020-09-14 NOTE — PHYSICAL EXAM
[Awake] : awake [Alert] : alert [Soft] : soft [Oriented x3] : oriented to person, place, and time [Normal] : vagina [Labia Minora] : labia minora [Labia Majora] : labia major [No Bleeding] : there was no active vaginal bleeding [Absent] : absent [Adnexa Absent] : absent bilaterally [No Tenderness] : no rectal tenderness [Acute Distress] : no acute distress [LAD] : no lymphadenopathy [Mass] : no breast mass [Goiter] : no goiter [Thyroid Nodule] : no thyroid nodule [Axillary LAD] : no axillary lymphadenopathy [Nipple Discharge] : no nipple discharge [Distended] : not distended [Tender] : non tender [Depressed Mood] : not depressed [H/Smegaly] : no hepatosplenomegaly [Flat Affect] : affect not flat [Ovarian Mass (___ Cm)] : there were no adnexal masses [Adnexa Tenderness] : were not tender [de-identified] : breast exam: supine and upright    had L mastectomy   L breast skin scabs from recent radiation [Occult Blood] : occult blood test from digital rectal exam was negative

## 2020-09-16 RX ORDER — SILVER SULFADIAZINE 10 MG/G
1 CREAM TOPICAL
Qty: 1 | Refills: 1 | Status: DISCONTINUED | COMMUNITY
Start: 2019-08-05 | End: 2020-09-16

## 2020-09-16 RX ORDER — GABAPENTIN 100 MG/1
100 CAPSULE ORAL
Refills: 0 | Status: DISCONTINUED | COMMUNITY
End: 2020-09-16

## 2020-09-16 RX ORDER — CALCIUM CARBONATE/VITAMIN D3 500MG-5MCG
500-200 TABLET ORAL
Refills: 0 | Status: DISCONTINUED | COMMUNITY
Start: 2020-09-16 | End: 2020-09-16

## 2020-09-16 RX ORDER — ALBUTEROL SULFATE 90 UG/1
INHALANT RESPIRATORY (INHALATION)
Refills: 0 | Status: DISCONTINUED | COMMUNITY
End: 2020-09-16

## 2020-09-16 RX ORDER — CALCIUM CARBONATE 500(1250)
500 TABLET ORAL
Refills: 0 | Status: DISCONTINUED | COMMUNITY
End: 2020-09-16

## 2020-09-18 LAB
CYTOLOGY CVX/VAG DOC THIN PREP: ABNORMAL
HPV HIGH+LOW RISK DNA PNL CVX: NOT DETECTED

## 2020-09-19 ENCOUNTER — OUTPATIENT (OUTPATIENT)
Dept: OUTPATIENT SERVICES | Facility: HOSPITAL | Age: 80
LOS: 1 days | Discharge: HOME | End: 2020-09-19

## 2020-09-19 DIAGNOSIS — Z90.710 ACQUIRED ABSENCE OF BOTH CERVIX AND UTERUS: Chronic | ICD-10-CM

## 2020-09-19 DIAGNOSIS — C50.919 MALIGNANT NEOPLASM OF UNSPECIFIED SITE OF UNSPECIFIED FEMALE BREAST: ICD-10-CM

## 2020-09-19 DIAGNOSIS — Z90.12 ACQUIRED ABSENCE OF LEFT BREAST AND NIPPLE: Chronic | ICD-10-CM

## 2020-09-24 ENCOUNTER — RESULT REVIEW (OUTPATIENT)
Age: 80
End: 2020-09-24

## 2020-09-24 ENCOUNTER — APPOINTMENT (OUTPATIENT)
Dept: HEMATOLOGY ONCOLOGY | Facility: CLINIC | Age: 80
End: 2020-09-24
Payer: MEDICARE

## 2020-09-24 VITALS
OXYGEN SATURATION: 94 % | DIASTOLIC BLOOD PRESSURE: 71 MMHG | TEMPERATURE: 97 F | WEIGHT: 127.2 LBS | BODY MASS INDEX: 23.41 KG/M2 | SYSTOLIC BLOOD PRESSURE: 180 MMHG | HEART RATE: 86 BPM | HEIGHT: 62 IN

## 2020-09-24 LAB
BASOPHILS # BLD AUTO: 0.1 K/UL — SIGNIFICANT CHANGE UP (ref 0–0.2)
BASOPHILS NFR BLD AUTO: 1.3 % — SIGNIFICANT CHANGE UP (ref 0–2)
EOSINOPHIL # BLD AUTO: 0.2 K/UL — SIGNIFICANT CHANGE UP (ref 0–0.5)
EOSINOPHIL NFR BLD AUTO: 3.4 % — SIGNIFICANT CHANGE UP (ref 0–6)
HCT VFR BLD CALC: 34.4 % — LOW (ref 34.5–45)
HGB BLD-MCNC: 11.2 G/DL — LOW (ref 11.5–15.5)
LYMPHOCYTES # BLD AUTO: 1.4 K/UL — SIGNIFICANT CHANGE UP (ref 1–3.3)
LYMPHOCYTES # BLD AUTO: 31.3 % — SIGNIFICANT CHANGE UP (ref 13–44)
MCHC RBC-ENTMCNC: 28.4 PG — SIGNIFICANT CHANGE UP (ref 27–34)
MCHC RBC-ENTMCNC: 32.5 G/DL — SIGNIFICANT CHANGE UP (ref 32–36)
MCV RBC AUTO: 87.4 FL — SIGNIFICANT CHANGE UP (ref 80–100)
MONOCYTES # BLD AUTO: 0.7 K/UL — SIGNIFICANT CHANGE UP (ref 0–0.9)
MONOCYTES NFR BLD AUTO: 14.8 % — HIGH (ref 2–14)
NEUTROPHILS # BLD AUTO: 2.2 K/UL — SIGNIFICANT CHANGE UP (ref 1.8–7.4)
NEUTROPHILS NFR BLD AUTO: 49.1 % — SIGNIFICANT CHANGE UP (ref 43–77)
PLATELET # BLD AUTO: 241 K/UL — SIGNIFICANT CHANGE UP (ref 150–400)
RBC # BLD: 3.93 M/UL — SIGNIFICANT CHANGE UP (ref 3.8–5.2)
RBC # FLD: 12.2 % — SIGNIFICANT CHANGE UP (ref 10.3–14.5)
WBC # BLD: 4.5 K/UL — SIGNIFICANT CHANGE UP (ref 3.8–10.5)
WBC # FLD AUTO: 4.5 K/UL — SIGNIFICANT CHANGE UP (ref 3.8–10.5)

## 2020-09-24 PROCEDURE — 99214 OFFICE O/P EST MOD 30 MIN: CPT

## 2020-09-24 RX ORDER — POTASSIUM CHLORIDE 1500 MG/1
20 TABLET, FILM COATED, EXTENDED RELEASE ORAL
Refills: 0 | Status: DISCONTINUED | COMMUNITY
Start: 2017-01-16 | End: 2020-09-24

## 2020-09-24 NOTE — PHYSICAL EXAM
[Normal] : normal appearance, no rash, nodules, vesicles, ulcers, erythema [de-identified] : Status post left mastectomy with no evidence of recurrence

## 2020-09-24 NOTE — HISTORY OF PRESENT ILLNESS
[de-identified] : \par 80 year old female following up for invasive ductal carcinoma of the left breast. Originally discovered an irregular mass via ultrasound, and then completed an MRI and left core needle biopsy. 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 She then underwent a left breast mastectomy on 12/14/18 with left SLNB and oncoplastic closure, however, margins were positive. Initiated treatment with Taxotere/Cytoxan chemotherapy, and is anticipated to undergo radiation therapy with Dr. Lomeli afterwards due to positive margins.\par \par Pathology report on 11/6/18 of ultrasound guided left breast core needle biopsy revealed: infiltrating ductal carcinoma, Broadway score = 6/9, positive E-Cadherin staining, ER/GA negative, HER2 negative\par \par Pathology report on 12/21/18 of left breast short superior long lateral mastectomy revealed: ([id number]) invasive mammary duct carcinoma, histological grade 2/3, SBR score 6/9, measuring 2.6 cm in length. Deep surgical resection margin is positive. Anterior surgical resection margin is positive. Both anterior and posterior positive margins belong to upper outer quadrant. Ductal carcinoma in situ, intermediate nuclear grade, solid/cribriform, with calcifications and necrosis. Two sentinel lymph nodes negative for metastatic carcinoma.\par \par \par 79 year old female following up for invasive ductal carcinoma of the left breast. Originally discovered an irregular mass via ultrasound, and then completed an MRI and left core needle biopsy. 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 She then underwent a left breast mastectomy on 12/14/18 with left SLNB and oncoplastic closure, however, margins were positive. Initiated treatment with Taxotere/Cytoxan chemotherapy, and is anticipated to undergo radiation therapy with Dr. Lomeli afterwards due to positive margins.\par \par Pathology report on 11/6/18 of ultrasound guided left breast core needle biopsy revealed: infiltrating ductal carcinoma, Brie score = 6/9, positive E-Cadherin staining, ER/GA negative, HER2 negative\par \par Pathology report on 12/21/18 of left breast short superior long lateral mastectomy revealed: ([id number]) invasive mammary duct carcinoma, histological grade 2/3, SBR score 6/9, measuring 2.6 cm in length. Deep surgical resection margin is positive. Anterior surgical resection margin is positive. Both anterior and posterior positive margins belong to upper outer quadrant. Ductal carcinoma in situ, intermediate nuclear grade, solid/cribriform, with calcifications and necrosis. Two sentinel lymph nodes negative for metastatic carcinoma.\par \par \par 79 year old female following up for invasive ductal carcinoma of the left breast. Originally discovered an irregular mass via ultrasound, and then completed an MRI and left core needle biopsy. 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 She then underwent a left breast mastectomy on 12/14/18 with left SLNB and oncoplastic closure, however, margins were positive. Initiated treatment with Taxotere/Cytoxan chemotherapy, and is anticipated to undergo radiation therapy with Dr. Lomeli afterwards due to positive margins.\par \par Pathology report on 11/6/18 of ultrasound guided left breast core needle biopsy revealed: infiltrating ductal carcinoma, Brie score = 6/9, positive E-Cadherin staining, ER/GA negative, HER2 negative\par \par Pathology report on 12/21/18 of left breast short superior long lateral mastectomy revealed: ([id number]) invasive mammary duct carcinoma, histological grade 2/3, SBR score 6/9, measuring 2.6 cm in length. Deep surgical resection margin is positive. Anterior surgical resection margin is positive. Both anterior and posterior positive margins belong to upper outer quadrant. Ductal carcinoma in situ, intermediate nuclear grade, solid/cribriform, with calcifications and necrosis. 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 Tolerated Taxotere/Cytoxan well. Then received 5000 cGy RT with Dr. Lomeli to left chest wall.\par  [de-identified] : Now followed without evidence of disease, feeling well.\par Denies fatigue, weight loss or pain.

## 2020-09-24 NOTE — ASSESSMENT
[FreeTextEntry1] : Triple negative breast cancer, T2N0, status post left mastectomy.  Because of positive margins, 4 cycles of Taxotere and Cytoxan were delivered followed by a total dose of 5000 cGy to the left chest wall.\par Currently followed [facility].

## 2020-09-25 LAB
ALBUMIN SERPL ELPH-MCNC: 4.8 G/DL
ALP BLD-CCNC: 63 U/L
ALT SERPL-CCNC: 18 U/L
ANION GAP SERPL CALC-SCNC: 17 MMOL/L
AST SERPL-CCNC: 27 U/L
BILIRUB SERPL-MCNC: 0.5 MG/DL
BUN SERPL-MCNC: 16 MG/DL
CALCIUM SERPL-MCNC: 9.7 MG/DL
CANCER AG27-29 SERPL-ACNC: 31.7 U/ML
CHLORIDE SERPL-SCNC: 88 MMOL/L
CO2 SERPL-SCNC: 24 MMOL/L
CREAT SERPL-MCNC: 1.15 MG/DL
GLUCOSE SERPL-MCNC: 89 MG/DL
POTASSIUM SERPL-SCNC: 3.8 MMOL/L
PROT SERPL-MCNC: 7.5 G/DL
SODIUM SERPL-SCNC: 130 MMOL/L

## 2020-10-19 ENCOUNTER — RESULT REVIEW (OUTPATIENT)
Age: 80
End: 2020-10-19

## 2020-10-19 ENCOUNTER — APPOINTMENT (OUTPATIENT)
Dept: ULTRASOUND IMAGING | Facility: CLINIC | Age: 80
End: 2020-10-19
Payer: MEDICARE

## 2020-10-19 ENCOUNTER — APPOINTMENT (OUTPATIENT)
Dept: MAMMOGRAPHY | Facility: CLINIC | Age: 80
End: 2020-10-19
Payer: MEDICARE

## 2020-10-19 ENCOUNTER — OUTPATIENT (OUTPATIENT)
Dept: OUTPATIENT SERVICES | Facility: HOSPITAL | Age: 80
LOS: 1 days | End: 2020-10-19
Payer: MEDICARE

## 2020-10-19 DIAGNOSIS — C50.919 MALIGNANT NEOPLASM OF UNSPECIFIED SITE OF UNSPECIFIED FEMALE BREAST: ICD-10-CM

## 2020-10-19 DIAGNOSIS — Z90.710 ACQUIRED ABSENCE OF BOTH CERVIX AND UTERUS: Chronic | ICD-10-CM

## 2020-10-19 DIAGNOSIS — Z90.12 ACQUIRED ABSENCE OF LEFT BREAST AND NIPPLE: Chronic | ICD-10-CM

## 2020-10-19 PROCEDURE — 77065 DX MAMMO INCL CAD UNI: CPT

## 2020-10-19 PROCEDURE — G0279: CPT | Mod: 26

## 2020-10-19 PROCEDURE — 76641 ULTRASOUND BREAST COMPLETE: CPT | Mod: 26,RT

## 2020-10-19 PROCEDURE — 77065 DX MAMMO INCL CAD UNI: CPT | Mod: 26,RT

## 2020-10-19 PROCEDURE — 76641 ULTRASOUND BREAST COMPLETE: CPT

## 2020-10-19 PROCEDURE — G0279: CPT

## 2020-11-24 ENCOUNTER — APPOINTMENT (OUTPATIENT)
Dept: SURGERY | Facility: CLINIC | Age: 80
End: 2020-11-24
Payer: MEDICARE

## 2020-11-24 VITALS
DIASTOLIC BLOOD PRESSURE: 84 MMHG | SYSTOLIC BLOOD PRESSURE: 188 MMHG | HEIGHT: 62 IN | HEART RATE: 89 BPM | WEIGHT: 127 LBS | BODY MASS INDEX: 23.37 KG/M2

## 2020-11-24 PROCEDURE — 99214 OFFICE O/P EST MOD 30 MIN: CPT

## 2020-11-24 NOTE — PHYSICAL EXAM
[Normocephalic] : normocephalic [Atraumatic] : atraumatic [EOMI] : extra ocular movement intact [PERRL] : pupils equal, round and reactive to light [Sclera nonicteric] : sclera nonicteric [Supple] : supple [No Supraclavicular Adenopathy] : no supraclavicular adenopathy [Examined in the supine and seated position] : examined in the supine and seated position [No dominant masses] : no dominant masses in right breast  [No Nipple Retraction] : no right nipple retraction [No Nipple Discharge] : no right nipple discharge [Breast Nipple Inversion Right] : nipple not inverted [Breast Nipple Retraction Right] : nipple not retracted [Breast Nipple Flattening Right] : nipple not flattened [Breast Nipple Fissures Right] : nipple not fissured [Breast Abnormal Lactation (Galactorrhea) Right] : no galactorrhea [Breast Abnormal Secretion Bloody Fluid Right] : no bloody discharge [Breast Abnormal Secretion Serous Fluid Right] : no serous discharge [Breast Abnormal Secretion Opalescent Fluid Right] : no milky discharge [No Axillary Lymphadenopathy] : no left axillary lymphadenopathy [No Edema] : no edema [No Rashes] : no rashes [No Ulceration] : no ulceration [de-identified] : No supraclavicular or axillary adenopathy. No dominant masses, normal to palpation. Everted nipple without discharge. No skin changes.\par  [de-identified] : Mastectomy flap well healed.

## 2020-11-24 NOTE — ASSESSMENT
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   She understands the invasive tumor measured 2.6 cm in length and 0/2 lymph nodes were negative for metastatic carcinoma.  The deep surgical resection margin corresponding to the chest wall, is positive and she underwent radiation after completion of chemotherapy (5/9/2019).  Imaging and clinical exam is benign today.  Recommendation for: \par 1. Follow up with medical oncologist\par 2. Followup with radiation oncologist\par 3. Follow up in 6 months\par 4.  Continue annual screening mammogram/sonogram right breast 10/20/2021

## 2020-11-24 NOTE — HISTORY OF PRESENT ILLNESS
[FreeTextEntry1] : Referring Physician: Dr. Patton\par I had the pleasure of seeing RAÚL JASMINE in the office today for a follow up visit secondary to history with left breast IDC.\par \par Tenzin Nieto is kristine 81 yo postmenopausal female recently diagnosed with left breast 1 cm invasive ductal carcinoma grade 2 ER negative UT negative Her2 negative from irregular mass seen on ultrasound.  She recently underwent an MRI which demonstrated enhancement of 5.3 cm area and discussion of mastectomy v lumpectomy was thoroughly undertaken.  She underwent left breast mastectomy with left SLNB and oncoplastic closure on 12/14/2018.  Final pathology demonstrated the invasive tumor to measure 2.6 cm and 0/2 lymph nodes negative for carcinoma.  The deep margin was positive and she underwent radiation.  She also completed chemotherapy for her TNBC.\par \par She denies skin changes or nipple discharge. She reports a left breast lump which she has had for years but recently felt different. She undergoes mammogram every year since age 40.\par \par G0. Menses began at age 12.\par She denies personal history of malignancy.\par Family history is significant for sister diagnosed with colon cancer.\par \par Chemotherapy completed 5/9/2019\par Radiation completed in 2019\par \par 12/14/2018  Final Surgical Pathology\par 1.  Left [place] lymph node #1 hot and blue:  One lymph node, negative for metastatic carcinoma. (0/1)\par 2.  Left sentinel lymph node #2 blue:  One lymph node, negative for metastatic carcinoma.\par 3.  Left breast short superior long lateral, mastectomy:  Invasive mammary duct carcinoma, intermediate histological grade 2/3, SBR score 6/9, measuring 2.6 cm in length.  Deep surgical resection margin-corresponding to the chest wall, is positive.  The anterior surgical resection margin corresponding to anterior skin surface.  Both anterior and posterior positive margin belong to the upper outer quadrant, see also gross descriptions.  Ductal carcinoma in situ, intermediate nuclear grade, solid/cribriform, with focal calcifications and necrosis.Negative nipple areolar complex and representative skin.\par \par 10/19/2020  US breast complete RT/  [facility] mammo diag w ne RT\par Impression:  No mammographic or sonographic evidence of malignancy.  BIRADS 1 negative.\par \par We reviewed both clinical exam and recent imaging.  Both clinical exam and recent imaging is benign.  Recommendation for follow up in 6 months.  She understands and agrees to plan.  All questions answered.

## 2021-01-20 ENCOUNTER — APPOINTMENT (OUTPATIENT)
Dept: RADIATION ONCOLOGY | Facility: CLINIC | Age: 81
End: 2021-01-20
Payer: MEDICARE

## 2021-02-25 ENCOUNTER — APPOINTMENT (OUTPATIENT)
Dept: RADIATION ONCOLOGY | Facility: CLINIC | Age: 81
End: 2021-02-25
Payer: MEDICARE

## 2021-02-25 VITALS
RESPIRATION RATE: 16 BRPM | OXYGEN SATURATION: 96 % | HEART RATE: 87 BPM | TEMPERATURE: 98.1 F | DIASTOLIC BLOOD PRESSURE: 79 MMHG | HEIGHT: 62 IN | BODY MASS INDEX: 23.23 KG/M2 | WEIGHT: 126.25 LBS | SYSTOLIC BLOOD PRESSURE: 177 MMHG

## 2021-02-25 PROCEDURE — 99072 ADDL SUPL MATRL&STAF TM PHE: CPT

## 2021-02-25 PROCEDURE — 99212 OFFICE O/P EST SF 10 MIN: CPT

## 2021-02-25 NOTE — VITALS
[Maximal Pain Intensity: 0/10] : 0/10 [Least Pain Intensity: 0/10] : 0/10 [NoTreatment Scheduled] : no treatment scheduled

## 2021-02-26 NOTE — HISTORY OF PRESENT ILLNESS
[FreeTextEntry1] : Ms. Dave is a 80 year-old woman with Invasive ductal carcinoma [id number], stage IIA, ERs/p Left mastectomy and oncoplastic closure/SLNBx with positive margins and adjuvant Taxotere/Cytoxan chemotherapy x4 with Dr. Vee. She received radiation therapy to the left chest wall to a total dose of 5000 cGy to left chest wall, completed on 8/2/19 and 1000 cGy boost completed on 8/9/19.\par \par She will be seen today for routine radiation follow up.  \par At last visit, mastectomy left chest, incision well healed. \par Denied hand or arm edema, fatigue, pain. \par She follows with Dr Vee.\par Right breast mammogramsonogram done on 10/19/2020 showed no sonographic or mammographic evidence of malignancy. BIRADS 1\par \par At last visit, patient noted developing some dizziness and right leg pain June 2020 that prompted her to go to the ED. She had a CVA work up including brain MRI which were all negative.  She followed with Neurology, Dr. Snowden.\par \par She has no chest or arm complaints. Skin tone over left chest nearly back to baseline.\par \par 10/19/2020 US breast complete RT/ MG mammo diag w ne RT\par Impression: No mammographic or sonographic evidence of malignancy.\par BIRADS 1 negative.\par \par

## 2021-02-26 NOTE — LETTER CLOSING
[FreeTextEntry3] : Chalino Lomeli MD\par Physician in Chief\par Department of Radiation Medicine\par [facility] [place]\par [facility] [place]\par \par  of Radiation Medicine\par [facility]\par at  [facility] [place]\par \par Radiation \par [facility]/\par [facility] [place]\par [address]\par [place], [place] [address]\par \par Tel: [phone]\par Fax: [phone]\par

## 2021-02-26 NOTE — DISEASE MANAGEMENT
[FreeTextEntry4] : invasive ductal carcinoma [TTNM] : 2 [NTNM] : 0 [MTNM] : 0 [de-identified] : 1943 [de-identified] : left chest

## 2021-02-26 NOTE — REVIEW OF SYSTEMS
[Negative] : Neurological [FreeTextEntry9] : s/p left mastectomy [de-identified] : s/p chemotherapy

## 2021-03-17 ENCOUNTER — OUTPATIENT (OUTPATIENT)
Dept: OUTPATIENT SERVICES | Facility: HOSPITAL | Age: 81
LOS: 1 days | Discharge: HOME | End: 2021-03-17

## 2021-03-17 DIAGNOSIS — C50.919 MALIGNANT NEOPLASM OF UNSPECIFIED SITE OF UNSPECIFIED FEMALE BREAST: ICD-10-CM

## 2021-03-17 DIAGNOSIS — Z90.12 ACQUIRED ABSENCE OF LEFT BREAST AND NIPPLE: Chronic | ICD-10-CM

## 2021-03-17 DIAGNOSIS — Z90.710 ACQUIRED ABSENCE OF BOTH CERVIX AND UTERUS: Chronic | ICD-10-CM

## 2021-03-22 ENCOUNTER — APPOINTMENT (OUTPATIENT)
Dept: HEMATOLOGY ONCOLOGY | Facility: CLINIC | Age: 81
End: 2021-03-22
Payer: MEDICARE

## 2021-03-22 ENCOUNTER — RESULT REVIEW (OUTPATIENT)
Age: 81
End: 2021-03-22

## 2021-03-22 VITALS
OXYGEN SATURATION: 98 % | WEIGHT: 124.01 LBS | SYSTOLIC BLOOD PRESSURE: 180 MMHG | DIASTOLIC BLOOD PRESSURE: 83 MMHG | HEART RATE: 80 BPM | HEIGHT: 62 IN | BODY MASS INDEX: 22.82 KG/M2

## 2021-03-22 LAB
ALBUMIN SERPL ELPH-MCNC: 4.8 G/DL
ALP BLD-CCNC: 71 U/L
ALT SERPL-CCNC: 20 U/L
ANION GAP SERPL CALC-SCNC: 12 MMOL/L
AST SERPL-CCNC: 25 U/L
BASOPHILS # BLD AUTO: 0.1 K/UL — SIGNIFICANT CHANGE UP (ref 0–0.2)
BASOPHILS NFR BLD AUTO: 1.3 % — SIGNIFICANT CHANGE UP (ref 0–2)
BILIRUB SERPL-MCNC: 0.6 MG/DL
BUN SERPL-MCNC: 17 MG/DL
CALCIUM SERPL-MCNC: 10.3 MG/DL
CHLORIDE SERPL-SCNC: 93 MMOL/L
CO2 SERPL-SCNC: 30 MMOL/L
CREAT SERPL-MCNC: 1.01 MG/DL
EOSINOPHIL # BLD AUTO: 0.2 K/UL — SIGNIFICANT CHANGE UP (ref 0–0.5)
EOSINOPHIL NFR BLD AUTO: 4 % — SIGNIFICANT CHANGE UP (ref 0–6)
GLUCOSE SERPL-MCNC: 119 MG/DL
HCT VFR BLD CALC: 40 % — SIGNIFICANT CHANGE UP (ref 34.5–45)
HGB BLD-MCNC: 12.4 G/DL — SIGNIFICANT CHANGE UP (ref 11.5–15.5)
LYMPHOCYTES # BLD AUTO: 1.1 K/UL — SIGNIFICANT CHANGE UP (ref 1–3.3)
LYMPHOCYTES # BLD AUTO: 24.1 % — SIGNIFICANT CHANGE UP (ref 13–44)
MCHC RBC-ENTMCNC: 28.5 PG — SIGNIFICANT CHANGE UP (ref 27–34)
MCHC RBC-ENTMCNC: 30.9 G/DL — LOW (ref 32–36)
MCV RBC AUTO: 92.2 FL — SIGNIFICANT CHANGE UP (ref 80–100)
MONOCYTES # BLD AUTO: 0.7 K/UL — SIGNIFICANT CHANGE UP (ref 0–0.9)
MONOCYTES NFR BLD AUTO: 15 % — HIGH (ref 2–14)
NEUTROPHILS # BLD AUTO: 2.4 K/UL — SIGNIFICANT CHANGE UP (ref 1.8–7.4)
NEUTROPHILS NFR BLD AUTO: 55.6 % — SIGNIFICANT CHANGE UP (ref 43–77)
PLATELET # BLD AUTO: 256 K/UL — SIGNIFICANT CHANGE UP (ref 150–400)
POTASSIUM SERPL-SCNC: 3.9 MMOL/L
PROT SERPL-MCNC: 7.8 G/DL
RBC # BLD: 4.34 M/UL — SIGNIFICANT CHANGE UP (ref 3.8–5.2)
RBC # FLD: 12.5 % — SIGNIFICANT CHANGE UP (ref 10.3–14.5)
SODIUM SERPL-SCNC: 135 MMOL/L
WBC # BLD: 4.4 K/UL — SIGNIFICANT CHANGE UP (ref 3.8–10.5)
WBC # FLD AUTO: 4.4 K/UL — SIGNIFICANT CHANGE UP (ref 3.8–10.5)

## 2021-03-22 PROCEDURE — 99213 OFFICE O/P EST LOW 20 MIN: CPT

## 2021-03-22 PROCEDURE — 99072 ADDL SUPL MATRL&STAF TM PHE: CPT

## 2021-03-22 NOTE — HISTORY OF PRESENT ILLNESS
[de-identified] : \par \par 81 year old female following up for invasive ductal carcinoma of the left breast. Originally discovered an irregular mass via ultrasound, and then completed an MRI and left core needle biopsy. 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 She then underwent a left breast mastectomy on 12/14/18 with left SLNB and oncoplastic closure, however, margins were positive. Initiated treatment with Taxotere/Cytoxan chemotherapy, and received 5000 cGy to left chest radiation with Dr. Lomeli afterwards due to positive margins.\par \par Pathology report on 11/6/18 of ultrasound guided left breast core needle biopsy revealed: infiltrating ductal carcinoma, [place] score = 6/9, positive E-Cadherin staining, ER/LA negative, HER2 negative\par \par Pathology report on 12/21/18 of left breast short superior long lateral mastectomy revealed: ([id number]) invasive mammary duct carcinoma, histological grade 2/3, SBR score 6/9, measuring 2.6 cm in length. Deep surgical resection margin is positive. Anterior surgical resection margin is positive. Both anterior and posterior positive margins belong to upper outer quadrant. Ductal carcinoma in situ, intermediate nuclear grade, solid/cribriform, with calcifications and necrosis. Two sentinel lymph nodes negative for metastatic carcinoma.\par \par  [de-identified] : Continues to do well with no signs of recurrence

## 2021-03-22 NOTE — ASSESSMENT
[FreeTextEntry1] : \par Triple negative breast cancer, T2N0, status post left mastectomy. Because of positive margins, 4 cycles of Taxotere and Cytoxan were delivered followed by a total dose of 5000 cGy to the left chest wall.\par Currently followed [facility]. \par \par

## 2021-03-22 NOTE — REASON FOR VISIT
Initial visit with patient attempted but patient on vent and unresponsive. Quiet prayer said at bedside. Card left. Stoney White M.Div.    [Follow-Up Visit] : a follow-up [FreeTextEntry2] : Breast cancer

## 2021-03-22 NOTE — PHYSICAL EXAM
[Normal] : normal appearance, no rash, nodules, vesicles, ulcers, erythema [de-identified] : Status post left mastectomy with no evidence of recurrence

## 2021-03-23 LAB — CANCER AG27-29 SERPL-ACNC: 38.9 U/ML

## 2021-06-07 NOTE — ED PROVIDER NOTE - DISPOSITION TYPE
Who is calling:  Patient   Reason for Call:  Would like Dr Naranjo's Nurse call him regarding a fax # he can fax a form to.   Date of last appointment with primary care: 3/23  Okay to leave a detailed message: Yes       OBSERVATION

## 2021-07-20 ENCOUNTER — APPOINTMENT (OUTPATIENT)
Dept: SURGERY | Facility: CLINIC | Age: 81
End: 2021-07-20
Payer: MEDICARE

## 2021-07-20 VITALS
SYSTOLIC BLOOD PRESSURE: 164 MMHG | HEART RATE: 73 BPM | WEIGHT: 118 LBS | TEMPERATURE: 98 F | HEIGHT: 62 IN | OXYGEN SATURATION: 98 % | BODY MASS INDEX: 21.71 KG/M2 | DIASTOLIC BLOOD PRESSURE: 73 MMHG

## 2021-07-20 DIAGNOSIS — Z90.12 ACQUIRED ABSENCE OF LEFT BREAST AND NIPPLE: ICD-10-CM

## 2021-07-20 PROCEDURE — 99213 OFFICE O/P EST LOW 20 MIN: CPT

## 2021-07-20 NOTE — PHYSICAL EXAM
[Normocephalic] : normocephalic [Atraumatic] : atraumatic [EOMI] : extra ocular movement intact [PERRL] : pupils equal, round and reactive to light [Sclera nonicteric] : sclera nonicteric [Supple] : supple [No Supraclavicular Adenopathy] : no supraclavicular adenopathy [Examined in the supine and seated position] : examined in the supine and seated position [No dominant masses] : no dominant masses in right breast  [No Nipple Retraction] : no right nipple retraction [No Nipple Discharge] : no right nipple discharge [Breast Nipple Inversion Right] : nipple not inverted [Breast Nipple Retraction Right] : nipple not retracted [Breast Nipple Flattening Right] : nipple not flattened [Breast Nipple Fissures Right] : nipple not fissured [Breast Abnormal Lactation (Galactorrhea) Right] : no galactorrhea [Breast Abnormal Secretion Bloody Fluid Right] : no bloody discharge [Breast Abnormal Secretion Serous Fluid Right] : no serous discharge [Breast Abnormal Secretion Opalescent Fluid Right] : no milky discharge [No Axillary Lymphadenopathy] : no left axillary lymphadenopathy [No Edema] : no edema [No Rashes] : no rashes [No Ulceration] : no ulceration [de-identified] : No supraclavicular or axillary adenopathy. No dominant masses, normal to palpation. Everted nipple without discharge. No skin changes.\par  [de-identified] : Mastectomy flap well healed.

## 2021-07-20 NOTE — ASSESSMENT
[FreeTextEntry1] : 81 postmenopausal female with history of left breast mastectomy with oncoplastic closure for her left breast IDC grade 2 triple negative breast cancer.  She completed PMRT and adjuvant chemotherapy (5/9/2019).  Clinical exam is benign today.  Recommendation for: \par 1. Follow up with Medical oncologist\par 2. Followup with Radiation oncologist\par 3. Follow up in 6 months\par 4.  Continue annual screening mammogram/sonogram right breast 10/20/2021

## 2021-07-20 NOTE — HISTORY OF PRESENT ILLNESS
[FreeTextEntry1] : Referring Physician: Dr. Patton\par I had the pleasure of seeing RAÚL JASMINE in the office today for a follow up visit secondary to history with left breast IDC.\par \par Tenzin Nieto is kristine 80 yo postmenopausal female recently diagnosed with left breast 1 cm invasive ductal carcinoma grade 2 ER negative MO negative Her2 negative from irregular mass seen on ultrasound.  She  underwent an MRI which demonstrated enhancement of 5.3 cm area and discussion of mastectomy v lumpectomy was thoroughly undertaken.  \par \par She underwent left breast mastectomy with left SLNB and oncoplastic closure on 12/14/2018.  Final pathology demonstrated the invasive tumor to measure 2.6 cm and 0/2 lymph nodes negative for carcinoma.  The deep margin was positive and she underwent radiation.  She also completed chemotherapy for her TNBC.\par \par She denies dominant breast mass, skin changes or nipple discharge. She denies headaches, blurry vision, chest pain, SOB, abdominal pain, joint aches or difficult walking.\par \par G0. Menses began at age 12.\par She denies personal history of malignancy.\par Family history is significant for sister diagnosed with colon cancer.\par \par Chemotherapy completed 5/9/2019\par Radiation completed in 2019\par \par 12/14/2018  Final Surgical Pathology\par 1.  Left [place] lymph node #1 hot and blue:  One lymph node, negative for metastatic carcinoma. (0/1)\par 2.  Left sentinel lymph node #2 blue:  One lymph node, negative for metastatic carcinoma.\par 3.  Left breast short superior long lateral, mastectomy:  Invasive mammary duct carcinoma, intermediate histological grade 2/3, SBR score 6/9, measuring 2.6 cm in length.  Deep surgical resection margin-corresponding to the chest wall, is positive.  The anterior surgical resection margin corresponding to anterior skin surface.  Both anterior and posterior positive margin belong to the upper outer quadrant, see also gross descriptions.  Ductal carcinoma in situ, intermediate nuclear grade, solid/cribriform, with focal calcifications and necrosis.Negative nipple areolar complex and representative skin.\par \par 10/19/2020  US breast complete RT/  MG mammo diag w ne RT\par Impression:  No mammographic or sonographic evidence of malignancy.  BIRADS 1 negative.\par \par We reviewed both clinical exam and recent imaging.  Both clinical exam and recent imaging is benign.  Recommendation for follow up in 6 months.  She understands and agrees to plan.  All questions answered.

## 2021-08-25 ENCOUNTER — APPOINTMENT (OUTPATIENT)
Dept: RADIATION ONCOLOGY | Facility: CLINIC | Age: 81
End: 2021-08-25
Payer: MEDICARE

## 2021-08-25 PROCEDURE — 99212 OFFICE O/P EST SF 10 MIN: CPT

## 2021-08-25 NOTE — VITALS
[Maximal Pain Intensity: 0/10] : 0/10 [Least Pain Intensity: 0/10] : 0/10 [NoTreatment Scheduled] : no treatment scheduled [OTC] : OTC [80: Normal activity with effort; some signs or symptoms of disease.] : 80: Normal activity with effort; some signs or symptoms of disease.  [ECOG Performance Status: 1 - Restricted in physically strenuous activity but ambulatory and able to carry out work of a light or sedentary nature] : Performance Status: 1 - Restricted in physically strenuous activity but ambulatory and able to carry out work of a light or sedentary nature, e.g., light house work, office work

## 2021-08-26 NOTE — PHYSICAL EXAM
[Normal] : oriented to person, place and time, the affect was normal, the mood was normal and not anxious [de-identified] : left mastectomy, no reconstruction

## 2021-08-26 NOTE — HISTORY OF PRESENT ILLNESS
[FreeTextEntry1] : Ms. Dave is an 81 year-old woman with Invasive ductal carcinoma [id number], stage IIA, s/p Left mastectomy and oncoplastic closure/SLNBx with positive margins.  She is s/p adjuvant Taxotere/Cytoxan chemotherapy x4 with Dr. Vee. She received radiation therapy to the left chest wall to a total dose of 5000 cGy to left chest wall, completed on 8/2/19 and 1000 cGy boost completed on 8/9/19.\par \par Presents today for routine radiation follow up.  \par Mastectomy left chest, incision well healed. \par Denied hand or arm edema, fatigue, pain. \par She follows with Dr Vee.\par \par \par \par

## 2021-08-26 NOTE — REVIEW OF SYSTEMS
[Recent Change In Weight] : ~T recent weight change [Negative] : Musculoskeletal [FreeTextEntry2] : lost approximately 10 lbs since past f/u [FreeTextEntry9] : s/p left mastectomy [de-identified] : s/p chemotherapy

## 2021-08-26 NOTE — LETTER CLOSING
[Sincerely yours,] : Sincerely yours, [FreeTextEntry3] : Chalino Lomeli MD\par Physician in Chief\par Department of Radiation Medicine\par [facility] [place]\par [facility] [place]\par \par  of Radiation Medicine\par [facility]\par at  [facility] [place]/[facility]\par \par Radiation \par [facility]/\par [facility] [place]\par [address]\par [place], [place] [address]\par \par Tel: [phone]\par Fax: [phone]\par

## 2021-09-16 ENCOUNTER — APPOINTMENT (OUTPATIENT)
Dept: FAMILY MEDICINE | Facility: CLINIC | Age: 81
End: 2021-09-16

## 2021-10-13 ENCOUNTER — APPOINTMENT (OUTPATIENT)
Dept: OBGYN | Facility: CLINIC | Age: 81
End: 2021-10-13
Payer: MEDICARE

## 2021-10-13 VITALS
HEIGHT: 63 IN | BODY MASS INDEX: 20.38 KG/M2 | DIASTOLIC BLOOD PRESSURE: 64 MMHG | SYSTOLIC BLOOD PRESSURE: 110 MMHG | WEIGHT: 115 LBS

## 2021-10-13 DIAGNOSIS — Z92.3 PERSONAL HISTORY OF IRRADIATION: ICD-10-CM

## 2021-10-13 DIAGNOSIS — Z01.419 ENCOUNTER FOR GYNECOLOGICAL EXAMINATION (GENERAL) (ROUTINE) W/OUT ABNORMAL FINDINGS: ICD-10-CM

## 2021-10-13 DIAGNOSIS — Z12.39 ENCOUNTER FOR OTHER SCREENING FOR MALIGNANT NEOPLASM OF BREAST: ICD-10-CM

## 2021-10-13 DIAGNOSIS — Z01.818 ENCOUNTER FOR OTHER PREPROCEDURAL EXAMINATION: ICD-10-CM

## 2021-10-13 DIAGNOSIS — Z85.3 PERSONAL HISTORY OF MALIGNANT NEOPLASM OF BREAST: ICD-10-CM

## 2021-10-13 PROCEDURE — G0101: CPT

## 2021-10-13 PROCEDURE — 82270 OCCULT BLOOD FECES: CPT

## 2021-10-13 RX ORDER — SENNOSIDES 8.6 MG/1
8.6 CAPSULE, GELATIN COATED ORAL
Refills: 0 | Status: DISCONTINUED | COMMUNITY
End: 2021-10-13

## 2021-10-13 RX ORDER — OMEPRAZOLE 20 MG/1
20 CAPSULE, DELAYED RELEASE ORAL
Refills: 0 | Status: DISCONTINUED | COMMUNITY
End: 2021-10-13

## 2021-10-13 RX ORDER — ALENDRONATE SODIUM 70 MG/1
70 TABLET ORAL
Refills: 0 | Status: DISCONTINUED | COMMUNITY
Start: 2016-09-21 | End: 2021-10-13

## 2021-10-15 LAB
DATE COLLECTED: NORMAL
HEMOCCULT SP1 STL QL: NEGATIVE
QUALITY CONTROL: YES

## 2021-10-15 NOTE — END OF VISIT
[FreeTextEntry3] : I, Clarice Ruiz solely acted as scribe for Dr. Yasmeen Schaffer on 10/13/2021. All medical entries made by the Scribe were at my, Dr. Schaffer's, direction and personally dictated by me on 10/13/2021. I have reviewed the chart and agree that the record accurately reflects my personal performance of the history, physical exam, assessment, and plan. I have also personally directed, reviewed, and agreed with the chart.

## 2021-10-15 NOTE — HISTORY OF PRESENT ILLNESS
[HPV test offered] : HPV test offered [LMP unknown] : LMP unknown [N] : Patient denies prior pregnancies [unknown] : Patient is unsure of the date of her LMP [Menarche Age: ____] : age at menarche was [unfilled] [Post-Menopause, No Sxs] : post-menopausal, currently without symptoms [No] : Patient does not have concerns regarding sex [Previously active] : previously active [FreeTextEntry1] : 81 year yo G0 LMP unknown is here for her annual visit. \par \par She is s/p left breast mastectomy secondary to breast cancer in 2019, she is still following with oncology. \par \par Her last health maintenance visit was 1 year ago. She reports being in good health, a healthy diet, regular exercise and no concerns with her weight. Last menstrual period: unknown, she is postmenopausal. Last cervical pap smear: 09/14/20, neg. Last Mammogram: 10/19/20, BR1. Last breast US: 10/19/20, BR1. \par \par She is not currently sexually active and not using any form of contraception.\par  [TextBox_4] : Pt presents for her annual exam [Mammogramdate] : 10/19/20 [TextBox_19] : br1 [BreastSonogramDate] : 10/19/20 [Papeardate] : 09/14/20 [TextBox_25] : br1 [TextBox_31] : neg [HPVDate] : 09/14/20 [TextBox_78] : neg [PGHxTotal] : 0

## 2021-10-15 NOTE — PHYSICAL EXAM
[Appropriately responsive] : appropriately responsive [Alert] : alert [No Acute Distress] : no acute distress [No Lymphadenopathy] : no lymphadenopathy [Soft] : soft [Non-tender] : non-tender [Non-distended] : non-distended [Oriented x3] : oriented x3 [Examination Of The Breasts] : a normal appearance [No Discharge] : no discharge [Left Breast Absent] : a total mastectomy [No Masses] : no breast masses were palpable [Labia Majora] : normal [Labia Minora] : normal [Atrophy] : atrophy [Dry Mucosa] : dry mucosa [No Bleeding] : There was no active vaginal bleeding [Normal] : normal [Uterine Adnexae] : normal [No Tenderness] : no tenderness [Nl Sphincter Tone] : normal sphincter tone [FreeTextEntry9] : Stool for occult blood.

## 2021-10-15 NOTE — DISCUSSION/SUMMARY
[FreeTextEntry1] : -Pap not done today given age and history of normal cervical pap smears. \par \par -Self-breast exam reviewed. Breast care per oncologist. \par \par She will follow up annually and as needed.\par \par

## 2021-10-20 ENCOUNTER — RESULT REVIEW (OUTPATIENT)
Age: 81
End: 2021-10-20

## 2021-10-20 ENCOUNTER — APPOINTMENT (OUTPATIENT)
Dept: MAMMOGRAPHY | Facility: CLINIC | Age: 81
End: 2021-10-20
Payer: MEDICARE

## 2021-10-20 ENCOUNTER — APPOINTMENT (OUTPATIENT)
Dept: RADIOLOGY | Facility: CLINIC | Age: 81
End: 2021-10-20
Payer: MEDICARE

## 2021-10-20 ENCOUNTER — APPOINTMENT (OUTPATIENT)
Dept: ULTRASOUND IMAGING | Facility: CLINIC | Age: 81
End: 2021-10-20
Payer: MEDICARE

## 2021-10-20 ENCOUNTER — OUTPATIENT (OUTPATIENT)
Dept: OUTPATIENT SERVICES | Facility: HOSPITAL | Age: 81
LOS: 1 days | End: 2021-10-20
Payer: MEDICARE

## 2021-10-20 DIAGNOSIS — Z00.00 ENCOUNTER FOR GENERAL ADULT MEDICAL EXAMINATION WITHOUT ABNORMAL FINDINGS: ICD-10-CM

## 2021-10-20 DIAGNOSIS — Z90.12 ACQUIRED ABSENCE OF LEFT BREAST AND NIPPLE: Chronic | ICD-10-CM

## 2021-10-20 DIAGNOSIS — Z90.710 ACQUIRED ABSENCE OF BOTH CERVIX AND UTERUS: Chronic | ICD-10-CM

## 2021-10-20 PROCEDURE — 76641 ULTRASOUND BREAST COMPLETE: CPT | Mod: 26,RT

## 2021-10-20 PROCEDURE — 77080 DXA BONE DENSITY AXIAL: CPT

## 2021-10-20 PROCEDURE — 77065 DX MAMMO INCL CAD UNI: CPT | Mod: 26,RT

## 2021-10-20 PROCEDURE — G0279: CPT | Mod: 26

## 2021-10-20 PROCEDURE — 77080 DXA BONE DENSITY AXIAL: CPT | Mod: 26

## 2021-10-20 PROCEDURE — G0279: CPT

## 2021-10-20 PROCEDURE — 77065 DX MAMMO INCL CAD UNI: CPT

## 2021-10-20 PROCEDURE — 76641 ULTRASOUND BREAST COMPLETE: CPT

## 2022-01-20 NOTE — H&P PST ADULT - NSANTHNECKRD_ENT_A_CORE
Pertinent PMH/PSH/FHx/SHx and Review of Systems contained within:  Patient presents to the ED for chills and fever.  PAtient was at home and says that he went to lay down because he was feeling run down, and started shaking with chills uncontrollably.  He is febrile to 103 F in ER.  He denies any focal symptoms, denies any cough, sob, abdominal or chest pain, n/v/d, sore throat, runny nose, or urinary symptoms.    Relevant PMHx/SHx/SOCHx/FAMH:  HTN, HLD, DM, CABG  Patient denies EtOH/tobacco/illicit substance use.    ROS: No headache/photophobia/eye pain/changes in vision, No ear pain/sore throat/dysphagia, No chest pain/palpitations, no SOB/cough/wheeze/stridor, No abdominal pain, No N/V/D/melena, no dysuria/frequency/discharge, No neck/back pain, no rash, no changes in neurological status/function.
No

## 2022-01-22 NOTE — REVIEW OF SYSTEMS
unknown [Dyspepsia: Grade 0] : Dyspepsia: Grade 0 [Esophagitis: Grade 0] : Esophagitis: Grade 0 [Dysphagia: Grade 0] : Dysphagia: Grade 0 [Breast Pain: Grade 0] : Breast Pain: Grade 0 [Fatigue: Grade 0] : Fatigue: Grade 0 [Skin Hyperpigmentation: Grade 0] : Skin Hyperpigmentation: Grade 0 [Dermatitis Radiation: Grade 0] : Dermatitis Radiation: Grade 0

## 2022-02-01 ENCOUNTER — NON-APPOINTMENT (OUTPATIENT)
Age: 82
End: 2022-02-01

## 2022-03-18 ENCOUNTER — OUTPATIENT (OUTPATIENT)
Dept: OUTPATIENT SERVICES | Facility: HOSPITAL | Age: 82
LOS: 1 days | Discharge: HOME | End: 2022-03-18

## 2022-03-18 DIAGNOSIS — Z90.12 ACQUIRED ABSENCE OF LEFT BREAST AND NIPPLE: Chronic | ICD-10-CM

## 2022-03-18 DIAGNOSIS — C50.919 MALIGNANT NEOPLASM OF UNSPECIFIED SITE OF UNSPECIFIED FEMALE BREAST: ICD-10-CM

## 2022-03-18 DIAGNOSIS — Z90.710 ACQUIRED ABSENCE OF BOTH CERVIX AND UTERUS: Chronic | ICD-10-CM

## 2022-03-21 ENCOUNTER — RESULT REVIEW (OUTPATIENT)
Age: 82
End: 2022-03-21

## 2022-03-21 ENCOUNTER — APPOINTMENT (OUTPATIENT)
Dept: HEMATOLOGY ONCOLOGY | Facility: CLINIC | Age: 82
End: 2022-03-21
Payer: MEDICARE

## 2022-03-21 VITALS — BODY MASS INDEX: 18.12 KG/M2 | OXYGEN SATURATION: 98 % | HEART RATE: 69 BPM | WEIGHT: 102.25 LBS | HEIGHT: 63 IN

## 2022-03-21 VITALS — DIASTOLIC BLOOD PRESSURE: 90 MMHG | SYSTOLIC BLOOD PRESSURE: 196 MMHG

## 2022-03-21 LAB
BASOPHILS # BLD AUTO: 0.1 K/UL — SIGNIFICANT CHANGE UP (ref 0–0.2)
BASOPHILS NFR BLD AUTO: 1.9 % — SIGNIFICANT CHANGE UP (ref 0–2)
EOSINOPHIL # BLD AUTO: 0.1 K/UL — SIGNIFICANT CHANGE UP (ref 0–0.5)
EOSINOPHIL NFR BLD AUTO: 1.8 % — SIGNIFICANT CHANGE UP (ref 0–6)
HCT VFR BLD CALC: 42.4 % — SIGNIFICANT CHANGE UP (ref 34.5–45)
HGB BLD-MCNC: 13 G/DL — SIGNIFICANT CHANGE UP (ref 11.5–15.5)
LYMPHOCYTES # BLD AUTO: 1.5 K/UL — SIGNIFICANT CHANGE UP (ref 1–3.3)
LYMPHOCYTES # BLD AUTO: 23.6 % — SIGNIFICANT CHANGE UP (ref 13–44)
MCHC RBC-ENTMCNC: 28 PG — SIGNIFICANT CHANGE UP (ref 27–34)
MCHC RBC-ENTMCNC: 30.6 G/DL — LOW (ref 32–36)
MCV RBC AUTO: 91.5 FL — SIGNIFICANT CHANGE UP (ref 80–100)
MONOCYTES # BLD AUTO: 0.6 K/UL — SIGNIFICANT CHANGE UP (ref 0–0.9)
MONOCYTES NFR BLD AUTO: 8.9 % — SIGNIFICANT CHANGE UP (ref 2–14)
NEUTROPHILS # BLD AUTO: 4 K/UL — SIGNIFICANT CHANGE UP (ref 1.8–7.4)
NEUTROPHILS NFR BLD AUTO: 63.8 % — SIGNIFICANT CHANGE UP (ref 43–77)
PLATELET # BLD AUTO: 436 K/UL — HIGH (ref 150–400)
RBC # BLD: 4.63 M/UL — SIGNIFICANT CHANGE UP (ref 3.8–5.2)
RBC # FLD: 14 % — SIGNIFICANT CHANGE UP (ref 10.3–14.5)
WBC # BLD: 6.3 K/UL — SIGNIFICANT CHANGE UP (ref 3.8–10.5)
WBC # FLD AUTO: 6.3 K/UL — SIGNIFICANT CHANGE UP (ref 3.8–10.5)

## 2022-03-21 PROCEDURE — 99214 OFFICE O/P EST MOD 30 MIN: CPT

## 2022-03-21 NOTE — PHYSICAL EXAM
[Normal] : normal appearance, no rash, nodules, vesicles, ulcers, erythema [de-identified] : Status post left mastectomy with no evidence of recurrence

## 2022-03-21 NOTE — HISTORY OF PRESENT ILLNESS
[de-identified] : \par \par 81 year old female following up for invasive ductal carcinoma of the left breast. Originally discovered an irregular mass via ultrasound, and then completed an MRI and left core needle biopsy. 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 She then underwent a left breast mastectomy on 12/14/18 with left SLNB and oncoplastic closure, however, margins were positive. Initiated treatment with Taxotere/Cytoxan chemotherapy, and received 5000 cGy to left chest radiation with Dr. Lomeli afterwards due to positive margins.\par \par Pathology report on 11/6/18 of ultrasound guided left breast core needle biopsy revealed: infiltrating ductal carcinoma, [place] score = 6/9, positive E-Cadherin staining, ER/IA negative, HER2 negative\par \par Pathology report on 12/21/18 of left breast short superior long lateral mastectomy revealed: ([id number]) invasive mammary duct carcinoma, histological grade 2/3, SBR score 6/9, measuring 2.6 cm in length. Deep surgical resection margin is positive. Anterior surgical resection margin is positive. Both anterior and posterior positive margins belong to upper outer quadrant. Ductal carcinoma in situ, intermediate nuclear grade, solid/cribriform, with calcifications and necrosis. Two sentinel lymph nodes negative for metastatic carcinoma.\par \par  [de-identified] : Patient presents to office for f/u with daughter.\par Patient experienced new onset of confusion, ataxic gait and decreased appetite about a week ago. Patient was also very weak un able to ambulate by herself as per daughter. \par Patient admits episodes of blurry vision and visual disturbances. Denies one sided weakness.\par Patient states symptoms have resolved now. \par Patient also with weight loss, now 102 lbs previously 115 in October. Patient usually weighs in the 120s \par \par Mammo/US right breast 10/20/21- Birads 1\par Dexa Scan 10/20/21 Osteopenia\par

## 2022-03-22 LAB
ALBUMIN SERPL ELPH-MCNC: 4.5 G/DL
ALP BLD-CCNC: 65 U/L
ALT SERPL-CCNC: 18 U/L
ANION GAP SERPL CALC-SCNC: 15 MMOL/L
AST SERPL-CCNC: 25 U/L
BILIRUB SERPL-MCNC: 0.4 MG/DL
BUN SERPL-MCNC: 13 MG/DL
CALCIUM SERPL-MCNC: 9.5 MG/DL
CHLORIDE SERPL-SCNC: 98 MMOL/L
CO2 SERPL-SCNC: 25 MMOL/L
CREAT SERPL-MCNC: 0.92 MG/DL
EGFR: 62 ML/MIN/1.73M2
GLUCOSE SERPL-MCNC: 99 MG/DL
POTASSIUM SERPL-SCNC: 3.7 MMOL/L
PROT SERPL-MCNC: 8 G/DL
SODIUM SERPL-SCNC: 138 MMOL/L

## 2022-04-08 ENCOUNTER — APPOINTMENT (OUTPATIENT)
Dept: NUCLEAR MEDICINE | Facility: CLINIC | Age: 82
End: 2022-04-08
Payer: MEDICARE

## 2022-04-08 ENCOUNTER — OUTPATIENT (OUTPATIENT)
Dept: OUTPATIENT SERVICES | Facility: HOSPITAL | Age: 82
LOS: 1 days | End: 2022-04-08

## 2022-04-08 ENCOUNTER — APPOINTMENT (OUTPATIENT)
Dept: MRI IMAGING | Facility: CLINIC | Age: 82
End: 2022-04-08
Payer: MEDICARE

## 2022-04-08 DIAGNOSIS — Z90.710 ACQUIRED ABSENCE OF BOTH CERVIX AND UTERUS: Chronic | ICD-10-CM

## 2022-04-08 DIAGNOSIS — Z90.12 ACQUIRED ABSENCE OF LEFT BREAST AND NIPPLE: Chronic | ICD-10-CM

## 2022-04-08 DIAGNOSIS — C50.912 MALIGNANT NEOPLASM OF UNSPECIFIED SITE OF LEFT FEMALE BREAST: ICD-10-CM

## 2022-04-08 PROCEDURE — 70553 MRI BRAIN STEM W/O & W/DYE: CPT | Mod: 26

## 2022-04-08 PROCEDURE — 78815 PET IMAGE W/CT SKULL-THIGH: CPT | Mod: 26,PS

## 2022-04-14 NOTE — DISCHARGE NOTE PROVIDER - NSDCCPGOAL_GEN_ALL_CORE_FT
Patient calling to schedule colon, can be reached at [phone]   To get better and follow your care plan as instructed.

## 2022-04-19 ENCOUNTER — OUTPATIENT (OUTPATIENT)
Dept: OUTPATIENT SERVICES | Facility: HOSPITAL | Age: 82
LOS: 1 days | Discharge: HOME | End: 2022-04-19

## 2022-04-19 DIAGNOSIS — Z90.12 ACQUIRED ABSENCE OF LEFT BREAST AND NIPPLE: Chronic | ICD-10-CM

## 2022-04-19 DIAGNOSIS — Z90.710 ACQUIRED ABSENCE OF BOTH CERVIX AND UTERUS: Chronic | ICD-10-CM

## 2022-04-19 DIAGNOSIS — C50.919 MALIGNANT NEOPLASM OF UNSPECIFIED SITE OF UNSPECIFIED FEMALE BREAST: ICD-10-CM

## 2022-04-25 ENCOUNTER — APPOINTMENT (OUTPATIENT)
Dept: HEMATOLOGY ONCOLOGY | Facility: CLINIC | Age: 82
End: 2022-04-25

## 2022-04-27 ENCOUNTER — APPOINTMENT (OUTPATIENT)
Dept: HEMATOLOGY ONCOLOGY | Facility: CLINIC | Age: 82
End: 2022-04-27
Payer: MEDICARE

## 2022-04-27 ENCOUNTER — RESULT REVIEW (OUTPATIENT)
Age: 82
End: 2022-04-27

## 2022-04-27 VITALS
OXYGEN SATURATION: 96 % | WEIGHT: 104.38 LBS | BODY MASS INDEX: 18.5 KG/M2 | HEART RATE: 80 BPM | SYSTOLIC BLOOD PRESSURE: 161 MMHG | DIASTOLIC BLOOD PRESSURE: 78 MMHG | HEIGHT: 63 IN

## 2022-04-27 LAB
BASOPHILS # BLD AUTO: 0.1 K/UL — SIGNIFICANT CHANGE UP (ref 0–0.2)
BASOPHILS NFR BLD AUTO: 1.2 % — SIGNIFICANT CHANGE UP (ref 0–2)
EOSINOPHIL # BLD AUTO: 0.1 K/UL — SIGNIFICANT CHANGE UP (ref 0–0.5)
EOSINOPHIL NFR BLD AUTO: 2.4 % — SIGNIFICANT CHANGE UP (ref 0–6)
HCT VFR BLD CALC: 38.1 % — SIGNIFICANT CHANGE UP (ref 34.5–45)
HGB BLD-MCNC: 12 G/DL — SIGNIFICANT CHANGE UP (ref 11.5–15.5)
LYMPHOCYTES # BLD AUTO: 1.5 K/UL — SIGNIFICANT CHANGE UP (ref 1–3.3)
LYMPHOCYTES # BLD AUTO: 23.5 % — SIGNIFICANT CHANGE UP (ref 13–44)
MCHC RBC-ENTMCNC: 28 PG — SIGNIFICANT CHANGE UP (ref 27–34)
MCHC RBC-ENTMCNC: 31.4 G/DL — LOW (ref 32–36)
MCV RBC AUTO: 88.9 FL — SIGNIFICANT CHANGE UP (ref 80–100)
MONOCYTES # BLD AUTO: 0.8 K/UL — SIGNIFICANT CHANGE UP (ref 0–0.9)
MONOCYTES NFR BLD AUTO: 13.4 % — SIGNIFICANT CHANGE UP (ref 2–14)
NEUTROPHILS # BLD AUTO: 3.7 K/UL — SIGNIFICANT CHANGE UP (ref 1.8–7.4)
NEUTROPHILS NFR BLD AUTO: 59.5 % — SIGNIFICANT CHANGE UP (ref 43–77)
PLATELET # BLD AUTO: 364 K/UL — SIGNIFICANT CHANGE UP (ref 150–400)
RBC # BLD: 4.29 M/UL — SIGNIFICANT CHANGE UP (ref 3.8–5.2)
RBC # FLD: 14.2 % — SIGNIFICANT CHANGE UP (ref 10.3–14.5)
WBC # BLD: 6.2 K/UL — SIGNIFICANT CHANGE UP (ref 3.8–10.5)
WBC # FLD AUTO: 6.2 K/UL — SIGNIFICANT CHANGE UP (ref 3.8–10.5)

## 2022-04-27 PROCEDURE — 99214 OFFICE O/P EST MOD 30 MIN: CPT

## 2022-04-27 NOTE — ASSESSMENT
[FreeTextEntry1] : 82 year old female with history of left breast triple negative breast cancer in 2018, T2N0, underwent left mastectomy on 12/14/18, pathology demonstrated invasive mammary duct carcinoma, intermediate grade, measuring 2.6cm, Deep surgical resection margin-corresponding to the chest wall, is positive and anterior surgical resection margin is positive. DCIS, intermediate nuclear grade, 0/2 lymph nodes negative. Due to positive margins patient received 4 cycles of Taxotere and Cytoxan on 5/30/19 followed by a total dose of 5000 cGy to the left chest wall.\par \par Reviewed 04/08/2022 MR Head- No significant change when allowing for differences in technique\par 04/08/2022 PET/CT- Postsurgical changes of left mastectomy, with no abnormal FDG avidity. Nonspecific mild FDG avidity associated with left axillary biopsy clips.Subcentimeter anterior right upper lobe pulmonary nodule, too small to characterize. Non-FDG avid minimal anterior right upper lobe subpleural irregular opacity/reticulations, possibly post radiation change. Non-FDG avid left anterior fourth rib deformity with sclerosis and right iliac bone fracture deformity with heterotopic ossification.\par Advised no evidence of malignancy on MRI head and PET/CT\par \par Plan:\par -Right Mammo/sono 10/2022\par -Advised to call with concerns \par -Follow up in 6 months

## 2022-04-27 NOTE — HISTORY OF PRESENT ILLNESS
[de-identified] : \par \par 81 year old female following up for invasive ductal carcinoma of the left breast. Originally discovered an irregular mass via ultrasound, and then completed an MRI and left core needle biopsy. 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 She then underwent a left breast mastectomy on 12/14/18 with left SLNB and oncoplastic closure, however, margins were positive. Initiated treatment with Taxotere/Cytoxan chemotherapy, and received 5000 cGy to left chest radiation with Dr. Lomeli afterwards due to positive margins.\par \par Pathology report on 11/6/18 of ultrasound guided left breast core needle biopsy revealed: infiltrating ductal carcinoma, [place] score = 6/9, positive E-Cadherin staining, ER/DC negative, HER2 negative\par \par Pathology report on 12/21/18 of left breast short superior long lateral mastectomy revealed: ([id number]) invasive mammary duct carcinoma, histological grade 2/3, SBR score 6/9, measuring 2.6 cm in length. Deep surgical resection margin is positive. Anterior surgical resection margin is positive. Both anterior and posterior positive margins belong to upper outer quadrant. Ductal carcinoma in situ, intermediate nuclear grade, solid/cribriform, with calcifications and necrosis. Two sentinel lymph nodes negative for metastatic carcinoma.\par \par  [de-identified] : Returns for follow up visit. Patient is transitioning care from Dr. Liza Villasenor with present. \par Denies dizziness, headache\par Notes resolved confusion \par Reports imbalance improved, only at times feels unbalance\par Ambulates independently \par Appetite has improved significantly \par Denies recent infection \par

## 2022-05-24 LAB
ALBUMIN SERPL ELPH-MCNC: 4.5 G/DL
ALP BLD-CCNC: 76 U/L
ALT SERPL-CCNC: 18 U/L
ANION GAP SERPL CALC-SCNC: 14 MMOL/L
AST SERPL-CCNC: 24 U/L
BILIRUB SERPL-MCNC: 0.4 MG/DL
BUN SERPL-MCNC: 19 MG/DL
CALCIUM SERPL-MCNC: 9.9 MG/DL
CHLORIDE SERPL-SCNC: 90 MMOL/L
CO2 SERPL-SCNC: 26 MMOL/L
CREAT SERPL-MCNC: 1 MG/DL
EGFR: 56 ML/MIN/1.73M2
GLUCOSE SERPL-MCNC: 87 MG/DL
POTASSIUM SERPL-SCNC: 3.9 MMOL/L
PROT SERPL-MCNC: 8.1 G/DL
SODIUM SERPL-SCNC: 129 MMOL/L

## 2022-08-16 NOTE — ED CDU PROVIDER DISPOSITION NOTE - NSFOLLOWUPCLINICS_GEN_ALL_ED_FT
[FreeTextEntry1] : Yue Pratt is a 58 yo woman with a h/o parathyroid disease s/p parathyroidectomy who is here for a cardiac evaluation.\par \par She has occasional palpitations but no cp or sob.\par \par She was told she has a "systolic click" in the past.\par \par She is able to walk on flat ground as far as she wants but does get winded going up subway stairs.\par \par She was supposed to have a stress test today but she is feeling very sick - cough, achy, congested. She has not tested for COVID.\par \par W will test for COVID now.\par \par She never returned her monitor and will do so today.\par \par She has not yet had the calcium score but will get it.\par \par Pt to return in 2 weeks to discuss test results and follow up.
[facility] [place]  Neurosurgery/Spine  [address], [address]  [place] [address]  Phone: [phone]  Fax:   Follow Up Time:

## 2022-10-18 ENCOUNTER — APPOINTMENT (OUTPATIENT)
Dept: OBGYN | Facility: CLINIC | Age: 82
End: 2022-10-18

## 2022-10-18 ENCOUNTER — RESULT REVIEW (OUTPATIENT)
Age: 82
End: 2022-10-18

## 2022-10-18 VITALS
BODY MASS INDEX: 19.14 KG/M2 | WEIGHT: 108 LBS | SYSTOLIC BLOOD PRESSURE: 150 MMHG | DIASTOLIC BLOOD PRESSURE: 90 MMHG | HEIGHT: 63 IN

## 2022-10-18 DIAGNOSIS — R92.2 INCONCLUSIVE MAMMOGRAM: ICD-10-CM

## 2022-10-18 DIAGNOSIS — Z01.419 ENCOUNTER FOR GYNECOLOGICAL EXAMINATION (GENERAL) (ROUTINE) W/OUT ABNORMAL FINDINGS: ICD-10-CM

## 2022-10-18 PROCEDURE — 99397 PER PM REEVAL EST PAT 65+ YR: CPT

## 2022-10-18 RX ORDER — AMLODIPINE BESYLATE 2.5 MG/1
2.5 TABLET ORAL
Qty: 90 | Refills: 0 | Status: COMPLETED | COMMUNITY
Start: 2022-06-15

## 2022-10-18 RX ORDER — CHLORTHALIDONE 25 MG/1
25 TABLET ORAL
Qty: 30 | Refills: 0 | Status: ACTIVE | COMMUNITY
Start: 2022-06-08

## 2022-10-18 RX ORDER — LISINOPRIL 40 MG/1
40 TABLET ORAL DAILY
Qty: 90 | Refills: 0 | Status: COMPLETED | COMMUNITY
Start: 2017-01-16 | End: 2022-10-18

## 2022-10-18 RX ORDER — FAMOTIDINE 40 MG/1
40 TABLET, FILM COATED ORAL
Qty: 30 | Refills: 0 | Status: ACTIVE | COMMUNITY
Start: 2022-09-19

## 2022-10-18 RX ORDER — MIRTAZAPINE 7.5 MG/1
7.5 TABLET, FILM COATED ORAL
Qty: 30 | Refills: 0 | Status: ACTIVE | COMMUNITY
Start: 2022-10-11

## 2022-10-18 RX ORDER — ALENDRONATE SODIUM 70 MG/1
70 TABLET ORAL
Qty: 30 | Refills: 0 | Status: ACTIVE | COMMUNITY
Start: 2022-10-18

## 2022-10-18 RX ORDER — AMLODIPINE BESYLATE 10 MG/1
10 TABLET ORAL
Qty: 30 | Refills: 5 | Status: COMPLETED | COMMUNITY
Start: 2017-01-16 | End: 2022-10-18

## 2022-10-18 RX ORDER — HYDRALAZINE HYDROCHLORIDE 25 MG/1
25 TABLET ORAL
Qty: 60 | Refills: 0 | Status: COMPLETED | COMMUNITY
Start: 2022-06-06

## 2022-10-19 NOTE — PHYSICAL EXAM
[Chaperone Present] : A chaperone was present in the examining room during all aspects of the physical examination [FreeTextEntry1] : ace [Appropriately responsive] : appropriately responsive [Alert] : alert [No Acute Distress] : no acute distress [No Lymphadenopathy] : no lymphadenopathy [Soft] : soft [Non-tender] : non-tender [Non-distended] : non-distended [Oriented x3] : oriented x3 [Examination Of The Breasts] : a normal appearance [No Discharge] : no discharge [No Masses] : no breast masses were palpable [Labia Majora] : normal [Labia Minora] : normal [Normal] : normal [Atrophy] : atrophy [No Bleeding] : There was no active vaginal bleeding [Absent] : absent [Uterine Adnexae] : absent

## 2022-10-19 NOTE — HISTORY OF PRESENT ILLNESS
[FreeTextEntry1] : Gerson is a  post who presents for annual exam. She is without complaints. Denies pelvic pain, abnormal bleeding, or vaginal discharge. Denies issues with bowel or bladder function. \par She is not sexually active. \par Lives with kranthi. Works as retired. Feels safe at home. Denies depression/abuse. \par Immunizations: received flu shot\par Breast cancer on left, follows with heme onc. She had surgery and chemo/radiation. \par Accompanied by friend who also is her aid.  [No] : Patient does not have concerns regarding sex [Previously active] : previously active

## 2022-10-19 NOTE — DISCUSSION/SUMMARY
[FreeTextEntry1] :   The benefits of adequate calcium intake and a daily multivitamin along with routine daily cardiovascular exercise were reviewed with the patient.\par   The patient was informed regarding the benefits of a screening colonoscopy.\par   The importance of safer-sex was discussed with the patient.\par We reviewed ASCCP/ACOG guidelines for pap smears. She does not need pap smears, does not have a cervix and is over age 65.

## 2022-10-21 ENCOUNTER — OUTPATIENT (OUTPATIENT)
Dept: OUTPATIENT SERVICES | Facility: HOSPITAL | Age: 82
LOS: 1 days | Discharge: HOME | End: 2022-10-21

## 2022-10-21 DIAGNOSIS — Z90.710 ACQUIRED ABSENCE OF BOTH CERVIX AND UTERUS: Chronic | ICD-10-CM

## 2022-10-21 DIAGNOSIS — C50.919 MALIGNANT NEOPLASM OF UNSPECIFIED SITE OF UNSPECIFIED FEMALE BREAST: ICD-10-CM

## 2022-10-21 DIAGNOSIS — Z90.12 ACQUIRED ABSENCE OF LEFT BREAST AND NIPPLE: Chronic | ICD-10-CM

## 2022-10-25 ENCOUNTER — RESULT REVIEW (OUTPATIENT)
Age: 82
End: 2022-10-25

## 2022-10-25 ENCOUNTER — APPOINTMENT (OUTPATIENT)
Dept: HEMATOLOGY ONCOLOGY | Facility: CLINIC | Age: 82
End: 2022-10-25

## 2022-10-25 VITALS
WEIGHT: 102.01 LBS | OXYGEN SATURATION: 98 % | SYSTOLIC BLOOD PRESSURE: 135 MMHG | DIASTOLIC BLOOD PRESSURE: 68 MMHG | BODY MASS INDEX: 18.07 KG/M2 | HEART RATE: 86 BPM | HEIGHT: 63 IN

## 2022-10-25 LAB
BASOPHILS # BLD AUTO: 0.1 K/UL — SIGNIFICANT CHANGE UP (ref 0–0.2)
BASOPHILS NFR BLD AUTO: 1 % — SIGNIFICANT CHANGE UP (ref 0–2)
EOSINOPHIL # BLD AUTO: 0.1 K/UL — SIGNIFICANT CHANGE UP (ref 0–0.5)
EOSINOPHIL NFR BLD AUTO: 0.7 % — SIGNIFICANT CHANGE UP (ref 0–6)
HCT VFR BLD CALC: 38.4 % — SIGNIFICANT CHANGE UP (ref 34.5–45)
HGB BLD-MCNC: 11.9 G/DL — SIGNIFICANT CHANGE UP (ref 11.5–15.5)
LYMPHOCYTES # BLD AUTO: 1.1 K/UL — SIGNIFICANT CHANGE UP (ref 1–3.3)
LYMPHOCYTES # BLD AUTO: 12.8 % — LOW (ref 13–44)
MCHC RBC-ENTMCNC: 28.4 PG — SIGNIFICANT CHANGE UP (ref 27–34)
MCHC RBC-ENTMCNC: 31 G/DL — LOW (ref 32–36)
MCV RBC AUTO: 91.9 FL — SIGNIFICANT CHANGE UP (ref 80–100)
MONOCYTES # BLD AUTO: 1.1 K/UL — HIGH (ref 0–0.9)
MONOCYTES NFR BLD AUTO: 12.5 % — SIGNIFICANT CHANGE UP (ref 2–14)
NEUTROPHILS # BLD AUTO: 6.4 K/UL — SIGNIFICANT CHANGE UP (ref 1.8–7.4)
NEUTROPHILS NFR BLD AUTO: 73.1 % — SIGNIFICANT CHANGE UP (ref 43–77)
PLATELET # BLD AUTO: 262 K/UL — SIGNIFICANT CHANGE UP (ref 150–400)
RBC # BLD: 4.18 M/UL — SIGNIFICANT CHANGE UP (ref 3.8–5.2)
RBC # FLD: 12.7 % — SIGNIFICANT CHANGE UP (ref 10.3–14.5)
WBC # BLD: 8.7 K/UL — SIGNIFICANT CHANGE UP (ref 3.8–10.5)
WBC # FLD AUTO: 8.7 K/UL — SIGNIFICANT CHANGE UP (ref 3.8–10.5)

## 2022-10-25 PROCEDURE — 99214 OFFICE O/P EST MOD 30 MIN: CPT

## 2022-10-25 RX ORDER — AMLODIPINE BESYLATE 5 MG/1
5 TABLET ORAL
Qty: 45 | Refills: 0 | Status: DISCONTINUED | COMMUNITY
Start: 2022-05-05 | End: 2022-10-25

## 2022-10-25 NOTE — HISTORY OF PRESENT ILLNESS
[de-identified] : \par \par 81 year old female following up for invasive ductal carcinoma of the left breast. Originally discovered an irregular mass via ultrasound, and then completed an MRI and left core needle biopsy. 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 She then underwent a left breast mastectomy on 12/14/18 with left SLNB and oncoplastic closure, however, margins were positive. Initiated treatment with Taxotere/Cytoxan chemotherapy, and received 5000 cGy to left chest radiation with Dr. Lomeli afterwards due to positive margins.\par \par Pathology report on 11/6/18 of ultrasound guided left breast core needle biopsy revealed: infiltrating ductal carcinoma, [place] score = 6/9, positive E-Cadherin staining, ER/[place] negative, HER2 negative\par \par Pathology report on 12/21/18 of left breast short superior long lateral mastectomy revealed: ([id number]) invasive mammary duct carcinoma, histological grade 2/3, SBR score 6/9, measuring 2.6 cm in length. Deep surgical resection margin is positive. Anterior surgical resection margin is positive. Both anterior and posterior positive margins belong to upper outer quadrant. Ductal carcinoma in situ, intermediate nuclear grade, solid/cribriform, with calcifications and necrosis. Two sentinel lymph nodes negative for metastatic carcinoma.\par \par  [de-identified] : Returns for follow up visit with family member.\par Reports feeling well overall \par reports good appetite, however states that currently "nothing tastes good" \par Mild joint pain in R hand, elbow, shoulder. \par Denies acute pain \par Denies ha, dizziness, feeling faint\par Denies diarrhea, nausea, constipation, vomiting \par Denies recent infection \par Weight is stable.

## 2022-10-25 NOTE — ASSESSMENT
[FreeTextEntry1] : 82 year old female with history of left breast triple negative breast cancer in 2018, T2N0, underwent left mastectomy on 12/14/18, pathology demonstrated invasive mammary duct carcinoma, intermediate grade, measuring 2.6cm, Deep surgical resection margin-corresponding to the chest wall, is positive and anterior surgical resection margin is positive. DCIS, intermediate nuclear grade, 0/2 lymph nodes negative. Due to positive margins patient received 4 cycles of Taxotere and Cytoxan on 5/30/19 followed by a total dose of 5000 cGy to the left chest wall.\par \par Reviewed \par 10/25/2022 Today's CBC: WBC 8.7 K, HGB 11.9 g, HCT 38.4 %,  K, ANC 6400 \par No new symptoms or concerns.\par \par Plan:\par -Right Mammo/sono due now, 10/2022\par - RTO in 6 months

## 2022-10-25 NOTE — RESULTS/DATA
[FreeTextEntry1] : 04/08/2022 MR Head- No significant change when allowing for differences in technique\par 04/08/2022 PET/CT- Postsurgical changes of left mastectomy, with no abnormal FDG avidity. Nonspecific mild FDG avidity associated with left axillary biopsy clips.Subcentimeter anterior right upper lobe pulmonary nodule, too small to characterize. Non-FDG avid minimal anterior right upper lobe subpleural irregular opacity/reticulations, possibly post radiation change. Non-FDG avid left anterior fourth rib deformity with sclerosis and right iliac bone fracture deformity with heterotopic ossification.\par Advised no evidence of malignancy on MRI head and PET/CT

## 2022-10-25 NOTE — PHYSICAL EXAM
[Normal] : affect appropriate [de-identified] : L mastectomy scar, no palpable breast mass or axillary adenopathy

## 2022-10-25 NOTE — ADDENDUM
[FreeTextEntry1] : Documented by Melina Bliss acting as scribe for Dr. Zhong on 10/25/2022.\par \par All Medical record entries made by the Scribe were at my, Dr. Zhong, direction and personally dictated by me on 10/25/2022. I have reviewed the chart and agree that the record accurately reflects my personal performance of the history, physical exam, assessment and plan. I have also personally directed, reviewed, and agreed with the discharge instructions.

## 2022-10-28 LAB
ALBUMIN SERPL ELPH-MCNC: 3.4 G/DL
ALP BLD-CCNC: 70 U/L
ALT SERPL-CCNC: 23 U/L
ANION GAP SERPL CALC-SCNC: 10 MMOL/L
AST SERPL-CCNC: 58 U/L
BILIRUB SERPL-MCNC: 0.6 MG/DL
BUN SERPL-MCNC: 23 MG/DL
CALCIUM SERPL-MCNC: 9.3 MG/DL
CHLORIDE SERPL-SCNC: 93 MMOL/L
CO2 SERPL-SCNC: 30 MMOL/L
CREAT SERPL-MCNC: 1.62 MG/DL
EGFR: 32 ML/MIN/1.73M2
GLUCOSE SERPL-MCNC: 168 MG/DL
POTASSIUM SERPL-SCNC: 5 MMOL/L
PROT SERPL-MCNC: 7.9 G/DL
SODIUM SERPL-SCNC: 133 MMOL/L

## 2022-11-01 ENCOUNTER — APPOINTMENT (OUTPATIENT)
Dept: SURGERY | Facility: CLINIC | Age: 82
End: 2022-11-01

## 2022-11-30 ENCOUNTER — OUTPATIENT (OUTPATIENT)
Dept: OUTPATIENT SERVICES | Facility: HOSPITAL | Age: 82
LOS: 1 days | End: 2022-11-30
Payer: MEDICARE

## 2022-11-30 ENCOUNTER — RESULT REVIEW (OUTPATIENT)
Age: 82
End: 2022-11-30

## 2022-11-30 ENCOUNTER — APPOINTMENT (OUTPATIENT)
Dept: MAMMOGRAPHY | Facility: CLINIC | Age: 82
End: 2022-11-30

## 2022-11-30 ENCOUNTER — APPOINTMENT (OUTPATIENT)
Dept: ULTRASOUND IMAGING | Facility: CLINIC | Age: 82
End: 2022-11-30

## 2022-11-30 DIAGNOSIS — R92.2 INCONCLUSIVE MAMMOGRAM: ICD-10-CM

## 2022-11-30 DIAGNOSIS — Z90.710 ACQUIRED ABSENCE OF BOTH CERVIX AND UTERUS: Chronic | ICD-10-CM

## 2022-11-30 DIAGNOSIS — Z12.31 ENCOUNTER FOR SCREENING MAMMOGRAM FOR MALIGNANT NEOPLASM OF BREAST: ICD-10-CM

## 2022-11-30 DIAGNOSIS — Z90.12 ACQUIRED ABSENCE OF LEFT BREAST AND NIPPLE: Chronic | ICD-10-CM

## 2022-11-30 PROCEDURE — 76641 ULTRASOUND BREAST COMPLETE: CPT

## 2022-11-30 PROCEDURE — 76641 ULTRASOUND BREAST COMPLETE: CPT | Mod: 26,50

## 2022-11-30 PROCEDURE — G0279: CPT | Mod: 26

## 2022-11-30 PROCEDURE — 77065 DX MAMMO INCL CAD UNI: CPT

## 2022-11-30 PROCEDURE — G0279: CPT

## 2022-11-30 PROCEDURE — 77065 DX MAMMO INCL CAD UNI: CPT | Mod: 26,RT

## 2022-12-01 NOTE — ED ADULT TRIAGE NOTE - ACCOMPANIED BY
Quality 226: Preventive Care And Screening: Tobacco Use: Screening And Cessation Intervention: Patient screened for tobacco use and is an ex/non-smoker Quality 110: Preventive Care And Screening: Influenza Immunization: Influenza immunization was not ordered or administered, reason not given Quality 130: Documentation Of Current Medications In The Medical Record: Current Medications Documented Detail Level: Detailed Quality 431: Preventive Care And Screening: Unhealthy Alcohol Use - Screening: Patient not identified as an unhealthy alcohol user when screened for unhealthy alcohol use using a systematic screening method EMT/paramedic

## 2022-12-16 NOTE — PHYSICAL THERAPY INITIAL EVALUATION ADULT - LEVEL OF CONSCIOUSNESS, REHAB EVAL
Wayne Jose from [facility] called stating that the patient denied increasing the oxygen. alert

## 2023-04-12 ENCOUNTER — OUTPATIENT (OUTPATIENT)
Dept: OUTPATIENT SERVICES | Facility: HOSPITAL | Age: 83
LOS: 1 days | Discharge: HOME | End: 2023-04-12

## 2023-04-12 DIAGNOSIS — Z90.710 ACQUIRED ABSENCE OF BOTH CERVIX AND UTERUS: Chronic | ICD-10-CM

## 2023-04-12 DIAGNOSIS — Z90.12 ACQUIRED ABSENCE OF LEFT BREAST AND NIPPLE: Chronic | ICD-10-CM

## 2023-04-12 DIAGNOSIS — C50.919 MALIGNANT NEOPLASM OF UNSPECIFIED SITE OF UNSPECIFIED FEMALE BREAST: ICD-10-CM

## 2023-04-17 ENCOUNTER — RESULT REVIEW (OUTPATIENT)
Age: 83
End: 2023-04-17

## 2023-04-17 ENCOUNTER — APPOINTMENT (OUTPATIENT)
Dept: HEMATOLOGY ONCOLOGY | Facility: CLINIC | Age: 83
End: 2023-04-17
Payer: MEDICARE

## 2023-04-17 VITALS
HEART RATE: 86 BPM | DIASTOLIC BLOOD PRESSURE: 95 MMHG | HEIGHT: 63 IN | BODY MASS INDEX: 20.2 KG/M2 | SYSTOLIC BLOOD PRESSURE: 215 MMHG | OXYGEN SATURATION: 98 % | WEIGHT: 114.03 LBS | TEMPERATURE: 97.3 F

## 2023-04-17 VITALS — SYSTOLIC BLOOD PRESSURE: 196 MMHG | DIASTOLIC BLOOD PRESSURE: 85 MMHG

## 2023-04-17 LAB
ANISOCYTOSIS BLD QL: SLIGHT — SIGNIFICANT CHANGE UP
BASOPHILS # BLD AUTO: 0.1 K/UL — SIGNIFICANT CHANGE UP (ref 0–0.2)
BASOPHILS NFR BLD AUTO: 2 % — SIGNIFICANT CHANGE UP (ref 0–2)
ELLIPTOCYTES BLD QL SMEAR: SLIGHT — SIGNIFICANT CHANGE UP
EOSINOPHIL # BLD AUTO: 0.2 K/UL — SIGNIFICANT CHANGE UP (ref 0–0.5)
EOSINOPHIL NFR BLD AUTO: 3 % — SIGNIFICANT CHANGE UP (ref 0–6)
HCT VFR BLD CALC: 35.6 % — SIGNIFICANT CHANGE UP (ref 34.5–45)
HGB BLD-MCNC: 11.6 G/DL — SIGNIFICANT CHANGE UP (ref 11.5–15.5)
LYMPHOCYTES # BLD AUTO: 1.6 K/UL — SIGNIFICANT CHANGE UP (ref 1–3.3)
LYMPHOCYTES # BLD AUTO: 32 % — SIGNIFICANT CHANGE UP (ref 13–44)
MACROCYTES BLD QL: SLIGHT — SIGNIFICANT CHANGE UP
MCHC RBC-ENTMCNC: 29 PG — SIGNIFICANT CHANGE UP (ref 27–34)
MCHC RBC-ENTMCNC: 32.6 G/DL — SIGNIFICANT CHANGE UP (ref 32–36)
MCV RBC AUTO: 89 FL — SIGNIFICANT CHANGE UP (ref 80–100)
MICROCYTES BLD QL: SLIGHT — SIGNIFICANT CHANGE UP
MONOCYTES # BLD AUTO: 0.7 K/UL — SIGNIFICANT CHANGE UP (ref 0–0.9)
MONOCYTES NFR BLD AUTO: 9 % — SIGNIFICANT CHANGE UP (ref 2–14)
NEUTROPHILS # BLD AUTO: 3.4 K/UL — SIGNIFICANT CHANGE UP (ref 1.8–7.4)
NEUTROPHILS NFR BLD AUTO: 54 % — SIGNIFICANT CHANGE UP (ref 43–77)
OVALOCYTES BLD QL SMEAR: SLIGHT — SIGNIFICANT CHANGE UP
PLAT MORPH BLD: NORMAL — SIGNIFICANT CHANGE UP
PLATELET # BLD AUTO: 251 K/UL — SIGNIFICANT CHANGE UP (ref 150–400)
POIKILOCYTOSIS BLD QL AUTO: SLIGHT — SIGNIFICANT CHANGE UP
POLYCHROMASIA BLD QL SMEAR: SLIGHT — SIGNIFICANT CHANGE UP
RBC # BLD: 3.99 M/UL — SIGNIFICANT CHANGE UP (ref 3.8–5.2)
RBC # FLD: 13.1 % — SIGNIFICANT CHANGE UP (ref 10.3–14.5)
RBC BLD AUTO: SIGNIFICANT CHANGE UP
WBC # BLD: 5.8 K/UL — SIGNIFICANT CHANGE UP (ref 3.8–10.5)
WBC # FLD AUTO: 5.8 K/UL — SIGNIFICANT CHANGE UP (ref 3.8–10.5)

## 2023-04-17 PROCEDURE — 99214 OFFICE O/P EST MOD 30 MIN: CPT

## 2023-04-17 NOTE — HISTORY OF PRESENT ILLNESS
[de-identified] : \par \par 81 year old female following up for invasive ductal carcinoma of the left breast. Originally discovered an irregular mass via ultrasound, and then completed an MRI and left core needle biopsy. 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 She then underwent a left breast mastectomy on 12/14/18 with left SLNB and oncoplastic closure, however, margins were positive. Initiated treatment with Taxotere/Cytoxan chemotherapy, and received 5000 cGy to left chest radiation with Dr. Lomeli afterwards due to positive margins.\par \par Pathology report on 11/6/18 of ultrasound guided left breast core needle biopsy revealed: infiltrating ductal carcinoma, [place] score = 6/9, positive E-Cadherin staining, ER/[place] negative, HER2 negative\par \par Pathology report on 12/21/18 of left breast short superior long lateral mastectomy revealed: ([id number]) invasive mammary duct carcinoma, histological grade 2/3, SBR score 6/9, measuring 2.6 cm in length. Deep surgical resection margin is positive. Anterior surgical resection margin is positive. Both anterior and posterior positive margins belong to upper outer quadrant. Ductal carcinoma in situ, intermediate nuclear grade, solid/cribriform, with calcifications and necrosis. Two sentinel lymph nodes negative for metastatic carcinoma.\par \par  [de-identified] : Returns for follow up visit with family member.\par Feels well.\par Denies headaches or dizziness or changes in vision.\par Denies weight loss.\par Denies pain. \par \par

## 2023-04-17 NOTE — PHYSICAL EXAM
[Normal] : affect appropriate [de-identified] : L mastectomy scar, no palpable breast mass or axillary adenopathy

## 2023-04-17 NOTE — ASSESSMENT
[FreeTextEntry1] : 83 year old female with history of left breast triple negative breast cancer in 2018, T2N0, underwent left mastectomy on 12/14/18, pathology demonstrated invasive mammary duct carcinoma, intermediate grade, measuring 2.6cm, Deep surgical resection margin-corresponding to the chest wall, is positive and anterior surgical resection margin is positive. DCIS, intermediate nuclear grade, 0/2 lymph nodes negative. Due to positive margins patient received 4 cycles of Taxotere and Cytoxan on 5/30/19 followed by a total dose of 5000 cGy to the left chest wall.\par \par CBC today is normal. \par 11/30/22 R mammo/sono - ANCA\par \par CBE - normal\par \par Plan:\par HTN - /95 - reports being nervous, on mulitple meds for HTN, advised PCP follow up. No headache/dizziness/changes in vision at this time. Repeat /85\par RTO 6 months\par

## 2023-04-18 LAB
ALBUMIN SERPL ELPH-MCNC: 4.2 G/DL
ALP BLD-CCNC: 66 U/L
ALT SERPL-CCNC: 16 U/L
ANION GAP SERPL CALC-SCNC: 11 MMOL/L
AST SERPL-CCNC: 25 U/L
BILIRUB SERPL-MCNC: 0.3 MG/DL
BUN SERPL-MCNC: 19 MG/DL
CALCIUM SERPL-MCNC: 10.4 MG/DL
CHLORIDE SERPL-SCNC: 101 MMOL/L
CO2 SERPL-SCNC: 28 MMOL/L
CREAT SERPL-MCNC: 0.98 MG/DL
EGFR: 57 ML/MIN/1.73M2
GLUCOSE SERPL-MCNC: 81 MG/DL
POTASSIUM SERPL-SCNC: 4 MMOL/L
PROT SERPL-MCNC: 7.6 G/DL
SODIUM SERPL-SCNC: 140 MMOL/L

## 2023-07-27 ENCOUNTER — OFFICE (OUTPATIENT)
Dept: URBAN - METROPOLITAN AREA CLINIC 12 | Facility: CLINIC | Age: 83
Setting detail: OPHTHALMOLOGY
End: 2023-07-27
Payer: MEDICARE

## 2023-07-27 DIAGNOSIS — H35.373: ICD-10-CM

## 2023-07-27 DIAGNOSIS — H43.813: ICD-10-CM

## 2023-07-27 DIAGNOSIS — E11.9: ICD-10-CM

## 2023-07-27 DIAGNOSIS — H16.223: ICD-10-CM

## 2023-07-27 DIAGNOSIS — H26.492: ICD-10-CM

## 2023-07-27 PROCEDURE — 92014 COMPRE OPH EXAM EST PT 1/>: CPT | Performed by: OPHTHALMOLOGY

## 2023-07-27 PROCEDURE — 92134 CPTRZ OPH DX IMG PST SGM RTA: CPT | Performed by: OPHTHALMOLOGY

## 2023-07-27 ASSESSMENT — SUPERFICIAL PUNCTATE KERATITIS (SPK)
OS_SPK: 1+
OD_SPK: 1+

## 2023-07-27 ASSESSMENT — REFRACTION_MANIFEST
OS_CYLINDER: -1.25
OS_VA1: 20/25-2
OU_VA: 20/20
OD_SPHERE: +1.00
OD_AXIS: 100
OS_SPHERE: -0.50
OS_AXIS: 075
OD_VA1: 20/20
OS_ADD: +2.75
OD_ADD: +2.75
OD_CYLINDER: -3.00

## 2023-07-27 ASSESSMENT — REFRACTION_AUTOREFRACTION
OD_AXIS: 102
OD_CYLINDER: -4.75
OS_CYLINDER: -1.25
OD_SPHERE: +1.50
OS_SPHERE: -0.75
OS_AXIS: 074

## 2023-07-27 ASSESSMENT — KERATOMETRY
OD_AXISANGLE_DEGREES: 014
OS_AXISANGLE_DEGREES: 166
OD_K2POWER_DIOPTERS: 44.25
OD_K1POWER_DIOPTERS: 42.25
OS_K1POWER_DIOPTERS: 43.50
METHOD_AUTO_MANUAL: AUTO
OS_K2POWER_DIOPTERS: 44.50

## 2023-07-27 ASSESSMENT — AXIALLENGTH_DERIVED
OS_AL: 23.8493
OS_AL: 23.9493
OD_AL: 23.8821
OD_AL: 24.0327

## 2023-07-27 ASSESSMENT — REFRACTION_CURRENTRX
OD_VPRISM_DIRECTION: PROGS
OD_SPHERE: +0.50
OD_SPHERE: +3.00
OD_OVR_VA: 20/
OS_VPRISM_DIRECTION: SV
OD_OVR_VA: 20/
OD_ADD: +2.75
OD_CYLINDER: -0.75
OS_OVR_VA: 20/
OS_SPHERE: +1.50
OS_CYLINDER: -1.75
OD_VPRISM_DIRECTION: SV
OD_AXIS: 091
OS_AXIS: 068
OS_SPHERE: +3.00
OS_VPRISM_DIRECTION: PROGS
OS_OVR_VA: 20/
OS_ADD: +2.75

## 2023-07-27 ASSESSMENT — VISUAL ACUITY
OD_BCVA: 20/30-2
OS_BCVA: 20/25-2

## 2023-07-27 ASSESSMENT — SPHEQUIV_DERIVED
OS_SPHEQUIV: -1.375
OD_SPHEQUIV: -0.875
OD_SPHEQUIV: -0.5
OS_SPHEQUIV: -1.125

## 2023-07-27 ASSESSMENT — TONOMETRY: OD_IOP_MMHG: 14

## 2023-07-27 ASSESSMENT — CONFRONTATIONAL VISUAL FIELD TEST (CVF)
OD_FINDINGS: FULL
OS_FINDINGS: FULL

## 2023-10-08 ENCOUNTER — OUTPATIENT (OUTPATIENT)
Dept: OUTPATIENT SERVICES | Facility: HOSPITAL | Age: 83
LOS: 1 days | Discharge: HOME | End: 2023-10-08

## 2023-10-08 DIAGNOSIS — Z90.710 ACQUIRED ABSENCE OF BOTH CERVIX AND UTERUS: Chronic | ICD-10-CM

## 2023-10-08 DIAGNOSIS — C50.919 MALIGNANT NEOPLASM OF UNSPECIFIED SITE OF UNSPECIFIED FEMALE BREAST: ICD-10-CM

## 2023-10-08 DIAGNOSIS — Z90.12 ACQUIRED ABSENCE OF LEFT BREAST AND NIPPLE: Chronic | ICD-10-CM

## 2023-10-12 ENCOUNTER — RESULT REVIEW (OUTPATIENT)
Age: 83
End: 2023-10-12

## 2023-10-12 ENCOUNTER — APPOINTMENT (OUTPATIENT)
Dept: HEMATOLOGY ONCOLOGY | Facility: CLINIC | Age: 83
End: 2023-10-12
Payer: MEDICARE

## 2023-10-12 VITALS
WEIGHT: 118.61 LBS | TEMPERATURE: 97.5 F | DIASTOLIC BLOOD PRESSURE: 83 MMHG | HEART RATE: 90 BPM | SYSTOLIC BLOOD PRESSURE: 189 MMHG | HEIGHT: 63 IN | OXYGEN SATURATION: 96 % | BODY MASS INDEX: 21.02 KG/M2

## 2023-10-12 LAB
BASOPHILS # BLD AUTO: 0.2 K/UL — SIGNIFICANT CHANGE UP (ref 0–0.2)
BASOPHILS NFR BLD AUTO: 3 % — HIGH (ref 0–2)
EOSINOPHIL # BLD AUTO: 0.1 K/UL — SIGNIFICANT CHANGE UP (ref 0–0.5)
EOSINOPHIL NFR BLD AUTO: 2.5 % — SIGNIFICANT CHANGE UP (ref 0–6)
HCT VFR BLD CALC: 36.4 % — SIGNIFICANT CHANGE UP (ref 34.5–45)
HGB BLD-MCNC: 11.8 G/DL — SIGNIFICANT CHANGE UP (ref 11.5–15.5)
LYMPHOCYTES # BLD AUTO: 1.6 K/UL — SIGNIFICANT CHANGE UP (ref 1–3.3)
LYMPHOCYTES # BLD AUTO: 26.6 % — SIGNIFICANT CHANGE UP (ref 13–44)
MCHC RBC-ENTMCNC: 29.4 PG — SIGNIFICANT CHANGE UP (ref 27–34)
MCHC RBC-ENTMCNC: 32.4 G/DL — SIGNIFICANT CHANGE UP (ref 32–36)
MCV RBC AUTO: 90.8 FL — SIGNIFICANT CHANGE UP (ref 80–100)
MONOCYTES # BLD AUTO: 0.9 K/UL — SIGNIFICANT CHANGE UP (ref 0–0.9)
MONOCYTES NFR BLD AUTO: 15.4 % — HIGH (ref 2–14)
NEUTROPHILS # BLD AUTO: 3.1 K/UL — SIGNIFICANT CHANGE UP (ref 1.8–7.4)
NEUTROPHILS NFR BLD AUTO: 52.5 % — SIGNIFICANT CHANGE UP (ref 43–77)
PLATELET # BLD AUTO: 207 K/UL — SIGNIFICANT CHANGE UP (ref 150–400)
RBC # BLD: 4.01 M/UL — SIGNIFICANT CHANGE UP (ref 3.8–5.2)
RBC # FLD: 11.7 % — SIGNIFICANT CHANGE UP (ref 10.3–14.5)
WBC # BLD: 6 K/UL — SIGNIFICANT CHANGE UP (ref 3.8–10.5)
WBC # FLD AUTO: 6 K/UL — SIGNIFICANT CHANGE UP (ref 3.8–10.5)

## 2023-10-12 PROCEDURE — 99214 OFFICE O/P EST MOD 30 MIN: CPT

## 2023-10-12 RX ORDER — CLONIDINE 0.3 MG/D
0.3 PATCH TRANSDERMAL
Refills: 0 | Status: ACTIVE | COMMUNITY
Start: 2023-10-12

## 2023-10-12 RX ORDER — CLONIDINE HYDROCHLORIDE 0.1 MG/1
0.1 TABLET ORAL
Qty: 60 | Refills: 0 | Status: DISCONTINUED | COMMUNITY
Start: 2022-06-20 | End: 2023-10-12

## 2023-10-12 RX ORDER — LABETALOL HYDROCHLORIDE 200 MG/1
200 TABLET, FILM COATED ORAL
Qty: 180 | Refills: 1 | Status: DISCONTINUED | COMMUNITY
Start: 2017-01-16 | End: 2023-10-12

## 2023-10-12 RX ORDER — METOPROLOL SUCCINATE 100 MG/1
100 TABLET, EXTENDED RELEASE ORAL
Qty: 90 | Refills: 0 | Status: DISCONTINUED | COMMUNITY
Start: 2022-10-05 | End: 2023-10-12

## 2023-10-12 RX ORDER — CARVEDILOL 25 MG/1
25 TABLET, FILM COATED ORAL TWICE DAILY
Refills: 0 | Status: ACTIVE | COMMUNITY
Start: 2023-10-12

## 2023-10-12 RX ORDER — CLONIDINE HYDROCHLORIDE 0.2 MG/1
0.2 TABLET ORAL
Qty: 90 | Refills: 0 | Status: DISCONTINUED | COMMUNITY
Start: 2022-05-16 | End: 2023-10-12

## 2023-10-12 RX ORDER — METOPROLOL SUCCINATE 50 MG/1
50 TABLET, EXTENDED RELEASE ORAL
Qty: 30 | Refills: 0 | Status: DISCONTINUED | COMMUNITY
Start: 2022-06-08 | End: 2023-10-12

## 2023-10-12 RX ORDER — DOCUSATE SODIUM 100 MG/1
100 CAPSULE ORAL
Refills: 0 | Status: DISCONTINUED | COMMUNITY
End: 2023-10-12

## 2023-10-12 RX ORDER — INDAPAMIDE 2.5 MG/1
2.5 TABLET, FILM COATED ORAL DAILY
Refills: 0 | Status: DISCONTINUED | COMMUNITY
Start: 2017-01-16 | End: 2023-10-12

## 2023-10-13 LAB
ALBUMIN SERPL ELPH-MCNC: 4.5 G/DL
ALP BLD-CCNC: 70 U/L
ALT SERPL-CCNC: 18 U/L
ANION GAP SERPL CALC-SCNC: 14 MMOL/L
AST SERPL-CCNC: 31 U/L
BILIRUB SERPL-MCNC: 0.4 MG/DL
BUN SERPL-MCNC: 19 MG/DL
CALCIUM SERPL-MCNC: 10 MG/DL
CHLORIDE SERPL-SCNC: 97 MMOL/L
CO2 SERPL-SCNC: 28 MMOL/L
CREAT SERPL-MCNC: 1.28 MG/DL
EGFR: 42 ML/MIN/1.73M2
GLUCOSE SERPL-MCNC: 81 MG/DL
POTASSIUM SERPL-SCNC: 3.8 MMOL/L
PROT SERPL-MCNC: 7.8 G/DL
SODIUM SERPL-SCNC: 139 MMOL/L

## 2023-12-01 ENCOUNTER — RESULT REVIEW (OUTPATIENT)
Age: 83
End: 2023-12-01

## 2023-12-01 ENCOUNTER — APPOINTMENT (OUTPATIENT)
Dept: ULTRASOUND IMAGING | Facility: CLINIC | Age: 83
End: 2023-12-01
Payer: MEDICARE

## 2023-12-01 ENCOUNTER — APPOINTMENT (OUTPATIENT)
Dept: MAMMOGRAPHY | Facility: CLINIC | Age: 83
End: 2023-12-01
Payer: MEDICARE

## 2023-12-01 ENCOUNTER — OUTPATIENT (OUTPATIENT)
Dept: OUTPATIENT SERVICES | Facility: HOSPITAL | Age: 83
LOS: 1 days | End: 2023-12-01
Payer: MEDICARE

## 2023-12-01 DIAGNOSIS — Z90.710 ACQUIRED ABSENCE OF BOTH CERVIX AND UTERUS: Chronic | ICD-10-CM

## 2023-12-01 DIAGNOSIS — C50.912 MALIGNANT NEOPLASM OF UNSPECIFIED SITE OF LEFT FEMALE BREAST: ICD-10-CM

## 2023-12-01 DIAGNOSIS — Z90.12 ACQUIRED ABSENCE OF LEFT BREAST AND NIPPLE: Chronic | ICD-10-CM

## 2023-12-01 PROCEDURE — 76641 ULTRASOUND BREAST COMPLETE: CPT

## 2023-12-01 PROCEDURE — G0279: CPT | Mod: 26

## 2023-12-01 PROCEDURE — 76641 ULTRASOUND BREAST COMPLETE: CPT | Mod: 26,RT

## 2023-12-01 PROCEDURE — G0279: CPT

## 2023-12-01 PROCEDURE — 77065 DX MAMMO INCL CAD UNI: CPT | Mod: 26,RT

## 2023-12-01 PROCEDURE — 77065 DX MAMMO INCL CAD UNI: CPT

## 2023-12-06 ENCOUNTER — APPOINTMENT (OUTPATIENT)
Dept: OBGYN | Facility: CLINIC | Age: 83
End: 2023-12-06
Payer: MEDICARE

## 2023-12-06 ENCOUNTER — NON-APPOINTMENT (OUTPATIENT)
Age: 83
End: 2023-12-06

## 2023-12-06 VITALS
BODY MASS INDEX: 20.73 KG/M2 | SYSTOLIC BLOOD PRESSURE: 138 MMHG | HEIGHT: 63 IN | WEIGHT: 117 LBS | DIASTOLIC BLOOD PRESSURE: 70 MMHG

## 2023-12-06 DIAGNOSIS — S32.301A UNSPECIFIED FRACTURE OF RIGHT ILIUM, INITIAL ENCOUNTER FOR CLOSED FRACTURE: ICD-10-CM

## 2023-12-06 DIAGNOSIS — Z87.81 PERSONAL HISTORY OF (HEALED) TRAUMATIC FRACTURE: ICD-10-CM

## 2023-12-06 DIAGNOSIS — Z90.12 ACQUIRED ABSENCE OF LEFT BREAST AND NIPPLE: ICD-10-CM

## 2023-12-06 DIAGNOSIS — Z12.11 ENCOUNTER FOR SCREENING FOR MALIGNANT NEOPLASM OF COLON: ICD-10-CM

## 2023-12-06 DIAGNOSIS — Z92.89 PERSONAL HISTORY OF OTHER MEDICAL TREATMENT: ICD-10-CM

## 2023-12-06 DIAGNOSIS — Z01.411 ENCOUNTER FOR GYNECOLOGICAL EXAMINATION (GENERAL) (ROUTINE) WITH ABNORMAL FINDINGS: ICD-10-CM

## 2023-12-06 PROCEDURE — G0101: CPT

## 2023-12-08 PROBLEM — S32.301A: Status: RESOLVED | Noted: 2019-07-30 | Resolved: 2023-12-08

## 2023-12-08 PROBLEM — Z92.89 HISTORY OF SCREENING MAMMOGRAPHY: Status: RESOLVED | Noted: 2022-10-18 | Resolved: 2023-12-08

## 2023-12-08 PROBLEM — Z12.11 SCREENING FOR COLON CANCER: Status: RESOLVED | Noted: 2018-09-05 | Resolved: 2023-12-08

## 2023-12-08 PROBLEM — Z87.81 HISTORY OF FRACTURE OF PELVIS: Status: RESOLVED | Noted: 2019-07-30 | Resolved: 2023-12-08

## 2023-12-08 RX ORDER — DONEPEZIL HYDROCHLORIDE 5 MG/1
5 TABLET ORAL
Qty: 90 | Refills: 0 | Status: DISCONTINUED | COMMUNITY
Start: 2022-10-03 | End: 2023-12-08

## 2023-12-08 RX ORDER — HYDRALAZINE HYDROCHLORIDE 50 MG/1
50 TABLET ORAL
Qty: 180 | Refills: 0 | Status: DISCONTINUED | COMMUNITY
Start: 2022-10-05 | End: 2023-12-08

## 2023-12-08 RX ORDER — DONEPEZIL HYDROCHLORIDE 10 MG/1
10 TABLET ORAL
Qty: 90 | Refills: 0 | Status: ACTIVE | COMMUNITY
Start: 2023-06-15

## 2023-12-08 RX ORDER — LOSARTAN POTASSIUM 100 MG/1
100 TABLET, FILM COATED ORAL
Qty: 90 | Refills: 0 | Status: DISCONTINUED | COMMUNITY
Start: 2022-06-06 | End: 2023-12-08

## 2024-01-01 ENCOUNTER — INPATIENT (INPATIENT)
Facility: HOSPITAL | Age: 84
LOS: 28 days | DRG: 4 | End: 2025-01-22
Attending: STUDENT IN AN ORGANIZED HEALTH CARE EDUCATION/TRAINING PROGRAM | Admitting: GENERAL ACUTE CARE HOSPITAL
Payer: MEDICARE

## 2024-01-01 VITALS
HEART RATE: 71 BPM | TEMPERATURE: 97 F | DIASTOLIC BLOOD PRESSURE: 79 MMHG | RESPIRATION RATE: 20 BRPM | OXYGEN SATURATION: 98 % | SYSTOLIC BLOOD PRESSURE: 146 MMHG

## 2024-01-01 DIAGNOSIS — Z90.12 ACQUIRED ABSENCE OF LEFT BREAST AND NIPPLE: Chronic | ICD-10-CM

## 2024-01-01 DIAGNOSIS — I65.21 OCCLUSION AND STENOSIS OF RIGHT CAROTID ARTERY: ICD-10-CM

## 2024-01-01 DIAGNOSIS — Z90.710 ACQUIRED ABSENCE OF BOTH CERVIX AND UTERUS: Chronic | ICD-10-CM

## 2024-01-01 LAB
-  ENTEROBACTERALES: SIGNIFICANT CHANGE UP
A1C WITH ESTIMATED AVERAGE GLUCOSE RESULT: 5.2 % — SIGNIFICANT CHANGE UP (ref 4–5.6)
ALBUMIN SERPL ELPH-MCNC: 2.7 G/DL — LOW (ref 3.3–5.2)
ALBUMIN SERPL ELPH-MCNC: 2.9 G/DL — LOW (ref 3.3–5.2)
ALBUMIN SERPL ELPH-MCNC: 3.6 G/DL — SIGNIFICANT CHANGE UP (ref 3.3–5.2)
ALP SERPL-CCNC: 87 U/L — SIGNIFICANT CHANGE UP (ref 40–120)
ALP SERPL-CCNC: 88 U/L — SIGNIFICANT CHANGE UP (ref 40–120)
ALP SERPL-CCNC: 96 U/L — SIGNIFICANT CHANGE UP (ref 40–120)
ALT FLD-CCNC: 36 U/L — HIGH
ALT FLD-CCNC: 37 U/L — HIGH
ALT FLD-CCNC: 49 U/L — HIGH
AMMONIA BLD-MCNC: 16 UMOL/L — SIGNIFICANT CHANGE UP (ref 11–55)
AMMONIA BLD-MCNC: 30 UMOL/L — SIGNIFICANT CHANGE UP (ref 11–55)
AMPHET UR-MCNC: NEGATIVE — SIGNIFICANT CHANGE UP
ANION GAP SERPL CALC-SCNC: 11 MMOL/L — SIGNIFICANT CHANGE UP (ref 5–17)
ANION GAP SERPL CALC-SCNC: 11 MMOL/L — SIGNIFICANT CHANGE UP (ref 5–17)
ANION GAP SERPL CALC-SCNC: 13 MMOL/L — SIGNIFICANT CHANGE UP (ref 5–17)
ANION GAP SERPL CALC-SCNC: 16 MMOL/L — SIGNIFICANT CHANGE UP (ref 5–17)
ANION GAP SERPL CALC-SCNC: 8 MMOL/L — SIGNIFICANT CHANGE UP (ref 5–17)
ANION GAP SERPL CALC-SCNC: 8 MMOL/L — SIGNIFICANT CHANGE UP (ref 5–17)
ANION GAP SERPL CALC-SCNC: 9 MMOL/L — SIGNIFICANT CHANGE UP (ref 5–17)
ANION GAP SERPL CALC-SCNC: 9 MMOL/L — SIGNIFICANT CHANGE UP (ref 5–17)
APPEARANCE CSF: CLEAR — SIGNIFICANT CHANGE UP
APPEARANCE SPUN FLD: COLORLESS — SIGNIFICANT CHANGE UP
APPEARANCE UR: ABNORMAL
APPEARANCE UR: CLEAR — SIGNIFICANT CHANGE UP
AST SERPL-CCNC: 54 U/L — HIGH
AST SERPL-CCNC: 57 U/L — HIGH
AST SERPL-CCNC: 58 U/L — HIGH
BACTERIA # UR AUTO: ABNORMAL /HPF
BACTERIA # UR AUTO: NEGATIVE /HPF — SIGNIFICANT CHANGE UP
BARBITURATES UR SCN-MCNC: NEGATIVE — SIGNIFICANT CHANGE UP
BASE EXCESS BLDA CALC-SCNC: 1.4 MMOL/L — SIGNIFICANT CHANGE UP (ref -2–3)
BASE EXCESS BLDA CALC-SCNC: 2.4 MMOL/L — SIGNIFICANT CHANGE UP (ref -2–3)
BASE EXCESS BLDA CALC-SCNC: 4.3 MMOL/L — HIGH (ref -2–3)
BASOPHILS # BLD AUTO: 0.02 K/UL — SIGNIFICANT CHANGE UP (ref 0–0.2)
BASOPHILS # BLD AUTO: 0.03 K/UL — SIGNIFICANT CHANGE UP (ref 0–0.2)
BASOPHILS NFR BLD AUTO: 0.1 % — SIGNIFICANT CHANGE UP (ref 0–2)
BASOPHILS NFR BLD AUTO: 0.3 % — SIGNIFICANT CHANGE UP (ref 0–2)
BENZODIAZ UR-MCNC: POSITIVE
BILIRUB DIRECT SERPL-MCNC: 0.1 MG/DL — SIGNIFICANT CHANGE UP (ref 0–0.3)
BILIRUB INDIRECT FLD-MCNC: 0.3 MG/DL — SIGNIFICANT CHANGE UP (ref 0.2–1)
BILIRUB SERPL-MCNC: 0.2 MG/DL — LOW (ref 0.4–2)
BILIRUB SERPL-MCNC: 0.4 MG/DL — SIGNIFICANT CHANGE UP (ref 0.4–2)
BILIRUB SERPL-MCNC: 0.5 MG/DL — SIGNIFICANT CHANGE UP (ref 0.4–2)
BILIRUB UR-MCNC: NEGATIVE — SIGNIFICANT CHANGE UP
BILIRUB UR-MCNC: NEGATIVE — SIGNIFICANT CHANGE UP
BLOOD GAS COMMENTS ARTERIAL: SIGNIFICANT CHANGE UP
BUN SERPL-MCNC: 12.5 MG/DL — SIGNIFICANT CHANGE UP (ref 8–20)
BUN SERPL-MCNC: 12.7 MG/DL — SIGNIFICANT CHANGE UP (ref 8–20)
BUN SERPL-MCNC: 14.7 MG/DL — SIGNIFICANT CHANGE UP (ref 8–20)
BUN SERPL-MCNC: 14.9 MG/DL — SIGNIFICANT CHANGE UP (ref 8–20)
BUN SERPL-MCNC: 15.6 MG/DL — SIGNIFICANT CHANGE UP (ref 8–20)
BUN SERPL-MCNC: 20.3 MG/DL — HIGH (ref 8–20)
BUN SERPL-MCNC: 27.9 MG/DL — HIGH (ref 8–20)
BUN SERPL-MCNC: 9.9 MG/DL — SIGNIFICANT CHANGE UP (ref 8–20)
CALCIUM SERPL-MCNC: 8.1 MG/DL — LOW (ref 8.4–10.5)
CALCIUM SERPL-MCNC: 8.3 MG/DL — LOW (ref 8.4–10.5)
CALCIUM SERPL-MCNC: 8.5 MG/DL — SIGNIFICANT CHANGE UP (ref 8.4–10.5)
CALCIUM SERPL-MCNC: 8.6 MG/DL — SIGNIFICANT CHANGE UP (ref 8.4–10.5)
CALCIUM SERPL-MCNC: 8.7 MG/DL — SIGNIFICANT CHANGE UP (ref 8.4–10.5)
CALCIUM SERPL-MCNC: 8.8 MG/DL — SIGNIFICANT CHANGE UP (ref 8.4–10.5)
CALCIUM SERPL-MCNC: 8.9 MG/DL — SIGNIFICANT CHANGE UP (ref 8.4–10.5)
CALCIUM SERPL-MCNC: 9.1 MG/DL — SIGNIFICANT CHANGE UP (ref 8.4–10.5)
CAST: 0 /LPF — SIGNIFICANT CHANGE UP (ref 0–4)
CAST: 4 /LPF — SIGNIFICANT CHANGE UP (ref 0–4)
CHLORIDE SERPL-SCNC: 100 MMOL/L — SIGNIFICANT CHANGE UP (ref 96–108)
CHLORIDE SERPL-SCNC: 105 MMOL/L — SIGNIFICANT CHANGE UP (ref 96–108)
CHLORIDE SERPL-SCNC: 105 MMOL/L — SIGNIFICANT CHANGE UP (ref 96–108)
CHLORIDE SERPL-SCNC: 109 MMOL/L — HIGH (ref 96–108)
CHLORIDE SERPL-SCNC: 110 MMOL/L — HIGH (ref 96–108)
CHLORIDE SERPL-SCNC: 111 MMOL/L — HIGH (ref 96–108)
CHLORIDE SERPL-SCNC: 112 MMOL/L — HIGH (ref 96–108)
CHLORIDE SERPL-SCNC: 112 MMOL/L — HIGH (ref 96–108)
CHOLEST SERPL-MCNC: 124 MG/DL — SIGNIFICANT CHANGE UP
CK MB CFR SERPL CALC: 3 NG/ML — SIGNIFICANT CHANGE UP (ref 0–6.7)
CK SERPL-CCNC: 154 U/L — SIGNIFICANT CHANGE UP (ref 25–170)
CO2 SERPL-SCNC: 21 MMOL/L — LOW (ref 22–29)
CO2 SERPL-SCNC: 21 MMOL/L — LOW (ref 22–29)
CO2 SERPL-SCNC: 22 MMOL/L — SIGNIFICANT CHANGE UP (ref 22–29)
CO2 SERPL-SCNC: 25 MMOL/L — SIGNIFICANT CHANGE UP (ref 22–29)
CO2 SERPL-SCNC: 26 MMOL/L — SIGNIFICANT CHANGE UP (ref 22–29)
CO2 SERPL-SCNC: 28 MMOL/L — SIGNIFICANT CHANGE UP (ref 22–29)
COCAINE METAB.OTHER UR-MCNC: NEGATIVE — SIGNIFICANT CHANGE UP
COLOR CSF: SIGNIFICANT CHANGE UP
COLOR SPEC: SIGNIFICANT CHANGE UP
COLOR SPEC: YELLOW — SIGNIFICANT CHANGE UP
CREAT SERPL-MCNC: 0.71 MG/DL — SIGNIFICANT CHANGE UP (ref 0.5–1.3)
CREAT SERPL-MCNC: 0.77 MG/DL — SIGNIFICANT CHANGE UP (ref 0.5–1.3)
CREAT SERPL-MCNC: 0.82 MG/DL — SIGNIFICANT CHANGE UP (ref 0.5–1.3)
CREAT SERPL-MCNC: 0.85 MG/DL — SIGNIFICANT CHANGE UP (ref 0.5–1.3)
CREAT SERPL-MCNC: 0.87 MG/DL — SIGNIFICANT CHANGE UP (ref 0.5–1.3)
CREAT SERPL-MCNC: 0.88 MG/DL — SIGNIFICANT CHANGE UP (ref 0.5–1.3)
CREAT SERPL-MCNC: 0.96 MG/DL — SIGNIFICANT CHANGE UP (ref 0.5–1.3)
CREAT SERPL-MCNC: 1.08 MG/DL — SIGNIFICANT CHANGE UP (ref 0.5–1.3)
CRP SERPL-MCNC: 104 MG/L — HIGH
CRP SERPL-MCNC: 140 MG/L — HIGH
CRYPTOC AG CSF-ACNC: NEGATIVE — SIGNIFICANT CHANGE UP
CSF PCR RESULT: SIGNIFICANT CHANGE UP
DIFF PNL FLD: ABNORMAL
DIFF PNL FLD: NEGATIVE — SIGNIFICANT CHANGE UP
EBV PCR: SIGNIFICANT CHANGE UP IU/ML
EGFR: 51 ML/MIN/1.73M2 — LOW
EGFR: 58 ML/MIN/1.73M2 — LOW
EGFR: 65 ML/MIN/1.73M2 — SIGNIFICANT CHANGE UP
EGFR: 66 ML/MIN/1.73M2 — SIGNIFICANT CHANGE UP
EGFR: 68 ML/MIN/1.73M2 — SIGNIFICANT CHANGE UP
EGFR: 70 ML/MIN/1.73M2 — SIGNIFICANT CHANGE UP
EGFR: 76 ML/MIN/1.73M2 — SIGNIFICANT CHANGE UP
EGFR: 84 ML/MIN/1.73M2 — SIGNIFICANT CHANGE UP
EOSINOPHIL # BLD AUTO: 0.01 K/UL — SIGNIFICANT CHANGE UP (ref 0–0.5)
EOSINOPHIL # BLD AUTO: 0.14 K/UL — SIGNIFICANT CHANGE UP (ref 0–0.5)
EOSINOPHIL NFR BLD AUTO: 0.1 % — SIGNIFICANT CHANGE UP (ref 0–6)
EOSINOPHIL NFR BLD AUTO: 1.4 % — SIGNIFICANT CHANGE UP (ref 0–6)
ERYTHROCYTE [SEDIMENTATION RATE] IN BLOOD: 19 MM/HR — SIGNIFICANT CHANGE UP (ref 0–20)
ERYTHROCYTE [SEDIMENTATION RATE] IN BLOOD: 38 MM/HR — HIGH (ref 0–20)
ERYTHROCYTE [SEDIMENTATION RATE] IN BLOOD: 38 MM/HR — HIGH (ref 0–20)
ESTIMATED AVERAGE GLUCOSE: 103 MG/DL — SIGNIFICANT CHANGE UP (ref 68–114)
FENTANYL UR QL SCN: NEGATIVE — SIGNIFICANT CHANGE UP
FERRITIN SERPL-MCNC: 878 NG/ML — HIGH (ref 13–330)
FOLATE SERPL-MCNC: 18.8 NG/ML — SIGNIFICANT CHANGE UP
GAS PNL BLDA: SIGNIFICANT CHANGE UP
GLUCOSE BLDC GLUCOMTR-MCNC: 102 MG/DL — HIGH (ref 70–99)
GLUCOSE BLDC GLUCOMTR-MCNC: 109 MG/DL — HIGH (ref 70–99)
GLUCOSE BLDC GLUCOMTR-MCNC: 111 MG/DL — HIGH (ref 70–99)
GLUCOSE BLDC GLUCOMTR-MCNC: 115 MG/DL — HIGH (ref 70–99)
GLUCOSE BLDC GLUCOMTR-MCNC: 118 MG/DL — HIGH (ref 70–99)
GLUCOSE BLDC GLUCOMTR-MCNC: 120 MG/DL — HIGH (ref 70–99)
GLUCOSE BLDC GLUCOMTR-MCNC: 125 MG/DL — HIGH (ref 70–99)
GLUCOSE BLDC GLUCOMTR-MCNC: 126 MG/DL — HIGH (ref 70–99)
GLUCOSE BLDC GLUCOMTR-MCNC: 130 MG/DL — HIGH (ref 70–99)
GLUCOSE BLDC GLUCOMTR-MCNC: 135 MG/DL — HIGH (ref 70–99)
GLUCOSE BLDC GLUCOMTR-MCNC: 138 MG/DL — HIGH (ref 70–99)
GLUCOSE BLDC GLUCOMTR-MCNC: 139 MG/DL — HIGH (ref 70–99)
GLUCOSE BLDC GLUCOMTR-MCNC: 139 MG/DL — HIGH (ref 70–99)
GLUCOSE BLDC GLUCOMTR-MCNC: 152 MG/DL — HIGH (ref 70–99)
GLUCOSE BLDC GLUCOMTR-MCNC: 154 MG/DL — HIGH (ref 70–99)
GLUCOSE BLDC GLUCOMTR-MCNC: 160 MG/DL — HIGH (ref 70–99)
GLUCOSE BLDC GLUCOMTR-MCNC: 161 MG/DL — HIGH (ref 70–99)
GLUCOSE BLDC GLUCOMTR-MCNC: 163 MG/DL — HIGH (ref 70–99)
GLUCOSE BLDC GLUCOMTR-MCNC: 163 MG/DL — HIGH (ref 70–99)
GLUCOSE BLDC GLUCOMTR-MCNC: 89 MG/DL — SIGNIFICANT CHANGE UP (ref 70–99)
GLUCOSE BLDC GLUCOMTR-MCNC: 97 MG/DL — SIGNIFICANT CHANGE UP (ref 70–99)
GLUCOSE BLDC GLUCOMTR-MCNC: 98 MG/DL — SIGNIFICANT CHANGE UP (ref 70–99)
GLUCOSE CSF-MCNC: 74 MG/DLG/24H — HIGH (ref 40–70)
GLUCOSE SERPL-MCNC: 130 MG/DL — HIGH (ref 70–99)
GLUCOSE SERPL-MCNC: 133 MG/DL — HIGH (ref 70–99)
GLUCOSE SERPL-MCNC: 149 MG/DL — HIGH (ref 70–99)
GLUCOSE SERPL-MCNC: 156 MG/DL — HIGH (ref 70–99)
GLUCOSE SERPL-MCNC: 157 MG/DL — HIGH (ref 70–99)
GLUCOSE SERPL-MCNC: 78 MG/DL — SIGNIFICANT CHANGE UP (ref 70–99)
GLUCOSE SERPL-MCNC: 91 MG/DL — SIGNIFICANT CHANGE UP (ref 70–99)
GLUCOSE SERPL-MCNC: 93 MG/DL — SIGNIFICANT CHANGE UP (ref 70–99)
GLUCOSE UR QL: NEGATIVE MG/DL — SIGNIFICANT CHANGE UP
GLUCOSE UR QL: NEGATIVE MG/DL — SIGNIFICANT CHANGE UP
GRAM STN FLD: ABNORMAL
GRAM STN FLD: ABNORMAL
GRAM STN FLD: SIGNIFICANT CHANGE UP
GRAM STN FLD: SIGNIFICANT CHANGE UP
HCO3 BLDA-SCNC: 24 MMOL/L — SIGNIFICANT CHANGE UP (ref 21–28)
HCO3 BLDA-SCNC: 26 MMOL/L — SIGNIFICANT CHANGE UP (ref 21–28)
HCO3 BLDA-SCNC: 29 MMOL/L — HIGH (ref 21–28)
HCT VFR BLD CALC: 29.9 % — LOW (ref 34.5–45)
HCT VFR BLD CALC: 30.5 % — LOW (ref 34.5–45)
HCT VFR BLD CALC: 30.8 % — LOW (ref 34.5–45)
HCT VFR BLD CALC: 31.8 % — LOW (ref 34.5–45)
HCT VFR BLD CALC: 32 % — LOW (ref 34.5–45)
HCT VFR BLD CALC: 32.1 % — LOW (ref 34.5–45)
HCT VFR BLD CALC: 33 % — LOW (ref 34.5–45)
HCT VFR BLD CALC: 34.6 % — SIGNIFICANT CHANGE UP (ref 34.5–45)
HCT VFR BLD CALC: 36.9 % — SIGNIFICANT CHANGE UP (ref 34.5–45)
HDLC SERPL-MCNC: 68 MG/DL — SIGNIFICANT CHANGE UP
HGB BLD-MCNC: 10.1 G/DL — LOW (ref 11.5–15.5)
HGB BLD-MCNC: 10.7 G/DL — LOW (ref 11.5–15.5)
HGB BLD-MCNC: 11.2 G/DL — LOW (ref 11.5–15.5)
HGB BLD-MCNC: 11.7 G/DL — SIGNIFICANT CHANGE UP (ref 11.5–15.5)
HGB BLD-MCNC: 9.5 G/DL — LOW (ref 11.5–15.5)
HGB BLD-MCNC: 9.9 G/DL — LOW (ref 11.5–15.5)
HGB BLD-MCNC: 9.9 G/DL — LOW (ref 11.5–15.5)
HOROWITZ INDEX BLDA+IHG-RTO: 0.4 — SIGNIFICANT CHANGE UP
HOROWITZ INDEX BLDA+IHG-RTO: 0.5 — SIGNIFICANT CHANGE UP
HOROWITZ INDEX BLDA+IHG-RTO: SIGNIFICANT CHANGE UP
HSV1 DNA BLD QL NAA+PROBE: SIGNIFICANT CHANGE UP
HSV2 DNA BLD QL NAA+PROBE: SIGNIFICANT CHANGE UP
IMM GRANULOCYTES NFR BLD AUTO: 0.5 % — SIGNIFICANT CHANGE UP (ref 0–0.9)
IMM GRANULOCYTES NFR BLD AUTO: 1 % — HIGH (ref 0–0.9)
IRON SATN MFR SERPL: 15 UG/DL — LOW (ref 37–145)
IRON SATN MFR SERPL: 9 % — LOW (ref 14–50)
KETONES UR-MCNC: 15 MG/DL
KETONES UR-MCNC: ABNORMAL MG/DL
LACTATE SERPL-SCNC: 0.8 MMOL/L — SIGNIFICANT CHANGE UP (ref 0.5–2)
LACTATE SERPL-SCNC: 1.3 MMOL/L — SIGNIFICANT CHANGE UP (ref 0.5–2)
LEUKOCYTE ESTERASE UR-ACNC: ABNORMAL
LEUKOCYTE ESTERASE UR-ACNC: NEGATIVE — SIGNIFICANT CHANGE UP
LIPID PNL WITH DIRECT LDL SERPL: 41 MG/DL — SIGNIFICANT CHANGE UP
LYMPHOCYTES # BLD AUTO: 0.86 K/UL — LOW (ref 1–3.3)
LYMPHOCYTES # BLD AUTO: 1.05 K/UL — SIGNIFICANT CHANGE UP (ref 1–3.3)
LYMPHOCYTES # BLD AUTO: 10.3 % — LOW (ref 13–44)
LYMPHOCYTES # BLD AUTO: 5.9 % — LOW (ref 13–44)
MAGNESIUM SERPL-MCNC: 1.7 MG/DL — SIGNIFICANT CHANGE UP (ref 1.6–2.6)
MAGNESIUM SERPL-MCNC: 2 MG/DL — SIGNIFICANT CHANGE UP (ref 1.6–2.6)
MAGNESIUM SERPL-MCNC: 2.1 MG/DL — SIGNIFICANT CHANGE UP (ref 1.6–2.6)
MAGNESIUM SERPL-MCNC: 2.1 MG/DL — SIGNIFICANT CHANGE UP (ref 1.6–2.6)
MAGNESIUM SERPL-MCNC: 2.2 MG/DL — SIGNIFICANT CHANGE UP (ref 1.6–2.6)
MAGNESIUM SERPL-MCNC: 2.2 MG/DL — SIGNIFICANT CHANGE UP (ref 1.6–2.6)
MAGNESIUM SERPL-MCNC: 2.4 MG/DL — SIGNIFICANT CHANGE UP (ref 1.6–2.6)
MCHC RBC-ENTMCNC: 27.5 PG — SIGNIFICANT CHANGE UP (ref 27–34)
MCHC RBC-ENTMCNC: 27.7 PG — SIGNIFICANT CHANGE UP (ref 27–34)
MCHC RBC-ENTMCNC: 27.8 PG — SIGNIFICANT CHANGE UP (ref 27–34)
MCHC RBC-ENTMCNC: 27.9 PG — SIGNIFICANT CHANGE UP (ref 27–34)
MCHC RBC-ENTMCNC: 27.9 PG — SIGNIFICANT CHANGE UP (ref 27–34)
MCHC RBC-ENTMCNC: 28.4 PG — SIGNIFICANT CHANGE UP (ref 27–34)
MCHC RBC-ENTMCNC: 28.6 PG — SIGNIFICANT CHANGE UP (ref 27–34)
MCHC RBC-ENTMCNC: 30.8 G/DL — LOW (ref 32–36)
MCHC RBC-ENTMCNC: 30.8 G/DL — LOW (ref 32–36)
MCHC RBC-ENTMCNC: 30.9 G/DL — LOW (ref 32–36)
MCHC RBC-ENTMCNC: 31.1 G/DL — LOW (ref 32–36)
MCHC RBC-ENTMCNC: 31.7 G/DL — LOW (ref 32–36)
MCHC RBC-ENTMCNC: 31.8 G/DL — LOW (ref 32–36)
MCHC RBC-ENTMCNC: 31.8 G/DL — LOW (ref 32–36)
MCHC RBC-ENTMCNC: 32.4 G/DL — SIGNIFICANT CHANGE UP (ref 32–36)
MCHC RBC-ENTMCNC: 32.4 G/DL — SIGNIFICANT CHANGE UP (ref 32–36)
MCV RBC AUTO: 86.1 FL — SIGNIFICANT CHANGE UP (ref 80–100)
MCV RBC AUTO: 86.6 FL — SIGNIFICANT CHANGE UP (ref 80–100)
MCV RBC AUTO: 87.6 FL — SIGNIFICANT CHANGE UP (ref 80–100)
MCV RBC AUTO: 88.2 FL — SIGNIFICANT CHANGE UP (ref 80–100)
MCV RBC AUTO: 89.2 FL — SIGNIFICANT CHANGE UP (ref 80–100)
MCV RBC AUTO: 89.3 FL — SIGNIFICANT CHANGE UP (ref 80–100)
MCV RBC AUTO: 89.4 FL — SIGNIFICANT CHANGE UP (ref 80–100)
MCV RBC AUTO: 90.2 FL — SIGNIFICANT CHANGE UP (ref 80–100)
MCV RBC AUTO: 90.3 FL — SIGNIFICANT CHANGE UP (ref 80–100)
METHADONE UR-MCNC: NEGATIVE — SIGNIFICANT CHANGE UP
METHOD TYPE: SIGNIFICANT CHANGE UP
MONOCYTES # BLD AUTO: 0.7 K/UL — SIGNIFICANT CHANGE UP (ref 0–0.9)
MONOCYTES # BLD AUTO: 0.73 K/UL — SIGNIFICANT CHANGE UP (ref 0–0.9)
MONOCYTES NFR BLD AUTO: 4.8 % — SIGNIFICANT CHANGE UP (ref 2–14)
MONOCYTES NFR BLD AUTO: 7.2 % — SIGNIFICANT CHANGE UP (ref 2–14)
MRSA PCR RESULT.: SIGNIFICANT CHANGE UP
NEUTROPHILS # BLD AUTO: 12.86 K/UL — HIGH (ref 1.8–7.4)
NEUTROPHILS # BLD AUTO: 8.19 K/UL — HIGH (ref 1.8–7.4)
NEUTROPHILS # CSF: SIGNIFICANT CHANGE UP % (ref 0–6)
NEUTROPHILS NFR BLD AUTO: 80.3 % — HIGH (ref 43–77)
NEUTROPHILS NFR BLD AUTO: 88.1 % — HIGH (ref 43–77)
NIGHT BLUE STAIN TISS: SIGNIFICANT CHANGE UP
NITRITE UR-MCNC: NEGATIVE — SIGNIFICANT CHANGE UP
NITRITE UR-MCNC: POSITIVE
NON HDL CHOLESTEROL: 56 MG/DL — SIGNIFICANT CHANGE UP
NRBC NFR CSF: 4 /UL — SIGNIFICANT CHANGE UP (ref 0–5)
OPIATES UR-MCNC: NEGATIVE — SIGNIFICANT CHANGE UP
PCO2 BLDA: 28 MMHG — LOW (ref 32–45)
PCO2 BLDA: 38 MMHG — SIGNIFICANT CHANGE UP (ref 32–45)
PCO2 BLDA: 47 MMHG — HIGH (ref 32–45)
PCP SPEC-MCNC: SIGNIFICANT CHANGE UP
PCP UR-MCNC: NEGATIVE — SIGNIFICANT CHANGE UP
PH BLDA: 7.4 — SIGNIFICANT CHANGE UP (ref 7.35–7.45)
PH BLDA: 7.45 — SIGNIFICANT CHANGE UP (ref 7.35–7.45)
PH BLDA: 7.54 — HIGH (ref 7.35–7.45)
PH UR: 5.5 — SIGNIFICANT CHANGE UP (ref 5–8)
PH UR: 6 — SIGNIFICANT CHANGE UP (ref 5–8)
PHOSPHATE SERPL-MCNC: 2.2 MG/DL — LOW (ref 2.4–4.7)
PHOSPHATE SERPL-MCNC: 2.6 MG/DL — SIGNIFICANT CHANGE UP (ref 2.4–4.7)
PHOSPHATE SERPL-MCNC: 2.8 MG/DL — SIGNIFICANT CHANGE UP (ref 2.4–4.7)
PHOSPHATE SERPL-MCNC: 3.2 MG/DL — SIGNIFICANT CHANGE UP (ref 2.4–4.7)
PHOSPHATE SERPL-MCNC: 3.3 MG/DL — SIGNIFICANT CHANGE UP (ref 2.4–4.7)
PHOSPHATE SERPL-MCNC: 3.3 MG/DL — SIGNIFICANT CHANGE UP (ref 2.4–4.7)
PHOSPHATE SERPL-MCNC: 3.4 MG/DL — SIGNIFICANT CHANGE UP (ref 2.4–4.7)
PLATELET # BLD AUTO: 149 K/UL — LOW (ref 150–400)
PLATELET # BLD AUTO: 169 K/UL — SIGNIFICANT CHANGE UP (ref 150–400)
PLATELET # BLD AUTO: 209 K/UL — SIGNIFICANT CHANGE UP (ref 150–400)
PLATELET # BLD AUTO: 221 K/UL — SIGNIFICANT CHANGE UP (ref 150–400)
PLATELET # BLD AUTO: 227 K/UL — SIGNIFICANT CHANGE UP (ref 150–400)
PLATELET # BLD AUTO: 241 K/UL — SIGNIFICANT CHANGE UP (ref 150–400)
PLATELET # BLD AUTO: 253 K/UL — SIGNIFICANT CHANGE UP (ref 150–400)
PLATELET # BLD AUTO: 254 K/UL — SIGNIFICANT CHANGE UP (ref 150–400)
PLATELET # BLD AUTO: 254 K/UL — SIGNIFICANT CHANGE UP (ref 150–400)
PO2 BLDA: 182 MMHG — HIGH (ref 83–108)
PO2 BLDA: 204 MMHG — HIGH (ref 83–108)
PO2 BLDA: 61 MMHG — LOW (ref 83–108)
POTASSIUM SERPL-MCNC: 3.3 MMOL/L — LOW (ref 3.5–5.3)
POTASSIUM SERPL-MCNC: 3.4 MMOL/L — LOW (ref 3.5–5.3)
POTASSIUM SERPL-MCNC: 3.6 MMOL/L — SIGNIFICANT CHANGE UP (ref 3.5–5.3)
POTASSIUM SERPL-MCNC: 3.9 MMOL/L — SIGNIFICANT CHANGE UP (ref 3.5–5.3)
POTASSIUM SERPL-MCNC: 4 MMOL/L — SIGNIFICANT CHANGE UP (ref 3.5–5.3)
POTASSIUM SERPL-MCNC: 4.1 MMOL/L — SIGNIFICANT CHANGE UP (ref 3.5–5.3)
POTASSIUM SERPL-MCNC: 4.1 MMOL/L — SIGNIFICANT CHANGE UP (ref 3.5–5.3)
POTASSIUM SERPL-MCNC: 4.3 MMOL/L — SIGNIFICANT CHANGE UP (ref 3.5–5.3)
POTASSIUM SERPL-SCNC: 3.3 MMOL/L — LOW (ref 3.5–5.3)
POTASSIUM SERPL-SCNC: 3.4 MMOL/L — LOW (ref 3.5–5.3)
POTASSIUM SERPL-SCNC: 3.6 MMOL/L — SIGNIFICANT CHANGE UP (ref 3.5–5.3)
POTASSIUM SERPL-SCNC: 3.9 MMOL/L — SIGNIFICANT CHANGE UP (ref 3.5–5.3)
POTASSIUM SERPL-SCNC: 4 MMOL/L — SIGNIFICANT CHANGE UP (ref 3.5–5.3)
POTASSIUM SERPL-SCNC: 4.1 MMOL/L — SIGNIFICANT CHANGE UP (ref 3.5–5.3)
POTASSIUM SERPL-SCNC: 4.1 MMOL/L — SIGNIFICANT CHANGE UP (ref 3.5–5.3)
POTASSIUM SERPL-SCNC: 4.3 MMOL/L — SIGNIFICANT CHANGE UP (ref 3.5–5.3)
PROCALCITONIN SERPL-MCNC: 0.13 NG/ML — HIGH (ref 0.02–0.1)
PROCALCITONIN SERPL-MCNC: 0.17 NG/ML — HIGH (ref 0.02–0.1)
PROT CSF-MCNC: 37 MG/DL — SIGNIFICANT CHANGE UP (ref 15–45)
PROT SERPL-MCNC: 6.2 G/DL — LOW (ref 6.6–8.7)
PROT SERPL-MCNC: 6.4 G/DL — LOW (ref 6.6–8.7)
PROT SERPL-MCNC: 7.1 G/DL — SIGNIFICANT CHANGE UP (ref 6.6–8.7)
PROT UR-MCNC: 30 MG/DL
PROT UR-MCNC: 30 MG/DL
RAPID RVP RESULT: SIGNIFICANT CHANGE UP
RAPID RVP RESULT: SIGNIFICANT CHANGE UP
RBC # BLD: 3.35 M/UL — LOW (ref 3.8–5.2)
RBC # BLD: 3.41 M/UL — LOW (ref 3.8–5.2)
RBC # BLD: 3.42 M/UL — LOW (ref 3.8–5.2)
RBC # BLD: 3.56 M/UL — LOW (ref 3.8–5.2)
RBC # BLD: 3.58 M/UL — LOW (ref 3.8–5.2)
RBC # BLD: 3.63 M/UL — LOW (ref 3.8–5.2)
RBC # BLD: 3.74 M/UL — LOW (ref 3.8–5.2)
RBC # BLD: 4.02 M/UL — SIGNIFICANT CHANGE UP (ref 3.8–5.2)
RBC # BLD: 4.26 M/UL — SIGNIFICANT CHANGE UP (ref 3.8–5.2)
RBC # CSF: 198 /CMM — HIGH (ref 0–1)
RBC # FLD: 14.6 % — HIGH (ref 10.3–14.5)
RBC # FLD: 14.6 % — HIGH (ref 10.3–14.5)
RBC # FLD: 15 % — HIGH (ref 10.3–14.5)
RBC # FLD: 15.2 % — HIGH (ref 10.3–14.5)
RBC # FLD: 15.4 % — HIGH (ref 10.3–14.5)
RBC # FLD: 15.6 % — HIGH (ref 10.3–14.5)
RBC # FLD: 15.6 % — HIGH (ref 10.3–14.5)
RBC # FLD: 15.7 % — HIGH (ref 10.3–14.5)
RBC # FLD: 15.8 % — HIGH (ref 10.3–14.5)
RBC CASTS # UR COMP ASSIST: 3 /HPF — SIGNIFICANT CHANGE UP (ref 0–4)
RBC CASTS # UR COMP ASSIST: 80 /HPF — HIGH (ref 0–4)
S AUREUS DNA NOSE QL NAA+PROBE: SIGNIFICANT CHANGE UP
SAO2 % BLDA: 100 % — HIGH (ref 94–98)
SAO2 % BLDA: 93.2 % — LOW (ref 94–98)
SAO2 % BLDA: 99.7 % — HIGH (ref 94–98)
SARS-COV-2 RNA SPEC QL NAA+PROBE: SIGNIFICANT CHANGE UP
SARS-COV-2 RNA SPEC QL NAA+PROBE: SIGNIFICANT CHANGE UP
SODIUM SERPL-SCNC: 139 MMOL/L — SIGNIFICANT CHANGE UP (ref 135–145)
SODIUM SERPL-SCNC: 140 MMOL/L — SIGNIFICANT CHANGE UP (ref 135–145)
SODIUM SERPL-SCNC: 142 MMOL/L — SIGNIFICANT CHANGE UP (ref 135–145)
SODIUM SERPL-SCNC: 143 MMOL/L — SIGNIFICANT CHANGE UP (ref 135–145)
SODIUM SERPL-SCNC: 143 MMOL/L — SIGNIFICANT CHANGE UP (ref 135–145)
SODIUM SERPL-SCNC: 144 MMOL/L — SIGNIFICANT CHANGE UP (ref 135–145)
SODIUM SERPL-SCNC: 145 MMOL/L — SIGNIFICANT CHANGE UP (ref 135–145)
SODIUM SERPL-SCNC: 146 MMOL/L — HIGH (ref 135–145)
SOURCE HSV 1/2: SIGNIFICANT CHANGE UP
SP GR SPEC: >1.03 — HIGH (ref 1–1.03)
SP GR SPEC: >1.03 — HIGH (ref 1–1.03)
SPECIMEN SOURCE: SIGNIFICANT CHANGE UP
SQUAMOUS # UR AUTO: 2 /HPF — SIGNIFICANT CHANGE UP (ref 0–5)
SQUAMOUS # UR AUTO: 4 /HPF — SIGNIFICANT CHANGE UP (ref 0–5)
THC UR QL: NEGATIVE — SIGNIFICANT CHANGE UP
TIBC SERPL-MCNC: 176 UG/DL — LOW (ref 220–430)
TRANSFERRIN SERPL-MCNC: 123 MG/DL — LOW (ref 192–382)
TRIGL SERPL-MCNC: 75 MG/DL — SIGNIFICANT CHANGE UP
TROPONIN T, HIGH SENSITIVITY RESULT: 13 NG/L — SIGNIFICANT CHANGE UP (ref 0–51)
TROPONIN T, HIGH SENSITIVITY RESULT: 17 NG/L — SIGNIFICANT CHANGE UP (ref 0–51)
TSH SERPL-MCNC: 1.89 UIU/ML — SIGNIFICANT CHANGE UP (ref 0.27–4.2)
TUBE TYPE: SIGNIFICANT CHANGE UP
UROBILINOGEN FLD QL: 1 MG/DL — SIGNIFICANT CHANGE UP (ref 0.2–1)
UROBILINOGEN FLD QL: 1 MG/DL — SIGNIFICANT CHANGE UP (ref 0.2–1)
VDRL CSF-TITR: SIGNIFICANT CHANGE UP
VIT B12 SERPL-MCNC: >2000 PG/ML — HIGH (ref 232–1245)
WBC # BLD: 10.19 K/UL — SIGNIFICANT CHANGE UP (ref 3.8–10.5)
WBC # BLD: 10.24 K/UL — SIGNIFICANT CHANGE UP (ref 3.8–10.5)
WBC # BLD: 10.32 K/UL — SIGNIFICANT CHANGE UP (ref 3.8–10.5)
WBC # BLD: 11 K/UL — HIGH (ref 3.8–10.5)
WBC # BLD: 11.35 K/UL — HIGH (ref 3.8–10.5)
WBC # BLD: 11.91 K/UL — HIGH (ref 3.8–10.5)
WBC # BLD: 14.47 K/UL — HIGH (ref 3.8–10.5)
WBC # BLD: 14.59 K/UL — HIGH (ref 3.8–10.5)
WBC # BLD: 16.74 K/UL — HIGH (ref 3.8–10.5)
WBC # FLD AUTO: 10.19 K/UL — SIGNIFICANT CHANGE UP (ref 3.8–10.5)
WBC # FLD AUTO: 10.24 K/UL — SIGNIFICANT CHANGE UP (ref 3.8–10.5)
WBC # FLD AUTO: 10.32 K/UL — SIGNIFICANT CHANGE UP (ref 3.8–10.5)
WBC # FLD AUTO: 11 K/UL — HIGH (ref 3.8–10.5)
WBC # FLD AUTO: 11.35 K/UL — HIGH (ref 3.8–10.5)
WBC # FLD AUTO: 11.91 K/UL — HIGH (ref 3.8–10.5)
WBC # FLD AUTO: 14.47 K/UL — HIGH (ref 3.8–10.5)
WBC # FLD AUTO: 14.59 K/UL — HIGH (ref 3.8–10.5)
WBC # FLD AUTO: 16.74 K/UL — HIGH (ref 3.8–10.5)
WBC UR QL: 2 /HPF — SIGNIFICANT CHANGE UP (ref 0–5)
WBC UR QL: 35 /HPF — HIGH (ref 0–5)
WNV IGG CSF IA-ACNC: NEGATIVE — SIGNIFICANT CHANGE UP
WNV IGM CSF IA-ACNC: NEGATIVE — SIGNIFICANT CHANGE UP

## 2024-01-01 PROCEDURE — 99291 CRITICAL CARE FIRST HOUR: CPT

## 2024-01-01 PROCEDURE — 95720 EEG PHY/QHP EA INCR W/VEEG: CPT

## 2024-01-01 PROCEDURE — 93010 ELECTROCARDIOGRAM REPORT: CPT

## 2024-01-01 PROCEDURE — 70450 CT HEAD/BRAIN W/O DYE: CPT | Mod: 26,MC

## 2024-01-01 PROCEDURE — 62270 DX LMBR SPI PNXR: CPT

## 2024-01-01 PROCEDURE — 99233 SBSQ HOSP IP/OBS HIGH 50: CPT | Mod: GC

## 2024-01-01 PROCEDURE — 31500 INSERT EMERGENCY AIRWAY: CPT

## 2024-01-01 PROCEDURE — 99223 1ST HOSP IP/OBS HIGH 75: CPT

## 2024-01-01 PROCEDURE — 71045 X-RAY EXAM CHEST 1 VIEW: CPT | Mod: 26

## 2024-01-01 PROCEDURE — 99222 1ST HOSP IP/OBS MODERATE 55: CPT

## 2024-01-01 PROCEDURE — 95718 EEG PHYS/QHP 2-12 HR W/VEEG: CPT

## 2024-01-01 PROCEDURE — 70496 CT ANGIOGRAPHY HEAD: CPT | Mod: 26,MC

## 2024-01-01 PROCEDURE — 99233 SBSQ HOSP IP/OBS HIGH 50: CPT

## 2024-01-01 PROCEDURE — 99291 CRITICAL CARE FIRST HOUR: CPT | Mod: 25

## 2024-01-01 PROCEDURE — 70498 CT ANGIOGRAPHY NECK: CPT | Mod: 26,MC

## 2024-01-01 PROCEDURE — 93880 EXTRACRANIAL BILAT STUDY: CPT | Mod: 26

## 2024-01-01 PROCEDURE — 93970 EXTREMITY STUDY: CPT | Mod: 26

## 2024-01-01 PROCEDURE — 70553 MRI BRAIN STEM W/O & W/DYE: CPT | Mod: 26

## 2024-01-01 RX ORDER — MINOXIDIL 2.5 MG/1
1 TABLET ORAL
Refills: 0 | DISCHARGE

## 2024-01-01 RX ORDER — BACTERIOSTATIC SODIUM CHLORIDE 0.9 %
500 VIAL (ML) INJECTION ONCE
Refills: 0 | Status: COMPLETED | OUTPATIENT
Start: 2024-01-01 | End: 2024-01-01

## 2024-01-01 RX ORDER — METHYLPREDNISOLONE ACETATE 40 MG/ML
1000 VIAL (ML) INJECTION DAILY
Refills: 0 | Status: COMPLETED | OUTPATIENT
Start: 2024-01-01 | End: 2025-01-01

## 2024-01-01 RX ORDER — DEXMEDETOMIDINE HYDROCHLORIDE 4 UG/ML
0.2 INJECTION, SOLUTION INTRAVENOUS
Qty: 400 | Refills: 0 | Status: DISCONTINUED | OUTPATIENT
Start: 2024-01-01 | End: 2025-01-01

## 2024-01-01 RX ORDER — POTASSIUM CHLORIDE 750 MG/1
40 TABLET, EXTENDED RELEASE ORAL EVERY 4 HOURS
Refills: 0 | Status: COMPLETED | OUTPATIENT
Start: 2024-01-01 | End: 2024-01-01

## 2024-01-01 RX ORDER — KETAMINE HCL IN NACL, ISO-OSM 100MG/10ML
0.2 SYRINGE (ML) INJECTION
Qty: 1000 | Refills: 0 | Status: DISCONTINUED | OUTPATIENT
Start: 2024-01-01 | End: 2024-01-01

## 2024-01-01 RX ORDER — PIPERACILLIN SODIUM AND TAZOBACTAM SODIUM 2; 250 G/50ML; MG/50ML
3.38 INJECTION, POWDER, FOR SOLUTION INTRAVENOUS EVERY 8 HOURS
Refills: 0 | Status: DISCONTINUED | OUTPATIENT
Start: 2024-01-01 | End: 2025-01-01

## 2024-01-01 RX ORDER — PANTOPRAZOLE 20 MG/1
40 TABLET, DELAYED RELEASE ORAL DAILY
Refills: 0 | Status: DISCONTINUED | OUTPATIENT
Start: 2024-01-01 | End: 2025-01-01

## 2024-01-01 RX ORDER — KETAMINE HCL IN NACL, ISO-OSM 100MG/10ML
100 SYRINGE (ML) INJECTION ONCE
Refills: 0 | Status: DISCONTINUED | OUTPATIENT
Start: 2024-01-01 | End: 2024-01-01

## 2024-01-01 RX ORDER — IRON SUCROSE 20 MG/ML
200 INJECTION, SOLUTION INTRAVENOUS EVERY 24 HOURS
Refills: 0 | Status: COMPLETED | OUTPATIENT
Start: 2024-01-01 | End: 2024-01-01

## 2024-01-01 RX ORDER — LEVETIRACETAM 750 MG/1
1000 TABLET, FILM COATED ORAL ONCE
Refills: 0 | Status: DISCONTINUED | OUTPATIENT
Start: 2024-01-01 | End: 2024-01-01

## 2024-01-01 RX ORDER — PERAMPANEL 12 MG/1
2 TABLET ORAL ONCE
Refills: 0 | Status: DISCONTINUED | OUTPATIENT
Start: 2024-01-01 | End: 2024-01-01

## 2024-01-01 RX ORDER — ANTISEPTIC SURGICAL SCRUB 0.04 MG/ML
15 SOLUTION TOPICAL EVERY 12 HOURS
Refills: 0 | Status: DISCONTINUED | OUTPATIENT
Start: 2024-01-01 | End: 2025-01-01

## 2024-01-01 RX ORDER — PERAMPANEL 12 MG/1
2 TABLET ORAL
Refills: 0 | Status: DISCONTINUED | OUTPATIENT
Start: 2024-01-01 | End: 2024-01-01

## 2024-01-01 RX ORDER — GLUCAGON 3 MG/1
1 POWDER NASAL ONCE
Refills: 0 | Status: DISCONTINUED | OUTPATIENT
Start: 2024-01-01 | End: 2024-01-01

## 2024-01-01 RX ORDER — SODIUM CHLORIDE 9 G/ML
1000 INJECTION, SOLUTION INTRAVENOUS
Refills: 0 | Status: DISCONTINUED | OUTPATIENT
Start: 2024-01-01 | End: 2024-01-01

## 2024-01-01 RX ORDER — ENOXAPARIN SODIUM 100 MG/ML
40 INJECTION SUBCUTANEOUS
Refills: 0 | Status: DISCONTINUED | OUTPATIENT
Start: 2024-01-01 | End: 2025-01-01

## 2024-01-01 RX ORDER — LEVETIRACETAM 750 MG/1
1000 TABLET, FILM COATED ORAL EVERY 12 HOURS
Refills: 0 | Status: DISCONTINUED | OUTPATIENT
Start: 2024-01-01 | End: 2024-01-01

## 2024-01-01 RX ORDER — VALPROIC ACID 250 MG
2000 CAPSULE ORAL ONCE
Refills: 0 | Status: COMPLETED | OUTPATIENT
Start: 2024-01-01 | End: 2024-01-01

## 2024-01-01 RX ORDER — CARVEDILOL 6.25 MG
1 TABLET ORAL
Refills: 0 | DISCHARGE

## 2024-01-01 RX ORDER — DM/PSEUDOEPHED/ACETAMINOPHEN 10-30-250
15 CAPSULE ORAL ONCE
Refills: 0 | Status: DISCONTINUED | OUTPATIENT
Start: 2024-01-01 | End: 2024-01-01

## 2024-01-01 RX ORDER — BRIVARACETAM 50 MG/1
100 TABLET, FILM COATED ORAL
Refills: 0 | Status: DISCONTINUED | OUTPATIENT
Start: 2024-01-01 | End: 2024-01-01

## 2024-01-01 RX ORDER — BACTERIOSTATIC SODIUM CHLORIDE 0.9 %
1000 VIAL (ML) INJECTION
Refills: 0 | Status: DISCONTINUED | OUTPATIENT
Start: 2024-01-01 | End: 2024-01-01

## 2024-01-01 RX ORDER — BACTERIOSTATIC SODIUM CHLORIDE 0.9 %
1000 VIAL (ML) INJECTION ONCE
Refills: 0 | Status: COMPLETED | OUTPATIENT
Start: 2024-01-01 | End: 2024-01-01

## 2024-01-01 RX ORDER — MIRTAZAPINE 30 MG/1
1 TABLET, FILM COATED ORAL
Refills: 0 | DISCHARGE

## 2024-01-01 RX ORDER — FENTANYL CITRATE 50 UG/ML
25 INJECTION INTRAMUSCULAR; INTRAVENOUS
Refills: 0 | Status: DISCONTINUED | OUTPATIENT
Start: 2024-01-01 | End: 2025-01-01

## 2024-01-01 RX ORDER — SODIUM PHOSPHATE, DIBASIC, ANHYDROUS, POTASSIUM PHOSPHATE, MONOBASIC, AND SODIUM PHOSPHATE, MONOBASIC, MONOHYDRATE 852; 155; 130 MG/1; MG/1; MG/1
1 TABLET, COATED ORAL ONCE
Refills: 0 | Status: COMPLETED | OUTPATIENT
Start: 2024-01-01 | End: 2024-01-01

## 2024-01-01 RX ORDER — LEVETIRACETAM 750 MG/1
1500 TABLET, FILM COATED ORAL EVERY 12 HOURS
Refills: 0 | Status: DISCONTINUED | OUTPATIENT
Start: 2024-01-01 | End: 2024-01-01

## 2024-01-01 RX ORDER — LACOSAMIDE 200 MG/1
150 TABLET, FILM COATED ORAL
Refills: 0 | Status: DISCONTINUED | OUTPATIENT
Start: 2024-01-01 | End: 2025-01-01

## 2024-01-01 RX ORDER — ATORVASTATIN CALCIUM 80 MG/1
10 TABLET, FILM COATED ORAL AT BEDTIME
Refills: 0 | Status: DISCONTINUED | OUTPATIENT
Start: 2024-01-01 | End: 2024-01-01

## 2024-01-01 RX ORDER — INSULIN LISPRO 100/ML
VIAL (ML) SUBCUTANEOUS EVERY 6 HOURS
Refills: 0 | Status: DISCONTINUED | OUTPATIENT
Start: 2024-01-01 | End: 2025-01-01

## 2024-01-01 RX ORDER — POLYETHYLENE GLYCOL 3350 17 G/17G
17 POWDER, FOR SOLUTION ORAL DAILY
Refills: 0 | Status: DISCONTINUED | OUTPATIENT
Start: 2024-01-01 | End: 2025-01-01

## 2024-01-01 RX ORDER — ACETAMINOPHEN 160 MG/5ML
1000 SUSPENSION ORAL ONCE
Refills: 0 | Status: DISCONTINUED | OUTPATIENT
Start: 2024-01-01 | End: 2025-01-01

## 2024-01-01 RX ORDER — PIPERACILLIN SODIUM AND TAZOBACTAM SODIUM 2; 250 G/50ML; MG/50ML
3.38 INJECTION, POWDER, FOR SOLUTION INTRAVENOUS ONCE
Refills: 0 | Status: COMPLETED | OUTPATIENT
Start: 2024-01-01 | End: 2024-01-01

## 2024-01-01 RX ORDER — DEXMEDETOMIDINE HYDROCHLORIDE 4 UG/ML
0.2 INJECTION, SOLUTION INTRAVENOUS
Qty: 200 | Refills: 0 | Status: DISCONTINUED | OUTPATIENT
Start: 2024-01-01 | End: 2024-01-01

## 2024-01-01 RX ORDER — CALCIUM CARBONATE/VITAMIN D3 600 MG-10
1 TABLET ORAL DAILY
Refills: 0 | Status: DISCONTINUED | OUTPATIENT
Start: 2024-01-01 | End: 2025-01-01

## 2024-01-01 RX ORDER — FERROUS SULFATE 325(65) MG
325 TABLET ORAL DAILY
Refills: 0 | Status: DISCONTINUED | OUTPATIENT
Start: 2024-01-01 | End: 2024-01-01

## 2024-01-01 RX ORDER — VALPROIC ACID 250 MG
250 CAPSULE ORAL EVERY 6 HOURS
Refills: 0 | Status: DISCONTINUED | OUTPATIENT
Start: 2024-01-01 | End: 2024-01-01

## 2024-01-01 RX ORDER — SILDENAFIL CITRATE 100 MG/1
1 TABLET, FILM COATED ORAL
Refills: 0 | DISCHARGE

## 2024-01-01 RX ORDER — CYANOCOBALAMIN (VITAMIN B-12) 1000MCG/ML
1000 VIAL (ML) INJECTION DAILY
Refills: 0 | Status: DISCONTINUED | OUTPATIENT
Start: 2024-01-01 | End: 2025-01-01

## 2024-01-01 RX ORDER — BACTERIOSTATIC SODIUM CHLORIDE 0.9 %
1000 VIAL (ML) INJECTION
Refills: 0 | Status: DISCONTINUED | OUTPATIENT
Start: 2024-01-01 | End: 2025-01-01

## 2024-01-01 RX ORDER — DM/PSEUDOEPHED/ACETAMINOPHEN 10-30-250
12.5 CAPSULE ORAL ONCE
Refills: 0 | Status: DISCONTINUED | OUTPATIENT
Start: 2024-01-01 | End: 2024-01-01

## 2024-01-01 RX ORDER — POTASSIUM CHLORIDE 750 MG/1
40 TABLET, EXTENDED RELEASE ORAL ONCE
Refills: 0 | Status: COMPLETED | OUTPATIENT
Start: 2024-01-01 | End: 2024-01-01

## 2024-01-01 RX ORDER — ASPIRIN 81 MG/1
81 TABLET, COATED ORAL DAILY
Refills: 0 | Status: DISCONTINUED | OUTPATIENT
Start: 2024-01-01 | End: 2024-01-01

## 2024-01-01 RX ORDER — ATORVASTATIN CALCIUM 80 MG/1
1 TABLET, FILM COATED ORAL
Refills: 0 | DISCHARGE

## 2024-01-01 RX ORDER — KETAMINE HCL IN NACL, ISO-OSM 100MG/10ML
0.4 SYRINGE (ML) INJECTION
Qty: 1000 | Refills: 0 | Status: DISCONTINUED | OUTPATIENT
Start: 2024-01-01 | End: 2024-01-01

## 2024-01-01 RX ORDER — ALBUTEROL 90 MCG
2.5 AEROSOL REFILL (GRAM) INHALATION EVERY 6 HOURS
Refills: 0 | Status: DISCONTINUED | OUTPATIENT
Start: 2024-01-01 | End: 2025-01-01

## 2024-01-01 RX ORDER — SENNOSIDES 8.6 MG
2 TABLET ORAL AT BEDTIME
Refills: 0 | Status: DISCONTINUED | OUTPATIENT
Start: 2024-01-01 | End: 2025-01-01

## 2024-01-01 RX ORDER — LABETALOL HYDROCHLORIDE 300 MG/1
10 TABLET, FILM COATED ORAL ONCE
Refills: 0 | Status: COMPLETED | OUTPATIENT
Start: 2024-01-01 | End: 2024-01-01

## 2024-01-01 RX ORDER — PERAMPANEL 12 MG/1
4 TABLET ORAL AT BEDTIME
Refills: 0 | Status: DISCONTINUED | OUTPATIENT
Start: 2025-01-01 | End: 2025-01-01

## 2024-01-01 RX ORDER — LABETALOL HYDROCHLORIDE 300 MG/1
10 TABLET, FILM COATED ORAL
Refills: 0 | Status: DISCONTINUED | OUTPATIENT
Start: 2024-01-01 | End: 2025-01-01

## 2024-01-01 RX ORDER — IPRATROPIUM BROMIDE AND ALBUTEROL SULFATE .5; 2.5 MG/3ML; MG/3ML
3 SOLUTION RESPIRATORY (INHALATION) EVERY 6 HOURS
Refills: 0 | Status: DISCONTINUED | OUTPATIENT
Start: 2024-01-01 | End: 2024-01-01

## 2024-01-01 RX ORDER — CLONIDINE HYDROCHLORIDE 0.2 MG/1
1 TABLET ORAL
Refills: 0 | DISCHARGE

## 2024-01-01 RX ORDER — KETAMINE HCL IN NACL, ISO-OSM 100MG/10ML
15 SYRINGE (ML) INJECTION ONCE
Refills: 0 | Status: DISCONTINUED | OUTPATIENT
Start: 2024-01-01 | End: 2024-01-01

## 2024-01-01 RX ORDER — PROPOFOL 10 MG/ML
5 INJECTION, EMULSION INTRAVENOUS
Qty: 1000 | Refills: 0 | Status: DISCONTINUED | OUTPATIENT
Start: 2024-01-01 | End: 2024-01-01

## 2024-01-01 RX ORDER — BRIVARACETAM 50 MG/1
100 TABLET, FILM COATED ORAL
Refills: 0 | Status: DISCONTINUED | OUTPATIENT
Start: 2024-01-01 | End: 2025-01-01

## 2024-01-01 RX ORDER — LACOSAMIDE 200 MG/1
105 TABLET, FILM COATED ORAL
Refills: 0 | Status: DISCONTINUED | OUTPATIENT
Start: 2024-01-01 | End: 2024-01-01

## 2024-01-01 RX ORDER — CYANOCOBALAMIN (VITAMIN B-12) 1000MCG/ML
1 VIAL (ML) INJECTION
Refills: 0 | DISCHARGE

## 2024-01-01 RX ORDER — CARVEDILOL 6.25 MG
25 TABLET ORAL EVERY 12 HOURS
Refills: 0 | Status: DISCONTINUED | OUTPATIENT
Start: 2024-01-01 | End: 2025-01-01

## 2024-01-01 RX ORDER — FENTANYL CITRATE 50 UG/ML
25 INJECTION INTRAMUSCULAR; INTRAVENOUS ONCE
Refills: 0 | Status: DISCONTINUED | OUTPATIENT
Start: 2024-01-01 | End: 2024-01-01

## 2024-01-01 RX ORDER — KETAMINE HCL IN NACL, ISO-OSM 100MG/10ML
90 SYRINGE (ML) INJECTION ONCE
Refills: 0 | Status: DISCONTINUED | OUTPATIENT
Start: 2024-01-01 | End: 2024-01-01

## 2024-01-01 RX ORDER — SENNOSIDES 8.6 MG
2 TABLET ORAL AT BEDTIME
Refills: 0 | Status: DISCONTINUED | OUTPATIENT
Start: 2024-01-01 | End: 2024-01-01

## 2024-01-01 RX ORDER — ETOMIDATE 2 MG/ML
20 INJECTION, SOLUTION INTRAVENOUS ONCE
Refills: 0 | Status: COMPLETED | OUTPATIENT
Start: 2024-01-01 | End: 2024-01-01

## 2024-01-01 RX ORDER — ACETAMINOPHEN 160 MG/5ML
1000 SUSPENSION ORAL ONCE
Refills: 0 | Status: COMPLETED | OUTPATIENT
Start: 2024-01-01 | End: 2024-01-01

## 2024-01-01 RX ORDER — MAGNESIUM SULFATE 0.8 MEQ/ML
2 AMPUL (ML) INJECTION ONCE
Refills: 0 | Status: COMPLETED | OUTPATIENT
Start: 2024-01-01 | End: 2024-01-01

## 2024-01-01 RX ORDER — LEVETIRACETAM 750 MG/1
750 TABLET, FILM COATED ORAL EVERY 12 HOURS
Refills: 0 | Status: DISCONTINUED | OUTPATIENT
Start: 2024-01-01 | End: 2024-01-01

## 2024-01-01 RX ORDER — PERAMPANEL 12 MG/1
2 TABLET ORAL
Refills: 0 | Status: DISCONTINUED | OUTPATIENT
Start: 2024-01-01 | End: 2025-01-01

## 2024-01-01 RX ORDER — IRON SUCROSE 20 MG/ML
200 INJECTION, SOLUTION INTRAVENOUS
Refills: 0 | Status: DISCONTINUED | OUTPATIENT
Start: 2024-01-01 | End: 2024-01-01

## 2024-01-01 RX ORDER — DM/PSEUDOEPHED/ACETAMINOPHEN 10-30-250
25 CAPSULE ORAL ONCE
Refills: 0 | Status: DISCONTINUED | OUTPATIENT
Start: 2024-01-01 | End: 2024-01-01

## 2024-01-01 RX ORDER — ASPIRIN 81 MG/1
81 TABLET, COATED ORAL DAILY
Refills: 0 | Status: DISCONTINUED | OUTPATIENT
Start: 2024-01-01 | End: 2025-01-01

## 2024-01-01 RX ORDER — LACOSAMIDE 200 MG/1
100 TABLET, FILM COATED ORAL
Refills: 0 | Status: DISCONTINUED | OUTPATIENT
Start: 2024-01-01 | End: 2024-01-01

## 2024-01-01 RX ORDER — AMLODIPINE BESYLATE 5 MG
10 TABLET ORAL DAILY
Refills: 0 | Status: DISCONTINUED | OUTPATIENT
Start: 2024-01-01 | End: 2024-01-01

## 2024-01-01 RX ORDER — PROPOFOL 10 MG/ML
40 INJECTION, EMULSION INTRAVENOUS
Qty: 1000 | Refills: 0 | Status: DISCONTINUED | OUTPATIENT
Start: 2024-01-01 | End: 2024-01-01

## 2024-01-01 RX ORDER — ACETAMINOPHEN 160 MG/5ML
650 SUSPENSION ORAL EVERY 6 HOURS
Refills: 0 | Status: DISCONTINUED | OUTPATIENT
Start: 2024-01-01 | End: 2025-01-01

## 2024-01-01 RX ORDER — ATORVASTATIN CALCIUM 80 MG/1
10 TABLET, FILM COATED ORAL AT BEDTIME
Refills: 0 | Status: DISCONTINUED | OUTPATIENT
Start: 2024-01-01 | End: 2025-01-01

## 2024-01-01 RX ORDER — ATORVASTATIN CALCIUM 80 MG/1
80 TABLET, FILM COATED ORAL AT BEDTIME
Refills: 0 | Status: DISCONTINUED | OUTPATIENT
Start: 2024-01-01 | End: 2024-01-01

## 2024-01-01 RX ORDER — HYDRALAZINE HCL 100 MG
10 TABLET ORAL
Refills: 0 | Status: DISCONTINUED | OUTPATIENT
Start: 2024-01-01 | End: 2025-01-01

## 2024-01-01 RX ORDER — POLYETHYLENE GLYCOL 3350 17 G/17G
17 POWDER, FOR SOLUTION ORAL DAILY
Refills: 0 | Status: DISCONTINUED | OUTPATIENT
Start: 2024-01-01 | End: 2024-01-01

## 2024-01-01 RX ORDER — KETAMINE HCL IN NACL, ISO-OSM 100MG/10ML
0.5 SYRINGE (ML) INJECTION
Qty: 1000 | Refills: 0 | Status: DISCONTINUED | OUTPATIENT
Start: 2024-01-01 | End: 2024-01-01

## 2024-01-01 RX ORDER — INSULIN LISPRO 100/ML
VIAL (ML) SUBCUTANEOUS
Refills: 0 | Status: DISCONTINUED | OUTPATIENT
Start: 2024-01-01 | End: 2024-01-01

## 2024-01-01 RX ORDER — KETAMINE HCL IN NACL, ISO-OSM 100MG/10ML
1 SYRINGE (ML) INJECTION
Qty: 2000 | Refills: 0 | Status: DISCONTINUED | OUTPATIENT
Start: 2024-01-01 | End: 2024-01-01

## 2024-01-01 RX ORDER — KETAMINE HCL IN NACL, ISO-OSM 100MG/10ML
0.5 SYRINGE (ML) INJECTION
Qty: 2000 | Refills: 0 | Status: DISCONTINUED | OUTPATIENT
Start: 2024-01-01 | End: 2024-01-01

## 2024-01-01 RX ORDER — SUCCINYLCHOLINE CHLORIDE 20 MG/ML
100 INJECTION, SOLUTION INTRAMUSCULAR; INTRAVENOUS ONCE
Refills: 0 | Status: COMPLETED | OUTPATIENT
Start: 2024-01-01 | End: 2024-01-01

## 2024-01-01 RX ORDER — CARVEDILOL 6.25 MG
25 TABLET ORAL EVERY 12 HOURS
Refills: 0 | Status: DISCONTINUED | OUTPATIENT
Start: 2024-01-01 | End: 2024-01-01

## 2024-01-01 RX ORDER — KETAMINE HCL IN NACL, ISO-OSM 100MG/10ML
50 SYRINGE (ML) INJECTION ONCE
Refills: 0 | Status: DISCONTINUED | OUTPATIENT
Start: 2024-01-01 | End: 2024-01-01

## 2024-01-01 RX ORDER — DONEPEZIL HYDROCHLORIDE 10 MG/1
1 TABLET, FILM COATED ORAL
Refills: 0 | DISCHARGE

## 2024-01-01 RX ADMIN — ACETAMINOPHEN 650 MILLIGRAM(S): 160 SUSPENSION ORAL at 18:00

## 2024-01-01 RX ADMIN — Medication 10 MILLIGRAM(S): at 05:01

## 2024-01-01 RX ADMIN — LACOSAMIDE 130 MILLIGRAM(S): 200 TABLET, FILM COATED ORAL at 16:51

## 2024-01-01 RX ADMIN — Medication 1000 MICROGRAM(S): at 11:52

## 2024-01-01 RX ADMIN — ACETAMINOPHEN 650 MILLIGRAM(S): 160 SUSPENSION ORAL at 17:11

## 2024-01-01 RX ADMIN — Medication 2 TABLET(S): at 21:42

## 2024-01-01 RX ADMIN — Medication 1000 MICROGRAM(S): at 11:15

## 2024-01-01 RX ADMIN — Medication 2 MILLIGRAM(S): at 11:28

## 2024-01-01 RX ADMIN — Medication 1000 MILLILITER(S): at 10:15

## 2024-01-01 RX ADMIN — BRIVARACETAM 100 MILLIGRAM(S): 50 TABLET, FILM COATED ORAL at 06:10

## 2024-01-01 RX ADMIN — PANTOPRAZOLE 40 MILLIGRAM(S): 20 TABLET, DELAYED RELEASE ORAL at 11:51

## 2024-01-01 RX ADMIN — Medication 1 TABLET(S): at 11:15

## 2024-01-01 RX ADMIN — POLYETHYLENE GLYCOL 3350 17 GRAM(S): 17 POWDER, FOR SOLUTION ORAL at 08:18

## 2024-01-01 RX ADMIN — Medication 1 TABLET(S): at 11:52

## 2024-01-01 RX ADMIN — ATORVASTATIN CALCIUM 10 MILLIGRAM(S): 80 TABLET, FILM COATED ORAL at 21:38

## 2024-01-01 RX ADMIN — POTASSIUM CHLORIDE 40 MILLIEQUIVALENT(S): 750 TABLET, EXTENDED RELEASE ORAL at 05:01

## 2024-01-01 RX ADMIN — SODIUM PHOSPHATE, DIBASIC, ANHYDROUS, POTASSIUM PHOSPHATE, MONOBASIC, AND SODIUM PHOSPHATE, MONOBASIC, MONOHYDRATE 1 PACKET(S): 852; 155; 130 TABLET, COATED ORAL at 06:28

## 2024-01-01 RX ADMIN — LACOSAMIDE 120 MILLIGRAM(S): 200 TABLET, FILM COATED ORAL at 10:06

## 2024-01-01 RX ADMIN — PERAMPANEL 2 MILLIGRAM(S): 12 TABLET ORAL at 16:05

## 2024-01-01 RX ADMIN — Medication 2 TABLET(S): at 21:30

## 2024-01-01 RX ADMIN — Medication 10 MILLIGRAM(S): at 16:38

## 2024-01-01 RX ADMIN — LEVETIRACETAM 1000 MILLIGRAM(S): 750 TABLET, FILM COATED ORAL at 05:15

## 2024-01-01 RX ADMIN — LABETALOL HYDROCHLORIDE 10 MILLIGRAM(S): 300 TABLET, FILM COATED ORAL at 22:15

## 2024-01-01 RX ADMIN — Medication 50 MILLILITER(S): at 10:15

## 2024-01-01 RX ADMIN — Medication 40 MILLIGRAM(S): at 21:55

## 2024-01-01 RX ADMIN — FENTANYL CITRATE 25 MICROGRAM(S): 50 INJECTION INTRAMUSCULAR; INTRAVENOUS at 09:51

## 2024-01-01 RX ADMIN — FENTANYL CITRATE 25 MICROGRAM(S): 50 INJECTION INTRAMUSCULAR; INTRAVENOUS at 13:14

## 2024-01-01 RX ADMIN — IRON SUCROSE 110 MILLIGRAM(S): 20 INJECTION, SOLUTION INTRAVENOUS at 11:46

## 2024-01-01 RX ADMIN — LABETALOL HYDROCHLORIDE 10 MILLIGRAM(S): 300 TABLET, FILM COATED ORAL at 15:47

## 2024-01-01 RX ADMIN — LACOSAMIDE 120 MILLIGRAM(S): 200 TABLET, FILM COATED ORAL at 08:38

## 2024-01-01 RX ADMIN — Medication 25 MILLIGRAM(S): at 05:38

## 2024-01-01 RX ADMIN — ACETAMINOPHEN 400 MILLIGRAM(S): 160 SUSPENSION ORAL at 17:16

## 2024-01-01 RX ADMIN — LEVETIRACETAM 1000 MILLIGRAM(S): 750 TABLET, FILM COATED ORAL at 15:19

## 2024-01-01 RX ADMIN — BRIVARACETAM 100 MILLIGRAM(S): 50 TABLET, FILM COATED ORAL at 05:21

## 2024-01-01 RX ADMIN — Medication 2 TABLET(S): at 21:51

## 2024-01-01 RX ADMIN — Medication 2: at 17:15

## 2024-01-01 RX ADMIN — DEXMEDETOMIDINE HYDROCHLORIDE 2.81 MICROGRAM(S)/KG/HR: 4 INJECTION, SOLUTION INTRAVENOUS at 19:30

## 2024-01-01 RX ADMIN — POLYETHYLENE GLYCOL 3350 17 GRAM(S): 17 POWDER, FOR SOLUTION ORAL at 11:52

## 2024-01-01 RX ADMIN — Medication 50 MILLIGRAM(S): at 05:33

## 2024-01-01 RX ADMIN — PERAMPANEL 2 MILLIGRAM(S): 12 TABLET ORAL at 13:46

## 2024-01-01 RX ADMIN — ATORVASTATIN CALCIUM 10 MILLIGRAM(S): 80 TABLET, FILM COATED ORAL at 21:42

## 2024-01-01 RX ADMIN — PROPOFOL 13.5 MICROGRAM(S)/KG/MIN: 10 INJECTION, EMULSION INTRAVENOUS at 20:23

## 2024-01-01 RX ADMIN — Medication 60 MILLILITER(S): at 22:13

## 2024-01-01 RX ADMIN — Medication 2 TABLET(S): at 22:20

## 2024-01-01 RX ADMIN — FENTANYL CITRATE 25 MICROGRAM(S): 50 INJECTION INTRAMUSCULAR; INTRAVENOUS at 00:35

## 2024-01-01 RX ADMIN — ETOMIDATE 20 MILLIGRAM(S): 2 INJECTION, SOLUTION INTRAVENOUS at 17:18

## 2024-01-01 RX ADMIN — ENOXAPARIN SODIUM 40 MILLIGRAM(S): 100 INJECTION SUBCUTANEOUS at 21:32

## 2024-01-01 RX ADMIN — ANTISEPTIC SURGICAL SCRUB 15 MILLILITER(S): 0.04 SOLUTION TOPICAL at 17:15

## 2024-01-01 RX ADMIN — Medication 25 MILLIGRAM(S): at 05:32

## 2024-01-01 RX ADMIN — ANTISEPTIC SURGICAL SCRUB 15 MILLILITER(S): 0.04 SOLUTION TOPICAL at 05:38

## 2024-01-01 RX ADMIN — Medication 10 MILLIGRAM(S): at 19:47

## 2024-01-01 RX ADMIN — LACOSAMIDE 120 MILLIGRAM(S): 200 TABLET, FILM COATED ORAL at 16:27

## 2024-01-01 RX ADMIN — PANTOPRAZOLE 40 MILLIGRAM(S): 20 TABLET, DELAYED RELEASE ORAL at 12:29

## 2024-01-01 RX ADMIN — ANTISEPTIC SURGICAL SCRUB 15 MILLILITER(S): 0.04 SOLUTION TOPICAL at 17:10

## 2024-01-01 RX ADMIN — PERAMPANEL 2 MILLIGRAM(S): 12 TABLET ORAL at 05:21

## 2024-01-01 RX ADMIN — Medication 10 MILLIGRAM(S): at 11:03

## 2024-01-01 RX ADMIN — IRON SUCROSE 110 MILLIGRAM(S): 20 INJECTION, SOLUTION INTRAVENOUS at 11:16

## 2024-01-01 RX ADMIN — IRON SUCROSE 110 MILLIGRAM(S): 20 INJECTION, SOLUTION INTRAVENOUS at 11:10

## 2024-01-01 RX ADMIN — POTASSIUM CHLORIDE 40 MILLIEQUIVALENT(S): 750 TABLET, EXTENDED RELEASE ORAL at 08:19

## 2024-01-01 RX ADMIN — LEVETIRACETAM 750 MILLIGRAM(S): 750 TABLET, FILM COATED ORAL at 17:04

## 2024-01-01 RX ADMIN — Medication 70 MILLIGRAM(S): at 11:27

## 2024-01-01 RX ADMIN — LEVETIRACETAM 1500 MILLIGRAM(S): 750 TABLET, FILM COATED ORAL at 17:10

## 2024-01-01 RX ADMIN — FENTANYL CITRATE 25 MICROGRAM(S): 50 INJECTION INTRAMUSCULAR; INTRAVENOUS at 17:13

## 2024-01-01 RX ADMIN — POLYETHYLENE GLYCOL 3350 17 GRAM(S): 17 POWDER, FOR SOLUTION ORAL at 11:11

## 2024-01-01 RX ADMIN — LACOSAMIDE 120 MILLIGRAM(S): 200 TABLET, FILM COATED ORAL at 23:36

## 2024-01-01 RX ADMIN — Medication 2: at 17:26

## 2024-01-01 RX ADMIN — Medication 100 MILLIGRAM(S): at 11:45

## 2024-01-01 RX ADMIN — Medication 60 MILLILITER(S): at 17:01

## 2024-01-01 RX ADMIN — POLYETHYLENE GLYCOL 3350 17 GRAM(S): 17 POWDER, FOR SOLUTION ORAL at 11:46

## 2024-01-01 RX ADMIN — ACETAMINOPHEN 650 MILLIGRAM(S): 160 SUSPENSION ORAL at 23:30

## 2024-01-01 RX ADMIN — Medication 25 MILLIGRAM(S): at 05:03

## 2024-01-01 RX ADMIN — LEVETIRACETAM 1500 MILLIGRAM(S): 750 TABLET, FILM COATED ORAL at 16:12

## 2024-01-01 RX ADMIN — Medication 50 MILLILITER(S): at 11:12

## 2024-01-01 RX ADMIN — IRON SUCROSE 110 MILLIGRAM(S): 20 INJECTION, SOLUTION INTRAVENOUS at 11:52

## 2024-01-01 RX ADMIN — ENOXAPARIN SODIUM 40 MILLIGRAM(S): 100 INJECTION SUBCUTANEOUS at 21:07

## 2024-01-01 RX ADMIN — ANTISEPTIC SURGICAL SCRUB 15 MILLILITER(S): 0.04 SOLUTION TOPICAL at 17:00

## 2024-01-01 RX ADMIN — ANTISEPTIC SURGICAL SCRUB 15 MILLILITER(S): 0.04 SOLUTION TOPICAL at 06:10

## 2024-01-01 RX ADMIN — PROPOFOL 13.5 MICROGRAM(S)/KG/MIN: 10 INJECTION, EMULSION INTRAVENOUS at 05:50

## 2024-01-01 RX ADMIN — Medication 50 MILLIGRAM(S): at 11:54

## 2024-01-01 RX ADMIN — POTASSIUM CHLORIDE 40 MILLIEQUIVALENT(S): 750 TABLET, EXTENDED RELEASE ORAL at 05:03

## 2024-01-01 RX ADMIN — Medication 2 TABLET(S): at 22:14

## 2024-01-01 RX ADMIN — ANTISEPTIC SURGICAL SCRUB 15 MILLILITER(S): 0.04 SOLUTION TOPICAL at 17:14

## 2024-01-01 RX ADMIN — ENOXAPARIN SODIUM 40 MILLIGRAM(S): 100 INJECTION SUBCUTANEOUS at 21:41

## 2024-01-01 RX ADMIN — ASPIRIN 81 MILLIGRAM(S): 81 TABLET, COATED ORAL at 11:52

## 2024-01-01 RX ADMIN — Medication 1000 MICROGRAM(S): at 11:01

## 2024-01-01 RX ADMIN — ASPIRIN 81 MILLIGRAM(S): 81 TABLET, COATED ORAL at 11:14

## 2024-01-01 RX ADMIN — Medication 5.61 MG/KG/HR: at 16:41

## 2024-01-01 RX ADMIN — ANTISEPTIC SURGICAL SCRUB 15 MILLILITER(S): 0.04 SOLUTION TOPICAL at 05:01

## 2024-01-01 RX ADMIN — PIPERACILLIN SODIUM AND TAZOBACTAM SODIUM 25 GRAM(S): 2; 250 INJECTION, POWDER, FOR SOLUTION INTRAVENOUS at 06:26

## 2024-01-01 RX ADMIN — PROPOFOL 1.77 MICROGRAM(S)/KG/MIN: 10 INJECTION, EMULSION INTRAVENOUS at 20:45

## 2024-01-01 RX ADMIN — LEVETIRACETAM 1000 MILLIGRAM(S): 750 TABLET, FILM COATED ORAL at 06:21

## 2024-01-01 RX ADMIN — Medication 325 MILLIGRAM(S): at 16:25

## 2024-01-01 RX ADMIN — ATORVASTATIN CALCIUM 10 MILLIGRAM(S): 80 TABLET, FILM COATED ORAL at 21:29

## 2024-01-01 RX ADMIN — PERAMPANEL 2 MILLIGRAM(S): 12 TABLET ORAL at 22:20

## 2024-01-01 RX ADMIN — Medication 325 MILLIGRAM(S): at 12:29

## 2024-01-01 RX ADMIN — LEVETIRACETAM 1500 MILLIGRAM(S): 750 TABLET, FILM COATED ORAL at 03:44

## 2024-01-01 RX ADMIN — Medication 1000 MICROGRAM(S): at 16:26

## 2024-01-01 RX ADMIN — ACETAMINOPHEN 650 MILLIGRAM(S): 160 SUSPENSION ORAL at 22:30

## 2024-01-01 RX ADMIN — ANTISEPTIC SURGICAL SCRUB 15 MILLILITER(S): 0.04 SOLUTION TOPICAL at 05:32

## 2024-01-01 RX ADMIN — SUCCINYLCHOLINE CHLORIDE 100 MILLIGRAM(S): 20 INJECTION, SOLUTION INTRAMUSCULAR; INTRAVENOUS at 17:18

## 2024-01-01 RX ADMIN — FENTANYL CITRATE 25 MICROGRAM(S): 50 INJECTION INTRAMUSCULAR; INTRAVENOUS at 20:52

## 2024-01-01 RX ADMIN — Medication 25 MILLIGRAM(S): at 17:10

## 2024-01-01 RX ADMIN — BRIVARACETAM 100 MILLIGRAM(S): 50 TABLET, FILM COATED ORAL at 17:14

## 2024-01-01 RX ADMIN — ATORVASTATIN CALCIUM 10 MILLIGRAM(S): 80 TABLET, FILM COATED ORAL at 21:06

## 2024-01-01 RX ADMIN — POLYETHYLENE GLYCOL 3350 17 GRAM(S): 17 POWDER, FOR SOLUTION ORAL at 11:15

## 2024-01-01 RX ADMIN — ATORVASTATIN CALCIUM 10 MILLIGRAM(S): 80 TABLET, FILM COATED ORAL at 21:55

## 2024-01-01 RX ADMIN — Medication 1000 MILLILITER(S): at 06:21

## 2024-01-01 RX ADMIN — DEXMEDETOMIDINE HYDROCHLORIDE 2.81 MICROGRAM(S)/KG/HR: 4 INJECTION, SOLUTION INTRAVENOUS at 12:28

## 2024-01-01 RX ADMIN — ATORVASTATIN CALCIUM 10 MILLIGRAM(S): 80 TABLET, FILM COATED ORAL at 22:20

## 2024-01-01 RX ADMIN — ATORVASTATIN CALCIUM 10 MILLIGRAM(S): 80 TABLET, FILM COATED ORAL at 21:32

## 2024-01-01 RX ADMIN — PROPOFOL 13.5 MICROGRAM(S)/KG/MIN: 10 INJECTION, EMULSION INTRAVENOUS at 03:46

## 2024-01-01 RX ADMIN — FENTANYL CITRATE 25 MICROGRAM(S): 50 INJECTION INTRAMUSCULAR; INTRAVENOUS at 16:00

## 2024-01-01 RX ADMIN — LACOSAMIDE 120 MILLIGRAM(S): 200 TABLET, FILM COATED ORAL at 11:10

## 2024-01-01 RX ADMIN — Medication 10 MILLIGRAM(S): at 21:51

## 2024-01-01 RX ADMIN — Medication 1 TABLET(S): at 16:26

## 2024-01-01 RX ADMIN — ACETAMINOPHEN 650 MILLIGRAM(S): 160 SUSPENSION ORAL at 16:11

## 2024-01-01 RX ADMIN — LEVETIRACETAM 750 MILLIGRAM(S): 750 TABLET, FILM COATED ORAL at 05:32

## 2024-01-01 RX ADMIN — Medication 2 MILLIGRAM(S): at 13:27

## 2024-01-01 RX ADMIN — ASPIRIN 81 MILLIGRAM(S): 81 TABLET, COATED ORAL at 11:11

## 2024-01-01 RX ADMIN — Medication 25 MILLIGRAM(S): at 17:36

## 2024-01-01 RX ADMIN — ANTISEPTIC SURGICAL SCRUB 15 MILLILITER(S): 0.04 SOLUTION TOPICAL at 18:00

## 2024-01-01 RX ADMIN — PANTOPRAZOLE 40 MILLIGRAM(S): 20 TABLET, DELAYED RELEASE ORAL at 11:14

## 2024-01-01 RX ADMIN — Medication 52.5 MILLIGRAM(S): at 17:17

## 2024-01-01 RX ADMIN — Medication 25 MILLIGRAM(S): at 06:10

## 2024-01-01 RX ADMIN — ANTISEPTIC SURGICAL SCRUB 15 MILLILITER(S): 0.04 SOLUTION TOPICAL at 05:22

## 2024-01-01 RX ADMIN — ENOXAPARIN SODIUM 40 MILLIGRAM(S): 100 INJECTION SUBCUTANEOUS at 21:42

## 2024-01-01 RX ADMIN — LACOSAMIDE 120 MILLIGRAM(S): 200 TABLET, FILM COATED ORAL at 01:55

## 2024-01-01 RX ADMIN — ENOXAPARIN SODIUM 40 MILLIGRAM(S): 100 INJECTION SUBCUTANEOUS at 21:30

## 2024-01-01 RX ADMIN — Medication 52.5 MILLIGRAM(S): at 05:31

## 2024-01-01 RX ADMIN — Medication 1000 MICROGRAM(S): at 11:46

## 2024-01-01 RX ADMIN — ACETAMINOPHEN 650 MILLIGRAM(S): 160 SUSPENSION ORAL at 21:38

## 2024-01-01 RX ADMIN — PANTOPRAZOLE 40 MILLIGRAM(S): 20 TABLET, DELAYED RELEASE ORAL at 11:47

## 2024-01-01 RX ADMIN — PERAMPANEL 2 MILLIGRAM(S): 12 TABLET ORAL at 21:32

## 2024-01-01 RX ADMIN — PERAMPANEL 2 MILLIGRAM(S): 12 TABLET ORAL at 21:29

## 2024-01-01 RX ADMIN — FENTANYL CITRATE 25 MICROGRAM(S): 50 INJECTION INTRAMUSCULAR; INTRAVENOUS at 15:13

## 2024-01-01 RX ADMIN — Medication 10 MILLIGRAM(S): at 01:47

## 2024-01-01 RX ADMIN — LACOSAMIDE 120 MILLIGRAM(S): 200 TABLET, FILM COATED ORAL at 17:13

## 2024-01-01 RX ADMIN — ENOXAPARIN SODIUM 40 MILLIGRAM(S): 100 INJECTION SUBCUTANEOUS at 22:20

## 2024-01-01 RX ADMIN — FENTANYL CITRATE 25 MICROGRAM(S): 50 INJECTION INTRAMUSCULAR; INTRAVENOUS at 13:24

## 2024-01-01 RX ADMIN — ANTISEPTIC SURGICAL SCRUB 15 MILLILITER(S): 0.04 SOLUTION TOPICAL at 16:26

## 2024-01-01 RX ADMIN — Medication 1 TABLET(S): at 11:01

## 2024-01-01 RX ADMIN — ACETAMINOPHEN 650 MILLIGRAM(S): 160 SUSPENSION ORAL at 22:35

## 2024-01-01 RX ADMIN — Medication 2: at 10:44

## 2024-01-01 RX ADMIN — Medication 25 MILLIGRAM(S): at 17:01

## 2024-01-01 RX ADMIN — Medication 50 MILLIGRAM(S): at 05:21

## 2024-01-01 RX ADMIN — ANTISEPTIC SURGICAL SCRUB 15 MILLILITER(S): 0.04 SOLUTION TOPICAL at 05:31

## 2024-01-01 RX ADMIN — Medication 25 MILLIGRAM(S): at 17:15

## 2024-01-01 RX ADMIN — LEVETIRACETAM 1000 MILLIGRAM(S): 750 TABLET, FILM COATED ORAL at 04:00

## 2024-01-01 RX ADMIN — Medication 1 TABLET(S): at 11:11

## 2024-01-01 RX ADMIN — Medication 50 MILLILITER(S): at 08:39

## 2024-01-01 RX ADMIN — LEVETIRACETAM 1500 MILLIGRAM(S): 750 TABLET, FILM COATED ORAL at 05:38

## 2024-01-01 RX ADMIN — Medication 1 TABLET(S): at 11:46

## 2024-01-01 RX ADMIN — Medication 25 GRAM(S): at 05:01

## 2024-01-01 RX ADMIN — ANTISEPTIC SURGICAL SCRUB 15 MILLILITER(S): 0.04 SOLUTION TOPICAL at 05:03

## 2024-01-01 RX ADMIN — Medication 1000 MILLILITER(S): at 15:19

## 2024-01-01 RX ADMIN — Medication 2 MILLIGRAM(S): at 05:56

## 2024-01-01 RX ADMIN — PIPERACILLIN SODIUM AND TAZOBACTAM SODIUM 25 GRAM(S): 2; 250 INJECTION, POWDER, FOR SOLUTION INTRAVENOUS at 21:29

## 2024-01-01 RX ADMIN — LACOSAMIDE 120 MILLIGRAM(S): 200 TABLET, FILM COATED ORAL at 17:56

## 2024-01-01 RX ADMIN — POLYETHYLENE GLYCOL 3350 17 GRAM(S): 17 POWDER, FOR SOLUTION ORAL at 11:06

## 2024-01-01 RX ADMIN — BRIVARACETAM 100 MILLIGRAM(S): 50 TABLET, FILM COATED ORAL at 17:15

## 2024-01-01 RX ADMIN — ENOXAPARIN SODIUM 40 MILLIGRAM(S): 100 INJECTION SUBCUTANEOUS at 22:21

## 2024-01-01 RX ADMIN — Medication 2.81 MG/KG/HR: at 19:47

## 2024-01-01 RX ADMIN — Medication 52.5 MILLIGRAM(S): at 01:19

## 2024-01-01 RX ADMIN — Medication 2: at 18:11

## 2024-01-01 RX ADMIN — Medication 90 MILLIGRAM(S): at 16:40

## 2024-01-01 RX ADMIN — ANTISEPTIC SURGICAL SCRUB 15 MILLILITER(S): 0.04 SOLUTION TOPICAL at 17:36

## 2024-01-01 RX ADMIN — PIPERACILLIN SODIUM AND TAZOBACTAM SODIUM 200 GRAM(S): 2; 250 INJECTION, POWDER, FOR SOLUTION INTRAVENOUS at 19:48

## 2024-01-01 RX ADMIN — Medication 25 MILLIGRAM(S): at 05:21

## 2024-01-01 RX ADMIN — Medication 50 MILLIGRAM(S): at 14:02

## 2024-01-01 RX ADMIN — Medication 40 MILLIGRAM(S): at 08:18

## 2024-01-01 RX ADMIN — Medication 10 MILLIGRAM(S): at 02:04

## 2024-01-01 RX ADMIN — Medication 2 TABLET(S): at 21:06

## 2024-01-01 RX ADMIN — PANTOPRAZOLE 40 MILLIGRAM(S): 20 TABLET, DELAYED RELEASE ORAL at 11:07

## 2024-01-01 RX ADMIN — Medication 500 MILLILITER(S): at 11:00

## 2024-01-01 RX ADMIN — Medication 2 MILLIGRAM(S): at 05:08

## 2024-01-01 RX ADMIN — PROPOFOL 1.77 MICROGRAM(S)/KG/MIN: 10 INJECTION, EMULSION INTRAVENOUS at 22:36

## 2024-01-01 RX ADMIN — BRIVARACETAM 100 MILLIGRAM(S): 50 TABLET, FILM COATED ORAL at 05:32

## 2024-01-01 RX ADMIN — ATORVASTATIN CALCIUM 10 MILLIGRAM(S): 80 TABLET, FILM COATED ORAL at 22:13

## 2024-01-01 RX ADMIN — BRIVARACETAM 100 MILLIGRAM(S): 50 TABLET, FILM COATED ORAL at 12:42

## 2024-01-01 RX ADMIN — PERAMPANEL 2 MILLIGRAM(S): 12 TABLET ORAL at 13:07

## 2024-01-01 RX ADMIN — ACETAMINOPHEN 650 MILLIGRAM(S): 160 SUSPENSION ORAL at 17:14

## 2024-01-01 RX ADMIN — PANTOPRAZOLE 40 MILLIGRAM(S): 20 TABLET, DELAYED RELEASE ORAL at 11:11

## 2024-01-01 RX ADMIN — Medication 25 MILLIGRAM(S): at 17:14

## 2024-01-01 RX ADMIN — Medication 1000 MICROGRAM(S): at 12:29

## 2024-01-01 RX ADMIN — PIPERACILLIN SODIUM AND TAZOBACTAM SODIUM 25 GRAM(S): 2; 250 INJECTION, POWDER, FOR SOLUTION INTRAVENOUS at 13:46

## 2024-01-01 RX ADMIN — PIPERACILLIN SODIUM AND TAZOBACTAM SODIUM 25 GRAM(S): 2; 250 INJECTION, POWDER, FOR SOLUTION INTRAVENOUS at 22:45

## 2024-01-01 RX ADMIN — Medication 1 TABLET(S): at 12:43

## 2024-01-01 RX ADMIN — PERAMPANEL 2 MILLIGRAM(S): 12 TABLET ORAL at 05:32

## 2024-01-01 RX ADMIN — Medication 2 TABLET(S): at 21:32

## 2024-01-01 RX ADMIN — ASPIRIN 81 MILLIGRAM(S): 81 TABLET, COATED ORAL at 16:26

## 2024-01-01 RX ADMIN — Medication 2.5 MILLIGRAM(S): at 20:57

## 2024-01-01 RX ADMIN — Medication 1000 MICROGRAM(S): at 11:12

## 2024-01-01 RX ADMIN — BRIVARACETAM 100 MILLIGRAM(S): 50 TABLET, FILM COATED ORAL at 17:56

## 2024-01-01 RX ADMIN — ACETAMINOPHEN 1000 MILLIGRAM(S): 160 SUSPENSION ORAL at 17:39

## 2024-01-01 RX ADMIN — PANTOPRAZOLE 40 MILLIGRAM(S): 20 TABLET, DELAYED RELEASE ORAL at 08:18

## 2024-01-01 RX ADMIN — Medication 25 MILLIGRAM(S): at 18:01

## 2024-01-01 RX ADMIN — Medication 2.5 MILLIGRAM(S): at 11:58

## 2024-01-01 RX ADMIN — ASPIRIN 81 MILLIGRAM(S): 81 TABLET, COATED ORAL at 11:06

## 2024-01-01 RX ADMIN — IRON SUCROSE 110 MILLIGRAM(S): 20 INJECTION, SOLUTION INTRAVENOUS at 11:02

## 2024-01-01 RX ADMIN — FENTANYL CITRATE 25 MICROGRAM(S): 50 INJECTION INTRAMUSCULAR; INTRAVENOUS at 10:51

## 2024-04-05 ENCOUNTER — OUTPATIENT (OUTPATIENT)
Dept: OUTPATIENT SERVICES | Facility: HOSPITAL | Age: 84
LOS: 1 days | Discharge: HOME | End: 2024-04-05

## 2024-04-05 ENCOUNTER — APPOINTMENT (OUTPATIENT)
Dept: RHEUMATOLOGY | Facility: CLINIC | Age: 84
End: 2024-04-05
Payer: MEDICARE

## 2024-04-05 VITALS
TEMPERATURE: 98.1 F | BODY MASS INDEX: 21.09 KG/M2 | RESPIRATION RATE: 17 BRPM | SYSTOLIC BLOOD PRESSURE: 170 MMHG | HEART RATE: 70 BPM | WEIGHT: 119 LBS | DIASTOLIC BLOOD PRESSURE: 90 MMHG | OXYGEN SATURATION: 98 % | HEIGHT: 63 IN

## 2024-04-05 DIAGNOSIS — Z90.12 ACQUIRED ABSENCE OF LEFT BREAST AND NIPPLE: Chronic | ICD-10-CM

## 2024-04-05 DIAGNOSIS — Z82.49 FAMILY HISTORY OF ISCHEMIC HEART DISEASE AND OTHER DISEASES OF THE CIRCULATORY SYSTEM: ICD-10-CM

## 2024-04-05 DIAGNOSIS — Z86.39 PERSONAL HISTORY OF OTHER ENDOCRINE, NUTRITIONAL AND METABOLIC DISEASE: ICD-10-CM

## 2024-04-05 DIAGNOSIS — Z86.79 PERSONAL HISTORY OF OTHER DISEASES OF THE CIRCULATORY SYSTEM: ICD-10-CM

## 2024-04-05 DIAGNOSIS — Z90.710 ACQUIRED ABSENCE OF BOTH CERVIX AND UTERUS: Chronic | ICD-10-CM

## 2024-04-05 DIAGNOSIS — Z78.9 OTHER SPECIFIED HEALTH STATUS: ICD-10-CM

## 2024-04-05 DIAGNOSIS — C50.919 MALIGNANT NEOPLASM OF UNSPECIFIED SITE OF UNSPECIFIED FEMALE BREAST: ICD-10-CM

## 2024-04-05 PROCEDURE — 99204 OFFICE O/P NEW MOD 45 MIN: CPT

## 2024-04-05 RX ORDER — SIMVASTATIN 20 MG/1
20 TABLET, FILM COATED ORAL
Qty: 30 | Refills: 5 | Status: DISCONTINUED | COMMUNITY
Start: 2017-01-16 | End: 2024-04-05

## 2024-04-09 PROBLEM — Z78.9 NON-SMOKER: Status: ACTIVE | Noted: 2024-04-05

## 2024-04-09 PROBLEM — Z86.79 HISTORY OF HYPERTENSION: Status: RESOLVED | Noted: 2024-04-05 | Resolved: 2024-04-09

## 2024-04-09 PROBLEM — Z82.49 FAMILY HISTORY OF HYPERTENSION: Status: ACTIVE | Noted: 2024-04-05

## 2024-04-09 PROBLEM — Z86.39 HISTORY OF HIGH CHOLESTEROL: Status: RESOLVED | Noted: 2024-04-05 | Resolved: 2024-04-09

## 2024-04-09 NOTE — ASSESSMENT
[FreeTextEntry1] : Elevation in RF OA/ degenerative disease  ROS, PE otherwise unremarkable for inflammatory arthritis  - labs as below. will call pt with results - NSAIDS/Tylenol prn for pain (reviewed risk and benefits of medications) - OTC topical analgesics - encouraged exercise and weight loss  Discussed treatment plan with the patient. The patient was given the opportunity to ask questions and all questions were answered to their satisfaction.

## 2024-04-09 NOTE — HISTORY OF PRESENT ILLNESS
[FreeTextEntry1] : 84 year old female with PMH as listed below presents today for an initial evaluation for abnormal labs  Found to have mild elevation in RF as part of routine work up by PCP Today reports feeling well overall.  No significant joint pain/swelling/stiffness to the joints. No falls.   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  Takes Tylenol prn for pain. Not currently taking any medications as she reports feeling okay  FH: denies FH of autoimmune disease  denies fever, chills, oral/nasal ulcers, chest pain, chest palpitations, denies sob, denies nausea, vomiting, abdominal pain, diarrhea, constipation, blood in stool, denies dysuria, blood in the urine, denies joint pain, joint swelling, muscle pain, muscle weakness, denies rashes, photosensitivity, hair loss, skin thickening, denies blood clots,  raynauds  Chart/ labs reviewed.  Wishes for labs to be repeated today.

## 2024-04-15 ENCOUNTER — APPOINTMENT (OUTPATIENT)
Dept: HEMATOLOGY ONCOLOGY | Facility: CLINIC | Age: 84
End: 2024-04-15
Payer: MEDICARE

## 2024-04-15 ENCOUNTER — RESULT REVIEW (OUTPATIENT)
Age: 84
End: 2024-04-15

## 2024-04-15 VITALS
SYSTOLIC BLOOD PRESSURE: 151 MMHG | BODY MASS INDEX: 21.26 KG/M2 | HEIGHT: 63 IN | DIASTOLIC BLOOD PRESSURE: 71 MMHG | TEMPERATURE: 97.5 F | WEIGHT: 120 LBS | OXYGEN SATURATION: 97 % | HEART RATE: 68 BPM | RESPIRATION RATE: 17 BRPM

## 2024-04-15 DIAGNOSIS — C50.912 MALIGNANT NEOPLASM OF UNSPECIFIED SITE OF LEFT FEMALE BREAST: ICD-10-CM

## 2024-04-15 DIAGNOSIS — D64.9 ANEMIA, UNSPECIFIED: ICD-10-CM

## 2024-04-15 LAB
BASOPHILS # BLD AUTO: 0.2 K/UL — SIGNIFICANT CHANGE UP (ref 0–0.2)
BASOPHILS NFR BLD AUTO: 2.9 % — HIGH (ref 0–2)
EOSINOPHIL # BLD AUTO: 0.2 K/UL — SIGNIFICANT CHANGE UP (ref 0–0.5)
EOSINOPHIL NFR BLD AUTO: 3 % — SIGNIFICANT CHANGE UP (ref 0–6)
HCT VFR BLD CALC: 34.7 % — SIGNIFICANT CHANGE UP (ref 34.5–45)
HGB BLD-MCNC: 11.2 G/DL — LOW (ref 11.5–15.5)
LYMPHOCYTES # BLD AUTO: 1.4 K/UL — SIGNIFICANT CHANGE UP (ref 1–3.3)
LYMPHOCYTES # BLD AUTO: 25.4 % — SIGNIFICANT CHANGE UP (ref 13–44)
MCHC RBC-ENTMCNC: 29.4 PG — SIGNIFICANT CHANGE UP (ref 27–34)
MCHC RBC-ENTMCNC: 32.4 G/DL — SIGNIFICANT CHANGE UP (ref 32–36)
MCV RBC AUTO: 90.9 FL — SIGNIFICANT CHANGE UP (ref 80–100)
MONOCYTES # BLD AUTO: 0.7 K/UL — SIGNIFICANT CHANGE UP (ref 0–0.9)
MONOCYTES NFR BLD AUTO: 13.3 % — SIGNIFICANT CHANGE UP (ref 2–14)
NEUTROPHILS # BLD AUTO: 3 K/UL — SIGNIFICANT CHANGE UP (ref 1.8–7.4)
NEUTROPHILS NFR BLD AUTO: 55.4 % — SIGNIFICANT CHANGE UP (ref 43–77)
PLATELET # BLD AUTO: 183 K/UL — SIGNIFICANT CHANGE UP (ref 150–400)
RBC # BLD: 3.82 M/UL — SIGNIFICANT CHANGE UP (ref 3.8–5.2)
RBC # FLD: 12.9 % — SIGNIFICANT CHANGE UP (ref 10.3–14.5)
WBC # BLD: 5.4 K/UL — SIGNIFICANT CHANGE UP (ref 3.8–10.5)
WBC # FLD AUTO: 5.4 K/UL — SIGNIFICANT CHANGE UP (ref 3.8–10.5)

## 2024-04-15 PROCEDURE — 99214 OFFICE O/P EST MOD 30 MIN: CPT

## 2024-04-15 RX ORDER — LOSARTAN POTASSIUM 100 MG/1
100 TABLET, FILM COATED ORAL
Refills: 0 | Status: ACTIVE | COMMUNITY

## 2024-04-15 RX ORDER — ATORVASTATIN CALCIUM 10 MG/1
10 TABLET, FILM COATED ORAL
Refills: 0 | Status: ACTIVE | COMMUNITY

## 2024-04-15 RX ORDER — HYDRALAZINE HYDROCHLORIDE 100 MG/1
100 TABLET ORAL
Refills: 0 | Status: ACTIVE | COMMUNITY

## 2024-04-15 RX ORDER — LOSARTAN POTASSIUM 50 MG/1
50 TABLET, FILM COATED ORAL
Refills: 0 | Status: ACTIVE | COMMUNITY

## 2024-04-15 RX ORDER — ACETAMINOPHEN/DIPHENHYDRAMINE 500MG-25MG
1000 TABLET ORAL
Refills: 0 | Status: ACTIVE | COMMUNITY

## 2024-04-15 RX ORDER — SILDENAFIL 20 MG/1
20 TABLET ORAL
Refills: 0 | Status: ACTIVE | COMMUNITY

## 2024-04-15 NOTE — ASSESSMENT
[FreeTextEntry1] : 83 year old female with history of left breast triple negative breast cancer in 2018, T2N0, underwent left mastectomy on 12/14/18, pathology demonstrated invasive mammary duct carcinoma, intermediate grade, measuring 2.6cm, Deep surgical resection margin-corresponding to the chest wall, is positive and anterior surgical resection margin is positive. DCIS, intermediate nuclear grade, 0/2 lymph nodes negative. S/p adjuvant 4 cycles of Taxotere and Cytoxan on 5/30/19 followed by RT - a total dose of 5000 cGy to the left chest wall. 11/30/22 R mammo/sono - ANCA 10/12/2023 Today's CBC: WBC 6.0 K, HGB 11.8 g, HCT 36.4 %,  K 12/1/23 Right Mammo/Sono-[facility]CA  Reviewed 4/15/24 CBC from today: WBC 5.4 Hgb 11.2 HCT 34.7  MCV 90.9   Plan: -Hgb 10.9 on 4/5, 11.2 today continue oral iron, pending TIBC, Ferritin, B12, folate - Right breast Mammo/Sono due end of 11/24 -In May 2024, Ms. Dave will be 5 years post chemotherapy, yearly follow up after that.   -Continue follow up with Dr. Schwartz for preventive care.   RTO in 1 year

## 2024-04-15 NOTE — HISTORY OF PRESENT ILLNESS
[0 - No Distress] : Distress Level: 0 [ECOG Performance Status: 0 - Fully active, able to carry on all pre-disease performance without restriction] : Performance Status: 0 - Fully active, able to carry on all pre-disease performance without restriction [de-identified] :  81 year old female following up for invasive ductal carcinoma of the left breast. Originally discovered an irregular mass via ultrasound, and then completed an MRI and left core needle biopsy. 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 She then underwent a left breast mastectomy on 12/14/18 with left SLNB and oncoplastic closure, however, margins were positive. Initiated treatment with Taxotere/Cytoxan chemotherapy, and received 5000 cGy to left chest radiation with Dr. Lomeli afterwards due to positive margins.  Pathology report on 11/6/18 of ultrasound guided left breast core needle biopsy revealed: infiltrating ductal carcinoma, [place] score = 6/9, positive E-Cadherin staining, ER/DE negative, HER2 negative  Pathology report on 12/21/18 of left breast short superior long lateral mastectomy revealed: ([id number]) invasive mammary duct carcinoma, histological grade 2/3, SBR score 6/9, measuring 2.6 cm in length. Deep surgical resection margin is positive. Anterior surgical resection margin is positive. Both anterior and posterior positive margins belong to upper outer quadrant. Ductal carcinoma in situ, intermediate nuclear grade, solid/cribriform, with calcifications and necrosis. Two sentinel lymph nodes negative for metastatic carcinoma.  PCP : Dr. Lola Schwartz  fax # [phone] [de-identified] : Returns for follow up visit accompanied by friend Feels well. No recent hospitalization or recent illness. Reports mild fatigue  Denies headache, dizziness, sob, chest pain, nausea, vomiting, abdominal pain, diarrhea, constipation, or urinary symptoms. On ASA, miralax prn, po iron  Reports she had a positive FOBT with PCP, but repeat test came negative.

## 2024-04-16 LAB
FERRITIN SERPL-MCNC: 747 NG/ML
FOLATE SERPL-MCNC: >20 NG/ML
IRON SATN MFR SERPL: 29 %
IRON SERPL-MCNC: 64 UG/DL
TIBC SERPL-MCNC: 222 UG/DL
UIBC SERPL-MCNC: 158 UG/DL
VIT B12 SERPL-MCNC: >2000 PG/ML

## 2024-04-19 ENCOUNTER — APPOINTMENT (OUTPATIENT)
Dept: RHEUMATOLOGY | Facility: CLINIC | Age: 84
End: 2024-04-19
Payer: MEDICARE

## 2024-04-19 VITALS
DIASTOLIC BLOOD PRESSURE: 82 MMHG | HEIGHT: 63 IN | OXYGEN SATURATION: 98 % | HEART RATE: 82 BPM | TEMPERATURE: 98.4 F | SYSTOLIC BLOOD PRESSURE: 132 MMHG

## 2024-04-19 DIAGNOSIS — R76.0 RAISED ANTIBODY TITER: ICD-10-CM

## 2024-04-19 DIAGNOSIS — M25.50 PAIN IN UNSPECIFIED JOINT: ICD-10-CM

## 2024-04-19 DIAGNOSIS — M15.9 POLYOSTEOARTHRITIS, UNSPECIFIED: ICD-10-CM

## 2024-04-19 LAB
ALBUMIN SERPL ELPH-MCNC: 4.5 G/DL
ALP BLD-CCNC: 72 U/L
ALT SERPL-CCNC: 29 U/L
ANION GAP SERPL CALC-SCNC: 10 MMOL/L
AST SERPL-CCNC: 31 U/L
BASOPHILS # BLD AUTO: 0.05 K/UL
BASOPHILS NFR BLD AUTO: 0.7 %
BILIRUB SERPL-MCNC: 0.4 MG/DL
BUN SERPL-MCNC: 22 MG/DL
CALCIUM SERPL-MCNC: 10.1 MG/DL
CCP AB SER IA-ACNC: <8 UNITS
CHLORIDE SERPL-SCNC: 99 MMOL/L
CO2 SERPL-SCNC: 30 MMOL/L
CREAT SERPL-MCNC: 1.46 MG/DL
EGFR: 35 ML/MIN/1.73M2
EOSINOPHIL # BLD AUTO: 0.16 K/UL
EOSINOPHIL NFR BLD AUTO: 2.3 %
GLUCOSE SERPL-MCNC: 82 MG/DL
HCT VFR BLD CALC: 35.5 %
HGB BLD-MCNC: 10.9 G/DL
IMM GRANULOCYTES NFR BLD AUTO: 0.4 %
LYMPHOCYTES # BLD AUTO: 1.82 K/UL
LYMPHOCYTES NFR BLD AUTO: 26.4 %
MAN DIFF?: NORMAL
MCHC RBC-ENTMCNC: 28.8 PG
MCHC RBC-ENTMCNC: 30.7 GM/DL
MCV RBC AUTO: 93.7 FL
MONOCYTES # BLD AUTO: 0.87 K/UL
MONOCYTES NFR BLD AUTO: 12.6 %
NEUTROPHILS # BLD AUTO: 3.96 K/UL
NEUTROPHILS NFR BLD AUTO: 57.6 %
PLATELET # BLD AUTO: 207 K/UL
POTASSIUM SERPL-SCNC: 4.2 MMOL/L
PROT SERPL-MCNC: 7.7 G/DL
RBC # BLD: 3.79 M/UL
RBC # FLD: 14.6 %
RF+CCP IGG SER-IMP: NEGATIVE
RHEUMATOID FACT SER QL: 464 IU/ML
SODIUM SERPL-SCNC: 140 MMOL/L
TSH SERPL-ACNC: 1.15 UIU/ML
WBC # FLD AUTO: 6.89 K/UL

## 2024-04-19 PROCEDURE — 99213 OFFICE O/P EST LOW 20 MIN: CPT

## 2024-08-06 ENCOUNTER — RX ONLY (RX ONLY)
Age: 84
End: 2024-08-06

## 2024-08-06 ENCOUNTER — OFFICE (OUTPATIENT)
Dept: URBAN - METROPOLITAN AREA CLINIC 12 | Facility: CLINIC | Age: 84
Setting detail: OPHTHALMOLOGY
End: 2024-08-06
Payer: MEDICARE

## 2024-08-06 DIAGNOSIS — H16.223: ICD-10-CM

## 2024-08-06 DIAGNOSIS — H35.373: ICD-10-CM

## 2024-08-06 DIAGNOSIS — H43.813: ICD-10-CM

## 2024-08-06 DIAGNOSIS — E11.9: ICD-10-CM

## 2024-08-06 DIAGNOSIS — H26.492: ICD-10-CM

## 2024-08-06 PROCEDURE — 92014 COMPRE OPH EXAM EST PT 1/>: CPT | Performed by: OPHTHALMOLOGY

## 2024-08-06 ASSESSMENT — CONFRONTATIONAL VISUAL FIELD TEST (CVF)
OS_FINDINGS: FULL
OD_FINDINGS: FULL

## 2024-08-22 ENCOUNTER — OFFICE (OUTPATIENT)
Dept: URBAN - METROPOLITAN AREA CLINIC 12 | Facility: CLINIC | Age: 84
Setting detail: OPHTHALMOLOGY
End: 2024-08-22
Payer: MEDICARE

## 2024-08-22 DIAGNOSIS — H90.3: ICD-10-CM

## 2024-08-22 PROCEDURE — 92567 TYMPANOMETRY: CPT

## 2024-08-22 PROCEDURE — 92557 COMPREHENSIVE HEARING TEST: CPT

## 2024-09-16 NOTE — ED PROVIDER NOTE - DATE/TIME 1
[2] : 2 [0] : 0 [Dull/Aching] : dull/aching [Localized] : localized [Sharp] : sharp [Full time] : Work status: full time [] : yes [de-identified] :  DOI 6/19/24 New patient - left hand fx  9/16/24: hand feeling better  7/30/24: Pt returns for follow up of left 4th and 5th base of metacarpal fx. She has been deisi taping and doing well.  7/15/24:  Pt has been wearing splint and deisi loops and is doing well.  6/27/24: Pt presents for follow up of left 4th and 5th base of metacarpal fx. Pt has been wearing splint, and states she has been having pain from the splint.   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 Pt was seen at [facility] ED and was informed that she fractured her left hand 4th and 5th MC. RHD  PMH: HTN, Hyperlipidemia. Allergies: Amoxicillin (rash).  03-Jul-2019 20:01

## 2024-11-12 NOTE — PROGRESS NOTE ADULT - I HAVE PERSONALLY SEEN, EXAMINED, AND PARTICIPATED IN THE CARE OF THIS PATIENT.  I HAVE REVIEWED ALL PERTINENT CLINICAL INFORMATION, INCLUDING HISTORY, PHYSICAL EXAM, PLAN AND THE MEDICAL/PA/NP STUDENT’S NOTE AND AGREE EXCEPT AS NOTED.
11/12/24 0000   Therapeutic Soothing Survey   What Helps When Having a Hard Time Deep breathing exercises;DBT/therapy worksheets   What Makes it More Difficult When Already Upset Other (comment)  (\"flashbacks\")     My Safety/Recovery Plan     1) Warning Signs: pacing      2) Personal Coping Strategies to Calm Myself:  deep breathing      3) Reasons for Living/Recovery: \"not sure yet\"     4) Activities/Social Interactions for Distraction: \"I need to find new socializing\"     5) Who to Contact While on the Unit: NA     6) Making My Surroundings Safe: NA  
Statement Selected

## 2024-12-03 ENCOUNTER — APPOINTMENT (OUTPATIENT)
Dept: ULTRASOUND IMAGING | Facility: CLINIC | Age: 84
End: 2024-12-03

## 2024-12-03 ENCOUNTER — APPOINTMENT (OUTPATIENT)
Dept: MAMMOGRAPHY | Facility: CLINIC | Age: 84
End: 2024-12-03

## 2024-12-09 ENCOUNTER — APPOINTMENT (OUTPATIENT)
Dept: OBGYN | Facility: CLINIC | Age: 84
End: 2024-12-09

## 2024-12-24 NOTE — ED ADULT NURSE REASSESSMENT NOTE - NS ED NURSE REASSESS COMMENT FT1
Pt noted to not being able keeping airway when brought to CT scan, patient brought back to critical care for intubation, Dr. Whitehead and Dr. Messina at the bedside

## 2024-12-24 NOTE — H&P ADULT - ASSESSMENT
85yo F with PMHx HTN, HLD, osteoporosis, GERD, h/o breast cancer s/p left mastectomy, left hysterectomy BIBEMs for left-sided weakness and LUE rhythmic shaking. CTA without LVO however with findings of high grade stenosis 75% right  ICA and 30% stenosis L ICA. NeuroICU consulted for further intervention. NIHSS 21    PLAN:  Neuro:  -neuro checks q1h  -keppra 1g bid  -f/u eeg  -MRI brain w/wo pending  -stroke core measures, lipid panel, a1c    Cardiac:  -SBP goal 100-200  -resume home norvasc 10mg and lisinopril 40mg  -lipitor 10mg  -pending TTE    Pulm:  -full vent support 14/400/40/6    GI:  -NPO for now  -bowel regimen: senna, miralax    Endo:  -ISS, pending a1c and TSH    Heme:  -DVT ppx: SCDs for now    ID:  -no issues    Dispo: pending PT/OT eval  D/w Dr. Mitchell

## 2024-12-24 NOTE — ED PROVIDER NOTE - PROGRESS NOTE DETAILS
I spoke to the kranthi who is at the bedside and he lives with her and states that patient was recently admitted at [facility] where her blood pressure was very high and was at the PCP office and was told that she will need a renal artery stent for renal artery stenosis.  Physical therapist came today at home to see her and stated that her blood pressure was very high and to be taken to the ER.  After that patient had an episode of shaking while he called the ambulance her last normal was 1:30 PM.  Patient not on any blood thinners patient was found to be hypertensive here and now is arousable and talking but has intermittent twitching and involuntary shaking of the left arm and leg will load her with Keppra and will do a CTA head and neck if creatinine is normal. Patient was found to have a high-grade right internal carotid artery stenosis and neuro IR was called.  Patient was seen by neuro ICU and was accepted to their service stroke team was also called.  Patient presented initially with seizures and does not merit the TNK therapy and no acute intervention for stenosis at this time but will need ICU level care due to respiratory failure new onset seizure and new strokes noted on the CT imaging.  On the last exam patient is left arm paralysis but moving the left leg.

## 2024-12-24 NOTE — ED ADULT TRIAGE NOTE - CHIEF COMPLAINT QUOTE
BIBEMS with c/o L sided weakness. LKW was 1330. upon ems arrival tremors were noted, given 5mg versed en route. MD tadeo at bedside, priority ct called per md.
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
## 2024-12-24 NOTE — PATIENT PROFILE ADULT - FALL HARM RISK - TYPE OF ASSESSMENT
Detail Level: Simple
Additional Notes: Patient consent was obtained to proceed with the visit and recommended plan of care after discussion of all risks and benefits, including the risks of COVID-19 exposure.
Admission

## 2024-12-24 NOTE — DISCHARGE NOTE NURSING/CASE MANAGEMENT/SOCIAL WORK - PATIENT PORTAL LINK FT
You can access the FollowMyHealth Patient Portal offered by [facility] by registering at the following website: [email or web address]. By joining [employer]’s FollowMyHealth portal, you will also be able to view your health information using other applications (apps) compatible with our system.

## 2024-12-24 NOTE — H&P ADULT - NSHPPHYSICALEXAM_GEN_ALL_CORE
General: NAD, pt is comfortably sitting up in bed, intubated  Cardiovascular: RRR  Respiratory: lungs CTAB, no wheezing, rhonchi, or crackles   GI: normoactive BS to auscultation, abd soft  Neuro: Not OE, not FC  CNII-CXII: PERRL 3mm briskly reactive, +corneals, +cough/gag  Motor: Localizing UE b/l, withdrawing briskly LE B/L to noxious stimuli  Extremities: distal pulses 2+ x4 symmetric

## 2024-12-24 NOTE — H&P ADULT - NSHPLABSRESULTS_GEN_ALL_CORE
12/24 CTA head/neck: Short segment high-grade stenosis of 76% at the origin of the right internal carotid artery. Moderate deposition of calcified plaque along the proximal aspect of the left internal carotid artery with mild stenosis of 30%     12/24 CTH: 3.2 x 1.6 cm hypodensity in the region of left occipital subcortical white matter

## 2024-12-24 NOTE — ED PROVIDER NOTE - CLINICAL SUMMARY MEDICAL DECISION MAKING FREE TEXT BOX
Per EMS when they arrived patient was agitated and was leaning over to the left side with left-sided weakness shortly followed by twitching and involuntary motion of the whole left side and noted to have residual twitching but no movements of the eye of diffuse seizure-like activity.  Patient had a normal fingerstick in the field.  Plan to load her with Keppra and will priority imaging for the CT brain to rule out any intracranial hemorrhage or large mass.  Will check labs and continue to neurocheck and reassess

## 2024-12-24 NOTE — ED ADULT NURSE NOTE - CHIEF COMPLAINT QUOTE
Pt seen and examined with house staff.  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  To receive 1u PC prior to DC home today  ROS o/w negative    NAD  Awake and alert  Stable vitals    Tx 1u PC and then plan for DC home BIBEMS with c/o L sided weakness. LKW was 1330. upon ems arrival tremors were noted, given 5mg versed en route. MD tadeo at bedside, priority ct called per md.

## 2024-12-24 NOTE — ED PROCEDURE NOTE - CPROC ED POST PROC CARE GUIDE1
Preventive Care Visit  [facility]  Ascencion Weathers MD, Family Medicine  Mar 24, 2023    Assessment & Plan   11 year old 9 month old, here for preventive care.    Encounter for routine child health examination w/o abnormal findings  11-year-old well visit with sports physical completion.  Paperwork Provided to patient for participation without noted restriction.  Immunizations updated including Menactra, Adacel booster, seasonal influenza.  Unable to receive COVID booster due to recent COVID exposure by patient's sibling in past 14 days.  Patient remains asymptomatic.  Elects to defer HPV vaccine series at this time and will review at future well visit.  - BEHAVIORAL/EMOTIONAL ASSESSMENT (82109)  - SCREENING TEST, PURE TONE, AIR ONLY  - SCREENING, VISUAL ACUITY, QUANTITATIVE, BILAT  - MENINGOCOCCAL (MENQUADFI ) (2 YRS - 55 YRS)  - TDAP 10-64Y (ADACEL,BOOSTRIX)  - PRIMARY CARE FOLLOW-UP SCHEDULING  - INFLUENZA VACCINE IM > 6 MONTHS VALENT IIV4 (AFLURIA/FLUZONE)         Patient has been advised of split billing requirements and indicates understanding: Yes  Growth      Normal height and weight  Pediatric Healthy Lifestyle Action Plan         Exercise and nutrition counseling performed    Immunizations   Appropriate vaccinations were ordered.  I provided face to face vaccine counseling, answered questions, and explained the benefits and risks of the vaccine components ordered today including:  Influenza - Quadrivalent Preserve Free 3yrs+, Meningococcal ACYW and Tdap 7 yrs+  Immunizations Administered     Name Date Dose VIS Date Route    INFLUENZA VACCINE >6 MONTHS (Afluria, Fluzone) 3/24/23  3:32 PM 0.5 mL 08/06/2021, Given Today Intramuscular    MENINGOCOCCAL ACWY (MENQUADFI ) 3/24/23  3:32 PM 0.5 mL 08/15/2019, Given Today Intramuscular    TDAP (Adacel,Boostrix) 3/24/23  3:30 PM 0.5 mL 08/06/2021, Given Today Intramuscular        Anticipatory Guidance    Reviewed age appropriate anticipatory  guidance. This includes body changes with puberty and sexuality, including STIs as appropriate.      Peer pressure    Bullying    Increased responsibility    Parent/ teen communication    Limits/consequences    Social media    TV/ media    School/ homework    Healthy food choices    Family meals    Adequate sleep/ exercise    Sleep issues    Dental care    Drugs, ETOH, smoking    Body image    Seat belts    Body changes with puberty    Referrals/Ongoing Specialty Care  None  Verbal Dental Referral: Verbal dental referral was given  Dental Fluoride Varnish:   No, to receive with routine dental care.    Dyslipidemia Follow Up:  Discussed nutrition    Subjective      11-year-old well visit.  Sports physical to be completed as well.  Seen with his grandfather today.  Parental separation noted.  Patient without current concern.  Sister with Covid-19 within the last 2 weeks.  Patient asymptomatic in that regard.      No flowsheet data found.  Social 3/24/2023   Lives with Parent(s)   Recent potential stressors (!) PARENTAL SEPARATION, (!) PARENTAL DIVORCE, (!) DIFFICULTIES BETWEEN PARENTS   History of trauma No   Family Hx of mental health challenges (!) YES   Lack of transportation has limited access to appts/meds No   Difficulty paying mortgage/rent on time No   Lack of steady place to sleep/has slept in a shelter No     Health Risks/Safety 3/24/2023   Where does your child sit in the car?  (!) FRONT SEAT   Does your child always wear a seat belt? Yes        TB Screening: Consider immunosuppression as a risk factor for TB 3/24/2023   Recent TB infection or positive TB test in family/close contacts No   Recent travel outside [place] (child/family/close contacts) No   Recent residence in high-risk group setting (correctional facility/health care facility/homeless shelter/refugee camp) No      Dyslipidemia 3/24/2023   FH: premature cardiovascular disease No, these conditions are not present in the patient's biologic parents or  grandparents   FH: hyperlipidemia (!) YES   Personal risk factors for heart disease (!) OBESITY (BMI >/97%)     No results for input(s): CHOL, HDL, LDL, TRIG, CHOLHDLRATIO in the last 69718 hours.    Dental Screening 3/24/2023   Has your child seen a dentist? Yes   When was the last visit? Within the last 3 months   Has your child had cavities in the last 3 years? (!) YES, 1-2 CAVITIES IN THE LAST 3 YEARS- MODERATE RISK   Have parents/caregivers/siblings had cavities in the last 2 years? (!) YES, IN THE LAST 6 MONTHS- HIGH RISK     Diet 3/24/2023   Questions about child's height or weight No   What does your child regularly drink? Cow's milk, (!) JUICE, (!) SPORTS DRINKS   What type of milk? Skim   How often does your family eat meals together? Most days   Servings of fruits/vegetables per day (!) 1-2   At least 3 servings of food or beverages that have calcium each day? Yes   In past 12 months, concerned food might run out Never true   In past 12 months, food has run out/couldn't afford more Never true     Elimination 3/24/2023   Bowel or bladder concerns? No concerns     Activity 3/24/2023   Days per week of moderate/strenuous exercise (!) 4 DAYS   On average, how many minutes does your child engage in exercise at this level? 60 minutes   What does your child do for exercise?  practice or play sports   What activities is your child involved with?  basketball baseball football     Media Use 3/24/2023   Hours per day of screen time (for entertainment) 2 to 5   Screen in bedroom (!) YES     Sleep 3/24/2023   Do you have any concerns about your child's sleep?  (!) FREQUENT WAKING, (!) DAYTIME SLEEPINESS     School 3/24/2023   School concerns (!) MATH, (!) POOR HOMEWORK COMPLETION   Grade in school 6th Grade   Current school [employer]   School absences (>2 days/mo) (!) YES   Concerns about friendships/relationships? No     Vision/Hearing 3/24/2023   Vision or hearing concerns No concerns     Development /  "Social-Emotional Screen 3/24/2023   Developmental concerns (!) PHYSICAL THERAPY     Psycho-Social/Depression - PSC-17 required for C&TC through age 18  General screening:  Electronic PSC   PSC SCORES 3/24/2023   Inattentive / Hyperactive Symptoms Subtotal 8 (At Risk)   Externalizing Symptoms Subtotal 5   Internalizing Symptoms Subtotal 7 (At Risk)   PSC - 17 Total Score 20 (Positive)       Follow up:  PSC-17 REFER (> 14), FOLLOW UP RECOMMENDED          Objective     Exam  /62   Pulse 100   Temp 98.4  F (36.9  C)   Resp 19   Ht 1.6 m (5' 3\")   Wt 61.7 kg (136 lb)   SpO2 98%   BMI 24.09 kg/m    94 %ile (Z= 1.59) based on [facility] [place] (Boys, 2-20 Years) Stature-for-age data based on Stature recorded on 3/24/2023.  97 %ile (Z= 1.87) based on [facility] [place] (Boys, 2-20 Years) weight-for-age data using vitals from 3/24/2023.  95 %ile (Z= 1.66) based on [facility] [place] (Boys, 2-20 Years) BMI-for-age based on BMI available as of 3/24/2023.  Blood pressure percentiles are 41 % systolic and 49 % diastolic based on the 2017 AAP Clinical Practice Guideline. This reading is in the normal blood pressure range.    Vision Screen  Vision Screen Details  Does the patient have corrective lenses (glasses/contacts)?: No  Vision Acuity Screen  Vision Acuity Tool: Ritter  RIGHT EYE: 10/10 (20/20)  LEFT EYE: 10/10 (20/20)  Is there a two line difference?: No  Vision Screen Results: Pass    Hearing Screen  RIGHT EAR  1000 Hz on Level 40 dB (Conditioning sound): Pass  1000 Hz on Level 20 dB: Pass  2000 Hz on Level 20 dB: Pass  4000 Hz on Level 20 dB: Pass  6000 Hz on Level 20 dB: Pass  8000 Hz on Level 20 dB: Pass  LEFT EAR  8000 Hz on Level 20 dB: Pass  6000 Hz on Level 20 dB: Pass  4000 Hz on Level 20 dB: Pass  2000 Hz on Level 20 dB: Pass  1000 Hz on Level 20 dB: Pass  500 Hz on Level 25 dB: Pass  RIGHT EAR  500 Hz on Level 25 dB: Pass  Results  Hearing Screen Results: Pass      Physical Exam  GENERAL: Active, alert, in no acute distress.  SKIN: Clear. No " significant rash, abnormal pigmentation or lesions  HEAD: Normocephalic  EYES: Pupils equal, round, reactive, Extraocular muscles intact. Normal conjunctivae.  EARS: Normal canals. Tympanic membranes are normal; gray and translucent.  NOSE: Normal without discharge.  MOUTH/THROAT: Clear. No oral lesions. Teeth without obvious abnormalities.  NECK: Supple, no masses.  No thyromegaly.  LYMPH NODES: No adenopathy  LUNGS: Clear. No rales, rhonchi, wheezing or retractions  HEART: Regular rhythm. Normal S1/S2. No murmurs. Normal pulses.  ABDOMEN: Soft, non-tender, not distended, no masses or hepatosplenomegaly. Bowel sounds normal.   NEUROLOGIC: No focal findings. Cranial nerves grossly intact: DTR's normal. Normal gait, strength and tone  BACK: Spine is straight, no scoliosis.  EXTREMITIES: Full range of motion, no deformities  : Normal male external genitalia. Scooter stage 2,  both testes descended, no hernia.       No Marfan stigmata: kyphoscoliosis, high-arched palate, pectus excavatuM, arachnodactyly, arm span > height, hyperlaxity, myopia, MVP, aortic insufficieny)  Eyes: normal fundoscopic and pupils  Cardiovascular: normal PMI, simultaneous femoral/radial pulses, no murmurs (standing, supine, Valsalva)  Skin: no HSV, MRSA, tinea corporis  Musculoskeletal    Neck: normal    Back: normal    Shoulder/arm: normal    Elbow/forearm: normal    Wrist/hand/fingers: normal    Hip/thigh: normal    Knee: normal    Leg/ankle: normal    Foot/toes: normal    Functional (Single Leg Hop or Squat): normal      Ascencion Weathers MD  [facility]   Verbal/written post procedure instructions were given to patient/caregiver.
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
## 2024-12-24 NOTE — PATIENT PROFILE ADULT - NSPROGENBLOODRESTRICT_GEN_A_NUR
[General Appearance - Well Developed] : well developed [General Appearance - Well Nourished] : well nourished [Edema] : no peripheral edema [Exaggerated Use Of Accessory Muscles For Inspiration] : no accessory muscle use [Abdomen Soft] : soft [Abdomen Tenderness] : non-tender [Abdomen Mass (___ Cm)] : no abdominal mass palpated [Abdomen Hernia] : no hernia was discovered [Urethral Meatus] : meatus normal [Penis Abnormality] : normal circumcised penis [Urinary Bladder Findings] : the bladder was normal on palpation [Scrotum] : the scrotum was normal [Epididymis] : the epididymides were normal [Testes Tenderness] : no tenderness of the testes [Testes Mass (___cm)] : there were no testicular masses [FreeTextEntry1] : firm mass above right testis asscoiated with cord - 2cm or so; no tender.  [Normal Station and Gait] : the gait and station were normal for the patient's age [] : no rash [No Focal Deficits] : no focal deficits [Oriented To Time, Place, And Person] : oriented to person, place, and time [Inguinal Lymph Nodes Enlarged Bilaterally] : inguinal RADHA pt intubated and sedated
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
## 2024-12-24 NOTE — PATIENT PROFILE ADULT - FALL HARM RISK - HARM RISK INTERVENTIONS

## 2024-12-24 NOTE — CHART NOTE - NSCHARTNOTEFT_GEN_A_CORE
Received pt on Vent AC/VC 16/400/40%/6, pt is intubated with ETT 7.5, 24 cm @ lip. Pt was suctioned. will continue to monitor.
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
## 2024-12-24 NOTE — ED PROVIDER NOTE - CARE PLAN
1 Principal Discharge DX:	Internal carotid artery stenosis, right  Secondary Diagnosis:	New onset seizure  Secondary Diagnosis:	Acute respiratory failure with hypoxia

## 2024-12-24 NOTE — DISCHARGE NOTE NURSING/CASE MANAGEMENT/SOCIAL WORK - FINANCIAL ASSISTANCE
[facility] provides services at a reduced cost to those who are determined to be eligible through [facility]’s financial assistance program. Information regarding [facility]’s financial assistance program can be found by going to [email or web address] or by calling [phone].

## 2024-12-24 NOTE — DISCHARGE NOTE NURSING/CASE MANAGEMENT/SOCIAL WORK - NSDCPEFALRISK_GEN_ALL_CORE
For information on Fall & Injury Prevention, visit: [email or web address] OR  [email or web address] OR  https://www.cdc.gov/steadi/patient.html

## 2024-12-25 NOTE — CONSULT NOTE ADULT - SUBJECTIVE AND OBJECTIVE BOX
Mammogram Instructions:  You have a mammogram order that has been placed, you need to call [phone] to schedule the mammogram appointment     The day of the exam you may bathe as usual.  DO NOT use powders, creams or deodorants on the breast or underarm area.    To reduce tenderness in the breasts, it is suggested that you refrain from caffeine for one week prior to your mammogram.    Please come to the [facility] at or before your appointment time so that we may start the exam as close to your appointment time as possible.  If you need to cancel/reschedule, please call [phone] as soon as possible, and we will be happy to reschedule it for you.  Due to our busy schedule, tardiness may result in having to reschedule your exam.    If your previous mammogram was not performed at [facility], you must obtain the films and bring them with you to your mammogram appointment or have them mailed to:  [facility]  [address] [place]. [address]    Due to safety concerns, another adult must accompany children that need supervision.       Dxa Bone Scan Instructions:    Do not take any solid calcium tablet/pill the day before and the day of the exam.  Failure to refrain from calcium tablet/pill will result in rescheduling.    You may consume all dairy and calcium containing food, just no solid calcium tablet/pill. (Multi-vitamins and Tums are OK to take.)     X-ray exams with barium or contrast solution and nuclear medicine exams should be avoided one week before your scan.    If you have had a previous bone density test at another facility, please obtain a copy of the report and bring it with you.    84yF was admitted on 12-24 with seizures and intubated for airway protection in ED. Workup showed ICA stenosis.       Imaging Reviewed Today:  CT HEAD 12/24 - 1. Age-appropriate involutional changes. Old left posterior temporal occipital ischemic event. No acute intracranial pathology. No acute intracranial hemorrhage.2. Choanal narrowing with suggestion of fluid within the nasopharyngeal region. Correlate with clinical assessment.    CTA NECK 12/24 - 1. Short segment high-grade stenosis of 76% based on NASCET criteria at the origin of the right internal carotid artery.2. Moderate deposition of calcified plaque along the proximal aspect of the left internal carotid artery with mild stenosis of 30% based on NASCET criteria in this location.    CTA HEAD 12/24 - No large vessel occlusion, significant stenosis or vascular abnormality identified.    ----------------------------  Patient currently sedated/intubated.  Abnormal posturing to painful stimuli only.       VITALS  T(C): 36.5 (12-25-24 @ 11:15), Max: 37.5 (12-25-24 @ 05:15)  HR: 65 (12-25-24 @ 11:15) (62 - 86)  BP: 132/62 (12-25-24 @ 11:15) (92/58 - 197/98)  RR: 12 (12-25-24 @ 11:15) (12 - 20)  SpO2: 100% (12-25-24 @ 11:15) (96% - 100%)  Wt(kg): --    PAST MEDICAL & SURGICAL HISTORY  Hypertension    Osteoporosis    Dyslipidemia    GERD (gastroesophageal reflux disease)    Breast cancer    History of hysterectomy    S/P mastectomy, left         RECENT LABS REVIEWED    CBC Full  -  ( 25 Dec 2024 02:43 )  WBC Count : 10.24 K/uL  RBC Count : 4.02 M/uL  Hemoglobin : 11.2 g/dL  Hematocrit : 34.6 %  Platelet Count - Automated : 169 K/uL  Mean Cell Volume : 86.1 fl  Mean Cell Hemoglobin : 27.9 pg  Mean Cell Hemoglobin Concentration : 32.4 g/dL  Auto Neutrophil # : x  Auto Lymphocyte # : x  Auto Monocyte # : x  Auto Eosinophil # : x  Auto Basophil # : x  Auto Neutrophil % : x  Auto Lymphocyte % : x  Auto Monocyte % : x  Auto Eosinophil % : x  Auto Basophil % : x    12-25    142  |  105  |  12.7  ----------------------------<  78  3.3[L]   |  21.0[L]  |  0.96    Ca    8.5      25 Dec 2024 02:43  Phos  3.4     12-25  Mg     1.7     12-25    TPro  7.1  /  Alb  3.6  /  TBili  0.5  /  DBili  x   /  AST  54[H]  /  ALT  49[H]  /  AlkPhos  96  12-24    Urinalysis Basic - ( 25 Dec 2024 02:43 )    Color: x / Appearance: x / SG: x / pH: x  Gluc: 78 mg/dL / Ketone: x  / Bili: x / Urobili: x   Blood: x / Protein: x / Nitrite: x   Leuk Esterase: x / RBC: x / WBC x   Sq Epi: x / Non Sq Epi: x / Bacteria: x        ALLERGIES  No Known Allergies      MEDICATIONS   albuterol/ipratropium for Nebulization 3 milliLiter(s) Nebulizer every 6 hours PRN  amLODIPine   Tablet 10 milliGRAM(s) Oral daily  atorvastatin 10 milliGRAM(s) Oral at bedtime  chlorhexidine 0.12% Liquid 15 milliLiter(s) Oral Mucosa every 12 hours  dextrose 5%. 1000 milliLiter(s) IV Continuous <Continuous>  dextrose 5%. 1000 milliLiter(s) IV Continuous <Continuous>  dextrose 50% Injectable 25 Gram(s) IV Push once  dextrose 50% Injectable 12.5 Gram(s) IV Push once  dextrose 50% Injectable 25 Gram(s) IV Push once  dextrose Oral Gel 15 Gram(s) Oral once PRN  enoxaparin Injectable 40 milliGRAM(s) SubCutaneous <User Schedule>  glucagon  Injectable 1 milliGRAM(s) IntraMuscular once  hydrALAZINE Injectable 10 milliGRAM(s) IV Push every 3 hours PRN  insulin lispro (ADMELOG) corrective regimen sliding scale   SubCutaneous three times a day before meals  labetalol Injectable 10 milliGRAM(s) IV Push every 3 hours PRN  levETIRAcetam   Injectable 1500 milliGRAM(s) IV Push every 12 hours  lisinopril 40 milliGRAM(s) Oral daily  pantoprazole  Injectable 40 milliGRAM(s) IV Push daily  polyethylene glycol 3350 17 Gram(s) Oral daily  propofol Infusion 40 MICROgram(s)/kG/Min IV Continuous <Continuous>  senna 2 Tablet(s) Oral at bedtime      ----------------------------------------------------------------------------------------  FUNCTIONAL HISTORY  Unknown -- Last visit - JULY 2019 was living with kranthi with 0 ANGELA    FUNCTIONAL STATUS/PROGRESS  Total A    ----------------------------------------------------------------------------------------  PHYSICAL EXAM  Constitutional - NAD, Appears Comfortable  HEENT - NCAT, EOMI  Neck - Supple, No limited ROM  Chest - +VENT  Cardiovascular - S1S2   Abdomen - Soft   Extremities - No edema  Neurologic Exam -                    Cognitive - Intubated/Sedated      Communication - Intubated/Sedated      Cranial Nerves - Intubated/Sedated      FUNCTIONAL MOTOR EXAM - Abnormal posturing of BLE to pain  Psychiatric - Intubated/Sedated   ----------------------------------------------------------------------------------------  ASSESSMENT/PLAN  84yFemale with functional deficits after developing seizure like activity   Seizures - Keppra  ICA stenosis - Lipitor  HTN - Lisinopril, Hydralazine, Labetalol  Respiratory Failure s/p VENT - Propofol, Duoneb  Pain - Tylenol  DVT PPX - SCDs, Lovenox  Rehab/Impaired mobility and function - Patient continues to require hospitalization for the above diagnoses and ongoing active management of comorbid complications that are substantially posing a threat to bodily function, functional ability and quality of life.     RECOMMEND - Turn Q2 when needed, HOB >30 degrees    Patient medically acute and being medically stabilized.  Ongoing EEG, FARHAT and therapy evals when more appropriate.      Will continue to follow.  Recommend ongoing mobilization by staff to maintain cardiopulmonary function and prevention of secondary complications related to debility/immobility. Have discussed the specific rehabilitation management and recommendations documented above with rehab clinical care team/rehab liaison.      Total Time Spent on Encounter (reviewing clinical notes, labs, radiology and medications, reviewing patient history, physical exam, assessment and discussing rehabilitation options - with consideration of prior level of function, expected level of recovery and return to community living) - 75 minutes
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
## 2024-12-25 NOTE — PROGRESS NOTE ADULT - ASSESSMENT
85yo F with new onset status epilepticus (clinically - with L UE and LLE twitching).  Acute resp failure due to neurologic condition.  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  PMHx of CVA, HTN, HLD, osteoporosis, GERD, h/o breast cancer s/p left mastectomy, left hysterectomy, MARLINE.    Plan:  - neuro checks  - cont Propofol gtt for now, aim for burst suppression till am  - Ativan PRN for clinical seizures  - increase Keppra to 1500 BID IV  - cont cEEG  - resume ASA, hold Plavix for possible LP  - plan for MRI w/wo contrast after szs controlled  - hold home dementia meds to avoid lowering sz threshold  - metabolic encephalopathy w/up in progress; check ESR, CRP, send paraneoplastic panel + plan for CT C/A/P w/contrast  in view of h/o Ca  - maintain -180, avoid significant fluctuations; resume home Coreg  - maintain Osats>92%, vent support  - TF, BM regimen; PPI for ulcer ppx  - monitor e-lytes, I/Os; send UA  - Goal euglycemia (-180), ISS; cont home statin  - VTE prophylaxis: SCDs, SQL

## 2024-12-25 NOTE — PROGRESS NOTE ADULT - SUBJECTIVE AND OBJECTIVE BOX
HPI:  83yo F with PMHx HTN, HLD, osteoporosis, GERD, h/o breast cancer s/p left mastectomy, left hysterectomy BIBEMs for left-sided weakness and LUE rhythmic shaking. Per family, patient was last known well at 1:30am. When EMS arrived patient was alert but sloughing over the left side. Patient's left arm was noted to be weak and shortly started twitching, was given versed by EMS. By the time patient arrived to ER, Pt was intubated for airway protection. 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 CTA without LVO however with findings of high grade stenosis 75% right  ICA and 30% stenosis L ICA. NeuroICU consulted for further intervention. (24 Dec 2024 20:57)    O/n events: clinical and EEG szs while weaning off Propofol, received additional doses of Keppra, back on Propofol gtt.    ICU Vital Signs Last 24 Hrs  T(C): 37.1 (25 Dec 2024 17:30), Max: 37.5 (25 Dec 2024 05:15)  T(F): 98.8 (25 Dec 2024 17:30), Max: 99.5 (25 Dec 2024 05:15)  HR: 71 (25 Dec 2024 17:30) (62 - 86)  BP: 124/68 (25 Dec 2024 17:30) (92/58 - 197/98)  BP(mean): 85 (25 Dec 2024 17:30) (68 - 121)  ABP: --  ABP(mean): --  RR: 24 (25 Dec 2024 17:30) (12 - 24)  SpO2: 100% (25 Dec 2024 17:30) (100% - 100%)    O2 Parameters below as of 25 Dec 2024 17:00  Patient On (Oxygen Delivery Method): ventilator    O2 Concentration (%): 40      Exam: limited as sedated to RASS -5 being on Propofol for seizures.  PERRL, gaze midline, o/b the vent, motor - trace wdrl in all 4.  CTAB  S1S2 present  Abd soft, NT, ND  No peripheral swelling, no rash.

## 2024-12-25 NOTE — CONSULT NOTE ADULT - SUBJECTIVE AND OBJECTIVE BOX
Preliminary note, official note pending attending review/signature.  Seen and examined by Stroke team attending/team, assessment/ plan as discussed with stroke team attending/team as noted.                                [facility] Stroke Team  CC: left sided weakness  HPI:  85yo F with PMHx HTN, HLD, osteoporosis, GERD, h/o breast cancer s/p left mastectomy, left hysterectomy BIBEMs for left-sided weakness and LUE rhythmic shaking. Per family, patient was last known well at 1:30am 12/24/24. When EMS arrived patient was alert but sloughing over the left side. Patient's left arm was noted to be weak and shortly started twitching, was given versed by EMS. By the time patient arrived to ER, Pt was intubated for airway protection. 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 CTA without LVO however with findings of high grade stenosis 75% right  ICA and 30% stenosis L ICA. NeuroICU consulted for further intervention.    Stroke risk factors:  HTN  Dyslipidemia    PAST MEDICAL & SURGICAL HISTORY:  Hypertension  Osteoporosis  Dyslipidemia  GERD (gastroesophageal reflux disease)  Breast cancer    History of hysterectomy  total abdominal  S/P mastectomy, left          MEDICATIONS  (STANDING):  amLODIPine   Tablet 10 milliGRAM(s) Oral daily  atorvastatin 10 milliGRAM(s) Oral at bedtime  chlorhexidine 0.12% Liquid 15 milliLiter(s) Oral Mucosa every 12 hours  dextrose 5%. 1000 milliLiter(s) (100 mL/Hr) IV Continuous <Continuous>  dextrose 5%. 1000 milliLiter(s) (50 mL/Hr) IV Continuous <Continuous>  dextrose 50% Injectable 25 Gram(s) IV Push once  dextrose 50% Injectable 12.5 Gram(s) IV Push once  dextrose 50% Injectable 25 Gram(s) IV Push once  enoxaparin Injectable 40 milliGRAM(s) SubCutaneous <User Schedule>  glucagon  Injectable 1 milliGRAM(s) IntraMuscular once  insulin lispro (ADMELOG) corrective regimen sliding scale   SubCutaneous three times a day before meals  levETIRAcetam   Injectable 1500 milliGRAM(s) IV Push every 12 hours  lisinopril 40 milliGRAM(s) Oral daily  pantoprazole  Injectable 40 milliGRAM(s) IV Push daily  polyethylene glycol 3350 17 Gram(s) Oral daily  propofol Infusion 40 MICROgram(s)/kG/Min (13.5 mL/Hr) IV Continuous <Continuous>  senna 2 Tablet(s) Oral at bedtime    MEDICATIONS  (PRN):  albuterol/ipratropium for Nebulization 3 milliLiter(s) Nebulizer every 6 hours PRN Shortness of Breath and/or Wheezing  dextrose Oral Gel 15 Gram(s) Oral once PRN Blood Glucose LESS THAN 70 milliGRAM(s)/deciliter  hydrALAZINE Injectable 10 milliGRAM(s) IV Push every 3 hours PRN SBP>180  labetalol Injectable 10 milliGRAM(s) IV Push every 3 hours PRN SBP>180    Allergies  No Known Allergies    SOCIAL HISTORY:  unknown     FAMILY HISTORY:  unknown    ROS: 14 point ROS negative other than what is present in HPI or below    Vital Signs Last 24 Hrs  T(C): 36.5 (25 Dec 2024 11:15), Max: 37.5 (25 Dec 2024 05:15)  T(F): 97.7 (25 Dec 2024 11:15), Max: 99.5 (25 Dec 2024 05:15)  HR: 65 (25 Dec 2024 11:15) (62 - 86)  BP: 132/62 (25 Dec 2024 11:15) (92/58 - 197/98)  BP(mean): 81 (25 Dec 2024 11:15) (68 - 121)  RR: 12 (25 Dec 2024 11:15) (12 - 20)  SpO2: 100% (25 Dec 2024 11:15) (96% - 100%)    Parameters below as of 25 Dec 2024 11:00  Patient On (Oxygen Delivery Method): ventilator    O2 Concentration (%): 40    Physical Exam LIMITED DUE TO PATIENT BEING INTUBATED AND SEDATED  Physical Exam:  General: patient with rhythmic upper extremity twitching   HEENT: Head normocephalic, atraumatic. Conjunctivae clear w/o exudates or hemorrhage. Sclera non-icteric. Nares are patent bilaterally. Mucous membranes pink and moist.  No tonsillar swelling or exudates.    Cardiac: Normal rate and rhythm. S1, S2 auscultated.      Respiratory: +intubated. Lung sounds clear in all fields. Chest wall symmetric, nontender.   Abdominal: Soft, nondistended, nontender. Bowel sounds normoactive x 4 quadrants. No masses, hepatomegaly, or splenomegaly.   Skin: Skin is warm, dry and intact without rashes or lesions. Appropriate color for ethnicity. Nailbeds pink with no cyanosis or clubbing.   Extremities: No edema, 2+ peripheral pulses in b/l upper and b/l lower extremities. Full range of motion in all joints.     Detailed Neurologic Exam:    Mental status: Patient was intubated and sedated    Cranial nerves: -dolls eyes. Pupils equal and react symmetrically to light.     Motor: There is normal bulk and tone.  Rhythmic motion in bilateral upper extremities  BL lower extremities withdraw from pain    Sensation: unable to assess at this time   Cerebellar: unable to assess at this time  Gait : deferred        LABS:                         11.2   10.24 )-----------( 169      ( 25 Dec 2024 02:43 )             34.6       12-25    142  |  105  |  12.7  ----------------------------<  78  3.3[L]   |  21.0[L]  |  0.96    Ca    8.5      25 Dec 2024 02:43  Phos  3.4     12-25  Mg     1.7     12-25    TPro  7.1  /  Alb  3.6  /  TBili  0.5  /  DBili  x   /  AST  54[H]  /  ALT  49[H]  /  AlkPhos  96  12-24 12-25 Chol 124 LDL 41 HDL 68 Trig 75    A1C: 5.2  LDL: 41        RADIOLOGY & ADDITIONAL STUDIES (independently reviewed unless otherwise noted):    CT Head No Cont (12.24.24 @ 15:01)   IMPRESSION:  No acute intracranial hemorrhage or acute territorial infarct. If   symptoms persist, consider MRI exam for further evaluation.    CT Head/Angio Head/Neck w/ IV Cont (12.24.24 @ 17:56)  IMPRESSION:    CT HEAD:  1. Age-appropriate involutional changes. Old left posterior temporal   occipital ischemic event. No acute intracranial pathology. No acute   intracranial hemorrhage.  2. Choanal narrowing with suggestion of fluid within the nasopharyngeal   region. Correlate with clinical assessment.    CTA NECK:  1. Short segment high-grade stenosis of 76% based on NASCET criteria at   the origin of the right internal carotid artery.  2. Moderate deposition of calcified plaque along the proximal aspect of   the left internal carotid artery with mild stenosis of 30% based on   NASCET criteria in this location.    CTA HEAD:  No large vessel occlusion, significant stenosis or vascular abnormality   identified.

## 2024-12-25 NOTE — CONSULT NOTE ADULT - ASSESSMENT
ASSESSMENT: 83yo F with PMHx HTN, HLD, osteoporosis, GERD, h/o breast cancer s/p left mastectomy, left hysterectomy BIBEMs for left-sided weakness and LUE rhythmic shaking. Per family, patient was last known well at 1:30am 12/24/24. When EMS arrived patient was alert but sloughing over the left side. Patient's left arm was noted to be weak and shortly started twitching, was given versed by EMS. By the time patient arrived to ER, Pt was intubated for airway protection. 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 Stroke neuro consulted for further evaluation.     Impression: CT findings with no mass effect, acute hemorrhage, or midline shift.    Nonacute left posterior temporal/occipital ischemic event. Pending MRI for further evaluation to r/o right cerebral dysfunction causing left sided weakness/rhythmic motion    NEURO:   -Continue close monitoring for neurologic deterioration    -Prelim EEG: One prolonged seizure from 4:35-4:48- 3 Hz LPDs at P4/C4/Cz, continuous muscle artifact over right temporal region consistent with clinical left arm/left leg twitching. Risk of focal onset seizures from bilateral parasagittal/midline regions, right > left. Severe diffuse/multifocal cerebral dysfunction in the setting of sedation.  - Stroke neuro checks q1 hour per neuro ICU   - SBP goal 100-180mmhg avoiding rapid fluctuations and hypotension. Patient currently 100-135mmHg    -ANTITHROMBOTIC THERAPY: Recommend continuing aspirin 81mg, and Plavix 75mg for high grade stenosis 75% right ICA and 30% stenosis left ICA.   -titrate statin to LDL goal less than 70  -MRI Brain w/o-pending  -Dysphagia screen: pass/fail-currently intubated. Reevaluate when indicated   -Physical therapy/OT/Speech eval/treatment.   -check TTE, cardiac monitoring w/ telemetry for now, further evaluation pending findings of noted workup  , +/- ILR             - patient should have all age and risk appropriate malignancy screenings with PCP or sooner if clinically suspected    -DVT ppx: Heparin s.c [] LMWH [x] SCD[x]    -maintain adequate hydration    -Na Goal: 135-145   -monitor for si/sx of infection   -Check LDL/A1C  -Stroke education completed 12/24/24    OTHER:  condition and plan of care d/w patient, questions and concerns addressed.     DISPOSITION: Rehab or home depending on PT eval once stable and workup is complete      CORE MEASURES:        Admission NIHSS:      Tenecteplase : [] YES [x] NO      LDL/A1C: 41/5.2     Depression Screen- if depression hx and/or present      Statin Therapy: atorvastatin 10mg     Dysphagia Screen: [] PASS [] FAIL     Smoking [] YES [] NO      Afib [] YES [x] NO     Stroke Education [] YES [] NO [x] pending   ASSESSMENT: 83yo F with PMHx HTN, HLD, osteoporosis, GERD, h/o breast cancer s/p left mastectomy, left hysterectomy BIBEMs for left-sided weakness and LUE rhythmic shaking. Per family, patient was last known well at 1:30am 12/24/24. When EMS arrived patient was alert but sloughing over the left side. Patient's left arm was noted to be weak and shortly started twitching, was given versed by EMS. By the time patient arrived to ER, Pt was intubated for airway protection. 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 Stroke neuro consulted for further evaluation.     Impression: CT findings with no mass effect, acute hemorrhage, or midline shift.    Nonacute left posterior temporal/occipital ischemic event. Pending MRI for further evaluation to r/o right cerebral dysfunction causing left sided weakness/rhythmic motion    NEURO:   -Continue close monitoring for neurologic deterioration    -Prelim EEG: One prolonged seizure from 4:35-4:48- 3 Hz LPDs at P4/C4/Cz, continuous muscle artifact over right temporal region consistent with clinical left arm/left leg twitching. Risk of focal onset seizures from bilateral parasagittal/midline regions, right > left. Severe diffuse/multifocal cerebral dysfunction in the setting of sedation.  - Stroke neuro checks q1 hour per neuro ICU   - SBP goal 100-180mmhg avoiding rapid fluctuations and hypotension. Patient currently 100-135mmHg    -ANTITHROMBOTIC THERAPY: Recommend continuing aspirin 81mg, and Plavix 75mg for high grade stenosis 75% right ICA and 30% stenosis left ICA.   -titrate statin to LDL goal less than 70  Recommend obtaining carotid dopplers   -MRI Brain w/o-pending  -Dysphagia screen: pass/fail-currently intubated. Reevaluate when indicated   -Physical therapy/OT/Speech eval/treatment.   -check TTE, cardiac monitoring w/ telemetry for now, further evaluation pending findings of noted workup  , +/- ILR             - patient should have all age and risk appropriate malignancy screenings with PCP or sooner if clinically suspected    -DVT ppx: Heparin s.c [] LMWH [x] SCD[x]    -maintain adequate hydration    -Na Goal: 135-145   -monitor for si/sx of infection   -Check LDL/A1C  -Stroke education completed 12/24/24    OTHER:  condition and plan of care d/w patient, questions and concerns addressed.     DISPOSITION: Rehab or home depending on PT eval once stable and workup is complete      CORE MEASURES:        Admission NIHSS:      Tenecteplase : [] YES [x] NO      LDL/A1C: 41/5.2     Depression Screen- if depression hx and/or present      Statin Therapy: atorvastatin 10mg     Dysphagia Screen: [] PASS [] FAIL     Smoking [] YES [] NO      Afib [] YES [x] NO     Stroke Education [] YES [] NO [x] pending   ASSESSMENT: 85yo F with PMHx HTN, HLD, osteoporosis, GERD, h/o breast cancer s/p left mastectomy, left hysterectomy BIBEMs for left-sided weakness and LUE rhythmic shaking. Per family, patient was last known well at 1:30am 12/24/24. When EMS arrived patient was alert but sloughing over the left side. Patient's left arm was noted to be weak and shortly started twitching, was given versed by EMS. By the time patient arrived to ER, Pt was intubated for airway protection. 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 Stroke neuro consulted for further evaluation.     Impression: CT findings with no mass effect, acute hemorrhage, or midline shift.    Nonacute left posterior temporal/occipital ischemic event. Pending MRI for further evaluation to r/o right cerebral dysfunction causing left sided weakness/rhythmic motion    NEURO:   -Continue close monitoring for neurologic deterioration    -Prelim EEG: One prolonged seizure from 4:35-4:48- 3 Hz LPDs at P4/C4/Cz, continuous muscle artifact over right temporal region consistent with clinical left arm/left leg twitching. Risk of focal onset seizures from bilateral parasagittal/midline regions, right > left. Severe diffuse/multifocal cerebral dysfunction in the setting of sedation.  - Stroke neuro checks q1 hour per neuro ICU   - SBP goal 100-180mmhg avoiding rapid fluctuations and hypotension. Patient currently 100-135mmHg    -ANTITHROMBOTIC THERAPY: Recommend continuing aspirin 81mg, and Plavix 75mg for high grade stenosis 75% right ICA and 30% stenosis left ICA.   -titrate statin to LDL goal less than 70  Recommend obtaining carotid dopplers   -MRI Brain w/ and w/o-pending  -Dysphagia screen: pass/fail-currently intubated. Reevaluate when indicated   -Physical therapy/OT/Speech eval/treatment.   -check TTE, cardiac monitoring w/ telemetry for now, further evaluation pending findings of noted workup  , +/- ILR             - patient should have all age and risk appropriate malignancy screenings with PCP or sooner if clinically suspected    -DVT ppx: Heparin s.c [] LMWH [x] SCD[x]    -maintain adequate hydration    -Na Goal: 135-145   -monitor for si/sx of infection   -Check LDL/A1C  -Stroke education completed 12/24/24    OTHER:  condition and plan of care d/w patient, questions and concerns addressed.     DISPOSITION: Rehab or home depending on PT eval once stable and workup is complete      CORE MEASURES:        Admission NIHSS:      Tenecteplase : [] YES [x] NO      LDL/A1C: 41/5.2     Depression Screen- if depression hx and/or present      Statin Therapy: atorvastatin 10mg     Dysphagia Screen: [] PASS [] FAIL     Smoking [] YES [] NO      Afib [] YES [x] NO     Stroke Education [] YES [] NO [x] pending

## 2024-12-25 NOTE — EEG REPORT - NS EEG TEXT BOX
RAÚL JASMINE [id number]     Study Date: 12-25-24 1:13 - 08:00 12-25-24  Duration x Hours: 6 hr 6 min  --------------------------------------------------------------------------------------------------  History:  CC/ HPI Patient is a 84y old  Female who presents with a chief complaint of seizures, severe R ICA stenosis (24 Dec 2024 20:57)    MEDICATIONS  (STANDING):  amLODIPine   Tablet 10 milliGRAM(s) Oral daily  atorvastatin 10 milliGRAM(s) Oral at bedtime  chlorhexidine 0.12% Liquid 15 milliLiter(s) Oral Mucosa every 12 hours  dextrose 5%. 1000 milliLiter(s) (100 mL/Hr) IV Continuous <Continuous>  dextrose 5%. 1000 milliLiter(s) (50 mL/Hr) IV Continuous <Continuous>  dextrose 50% Injectable 25 Gram(s) IV Push once  dextrose 50% Injectable 12.5 Gram(s) IV Push once  dextrose 50% Injectable 25 Gram(s) IV Push once  glucagon  Injectable 1 milliGRAM(s) IntraMuscular once  insulin lispro (ADMELOG) corrective regimen sliding scale   SubCutaneous three times a day before meals  levETIRAcetam   Injectable 1500 milliGRAM(s) IV Push every 12 hours  lisinopril 40 milliGRAM(s) Oral daily  pantoprazole  Injectable 40 milliGRAM(s) IV Push daily  polyethylene glycol 3350 17 Gram(s) Oral daily  propofol Infusion 40 MICROgram(s)/kG/Min (13.5 mL/Hr) IV Continuous <Continuous>  senna 2 Tablet(s) Oral at bedtime    --------------------------------------------------------------------------------------------------  Study Interpretation:    [[[Abbreviation Key:  PDR=alpha rhythm/posterior dominant rhythm. A-P=anterior posterior gradient.  Amplitude: ‘very low’:<20; ‘low’:20-50; ‘medium’:; ‘high’:>200uV.  Persistence for periodic/rhythmic patterns (% of epoch) ‘rare’:<1%; ‘occasional’:1-10%; ‘frequent’:10-50%; ‘abundant’:50-90%; ‘continuous’:>90%.  Persistence for sporadic discharges: ‘rare’:<1/hr; ‘occasional’:1/min-1/hr; ‘frequent’:>1/min; ‘abundant’:>1/10 sec.  GRDA=generalized rhythmic delta activity; FIRDA=frontal intermittent GRDA; LRDA=lateralized rhythmic delta activity; TIRDA=temporal intermittent rhythmic delta activity;  LPD=PLED=lateralized periodic discharges; GPD=generalized periodic discharges; BiPDs=BiPLEDs=bilateral independent periodic epileptiform discharges; SIRPID=stimulus induced rhythmic, periodic, or ictal appearing discharges; BIRDs=brief potentially ictal rhythmic discharges >4 Hz, lasting .5-10s; PFA=paroxysmal bursts of beta/gamma; LVFA=low voltage fast activity.  Modifiers: +F=with fast component; +S=with spike component; +R=with rhythmic component.  S-B=burst suppression pattern.  Max=maximal. N1-drowsy; N2-stage II sleep; N3-slow wave sleep. SSS/BETS=small sharp spikes/benign epileptiform transients of sleep. HV=hyperventilation; PS=photic stimulation]]]    Daily EEG Visual Analysis    FINDINGS:      Background:  Continuity: burst suppressed between start of recording and near 3:17, and again between 6:14 and 08:00, with interburst intervals of 1-4 seconds. There are also periods of asynchronous discontinuity near 1-2 seconds intermittently present outside of burst suppression.  Symmetry: symmetric, asynchronous at times  PDR: not present   Reactivity: not present  Voltage: normal, mostly 20-150uV during continuous periods and during burst periods  Anterior Posterior Gradient: present  Other background findings: none  Breach: absent    Background Slowing:  Generalized slowing: diffuse polymorphic theta/delta activity was present  Focal slowing: none was present.    State Changes:   -N2 sleep transients were not recorded.  -Drowsiness was absent    Sporadic Epileptiform Discharges:    See below    Rhythmic and Periodic Patterns (RPPs):  Subtle LPDs over the midline and right parasagittal region starting near 4:23, with gradual increase in amplitude, frequency (1-2 Hz), and with more apparent sharp wave morphology, present most frequently at P4 > Cz > C4.    Additional independent PD's present over the left parasagittal region, 1-2 Hz, P3/C3 max.     Electrographic and Electroclinical seizures:  Prolonged epoch of gradually evolving LPDs as described above from 4:35-4:48 characterized by near 3 Hz LPDs at P4/C4/Cz with continuous muscle artifact present over the right temporal region, consistent with video EEG finding of subtle left arm/left leg twitching.     Other Clinical Events:  None    Activation Procedures:   -Hyperventilation was not performed.    -Photic stimulation was not performed.      Artifacts:  Intermittent myogenic and movement artifacts were noted.    ECG:  The heart rate on single channel ECG was normal sinus rhythm.     EEG Classification / Summary:  Abnormal EEG  1. One prolonged seizure from 4:35-4:48- 3 Hz LPDs at P4/C4/Cz, continuous muscle artifact over right temporal region consistent with clinical left arm/left leg twitching  2. BIPEDs, right parasagittal/midline > left parasagittal beginning at 4:23, 1-2 Hz, P4>Cz>C4, P3/C3, 1-2 Hz.   3. Burst suppression between 1:13- 3:17 and 6:14- 08:00  4. Background slowing, generalized    Clinical Impression:  1. One right parasagittal/midline seizure as described above.  2. Risk of focal onset seizures from bilateral parasagittal/midline regions, right > left.  3. Severe diffuse/multifocal cerebral dysfunction in the setting of sedation.       THIS IS A PRELIMINARY INTERPRETATION ONLY PENDING ATTENDING REVIEW/ATTESTATION    Steve Leblanc DO  Epilepsy Fellow, PGY-5    -------------------------------------------------------------------------------------------------------  [facility] EEG Reading Room Ph#: [phone]  Epilepsy Answering Service after 5PM and before 8:30AM: Ph#: [phone]       RAÚL JASMINE [id number]     Study Date: 12-24-24 01:13 - 12-25-24 08:00  Duration x Hours: 6 hr 6 min  --------------------------------------------------------------------------------------------------  History:  CC/ HPI Patient is a 84y old  Female who presents with a chief complaint of seizures, severe R ICA stenosis (24 Dec 2024 20:57)    MEDICATIONS  (STANDING):  amLODIPine   Tablet 10 milliGRAM(s) Oral daily  atorvastatin 10 milliGRAM(s) Oral at bedtime  chlorhexidine 0.12% Liquid 15 milliLiter(s) Oral Mucosa every 12 hours  dextrose 5%. 1000 milliLiter(s) (100 mL/Hr) IV Continuous <Continuous>  dextrose 5%. 1000 milliLiter(s) (50 mL/Hr) IV Continuous <Continuous>  dextrose 50% Injectable 25 Gram(s) IV Push once  dextrose 50% Injectable 12.5 Gram(s) IV Push once  dextrose 50% Injectable 25 Gram(s) IV Push once  glucagon  Injectable 1 milliGRAM(s) IntraMuscular once  insulin lispro (ADMELOG) corrective regimen sliding scale   SubCutaneous three times a day before meals  levETIRAcetam   Injectable 1500 milliGRAM(s) IV Push every 12 hours  lisinopril 40 milliGRAM(s) Oral daily  pantoprazole  Injectable 40 milliGRAM(s) IV Push daily  polyethylene glycol 3350 17 Gram(s) Oral daily  propofol Infusion 40 MICROgram(s)/kG/Min (13.5 mL/Hr) IV Continuous <Continuous>  senna 2 Tablet(s) Oral at bedtime    --------------------------------------------------------------------------------------------------  Study Interpretation:    [[[Abbreviation Key:  PDR=alpha rhythm/posterior dominant rhythm. A-P=anterior posterior gradient.  Amplitude: ‘very low’:<20; ‘low’:20-50; ‘medium’:; ‘high’:>200uV.  Persistence for periodic/rhythmic patterns (% of epoch) ‘rare’:<1%; ‘occasional’:1-10%; ‘frequent’:10-50%; ‘abundant’:50-90%; ‘continuous’:>90%.  Persistence for sporadic discharges: ‘rare’:<1/hr; ‘occasional’:1/min-1/hr; ‘frequent’:>1/min; ‘abundant’:>1/10 sec.  GRDA=generalized rhythmic delta activity; FIRDA=frontal intermittent GRDA; LRDA=lateralized rhythmic delta activity; TIRDA=temporal intermittent rhythmic delta activity;  LPD=PLED=lateralized periodic discharges; GPD=generalized periodic discharges; BiPDs=BiPLEDs=bilateral independent periodic epileptiform discharges; SIRPID=stimulus induced rhythmic, periodic, or ictal appearing discharges; BIRDs=brief potentially ictal rhythmic discharges >4 Hz, lasting .5-10s; PFA=paroxysmal bursts of beta/gamma; LVFA=low voltage fast activity.  Modifiers: +F=with fast component; +S=with spike component; +R=with rhythmic component.  S-B=burst suppression pattern.  Max=maximal. N1-drowsy; N2-stage II sleep; N3-slow wave sleep. SSS/BETS=small sharp spikes/benign epileptiform transients of sleep. HV=hyperventilation; PS=photic stimulation]]]    Daily EEG Visual Analysis    FINDINGS:      Background:  Continuity: burst suppressed between start of recording to 03:17, and again between 06:14 and 08:00, with interburst intervals of 1-4 seconds and 1-3-second bursts of diffuse beta, alpha, theta > delta activity. There are also periods of discontinuity near 1-2 seconds intermittently present outside of burst suppression. During continuous periods, background consists of diffuse polymorphic delta and theta slowing, posteriorly predominant theta frequencies more prominent on the left, no definite PDR.  Symmetry: symmetric, asynchronous at times  PDR: absent  Other background findings: none  Breach: absent    Background Slowing:  Generalized slowing: as above  Focal slowing: Occasional right hemispheric relative attenuation    State Changes:   -N2 sleep transients were not recorded.  -Drowsiness was absent    Interictal Findings:  Occasional left centroparietal (C3/P3) spikes and sharp waves, periodic/semi-periodic around 1-1.5 Hz starting around 04:23.  Occasional right parietal (P4) sharp waves.  After the prolonged seizure (described below), runs of right parietal sharp waves at 1-2 Hz, fluctuating, at times up to 3 Hz, with intermixed fast activity, ending 06:14. Independent C3/P3 sharp waves at 1-1.5 Hz during this time.  After 06:14, occipital sharp waves (O1/O2, possibly O1>O2) at 0.3-1 Hz between seizures (described below).    Electrographic and Electroclinical seizures:  Time: 04:34, fluctuating/cycling until 05:53  Right parietal (P4) sharp waves, increasing in frequency and amplitude, with intermixed fast activity, then decreasing in frequency, spreading throughout both hemispheres R>L, fluctuating/cycling.  Event button is pressed during this time. Video shows LLE twitching.    Additional possible right parietal focal seizure at 05:55 for 19 sec, less well evolving with P4 sharp waves and overriding fast activity, becoming rhythmic. Independent C3/P3 sharp waves at 1-1.5 Hz during this time.    After 06:14, multiple occipital focal seizures with occipital (O1/O2, possibly O1>O2) sharp waves in runs at up to 3-4 Hz, ending by 07:19.    Other Clinical Events:  None    Activation Procedures:   -Hyperventilation was not performed.    -Photic stimulation was not performed.      Artifacts:  Intermittent myogenic and movement artifacts present    Single-lead EKG: Normal sinus rhythm. Occasional PAC.    EEG Classification / Summary:  Abnormal EEG  -Right parietal focal-onset electroclinical seizure with bilateral spread, prolonged, from 04:34 fluctuating/cycling until 05:53, with LLE twitching  -Between 06:14-07:19, multiple occipital focal seizures  -Runs of right parietal sharp waves on the ictal-interictal continuum between 05:53-06:14  -Outside of the above, there are left centroparietal and right parietal spikes/sharp waves and LPDs, occipital sharp waves and LPDs  -Occasional right hemispheric relative attenuation  -Variably burst-suppressed to continuous background (burst-suppressed between start of recording to 03:17, and again between 06:14 and 08:00)    Clinical Impression:  -Prolonged right parietal focal-onset seizure with bilateral spread from 04:34 fluctuating/cycling until 05:53, with LLE twitching  -Multiple occipital focal seizures between 06:14-07:19  -Right parietal ictal-interictal continuum between 05:53-06:14, at times closer to the ictal side  -Risk of focal-onset seizures from the left centroparietal, right parietal, and occipital regions  -Right hemispheric focal cerebral dysfunction can be structural or functional (such as post-ictal) in etiology  -Variably severe to moderate diffuse/multifocal cerebral dysfunction, nonspecific in etiology, may be related to sedating medication with changes in dose.      -------------------------------------------------------------------------------------------------------    Steve Leblanc DO  Epilepsy Fellow, PGY-5    -------------------------------------------------------------------------------------------------------  [facility] EEG Reading Room Ph#: [phone]  Epilepsy Answering Service after 5PM and before 8:30AM: Ph#: [phone]

## 2024-12-26 ENCOUNTER — RESULT REVIEW (OUTPATIENT)
Age: 84
End: 2024-12-26

## 2024-12-26 NOTE — PROGRESS NOTE ADULT - SUBJECTIVE AND OBJECTIVE BOX
HPI:  85yo F with PMHx HTN, HLD, osteoporosis, GERD, h/o breast cancer s/p left mastectomy, left hysterectomy BIBEMs for left-sided weakness and LUE rhythmic shaking. Per family, patient was last known well at 1:30am. When EMS arrived patient was alert but sloughing over the left side. Patient's left arm was noted to be weak and shortly started twitching, was given versed by EMS. By the time patient arrived to ER, Pt was intubated for airway protection. 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 CTA without LVO however with findings of high grade stenosis 75% right  ICA and 30% stenosis L ICA. NeuroICU consulted for further intervention. (24 Dec 2024 20:57)    O/n events: no szs while on Propofol gtt, started weaning.      ICU Vital Signs Last 24 Hrs  T(C): 36.2 (26 Dec 2024 14:30), Max: 37.1 (25 Dec 2024 16:30)  T(F): 97.2 (26 Dec 2024 14:30), Max: 98.8 (25 Dec 2024 16:30)  HR: 59 (26 Dec 2024 14:30) (58 - 77)  BP: 141/67 (26 Dec 2024 14:30) (98/53 - 168/80)  BP(mean): 86 (26 Dec 2024 14:30) (68 - 146)  ABP: --  ABP(mean): --  RR: 12 (26 Dec 2024 14:30) (12 - 24)  SpO2: 100% (26 Dec 2024 14:30) (100% - 100%)    O2 Parameters below as of 26 Dec 2024 14:00  Patient On (Oxygen Delivery Method): ventilator    O2 Concentration (%): 40          Exam: limited as sedated on Propofol for seizures.  PERRL, gaze midline, o/b the vent, motor - trace wdrl in all 4.  CTAB  S1S2 present  Abd soft, NT, ND  No peripheral swelling, no rash noted.

## 2024-12-26 NOTE — PROGRESS NOTE ADULT - ASSESSMENT
83yo F with new onset status epilepticus (clinically - with L UE and LLE twitching), weaning Propofol.  Acute resp failure due to neurologic condition.  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  PMHx of remote CVA, dementia, HTN, HLD, osteoporosis, GERD, h/o breast cancer s/p left mastectomy, left hysterectomy, MARLINE.    Plan:  - neuro checks  - weaning Propofol gtt - will switch to Precedex gtt  - Ativan PRN for clinical seizures  - cont Keppra to 1500 BID IV  - cont cEEG  - holding anti-thrombotics for possible LP  - pending MRI w/wo contrast today  - hold home dementia meds to avoid lowering sz threshold  - metabolic encephalopathy w/up in progress; paraneoplastic panel sent; plan for CT C/A/P w/contrast  in view of h/o Ca  - maintain -180, avoid significant fluctuations; cont home Coreg  - maintain Osats>92%, vent support  - cont TF, BM regimen; PPI for ulcer ppx  - monitor e-lytes, I/Os  - Goal euglycemia (-180), ISS; cont home statin  - VTE prophylaxis: SCDs, SQL

## 2024-12-26 NOTE — CONSULT NOTE ADULT - ASSESSMENT
Conclusion: After due diligence, the patient’s three siblings are equal surrogates in this case. They have deferred decision making to the patient’s godson, Norberto Castillo, for medical decisions. Mr. Castillo includes the patient’s close friend, Sonam Winchester, in medical decisions for the patient, and provides the patient’s sister with updates on the patient’s condition.      [facility] law and current ethical thinking support the right of a patient’s surrogate decision makers to make medical decisions on the patient’s behalf when the patient is without capacity. The team should engage with Mr. Norberto Castillo for medical decisions.     Thank you for including the Ethics Consultation Service. Ethics commends the team for their virtue in the care and support for Ms. Dave.     Ethics will remain available for any discussions and decision points that may occur.        Case discussed with Medical Ethicist Antonieta Giles MD, MS, MPH, [facility]  of Medical Ethics     More than 50% of the time of this consultation was spent in coordination of Care of Patient       References   (i) Donna TL & Kwaku JF. Principles of Biomedical Ethics. [place]: [employer]; 2019   (ii)[place] Pub[place] Health Law § 2994-a et seq. (2020).   (iii)[place] Pub[place] Health [place] § 2994-d.1.  Conclusion: After due diligence, the patient’s three siblings are equal surrogates in this case. They have deferred decision making to the patient’s godson, Norberto Castillo, for medical decisions. Mr. Castillo includes the patient’s close friend, Sonam Winchester, in medical decisions for the patient, and provides the patient’s sister with updates on the patient’s condition.      [facility] law and current ethical thinking support the right of a patient’s surrogate decision makers to make medical decisions on the patient’s behalf when the patient is without capacity. The team should engage with Mr. Norberto Castillo for medical decisions.     Thank you for including the Ethics Consultation Service. Ethics commends the team for their virtue in the care and support for Ms. Dave.     Ethics will remain available for any discussions and decision points that may occur.        Case discussed with Medical Ethicist Antonieta Giles MD, MS, MPH, [facility] ( of Medical Ethics)     More than 50% of the time of this consultation was spent in coordination of Care of Patient       References   (i) Donna TL & Kwaku JF. Principles of Biomedical Ethics. [place]: [employer]; 2019   (ii)[place] Pub[place] Health [place] § 2994-a et seq. (2020).   (iii)[place] Pub[place] Health [place] § 2994-d.1.

## 2024-12-26 NOTE — DIETITIAN INITIAL EVALUATION ADULT - NSFNSGIIOFT_GEN_A_CORE
12-25-24 @ 07:01  -  12-26-24 @ 07:00  --------------------------------------------------------  OUT:  Total OUT: 0 mL    Total NET: 10 mL      12-26-24 @ 07:01  -  12-26-24 @ 16:24  --------------------------------------------------------  OUT:  Total OUT: 0 mL    Total NET: 170 mL

## 2024-12-26 NOTE — DIETITIAN INITIAL EVALUATION ADULT - OTHER INFO
83yo F PMHx of remote CVA, dementia, HTN, HLD, osteoporosis, GERD, h/o breast cancer s/p left mastectomy, left hysterectomy, MARLINE. Admitted with new onset status epilepticus (clinically - with L UE and LLE twitching), weaning Propofol. Acute resp failure due to neurologic condition. 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

## 2024-12-26 NOTE — DIETITIAN INITIAL EVALUATION ADULT - NUTRITION DIAGNOSITC TERMINOLOGY #1
"        Subjective   Ramiro is a 20 year old who presents for the following health issues      HPI       Consult  - inattention and focusing issues x several years  - causing problems at work/school  Online college courses have challenged him. Difficulty with remain on task and task completion. Some forgetfulness. Loses things easily.   No depression. No anxiety sleeps ok. Occasional he displays and impulsive nature. No hyperactivity. Historically has done fairly well in school.        Review of Systems   Constitutional, HEENT, cardiovascular, pulmonary, GI, , musculoskeletal, neuro, skin, endocrine and psych systems are negative, except as otherwise noted.      Objective    /64   Pulse 71   Temp 97.2  F (36.2  C) (Tympanic)   Resp 16   Ht 1.848 m (6' 0.75\")   Wt 74.6 kg (164 lb 6.4 oz)   SpO2 97%   BMI 21.84 kg/m    Body mass index is 21.84 kg/m .  Physical Exam   Eye exam - right eye normal lid, conjunctiva, cornea, pupil and fundus, left eye normal lid, conjunctiva, cornea, pupil and fundus.  Thyroid not palpable, not enlarged, no nodules detected.  CHEST:chest clear to IPPA, no tachypnea, retractions or cyanosis and S1, S2 normal, no murmur, no gallop, rate regular.    Ramiro was seen today for consult.    Diagnoses and all orders for this visit:    Inattention  -     MENTAL HEALTH REFERRAL  - Adult; Assessments and Testing; ADHD; [facility] [facility] [phone]; We will contact you to schedule the appointment or please call with any questions    Family history of hypercholesterolemia  -     Lipid panel reflex to direct LDL Fasting      If his evaluation is suggestive of or confirms adhd , will see him back to discuss medication management.   work on lifestyle modification          " Inadequate Oral Intake

## 2024-12-26 NOTE — PROGRESS NOTE ADULT - ASSESSMENT
COMPLETE A/P PENDING   ASSESSMENT: 85yo F with PMHx HTN, HLD, osteoporosis, GERD, h/o breast cancer s/p left mastectomy, left hysterectomy BIBEMs for left-sided weakness and LUE rhythmic shaking. Per family, patient was last known well at 1:30am 12/24/24. When EMS arrived patient was alert but sloughing over the left side. Patient's left arm was noted to be weak and shortly started twitching, was given versed by EMS. By the time patient arrived to ER, Pt was intubated for airway protection. 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 Stroke neuro consulted for further evaluation.     Impression: CT findings with no mass effect, acute hemorrhage, or midline shift.    Nonacute left posterior temporal/occipital ischemic event. Pending MRI for further evaluation to r/o right cerebral dysfunction causing left sided weakness/rhythmic motion    NEURO:   -Neurologically ***  -Continue close monitoring for neurologic deterioration    -EEG report 12/26/24: Severe nonspecific diffuse or multifocal cerebral dysfunction, No epileptiform discharges or seizures were recorded.  -EEG report 12/25/24: Prolonged right parietal focal-onset seizure with bilateral spread from 04:34 fluctuating/cycling until 05:53, with LLE twitching/ Multiple occipital focal seizures between 06:14-07:19. Right parietal ictal-interictal continuum between 05:53-06:14, at times closer to the ictal side- Stroke neuro checks q1 hour per neuro ICU   - SBP goal 100-180mmhg avoiding rapid fluctuations and hypotension. Patient currently 100-135mmHg    -ANTITHROMBOTIC THERAPY: Recommend continuing aspirin 81mg, and Plavix 75mg for high grade stenosis 75% right ICA and 30% stenosis left ICA.   -titrate statin to LDL goal less than 70  Recommend obtaining carotid dopplers   -MRI Brain w/ and w/o-pending  -Dysphagia screen: pass/fail-currently intubated. Reevaluate when indicated   -Physical therapy/OT/Speech eval/treatment.   -check TTE, cardiac monitoring w/ telemetry for now, further evaluation pending findings of noted workup  , +/- ILR             - patient should have all age and risk appropriate malignancy screenings with PCP or sooner if clinically suspected    -DVT ppx: Heparin s.c [] LMWH [x] SCD[x]    -maintain adequate hydration    -Na Goal: 135-145   -monitor for si/sx of infection   -Check LDL/A1C  -Stroke education completed 12/24/24    OTHER:  condition and plan of care d/w patient, questions and concerns addressed.     DISPOSITION: Rehab or home depending on PT eval once stable and workup is complete      CORE MEASURES:        Admission NIHSS:      Tenecteplase : [] YES [x] NO      LDL/A1C: 41/5.2     Depression Screen- if depression hx and/or present      Statin Therapy: atorvastatin 10mg     Dysphagia Screen: [] PASS [x] FAIL     Smoking [] YES [x] NO      Afib [] YES [x] NO     Stroke Education [x] YES 12/24/24 [] NO       COMPLETE A/P PENDING   ASSESSMENT: 83yo F with PMHx HTN, HLD, osteoporosis, GERD, h/o breast cancer s/p left mastectomy, left hysterectomy BIBEMs for left-sided weakness and LUE rhythmic shaking. Per family, patient was last known well at 1:30am 12/24/24. When EMS arrived patient was alert but sloughing over the left side. Patient's left arm was noted to be weak and shortly started twitching, was given versed by EMS. By the time patient arrived to ER, Pt was intubated for airway protection. 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 Stroke neuro consulted for further evaluation.     Impression: CT findings with no mass effect, acute hemorrhage, or midline shift.    Nonacute left posterior temporal/occipital ischemic event. Pending MRI for further evaluation to r/o right cerebral dysfunction causing left sided weakness/rhythmic motion    NEURO:   -Neurologically ***  -Continue close monitoring for neurologic deterioration   -EEG report 12/26/24: Severe nonspecific diffuse or multifocal cerebral dysfunction, No epileptiform discharges or seizures were recorded.  -EEG report 12/25/24: Prolonged right parietal focal-onset seizure with bilateral spread from 04:34 fluctuating/cycling until 05:53, with LLE twitching/ Multiple occipital focal seizures between 06:14-07:19. Right parietal ictal-interictal continuum between 05:53-06:14, at times closer to the ictal side- Stroke neuro checks q1 hour per neuro ICU   - SBP goal 100-180mmhg avoiding rapid fluctuations and hypotension. Patient currently 100-135mmHg    -ANTITHROMBOTIC THERAPY: Plavix being held in anticipation for LP; continue with 81mg ASA daily.   -titrate statin to LDL goal less than 70  Recommend obtaining carotid dopplers   -MRI Brain w/ and w/o-pending; would recommend repeat CT head if patient unable to tolerate MRI I  -Dysphagia screen: pass/fail-currently intubated. Reevaluate when indicated   -Physical therapy/OT/Speech eval/treatment.   -check TTE, cardiac monitoring w/ telemetry for now, further evaluation pending findings of noted workup  , +/- ILR             - patient should have all age and risk appropriate malignancy screenings with PCP or sooner if clinically suspected    -DVT ppx: Heparin s.c [] LMWH [x] SCD[x]    -maintain adequate hydration    -Na Goal: 135-145   -monitor for si/sx of infection   -Check LDL/A1C  -Stroke education completed 12/24/24    OTHER:  condition and plan of care d/w patient, questions and concerns addressed.     DISPOSITION: Rehab or home depending on PT eval once stable and workup is complete      CORE MEASURES:        Admission NIHSS:      Tenecteplase : [] YES [x] NO      LDL/A1C: 41/5.2     Depression Screen- if depression hx and/or present      Statin Therapy: atorvastatin 10mg     Dysphagia Screen: [] PASS [x] FAIL     Smoking [] YES [x] NO      Afib [] YES [x] NO     Stroke Education [x] YES 12/24/24 [] NO       ASSESSMENT: 83yo F with PMHx HTN, HLD, osteoporosis, GERD, h/o breast cancer s/p left mastectomy, left hysterectomy BIBEMs for left-sided weakness and LUE rhythmic shaking. Per family, patient was last known well at 1:30am 12/24/24. When EMS arrived patient was alert but sloughing over the left side. Patient's left arm was noted to be weak and shortly started twitching, was given versed by EMS. By the time patient arrived to ER, Pt was intubated for airway protection. 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 Stroke neuro consulted for further evaluation.     Impression: CT findings with no mass effect, acute hemorrhage, or midline shift.    Nonacute left posterior temporal/occipital ischemic event. Pending MRI for further evaluation to r/o right cerebral dysfunction causing left sided weakness/rhythmic motion    NEURO:   -Neurologically w/ limited exam secondary to intubated/sedated status   -Continue close monitoring for neurologic deterioration   -EEG report 12/26/24: Severe nonspecific diffuse or multifocal cerebral dysfunction, No epileptiform discharges or seizures were recorded.  -EEG report 12/25/24: Prolonged right parietal focal-onset seizure with bilateral spread from 04:34 fluctuating/cycling until 05:53, with LLE twitching/ Multiple occipital focal seizures between 06:14-07:19. Right parietal ictal-interictal continuum between 05:53-06:14, at times closer to the ictal side- Stroke neuro checks q1 hour per neuro ICU   - SBP goal 100-180mmhg avoiding rapid fluctuations and hypotension. Patient currently 100-135mmHg    -ANTITHROMBOTIC THERAPY: Plavix being held in anticipation for LP; continue with 81mg ASA daily.   -titrate statin to LDL goal less than 70  Recommend obtaining carotid dopplers   -MRI Brain w/ and w/o-pending; would recommend repeat CT head if patient unable to tolerate MRI  -Dysphagia screen: pass/fail-currently intubated. Reevaluate when indicated   -Physical therapy/OT/Speech eval/treatment.   -check TTE, cardiac monitoring w/ telemetry for now, further evaluation pending findings of noted workup  , +/- ILR             - patient should have all age and risk appropriate malignancy screenings with PCP or sooner if clinically suspected    -DVT ppx: Heparin s.c [] LMWH [x] SCD[x]    -maintain adequate hydration    -Na Goal: 135-145   -monitor for si/sx of infection   -Check LDL/A1C  -Stroke education completed 12/24/24    OTHER:  condition and plan of care d/w patient, questions and concerns addressed.     DISPOSITION: Rehab or home depending on PT eval once stable and workup is complete    CORE MEASURES:        Admission NIHSS:      Tenecteplase : [] YES [x] NO      LDL/A1C: 41/5.2     Depression Screen- if depression hx and/or present      Statin Therapy: atorvastatin 10mg     Dysphagia Screen: [] PASS [x] FAIL     Smoking [] YES [x] NO      Afib [] YES [x] NO     Stroke Education [x] YES 12/24/24 [] NO       ASSESSMENT: 85yo F with PMHx HTN, HLD, osteoporosis, GERD, h/o breast cancer s/p left mastectomy, left hysterectomy BIBEMs for left-sided weakness and LUE rhythmic shaking. Per family, patient was last known well at 1:30am 12/24/24. When EMS arrived patient was alert but sloughing over the left side. Patient's left arm was noted to be weak and shortly started twitching, was given versed by EMS. By the time patient arrived to ER, Pt was intubated for airway protection. 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 Stroke neuro consulted for further evaluation.     Impression: CT findings with no mass effect, acute hemorrhage, or midline shift.    Nonacute left posterior temporal/occipital ischemic event. Pending MRI for further evaluation to r/o right cerebral dysfunction causing left sided weakness/rhythmic motion    NEURO:   -Neurologically w/ limited exam secondary to intubated/sedated status   -Continue close monitoring for neurologic deterioration   -EEG report 12/26/24: Severe nonspecific diffuse or multifocal cerebral dysfunction, No epileptiform discharges or seizures were recorded.  -EEG report 12/25/24: Prolonged right parietal focal-onset seizure with bilateral spread from 04:34 fluctuating/cycling until 05:53, with LLE twitching/ Multiple occipital focal seizures between 06:14-07:19. Right parietal ictal-interictal continuum between 05:53-06:14, at times closer to the ictal side- Stroke neuro checks q1 hour per neuro ICU   - SBP goal 100-180mmhg avoiding rapid fluctuations and hypotension. Patient currently 100-135mmHg    -ANTITHROMBOTIC THERAPY: Plavix being held in anticipation for LP; continue with 81mg ASA daily.   -titrate statin to LDL goal less than 70  Recommend obtaining carotid dopplers   -MRI Brain w/ and w/o-pending; would recommend repeat CT head if patient unable to tolerate MRI  -Dysphagia screen: pass/fail-currently intubated. Reevaluate when indicated   -Physical therapy/OT/Speech eval/treatment.   -check TTE, cardiac monitoring w/ telemetry for now, further evaluation pending findings of noted workup  , +/- ILR             - patient should have all age and risk appropriate malignancy screenings with PCP or sooner if clinically suspected    -DVT ppx: Heparin s.c [] LMWH [x] SCD[x]    -maintain adequate hydration    -Na Goal: 135-145   -monitor for si/sx of infection   -Check LDL/A1C  -Stroke education completed 12/24/24    OTHER:  condition and plan of care d/w patient, questions and concerns addressed.     CORE MEASURES:        Admission NIHSS:      Tenecteplase : [] YES [x] NO      LDL/A1C: 41/5.2     Depression Screen- if depression hx and/or present      Statin Therapy: atorvastatin 10mg     Dysphagia Screen: [] PASS [x] FAIL     Smoking [] YES [x] NO      Afib [] YES [x] NO     Stroke Education [x] YES 12/24/24 [] NO

## 2024-12-26 NOTE — PROGRESS NOTE ADULT - CRITICAL CARE ATTENDING COMMENT
New onset status epilepticus  EEG with burst suppression  MRI ww/o neg for new infarct  Possible plan for LP per NSCU  doubt R ICA stenosis symptomatic at this time - outpt JUANITO followup  General neuro to follow if needed

## 2024-12-26 NOTE — PROGRESS NOTE ADULT - SUBJECTIVE AND OBJECTIVE BOX
CC: Patient being seen for rehabilitation follow up.  Patient clinically unchanged.   Continues to be intubated/sedated.     FUNCTIONAL PROGRESS  Total A    VITALS  T(C): 36.2 (12-26-24 @ 12:00), Max: 37.1 (12-25-24 @ 16:30)  HR: 63 (12-26-24 @ 12:00) (58 - 77)  BP: 165/64 (12-26-24 @ 12:00) (98/53 - 168/80)  RR: 14 (12-26-24 @ 12:00) (12 - 24)  SpO2: 100% (12-26-24 @ 12:00) (100% - 100%)  Wt(kg): --    MEDICATIONS   atorvastatin 10 milliGRAM(s) at bedtime  calcium carbonate 1250 mG  + Vitamin D (OsCal 500 + D) 1 Tablet(s) daily  carvedilol 25 milliGRAM(s) every 12 hours  chlorhexidine 0.12% Liquid 15 milliLiter(s) every 12 hours  cyanocobalamin 1000 MICROGram(s) daily  dexMEDEtomidine Infusion 0.2 MICROgram(s)/kG/Hr <Continuous>  dextrose 5%. 1000 milliLiter(s) <Continuous>  dextrose 5%. 1000 milliLiter(s) <Continuous>  dextrose 50% Injectable 25 Gram(s) once  dextrose 50% Injectable 12.5 Gram(s) once  dextrose 50% Injectable 25 Gram(s) once  dextrose Oral Gel 15 Gram(s) once PRN  enoxaparin Injectable 40 milliGRAM(s) <User Schedule>  ferrous    sulfate 325 milliGRAM(s) daily  glucagon  Injectable 1 milliGRAM(s) once  hydrALAZINE Injectable 10 milliGRAM(s) every 3 hours PRN  insulin lispro (ADMELOG) corrective regimen sliding scale   three times a day before meals  labetalol Injectable 10 milliGRAM(s) every 3 hours PRN  levETIRAcetam   Injectable 1500 milliGRAM(s) every 12 hours  pantoprazole  Injectable 40 milliGRAM(s) daily  polyethylene glycol 3350 17 Gram(s) daily  propofol Infusion 40 MICROgram(s)/kG/Min <Continuous>  senna 2 Tablet(s) at bedtime  sodium chloride 0.9%. 1000 milliLiter(s) <Continuous>      RECENT LABS/IMAGING  - Reviewed Today                        11.7   11.91 )-----------( 254      ( 26 Dec 2024 02:30 )             36.9     12-26    140  |  105  |  12.5  ----------------------------<  91  3.6   |  22.0  |  0.88    Ca    8.8      26 Dec 2024 02:30  Phos  3.3     12-26  Mg     2.4     12-26    TPro  7.1  /  Alb  3.6  /  TBili  0.5  /  DBili  x   /  AST  54[H]  /  ALT  49[H]  /  AlkPhos  96  12-24      Urinalysis Basic - ( 26 Dec 2024 02:30 )    Color: x / Appearance: x / SG: x / pH: x  Gluc: 91 mg/dL / Ketone: x  / Bili: x / Urobili: x   Blood: x / Protein: x / Nitrite: x   Leuk Esterase: x / RBC: x / WBC x   Sq Epi: x / Non Sq Epi: x / Bacteria: x            CT HEAD 12/24 - 1. Age-appropriate involutional changes. Old left posterior temporal occipital ischemic event. No acute intracranial pathology. No acute intracranial hemorrhage.2. Choanal narrowing with suggestion of fluid within the nasopharyngeal region. Correlate with clinical assessment.    CTA NECK 12/24 - 1. Short segment high-grade stenosis of 76% based on NASCET criteria at the origin of the right internal carotid artery.2. Moderate deposition of calcified plaque along the proximal aspect of the left internal carotid artery with mild stenosis of 30% based on NASCET criteria in this location.    CTA HEAD 12/24 - No large vessel occlusion, significant stenosis or vascular abnormality identified.    EEG 12/26 - Abnormal EEG study. 1. Severe nonspecific diffuse or multifocal cerebral dysfunction.  2. No epileptiform discharges or seizures were recorded.    BLE V DOPPLER 12/26 - No evidence of deep venous thrombosis in either lower extremity.  ----------------------------------------------------------------------------------------  PHYSICAL EXAM  Constitutional - NAD, Appears Comfortable   Chest - +VENT   Extremities - No edema  Neurologic Exam -                    Cognitive - Intubated/Sedated      Communication - Intubated/Sedated      Cranial Nerves - Intubated/Sedated      FUNCTIONAL MOTOR EXAM - Abnormal posturing of BLE to pain  Psychiatric - Intubated/Sedated   ----------------------------------------------------------------------------------------  ASSESSMENT/PLAN  84yFemale with functional deficits after developing seizure like activity   Seizures - Keppra  ICA stenosis - Lipitor  HTN - Coreg, Lisinopril, Hydralazine, Labetalol  Respiratory Failure s/p VENT - Continue Precedex, Propofol, Duoneb  Pain - Tylenol  DVT PPX - SCDs, Lovenox  Rehab/Impaired mobility and function - Patient continues to require hospitalization for the above diagnoses and ongoing active management of comorbid complications that are substantially posing a threat to bodily function, functional ability and quality of life.     RECOMMEND - Turn Q2 when needed, HOB >30 degrees    Patient medically acute and being medically stabilized.  Ongoing EEG.  Pending FARHAT and MRI.   Recommend therapy evals when more appropriate.  At this time needs to be mobilized by nursing staff to prevent secondary complications of immobility.     Will continue to follow.  Recommend ongoing mobilization by staff to maintain cardiopulmonary function and prevention of secondary complications related to debility/immobility. Have discussed the specific rehabilitation management and recommendations documented above with rehab clinical care team/rehab liaison.      Total Time Spent on Encounter (reviewing clinical notes, labs, radiology and medications, reviewing patient history, physical exam, assessment and discussing rehabilitation options - with consideration of prior level of function, expected level of recovery and return to community living) - 50 minutes

## 2024-12-26 NOTE — DIETITIAN INITIAL EVALUATION ADULT - NS FNS DIET ORDER
Diet, NPO with Tube Feed:   Tube Feeding Modality: Orogastric  Jevity 1.5 Farrukh (JEVITY1.5)  Continuous  Starting Tube Feed Rate {mL per Hour}: 10  Increase Tube Feed Rate by (mL): 10     Every 4 hours  Until Goal Tube Feed Rate (mL per Hour): 30  Tube Feed Duration (in Hours): 24  Tube Feed Start Time: 14:00 (12-25-24 @ 14:04)

## 2024-12-26 NOTE — PROGRESS NOTE ADULT - SUBJECTIVE AND OBJECTIVE BOX
Preliminary note, offical recommendations pending attending review/signature   [facility] Stroke Team  Progress Note     HPI:  83yo F with PMHx HTN, HLD, osteoporosis, GERD, h/o breast cancer s/p left mastectomy, left hysterectomy BIBEMs for left-sided weakness and LUE rhythmic shaking. Per family, patient was last known well at 1:30am 12/24/24. When EMS arrived patient was alert but sloughing over the left side. Patient's left arm was noted to be weak and shortly started twitching, was given versed by EMS. By the time patient arrived to ER, Pt was intubated for airway protection. 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 CTA without LVO however with findings of high grade stenosis 75% right  ICA and 30% stenosis L ICA. NeuroICU consulted for further intervention.    SUBJECTIVE: No events overnight. Patient remains intubated. Unable to obtain complete ROS secondary to intubated status.     atorvastatin 10 milliGRAM(s) Oral at bedtime  calcium carbonate 1250 mG  + Vitamin D (OsCal 500 + D) 1 Tablet(s) Oral daily  carvedilol 25 milliGRAM(s) Oral every 12 hours  chlorhexidine 0.12% Liquid 15 milliLiter(s) Oral Mucosa every 12 hours  cyanocobalamin 1000 MICROGram(s) Oral daily  dextrose 5%. 1000 milliLiter(s) IV Continuous <Continuous>  dextrose 5%. 1000 milliLiter(s) IV Continuous <Continuous>  dextrose 50% Injectable 25 Gram(s) IV Push once  dextrose 50% Injectable 12.5 Gram(s) IV Push once  dextrose 50% Injectable 25 Gram(s) IV Push once  dextrose Oral Gel 15 Gram(s) Oral once PRN  enoxaparin Injectable 40 milliGRAM(s) SubCutaneous <User Schedule>  ferrous    sulfate 325 milliGRAM(s) Oral daily  glucagon  Injectable 1 milliGRAM(s) IntraMuscular once  hydrALAZINE Injectable 10 milliGRAM(s) IV Push every 3 hours PRN  insulin lispro (ADMELOG) corrective regimen sliding scale   SubCutaneous three times a day before meals  labetalol Injectable 10 milliGRAM(s) IV Push every 3 hours PRN  levETIRAcetam   Injectable 1500 milliGRAM(s) IV Push every 12 hours  pantoprazole  Injectable 40 milliGRAM(s) IV Push daily  polyethylene glycol 3350 17 Gram(s) Oral daily  propofol Infusion 40 MICROgram(s)/kG/Min IV Continuous <Continuous>  senna 2 Tablet(s) Oral at bedtime  sodium chloride 0.9%. 1000 milliLiter(s) IV Continuous <Continuous>      PHYSICAL EXAM:   Vital Signs Last 24 Hrs  T(C): 36.1 (26 Dec 2024 09:00), Max: 37.1 (25 Dec 2024 16:30)  T(F): 97 (26 Dec 2024 09:00), Max: 98.8 (25 Dec 2024 16:30)  HR: 58 (26 Dec 2024 09:00) (58 - 77)  BP: 135/64 (26 Dec 2024 09:00) (98/53 - 168/80)  BP(mean): 82 (26 Dec 2024 09:00) (68 - 146)  RR: 12 (26 Dec 2024 09:00) (12 - 24)  SpO2: 100% (26 Dec 2024 09:00) (100% - 100%)    Parameters below as of 26 Dec 2024 09:00  Patient On (Oxygen Delivery Method): ventilator    O2 Concentration (%): 40    Physical Exam LIMITED DUE TO PATIENT BEING INTUBATED AND SEDATED  Physical Exam:  General: patient with rhythmic upper extremity twitching   HEENT: Head normocephalic, atraumatic. Conjunctivae clear w/o exudates or hemorrhage. Sclera non-icteric. Nares are patent bilaterally. Mucous membranes pink and moist.  No tonsillar swelling or exudates.    Cardiac: Normal rate and rhythm. S1, S2 auscultated.      Respiratory: +intubated. Lung sounds clear in all fields. Chest wall symmetric, nontender.   Abdominal: Soft, nondistended, nontender. Bowel sounds normoactive x 4 quadrants. No masses, hepatomegaly, or splenomegaly.   Skin: Skin is warm, dry and intact without rashes or lesions. Appropriate color for ethnicity. Nailbeds pink with no cyanosis or clubbing.   Extremities: No edema, 2+ peripheral pulses in b/l upper and b/l lower extremities. Full range of motion in all joints.     Detailed Neurologic Exam:    Mental status: Patient was intubated and sedated    Cranial nerves: -dolls eyes. Pupils equal and react symmetrically to light.     Motor: There is normal bulk and tone.  Rhythmic motion in bilateral upper extremities  BL lower extremities withdraw from pain    Sensation: unable to assess at this time   Cerebellar: unable to assess at this time  Gait : deferred        LABS:                        11.7   11.91 )-----------( 254      ( 26 Dec 2024 02:30 )             36.9    12-26    140  |  105  |  12.5  ----------------------------<  91  3.6   |  22.0  |  0.88    Ca    8.8      26 Dec 2024 02:30  Phos  3.3     12-26  Mg     2.4     12-26    TPro  7.1  /  Alb  3.6  /  TBili  0.5  /  DBili  x   /  AST  54[H]  /  ALT  49[H]  /  AlkPhos  96  12-24      A1C: 5.2  LDL: 41    RADIOLOGY & ADDITIONAL STUDIES (independently reviewed unless otherwise noted):    CT Head No Cont (12.24.24 @ 15:01)   IMPRESSION:  No acute intracranial hemorrhage or acute territorial infarct. If   symptoms persist, consider MRI exam for further evaluation.    CT Head/Angio Head/Neck w/ IV Cont (12.24.24 @ 17:56)  IMPRESSION:    CT HEAD:  1. Age-appropriate involutional changes. Old left posterior temporal   occipital ischemic event. No acute intracranial pathology. No acute   intracranial hemorrhage.  2. Choanal narrowing with suggestion of fluid within the nasopharyngeal   region. Correlate with clinical assessment.    CTA NECK:  1. Short segment high-grade stenosis of 76% based on NASCET criteria at   the origin of the right internal carotid artery.  2. Moderate deposition of calcified plaque along the proximal aspect of   the left internal carotid artery with mild stenosis of 30% based on   NASCET criteria in this location.    CTA HEAD:  No large vessel occlusion, significant stenosis or vascular abnormality   identified.  --  EEG 12/26/24:  EEG IMPRESSION/CLINICAL CORRELATE  Clinical Impression:  Abnormal EEG study.  1. Severe nonspecific diffuse or multifocal cerebral dysfunction.   2. No epileptiform discharges or seizures were recorded.    EEG 12/25/24:  EEG IMPRESSION/CLINICAL CORRELATE  Clinical Impression:  -Prolonged right parietal focal-onset seizure with bilateral spread from 04:34 fluctuating/cycling until 05:53, with LLE twitching  -Multiple occipital focal seizures between 06:14-07:19  -Right parietal ictal-interictal continuum between 05:53-06:14, at times closer to the ictal side  -Risk of focal-onset seizures from the left centroparietal, right parietal, and occipital regions  -Right hemispheric focal cerebral dysfunction can be structural or functional (such as post-ictal) in etiology  -Variably severe to moderate diffuse/multifocal cerebral dysfunction, nonspecific in etiology, may be related to sedating medication with changes in dose.       Preliminary note, offical recommendations pending attending review/signature   [facility] Stroke Team  Progress Note     HPI:  85yo F with PMHx HTN, HLD, osteoporosis, GERD, h/o breast cancer s/p left mastectomy, left hysterectomy BIBEMs for left-sided weakness and LUE rhythmic shaking. Per family, patient was last known well at 1:30am 12/24/24. When EMS arrived patient was alert but sloughing over the left side. Patient's left arm was noted to be weak and shortly started twitching, was given versed by EMS. By the time patient arrived to ER, Pt was intubated for airway protection. 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 CTA without LVO however with findings of high grade stenosis 75% right  ICA and 30% stenosis L ICA. NeuroICU consulted for further intervention.    SUBJECTIVE: No events overnight. Patient remains intubated. Unable to obtain complete ROS secondary to intubated status.     atorvastatin 10 milliGRAM(s) Oral at bedtime  calcium carbonate 1250 mG  + Vitamin D (OsCal 500 + D) 1 Tablet(s) Oral daily  carvedilol 25 milliGRAM(s) Oral every 12 hours  chlorhexidine 0.12% Liquid 15 milliLiter(s) Oral Mucosa every 12 hours  cyanocobalamin 1000 MICROGram(s) Oral daily  dextrose 5%. 1000 milliLiter(s) IV Continuous <Continuous>  dextrose 5%. 1000 milliLiter(s) IV Continuous <Continuous>  dextrose 50% Injectable 25 Gram(s) IV Push once  dextrose 50% Injectable 12.5 Gram(s) IV Push once  dextrose 50% Injectable 25 Gram(s) IV Push once  dextrose Oral Gel 15 Gram(s) Oral once PRN  enoxaparin Injectable 40 milliGRAM(s) SubCutaneous <User Schedule>  ferrous    sulfate 325 milliGRAM(s) Oral daily  glucagon  Injectable 1 milliGRAM(s) IntraMuscular once  hydrALAZINE Injectable 10 milliGRAM(s) IV Push every 3 hours PRN  insulin lispro (ADMELOG) corrective regimen sliding scale   SubCutaneous three times a day before meals  labetalol Injectable 10 milliGRAM(s) IV Push every 3 hours PRN  levETIRAcetam   Injectable 1500 milliGRAM(s) IV Push every 12 hours  pantoprazole  Injectable 40 milliGRAM(s) IV Push daily  polyethylene glycol 3350 17 Gram(s) Oral daily  propofol Infusion 40 MICROgram(s)/kG/Min IV Continuous <Continuous>  senna 2 Tablet(s) Oral at bedtime  sodium chloride 0.9%. 1000 milliLiter(s) IV Continuous <Continuous>      PHYSICAL EXAM:   Vital Signs Last 24 Hrs  T(C): 36.1 (26 Dec 2024 09:00), Max: 37.1 (25 Dec 2024 16:30)  T(F): 97 (26 Dec 2024 09:00), Max: 98.8 (25 Dec 2024 16:30)  HR: 58 (26 Dec 2024 09:00) (58 - 77)  BP: 135/64 (26 Dec 2024 09:00) (98/53 - 168/80)  BP(mean): 82 (26 Dec 2024 09:00) (68 - 146)  RR: 12 (26 Dec 2024 09:00) (12 - 24)  SpO2: 100% (26 Dec 2024 09:00) (100% - 100%)    Parameters below as of 26 Dec 2024 09:00  Patient On (Oxygen Delivery Method): ventilator    O2 Concentration (%): 40    Physical Exam LIMITED DUE TO PATIENT BEING INTUBATED AND SEDATED  Physical Exam:  General: patient with rhythmic b/l upper extremity twitching     Detailed Neurologic Exam:  Mental status: Patient was intubated and sedated    Cranial nerves: -dolls eyes. Pupils pinpoint but equal and react symmetrically to light.     Motor: There is normal bulk and tone.  Rhythmic motion in bilateral upper extremities  BL lower extremities withdraw from pain    Sensation: unable to assess at this time   Cerebellar: unable to assess at this time  Gait : deferred        LABS:                        11.7   11.91 )-----------( 254      ( 26 Dec 2024 02:30 )             36.9    12-26    140  |  105  |  12.5  ----------------------------<  91  3.6   |  22.0  |  0.88    Ca    8.8      26 Dec 2024 02:30  Phos  3.3     12-26  Mg     2.4     12-26    TPro  7.1  /  Alb  3.6  /  TBili  0.5  /  DBili  x   /  AST  54[H]  /  ALT  49[H]  /  AlkPhos  96  12-24      A1C: 5.2  LDL: 41    RADIOLOGY & ADDITIONAL STUDIES (independently reviewed unless otherwise noted):    CT Head No Cont (12.24.24 @ 15:01)   IMPRESSION:  No acute intracranial hemorrhage or acute territorial infarct. If   symptoms persist, consider MRI exam for further evaluation.    CT Head/Angio Head/Neck w/ IV Cont (12.24.24 @ 17:56)  IMPRESSION:    CT HEAD:  1. Age-appropriate involutional changes. Old left posterior temporal   occipital ischemic event. No acute intracranial pathology. No acute   intracranial hemorrhage.  2. Choanal narrowing with suggestion of fluid within the nasopharyngeal   region. Correlate with clinical assessment.    CTA NECK:  1. Short segment high-grade stenosis of 76% based on NASCET criteria at   the origin of the right internal carotid artery.  2. Moderate deposition of calcified plaque along the proximal aspect of   the left internal carotid artery with mild stenosis of 30% based on   NASCET criteria in this location.    CTA HEAD:  No large vessel occlusion, significant stenosis or vascular abnormality   identified.  --  EEG 12/26/24:  EEG IMPRESSION/CLINICAL CORRELATE  Clinical Impression:  Abnormal EEG study.  1. Severe nonspecific diffuse or multifocal cerebral dysfunction.   2. No epileptiform discharges or seizures were recorded.    EEG 12/25/24:  EEG IMPRESSION/CLINICAL CORRELATE  Clinical Impression:  -Prolonged right parietal focal-onset seizure with bilateral spread from 04:34 fluctuating/cycling until 05:53, with LLE twitching  -Multiple occipital focal seizures between 06:14-07:19  -Right parietal ictal-interictal continuum between 05:53-06:14, at times closer to the ictal side  -Risk of focal-onset seizures from the left centroparietal, right parietal, and occipital regions  -Right hemispheric focal cerebral dysfunction can be structural or functional (such as post-ictal) in etiology  -Variably severe to moderate diffuse/multifocal cerebral dysfunction, nonspecific in etiology, may be related to sedating medication with changes in dose.         [facility] Stroke Team  Progress Note     HPI:  83yo F with PMHx HTN, HLD, osteoporosis, GERD, h/o breast cancer s/p left mastectomy, left hysterectomy BIBEMs for left-sided weakness and LUE rhythmic shaking. Per family, patient was last known well at 1:30am 12/24/24. When EMS arrived patient was alert but sloughing over the left side. Patient's left arm was noted to be weak and shortly started twitching, was given versed by EMS. By the time patient arrived to ER, Pt was intubated for airway protection. 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 CTA without LVO however with findings of high grade stenosis 75% right  ICA and 30% stenosis L ICA. NeuroICU consulted for further intervention.    SUBJECTIVE: No events overnight. Patient remains intubated. Unable to obtain complete ROS secondary to intubated status.     atorvastatin 10 milliGRAM(s) Oral at bedtime  calcium carbonate 1250 mG  + Vitamin D (OsCal 500 + D) 1 Tablet(s) Oral daily  carvedilol 25 milliGRAM(s) Oral every 12 hours  chlorhexidine 0.12% Liquid 15 milliLiter(s) Oral Mucosa every 12 hours  cyanocobalamin 1000 MICROGram(s) Oral daily  dextrose 5%. 1000 milliLiter(s) IV Continuous <Continuous>  dextrose 5%. 1000 milliLiter(s) IV Continuous <Continuous>  dextrose 50% Injectable 25 Gram(s) IV Push once  dextrose 50% Injectable 12.5 Gram(s) IV Push once  dextrose 50% Injectable 25 Gram(s) IV Push once  dextrose Oral Gel 15 Gram(s) Oral once PRN  enoxaparin Injectable 40 milliGRAM(s) SubCutaneous <User Schedule>  ferrous    sulfate 325 milliGRAM(s) Oral daily  glucagon  Injectable 1 milliGRAM(s) IntraMuscular once  hydrALAZINE Injectable 10 milliGRAM(s) IV Push every 3 hours PRN  insulin lispro (ADMELOG) corrective regimen sliding scale   SubCutaneous three times a day before meals  labetalol Injectable 10 milliGRAM(s) IV Push every 3 hours PRN  levETIRAcetam   Injectable 1500 milliGRAM(s) IV Push every 12 hours  pantoprazole  Injectable 40 milliGRAM(s) IV Push daily  polyethylene glycol 3350 17 Gram(s) Oral daily  propofol Infusion 40 MICROgram(s)/kG/Min IV Continuous <Continuous>  senna 2 Tablet(s) Oral at bedtime  sodium chloride 0.9%. 1000 milliLiter(s) IV Continuous <Continuous>      PHYSICAL EXAM:   Vital Signs Last 24 Hrs  T(C): 36.1 (26 Dec 2024 09:00), Max: 37.1 (25 Dec 2024 16:30)  T(F): 97 (26 Dec 2024 09:00), Max: 98.8 (25 Dec 2024 16:30)  HR: 58 (26 Dec 2024 09:00) (58 - 77)  BP: 135/64 (26 Dec 2024 09:00) (98/53 - 168/80)  BP(mean): 82 (26 Dec 2024 09:00) (68 - 146)  RR: 12 (26 Dec 2024 09:00) (12 - 24)  SpO2: 100% (26 Dec 2024 09:00) (100% - 100%)    Parameters below as of 26 Dec 2024 09:00  Patient On (Oxygen Delivery Method): ventilator    O2 Concentration (%): 40    Physical Exam LIMITED DUE TO PATIENT BEING INTUBATED AND SEDATED  Physical Exam:  General: patient with rhythmic b/l upper extremity twitching     Detailed Neurologic Exam:  Mental status: Patient was intubated and sedated    Cranial nerves: -dolls eyes. Pupils pinpoint but equal and react symmetrically to light.     Motor: There is normal bulk and tone.  Rhythmic motion in bilateral upper extremities  BL lower extremities withdraw from pain    Sensation: unable to assess at this time   Cerebellar: unable to assess at this time  Gait : deferred        LABS:                        11.7   11.91 )-----------( 254      ( 26 Dec 2024 02:30 )             36.9    12-26    140  |  105  |  12.5  ----------------------------<  91  3.6   |  22.0  |  0.88    Ca    8.8      26 Dec 2024 02:30  Phos  3.3     12-26  Mg     2.4     12-26    TPro  7.1  /  Alb  3.6  /  TBili  0.5  /  DBili  x   /  AST  54[H]  /  ALT  49[H]  /  AlkPhos  96  12-24      A1C: 5.2  LDL: 41    RADIOLOGY & ADDITIONAL STUDIES (independently reviewed unless otherwise noted):    CT Head No Cont (12.24.24 @ 15:01)   IMPRESSION:  No acute intracranial hemorrhage or acute territorial infarct. If   symptoms persist, consider MRI exam for further evaluation.    CT Head/Angio Head/Neck w/ IV Cont (12.24.24 @ 17:56)  IMPRESSION:    CT HEAD:  1. Age-appropriate involutional changes. Old left posterior temporal   occipital ischemic event. No acute intracranial pathology. No acute   intracranial hemorrhage.  2. Choanal narrowing with suggestion of fluid within the nasopharyngeal   region. Correlate with clinical assessment.    CTA NECK:  1. Short segment high-grade stenosis of 76% based on NASCET criteria at   the origin of the right internal carotid artery.  2. Moderate deposition of calcified plaque along the proximal aspect of   the left internal carotid artery with mild stenosis of 30% based on   NASCET criteria in this location.    CTA HEAD:  No large vessel occlusion, significant stenosis or vascular abnormality   identified.  --  EEG 12/26/24:  EEG IMPRESSION/CLINICAL CORRELATE  Clinical Impression:  Abnormal EEG study.  1. Severe nonspecific diffuse or multifocal cerebral dysfunction.   2. No epileptiform discharges or seizures were recorded.    EEG 12/25/24:  EEG IMPRESSION/CLINICAL CORRELATE  Clinical Impression:  -Prolonged right parietal focal-onset seizure with bilateral spread from 04:34 fluctuating/cycling until 05:53, with LLE twitching  -Multiple occipital focal seizures between 06:14-07:19  -Right parietal ictal-interictal continuum between 05:53-06:14, at times closer to the ictal side  -Risk of focal-onset seizures from the left centroparietal, right parietal, and occipital regions  -Right hemispheric focal cerebral dysfunction can be structural or functional (such as post-ictal) in etiology  -Variably severe to moderate diffuse/multifocal cerebral dysfunction, nonspecific in etiology, may be related to sedating medication with changes in dose.

## 2024-12-26 NOTE — DIETITIAN INITIAL EVALUATION ADULT - ORAL INTAKE PTA/DIET HISTORY
MST consult triggered for unsure weight loss. Pt off unit this afternoon. Chart and events reviewed. Pt is intubated and sedated. Propofol per I/Os 13.5 ml/hr = 356 kcals daily. TF started Jevity 1.5 this AM @ 10 ml/hr, goal rate 30 ml/hr x 24 hrs will provide 720 ml, 1080 kcals, 46 g pro, 547 ml free water. TF + propofol daily kcals = 1436 kcals. Last documented BM today. Current weight 133 lbs, no further weight hx from in chart. Full NFPE to be completed at follow up as feasible/appropriate.

## 2024-12-26 NOTE — CONSULT NOTE ADULT - SUBJECTIVE AND OBJECTIVE BOX
Consult requested by: LIZETTE Mosley [facility] [place]		Role: 				   Attending: AURORA Mitchell MD (Neurosciences ICU)		   Consultant: LAMBERTO Amezcua MA (Medical Ethicist)			   Contact #s: [phone] (Cell) [phone] (Office)   Consult purpose: To assist with the ethical dilemma posed by an 84-year-old female admitted for seizures and severe right internal carotid artery stenosis, regarding surrogacy.     Clinical summary: This is an 84-year-old female with a history of hypertension, hyperlipidemia, osteoporosis, GERD, breast cancer s/p left mastectomy, hysterectomy who presented to [facility] ED on 2024 via EMS for left-sided weakness and rhythmic shaking of the left upper extremity. EMS reported the patient was alert but sloughing over the left side on their arrival, and shortly thereafter the patient’s left arm started twitching; EMS administered versed in the field. The ED provider noted the patient was started on antiepileptics and required intubation for airway protection. Imaging in the ED as follows: CT scan of the head revealed no acute hemorrhage, however with findings of 3.2 x 1.6 cm hypodensity in the region of left occipital subcortical white matter likely old infarct; CT angiogram of the head and neck revealed high grade stenosis 75% right internal carotid artery (ICA) and 30% stenosis of the left ICA. Medical ICU attending evaluated the patient while in the ED due to severe hypertension (reported systolic blood pressure over 200 when at home), and recommended the patient be followed by NSICU given hypertension in setting of seizures. The patient was admitted to the Neurosciences ICU (NSICU) for further management of seizures and severe right ICA stenosis. On 2024, the admitting provider noted the patient on continuous EEG monitoring. 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 EEG report on 2024 noted prolonged right parietal focal-onset seizure with bilateral spread with left lower extremity twitching and multiple occipital focal seizures between. On 2024, the NSICU attending noted the patient with clinical and EEG seizures while weaning propofol, so the patient was restarted on propofol and the Keppra dose was increased. EEG report on 2024 noted severe nonspecific diffuse or multifocal cerebral dysfunction, and no seizure activity. On 2024, the NSICU attending noted the patient with no seizures while on propofol and plan to attempt weaning the patient off propofol. Stroke Neurology, Medical ICU, and Physiatry on consult. Ethics consult initiated to assist in determining the appropriate surrogate decision maker for the patient.   			     Prognosis Estimate (survival in hrs, days, wks, mos, yrs): In progress – per team   Patient Decision-Making Capacity: Lacks Capacity – due to acute illness   Patient Aware of: Diagnosis: Unknown	Prognosis: Unknown   Name of medical decision-maker should patient lack capacity (relationship): kranthi Shah   Role: Legal Surrogate	Contact #(s): [phone]   Other Decision-Maker (I.e., HCA or Surrogate) Aware of: Diagnosis: Yes   Prognosis: Yes    Other Stakeholders: Sonam Winchester (close friend, [phone]), Meet Fernando (sister, [phone]), Maite Farah (sister), Jinny Bowser (sister)   Evidence of Patient’s Preference of Life Sustaining Treatment (Written or Oral): None   Resuscitation status: DNR: No	DNI: No        Discussions:    Discussion with LAMBERTO Baxter MA (Medical Ethicist) and LIZETTE Mosley LMSW (ICU ) 2024 [id number]: Case discussed in detail. LIZETTE Carrington LMSW explained that the patient’s godson initially reported he was the HCP, however he did not have a form, and he also informed her that the patient has siblings who know he makes her medical decisions. LIZETTE Carrington LMSW asked for assistance identifying the appropriate surrogate for the patient.     Discussion with LAMBERTO Baxter MA (Medical Ethicist) and LIZETTE Moreno PA-C (NSICU) 2024 [id number]: Case discussed in detail. LIZETTE Moreno PA-C explained the team has been communicating with the patient’s godson, Norberto Castillo, and close friend, Sonam Winchester, as Mr. Castillo explained Ms. Winchester takes the patient to her doctors’ appointments.     Discussion with LAMBERTO Baxter MA (Medical Ethicist) and Norberto Castillo (patient’s godson) [phone]: The Ethicist called Mr. Castillo to introduce herself and the role of ethics. Mr. Castillo explained that the patient was a close friend of his parents’ even before he was born, and that the patient is like a second mother to him. He stated that he has been living with the patient for years, as soon as she could no longer live alone, and that he is her primary caregiver. Mr. Castillo explained that the patient is a , and her siblings live out of state. Mr. Castillo explained Ms. Sonam Winchester is also a close friend of the patient and a former nurse, so she assists with taking the patient to doctors’ appointments and is helping him navigate the patient’s current hospitalization. Mr. Castillo explained he is in contact with one of the patient’s sister’s and they are in agreement with him making medical decisions. Mr. Castillo provided the contact information for the patient’s sister Meet Fernando.     Discussion with LAMBERTO Baxter MA (Medical Ethicist) and Meet Fernando (patient’s sister) [phone]: The Ethicist introduced herself and the role of ethics. Ms. Fernando explained that she and the patient had seven other siblings but only two other sisters, Maite Farah and Jinny Bowser, are still alive. Ms. Fernando explained that the patient is a  and has no other blood relatives. Ms. Fernando confirmed that she and her sisters are in agreement with the patient’s godson, Norberto Castillo, making all medical decisions for the patient as he is the person who knows her best, because the patient has lived in [place], away from the rest of her family for a long time, and Mr. Castillo knows the patient the best. When the Ethicist asked for the contact information for the other sisters, Ms. Fernando explained that she is the best person to talk with on the phone and she updates her sisters because of their age. Ms. Fernando then explained she and her sisters spoke yesterday and agreed that Mr. Castillo is the best person to make decisions for the patient, and that Mr. Castillo updates them daily with the patient’s condition. Ms. Fernando also confirmed that the patient’s friend, Sonam Winchester, assists with her care at home as she is a former RN.     Discussion with LAMBERTO Baxter MA (Medical Ethicist) and Norberto Castillo (patient’s godson) [phone]: The Ethicist called Mr. Castillo to inform him she spoke with the patient’s sister, who expressed agreement with Mr. Castillo acting as the patient’s surrogate. Mr. Castillo was appreciative for the call.     Bioethics analysis: The central ethical issue in this case is patient autonomy.     The conflict in this case is not uncommon for providers who care for patients that do not have the capacity to make decisions for themselves. The principle of respect for autonomous persons acknowledges a patient’s right to hold views, to make choices and to take actions based on their values and beliefs.(i) Furthermore, this principle acknowledges the patient’s dignity and bodily integrity. Essential to this principle is the capacity for autonomous choice.(i) In other words, the patient’s ability to decide what can and cannot be done to her according to her set of values. To preserve and empower this ability to make choices, some patients decide beforehand what treatments they would and would not like to be done to them and identify an individual to act according to those wishes. When a patient does not have the ability to make decisions for herself, we should protect her autonomy by finding an appropriate surrogate decision maker.     The [facility] Family Health Care Decision Act (CDA) addresses the situation of a sick individual who lacks capacity, has no guardian or health care agent, but has an available surrogate.(ii) The ethical standard the surrogate is supposed to apply to such decision-making is, first “substituted judgment” –based on the patient’s prior articulation of preferences for such a situation– or second “best interests”. The CDA establishes a hierarchy of surrogacy, an order of priority of decision makers for the incapable patient:     • a court-appointed guardian;   • the spouse or domestic partner as defined in the CDA (PHL § 2994-a);   • adult children;  • a parent;   • a brother or sister; or   • a close friend.(iii)     One person from the list must be reasonably available, willing, and competent to act.(iii) Such person shall be the surrogate provided no other person in a class higher in priority than the person designated is available.(iii) Also, that person may designate any other person on the list to be surrogate, provided no one in a class higher in priority than the person designated objects.(iii)     In this case, the patient’s spouse is  and she has no biological children. The patient had eight siblings in total, with three living siblings to act as surrogate. Her siblings do not live in [place], and they have deferred medical decision making for the patient to her godson, Norberto Castillo, with whom the patient lives. The patient’s godson includes Ms. Sonam Winchester in her healthcare decisions as she is a former RN, and she takes the patient to all her doctors’ appointments.   Consult requested by: LIZETTE Mosley LMSW		Role:     Service: Neuro ICU			   Attending: AURORA Mitchell MD (Neurosciences ICU)		   Consultant: LAMBERTO Amezcua MA (Medical Ethicist)			   Contact #s: [phone] (Cell) [phone] (Office)   Consult purpose: To assist with the ethical dilemma posed by an 84-year-old female admitted for seizures and severe right internal carotid artery stenosis, regarding surrogacy.     Clinical summary: This is an 84-year-old female with a history of hypertension, hyperlipidemia, osteoporosis, GERD, breast cancer s/p left mastectomy, s/p hysterectomy who presented to [facility] ED on 2024 via EMS for left-sided weakness and rhythmic shaking of the left upper extremity. EMS reported the patient was alert but sloughing over the left side on their arrival, and shortly thereafter the patient’s left arm started twitching; EMS administered versed in the field. The ED provider noted the patient was started on antiepileptics and required intubation for airway protection. Imaging in the ED as follows: CT scan of the head revealed no acute hemorrhage, however with findings of 3.2 x 1.6 cm hypodensity in the region of left occipital subcortical white matter likely old infarct; CT angiogram of the head and neck revealed high grade stenosis 75% right internal carotid artery (ICA) and 30% stenosis of the left ICA. Medical ICU attending evaluated the patient while in the ED due to severe hypertension (reported systolic blood pressure over 200 when at home), and recommended the patient be followed by NSICU given hypertension in setting of seizures. The patient was admitted to the Neurosciences ICU (NSICU) for further treatment and management of seizures and severe right ICA stenosis. On 2024, the admitting provider noted the patient on continuous EEG monitoring. 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 EEG report on 2024 noted prolonged right parietal focal-onset seizure with bilateral spread with left lower extremity twitching and multiple occipital focal seizures between. On 2024, the NSICU attending noted the patient with clinical and EEG seizures while weaning propofol, so the patient was restarted on propofol and the Keppra dose was increased. EEG report on 2024 noted severe nonspecific diffuse or multifocal cerebral dysfunction, and no seizure activity. On 2024, the NSICU attending noted the patient with no seizures while on propofol and plan to attempt weaning the patient off propofol. Stroke Neurology, Medical ICU, and Physiatry on consult. Ethics consult initiated to assist in determining the appropriate surrogate decision maker for the patient.   			     Prognosis Estimate (survival in hrs, days, wks, mos, yrs): In progress – per team   Patient Decision-Making Capacity: Lacks Capacity – due to acute illness   Patient Aware of: Diagnosis: Unknown	Prognosis: Unknown   Name of medical decision-maker should patient lack capacity (relationship): kranthi Shah   Role: Legal Surrogate	Contact #(s): [phone]   Other Decision-Maker (I.e., HCA or Surrogate) Aware of: Diagnosis: Yes   Prognosis: Yes    Other Stakeholders: Sonam Winchester (close friend, [phone]), Meet Fernando (sister, [phone]), Maite Farah (sister), Jinny Bowser (sister)   Evidence of Patient’s Preference of Life Sustaining Treatment (Written or Oral): None   Resuscitation status: DNR: No	DNI: No        Discussions:    Discussion with LAMBERTO Baxter MA (Medical Ethicist) and LIZETTE Mosley LMSW (ICU ) 2024 [id number]: Case discussed in detail. LIZETTE Carrington LMSW explained that the patient’s godson initially reported he was the HCP, however he did not have a form, and he also informed her that the patient has siblings who know he makes her medical decisions. LIZETTE Carrington LMSW asked for assistance identifying the appropriate surrogate for the patient.     Discussion with LAMBERTO Baxter MA (Medical Ethicist) and LIZETTE Moreno PA-C (NSICU) [phone]: Case discussed in detail. LIZETTE Moreno PA-C explained the team has been communicating with the patient’s godson, Norberto Castillo, and close friend, Sonam Winchester, as Mr. Castillo explained Ms. Winchester takes the patient to her doctors’ appointments.     Discussion with LAMBERTO Baxter MA (Medical Ethicist) and Norberto Castillo (patient’s godson) 2024 [id number]: The Ethicist called Mr. Castillo to introduce herself and the role of ethics. Mr. Castillo explained that the patient was a close friend of his parents’ even before he was born, and that the patient is like a second mother to him. He stated that he has been living with the patient for years, as soon as she could no longer live alone, and that he is her primary caregiver. Mr. Castillo explained that the patient is a , and her siblings live out of state. Mr. Castillo explained Ms. Sonam Winchester is also a close friend of the patient and a former nurse, so she assists with taking the patient to doctors’ appointments and is helping him navigate the patient’s current hospitalization. Mr. Castillo explained he is in contact with one of the patient’s sister’s and they are in agreement with him making medical decisions. Mr. Castillo provided the contact information for the patient’s sister Meet Fernando. Mr. Castillo stated that he misspoke about being appointed HCP.     Discussion with LAMBERTO Baxter MA (Medical Ethicist) and Meet Fernando (patient’s sister) [phone]: The Ethicist introduced herself and the role of ethics. Ms. Fernando explained that she and the patient had seven other siblings but only two other sisters, Maite Farah and Jinny Bowser, are still alive. Ms. Fernando explained that the patient is a  and has no other blood relatives. Ms. Fernando confirmed that she and her sisters are in agreement with the patient’s godson, Norberto Castillo, making all medical decisions for the patient as he is the person who knows her best, because the patient has lived in [place], away from the rest of her family for a long time, and Mr. Castillo knows the patient the best. When the Ethicist asked for the contact information for the other sisters, Ms. Fernando explained that she is the best person to talk with on the phone and she updates her sisters because of their age. Ms. Fernando then explained she and her sisters spoke yesterday and agreed that Mr. Castillo is the best person to make decisions for the patient, and that Mr. Castillo updates them daily with the patient’s condition. Ms. Fernando also confirmed that the patient’s friend, Sonam Winchester, assists with her care at home as she is a former RN.     Discussion with LAMBERTO Baxter MA (Medical Ethicist) and Norberto Castillo (patient’s godson) [phone]: The Ethicist called Mr. Castillo to inform him she spoke with the patient’s sister, who expressed agreement with Mr. Castillo acting as the patient’s surrogate. Mr. Castillo was appreciative for the call.     Bioethics analysis: The central ethical issue in this case is patient autonomy.     The conflict in this case is not uncommon for providers who care for patients that do not have the capacity to make decisions for themselves. The principle of respect for autonomous persons acknowledges a patient’s right to hold views, to make choices and to take actions based on their values and beliefs.(i) Furthermore, this principle acknowledges the patient’s dignity and bodily integrity. Essential to this principle is the capacity for autonomous choice.(i) In other words, the patient’s ability to decide what can and cannot be done to her according to her set of values. To preserve and empower this ability to make choices, some patients decide beforehand what treatments they would and would not like to be done to them and identify an individual to act according to those wishes. When a patient does not have the ability to make decisions for herself, we should protect her autonomy by finding an appropriate surrogate decision maker.     The [facility] Health Care Decision Act (CDA) addresses the situation of a sick individual who lacks capacity, has no guardian or health care agent, but has an available surrogate.(ii) The ethical standard the surrogate is supposed to apply to such decision-making is, first “substituted judgment” –based on the patient’s prior articulation of preferences for such a situation– or second “best interests”. The CDA establishes a hierarchy of surrogacy, an order of priority of decision makers for the incapable patient:     • a court-appointed guardian;   • the spouse or domestic partner as defined in the CDA (PHL § 2994-a);   • adult children;  • a parent;   • a brother or sister; or   • a close friend.(iii)     One person from the list must be reasonably available, willing, and competent to act.(iii) Such person shall be the surrogate provided no other person in a class higher in priority than the person designated is available.(iii) Also, that person may designate any other person on the list to be surrogate, provided no one in a class higher in priority than the person designated objects.(iii)     In this case, the patient’s spouse is  and she has no biological children. The patient had eight siblings in total, with three living siblings to act as surrogate. Her siblings do not live in [place], and they have deferred medical decision making for the patient to her godson, Norberto Jonathan, with whom the patient lives. The patient’s godson includes Ms. Sonam Winchester in her healthcare decisions as she is a former RN, and she takes the patient to all her doctors’ appointments.   Consult requested by: LIZETTE Mosley LMSW		Role:     Service: Neuro ICU			   Attending: AURORA Mitchell MD (Neurosciences ICU)		   Consultant: LAMBERTO Amezcua MA (Medical Ethicist)			   Contact #s: [phone] (Cell) [phone] (Office)   Consult purpose: To assist with the ethical dilemma posed by an 84-year-old female admitted for seizures and severe right internal carotid artery stenosis, regarding surrogacy.     Clinical summary: This is an 84-year-old female with a history of hypertension, hyperlipidemia, osteoporosis, GERD, breast cancer s/p left mastectomy, s/p hysterectomy who presented to [facility] ED on 2024 via EMS for left-sided weakness and rhythmic shaking of the left upper extremity. EMS reported the patient was alert but sloughing over the left side on their arrival, and shortly thereafter the patient’s left arm started twitching; EMS administered versed in the field. The ED provider noted the patient was started on antiepileptics and required intubation for airway protection. Imaging in the ED as follows: CT scan of the head revealed no acute hemorrhage, however with findings of 3.2 x 1.6 cm hypodensity in the region of left occipital subcortical white matter likely old infarct; CT angiogram of the head and neck revealed high grade stenosis 75% right internal carotid artery (ICA) and 30% stenosis of the left ICA. Medical ICU attending evaluated the patient while in the ED due to severe hypertension (reported systolic blood pressure over 200 when at home), and recommended the patient be followed by NSICU given hypertension in setting of seizures. The patient was admitted to the Neurosciences ICU (NSICU) for further treatment and management of seizures and severe right ICA stenosis. On 2024, the admitting provider noted the patient on continuous EEG monitoring. 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 EEG report on 2024 noted prolonged right parietal focal-onset seizure with bilateral spread with left lower extremity twitching and multiple occipital focal seizures between. On 2024, the NSICU attending noted the patient with clinical and EEG seizures while weaning propofol, so the patient was restarted on propofol and the Keppra dose was increased. EEG report on 2024 noted severe nonspecific diffuse or multifocal cerebral dysfunction, and no seizure activity. On 2024, the NSICU attending noted the patient with no seizures while on propofol and plan to attempt weaning the patient off propofol. Stroke Neurology, Medical ICU, and Physiatry on consult. Ethics consult initiated to assist in determining the appropriate surrogate decision maker for the patient.   			     Prognosis Estimate (survival in hrs, days, wks, mos, yrs): In progress – per team   Patient Decision-Making Capacity: Lacks Capacity – due to acute illness   Patient Aware of: Diagnosis: Unknown	Prognosis: Unknown   Name of medical decision-maker should patient lack capacity (relationship): kranthi Shah   Role: Legal Surrogate	Contact #(s): [phone]   Other Decision-Maker (I.e., HCA or Surrogate) Aware of: Diagnosis: Yes   Prognosis: Yes    Other Stakeholders: Sonam Winchester (close friend, [phone]), Meet Fernando (sister, [phone]), Maite Farah (sister), Jinny Bowser (sister)   Evidence of Patient’s Preference of Life Sustaining Treatment (Written or Oral): None   Resuscitation status: DNR: No	DNI: No        Discussions:    Discussion with LAMBERTO Baxter MA (Medical Ethicist) and LIZETTE Mosley LMSW (ICU ) 2024 [id number]: Case discussed in detail. LIZETTE Carrington LMSW explained that the patient’s godson initially reported he was the HCP, however he did not have a form, and he also informed her that the patient has siblings who know he makes her medical decisions. LIZETTE Carrington LMSW asked for assistance identifying the appropriate surrogate for the patient.     Discussion with LAMBERTO Baxter MA (Medical Ethicist) and LIZETTE Moreno PA-C (NSICU) [id number]: Case discussed in detail. LIZETTE Moreno PA-C explained the team has been communicating with the patient’s godson, Norberto Castillo, and close friend, Sonam Winchester, as Mr. Castillo explained Ms. Winchester takes the patient to her doctors’ appointments.     Discussion with LAMBERTO Baxter MA (Medical Ethicist) and Norberto Castillo (patient’s godson) [phone]: The Ethicist called Mr. Castillo to introduce herself and the role of ethics. Mr. Castillo explained that the patient was a close friend of his parents’ even before he was born, and that the patient is like a second mother to him. He stated that he has been living with the patient for years, as soon as she could no longer live alone, and that he is her primary caregiver. Mr. Castillo explained that the patient is a , and her siblings live out of state. Mr. Castillo explained Ms. Sonam Winchester is also a close friend of the patient and a former nurse, so she assists with taking the patient to doctors’ appointments and is helping him navigate the patient’s current hospitalization. Mr. Castillo explained he is in contact with one of the patient’s sister’s and they are in agreement with him making medical decisions. Mr. Castillo provided the contact information for the patient’s sister Meet Fernando. Mr. Castillo confirmed that the patient does not have a HCP.     Discussion with LAMBERTO Baxter MA (Medical Ethicist) and Meet Fernando (patient’s sister) 2024 [id number]: The Ethicist introduced herself and the role of ethics. Ms. Fernando explained that she and the patient had seven other siblings but only two other sisters, Maite Farah and Jinny Bowser, are still alive. Ms. Fernando explained that the patient is a  and has no other blood relatives. Ms. Fernando confirmed that she and her sisters are in agreement with the patient’s godson, Norberto Castillo, making all medical decisions for the patient as he is the person who knows her best, because the patient has lived in [place], away from the rest of her family for a long time, and Mr. Castillo knows the patient the best. When the Ethicist asked for the contact information for the other sisters, Ms. Fernando explained that she is the best person to talk with on the phone and she updates her sisters because of their age. Ms. Fernando then explained she and her sisters spoke yesterday and agreed that Mr. Castillo is the best person to make decisions for the patient, and that Mr. Castillo updates them daily with the patient’s condition. Ms. Fernando also confirmed that the patient’s friend, Sonam Winchester, assists with her care at home as she is a former RN.     Discussion with LAMBERTO Baxter MA (Medical Ethicist) and Norberto Castillo (patient’s godson) [phone]: The Ethicist called Mr. Castillo to inform him she spoke with the patient’s sister, who expressed agreement with Mr. Castillo acting as the patient’s surrogate. Mr. Castillo was appreciative for the call.     Bioethics analysis: The central ethical issue in this case is patient autonomy.     The conflict in this case is not uncommon for providers who care for patients that do not have the capacity to make decisions for themselves. The principle of respect for autonomous persons acknowledges a patient’s right to hold views, to make choices and to take actions based on their values and beliefs.(i) Furthermore, this principle acknowledges the patient’s dignity and bodily integrity. Essential to this principle is the capacity for autonomous choice.(i) In other words, the patient’s ability to decide what can and cannot be done to her according to her set of values. To preserve and empower this ability to make choices, some patients decide beforehand what treatments they would and would not like to be done to them and identify an individual to act according to those wishes. When a patient does not have the ability to make decisions for herself, we should protect her autonomy by finding an appropriate surrogate decision maker.     The [facility] Health Care Decision Act (CDA) addresses the situation of a sick individual who lacks capacity, has no guardian or health care agent, but has an available surrogate.(ii) The ethical standard the surrogate is supposed to apply to such decision-making is, first “substituted judgment” –based on the patient’s prior articulation of preferences for such a situation– or second “best interests”. The CDA establishes a hierarchy of surrogacy, an order of priority of decision makers for the incapable patient:     • a court-appointed guardian;   • the spouse or domestic partner as defined in the CDA ([facility] § 2994-a);   • adult children;  • a parent;   • a brother or sister; or   • a close friend.(iii)     One person from the list must be reasonably available, willing, and competent to act.(iii) Such person shall be the surrogate provided no other person in a class higher in priority than the person designated is available.(iii) Also, that person may designate any other person on the list to be surrogate, provided no one in a class higher in priority than the person designated objects.(iii)     In this case, the patient’s spouse is  and she has no biological children. The patient had eight siblings in total, with three living siblings to act as surrogate. Her siblings do not live in [place], and they have deferred medical decision making for the patient to her godson, Norberto Castillo, with whom the patient lives. The patient’s godson includes Ms. Sonam Winchester in her healthcare decisions as she is a former RN, and she takes the patient to all her doctors’ appointments.

## 2024-12-26 NOTE — DIETITIAN INITIAL EVALUATION ADULT - ENTERAL
Change TF regimen to 18 hrs vs 24 hrs  Start Jevity 1.5 @ 30 ml/hr and increase by 10 ml q 4 hrs until goal rate 55 ml/hr x 18 hrs (990 ml, 1485 kcals, 63 g pro, 752 ml free water)  Additional free water per medical teams discretion  Monitor kcals from propofol and adjust TF regimen as needed

## 2024-12-26 NOTE — EEG REPORT - NS EEG TEXT BOX
[facility]   COMPREHENSIVE EPILEPSY CENTER   REPORT OF CONTINUOUS VIDEO EEG     [facility]: [address], [place] [address], Ph#: [phone]  [facility]: [address], [place] [address], Ph#: [phone]  Office: [address] [place] [address], [facility] [address], [place] [address] Ph#: [phone]    Patient Name: RAÚL JASMINE  Age and : 84y (40)  MRN #: [id number]  Location: [place] [address]  Referring Physician: Eze Mitchell    Study Date: 24 at 08:00 - 24 at 08:00    _____________________________________________________________  STUDY INFORMATION    EEG Recording Technique:  The patient underwent continuous Video-EEG monitoring, using Telemetry System hardware on the XLTek Digital System. EEG and video data were stored on a computer hard drive with important events saved in digital archive files. The material was reviewed by a physician (electroencephalographer / epileptologist) on a daily basis. Huber and seizure detection algorithms were utilized and reviewed. An EEG Technician attended to the patient, and was available throughout daytime work hours.  The epilepsy center neurologist was available in person or on call 24-hours per day.    EEG Placement and Labeling of Electrodes:  The EEG was performed utilizing 20 channel referential EEG connections (coronal over temporal over parasagittal montage) using all standard 10-20 electrode placements with EKG, with additional electrodes placed in the inferior temporal region using the modified 10-10 montage electrode placements for elective admissions, or if deemed necessary. Recording was at a sampling rate of 256 samples per second per channel. Time synchronized digital video recording was done simultaneously with EEG recording. A low light infrared camera was used for low light recording.     _____________________________________________________________  HISTORY    Patient is a 84y old  Female who presents with a chief complaint of seizures (25 Dec 2024 17:29)      PERTINENT MEDICATION:  MEDICATIONS  (STANDING):  aspirin enteric coated 81 milliGRAM(s) Oral daily  atorvastatin 10 milliGRAM(s) Oral at bedtime  calcium carbonate 1250 mG  + Vitamin D (OsCal 500 + D) 1 Tablet(s) Oral daily  carvedilol 25 milliGRAM(s) Oral every 12 hours  chlorhexidine 0.12% Liquid 15 milliLiter(s) Oral Mucosa every 12 hours  cyanocobalamin 1000 MICROGram(s) Oral daily  dextrose 5%. 1000 milliLiter(s) (100 mL/Hr) IV Continuous <Continuous>  dextrose 5%. 1000 milliLiter(s) (50 mL/Hr) IV Continuous <Continuous>  dextrose 50% Injectable 25 Gram(s) IV Push once  dextrose 50% Injectable 12.5 Gram(s) IV Push once  dextrose 50% Injectable 25 Gram(s) IV Push once  enoxaparin Injectable 40 milliGRAM(s) SubCutaneous <User Schedule>  ferrous    sulfate 325 milliGRAM(s) Oral daily  glucagon  Injectable 1 milliGRAM(s) IntraMuscular once  insulin lispro (ADMELOG) corrective regimen sliding scale   SubCutaneous three times a day before meals  levETIRAcetam   Injectable 1500 milliGRAM(s) IV Push every 12 hours  pantoprazole  Injectable 40 milliGRAM(s) IV Push daily  polyethylene glycol 3350 17 Gram(s) Oral daily  propofol Infusion 40 MICROgram(s)/kG/Min (13.5 mL/Hr) IV Continuous <Continuous>  senna 2 Tablet(s) Oral at bedtime  sodium chloride 0.9%. 1000 milliLiter(s) (60 mL/Hr) IV Continuous <Continuous>    _____________________________________________________________  INTERPRETATION    Findings: The background was a generalized burst-suppression pattern (burst duration 1-5 seconds, interburst interval 1-6 seconds), seen throughout the recording, except for between 4-5am on 24 when the recording was more continuous.    Background Slowing:  -Generalized burst-suppression    Focal Slowing:   None was present.    Sleep Background:  Stage II sleep transients were not recorded.  Drowsiness and stage II sleep transients were not recorded.    Other Non-Epileptiform Findings:  None were present.    Interictal Epileptiform Activity:   None were present.    Events:  Clinical events: None recorded.  Seizures: None recorded.    Artifacts:  Intermittent myogenic and movement artifacts were noted.    ECG:  The heart rate on single channel ECG was predominantly between 60-80 BPM.    _____________________________________________________________  EEG SUMMARY/CLASSIFICATION    Abnormal EEG in an encephalopathic patient.  -Generalized burst-suppression  _____________________________________________________________  EEG IMPRESSION/CLINICAL CORRELATE    Abnormal EEG study.  1. Severe nonspecific diffuse or multifocal cerebral dysfunction.   2. No epileptiform discharges or seizures were recorded.    Chandana Koenig MD  Neurology Attending Physician

## 2024-12-26 NOTE — DIETITIAN INITIAL EVALUATION ADULT - NUTRITIONGOAL OUTCOME1
Pt will meet > 75% est nutrient needs via enteral nutrition as tolerated  Elidel Counseling: Patient may experience a mild burning sensation during topical application. Elidel is not approved in children less than 2 years of age. There have been case reports of hematologic and skin malignancies in patients using topical calcineurin inhibitors although causality is questionable.

## 2024-12-26 NOTE — DIETITIAN INITIAL EVALUATION ADULT - ADD RECOMMEND
Suggest to discontinue Vitamin B12 supplementation (level > 2000)  Obtain daily weights and trend, monitor malnutrition parameters

## 2024-12-26 NOTE — DIETITIAN INITIAL EVALUATION ADULT - PERTINENT LABORATORY DATA
12-26    140  |  105  |  12.5  ----------------------------<  91  3.6   |  22.0  |  0.88    Ca    8.8      26 Dec 2024 02:30  Phos  3.3     12-26  Mg     2.4     12-26    POCT Blood Glucose.: 109 mg/dL (12-26-24 @ 12:46)  A1C with Estimated Average Glucose Result: 5.2 % (12-25-24 @ 02:43)

## 2024-12-27 NOTE — PROGRESS NOTE ADULT - ASSESSMENT
83yo F with new onset status epilepticus (clinically - with L UE and LLE twitching).  Acute resp failure due to neurologic condition.  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  PMHx of remote CVA, mild dementia, HTN, HLD, osteoporosis, GERD, h/o breast cancer s/p left mastectomy, left hysterectomy, MARLINE.    Plan:  - neuro checks  - off sedation now, cont cEEG  - Keppra 750 q12hrs IV, renal dose  - added VPA - load + cont 250 q6hrs IV, level in am  - Ativan PRN for clinical seizures  - cont cEEG  - restart ASA in am  - hold home dementia meds to avoid lowering sz threshold  - metabolic encephalopathy w/up in progress; paraneoplastic panel sent;   - plan for CT C/A/P w/contrast  in view of h/o Ca  - send BCx  - follow LP results  - maintain -180, avoid significant fluctuations; cont home Coreg  - maintain Osats>92%, vent support, SBT as tolerated  - cont TF, BM regimen; PPI for ulcer ppx  - monitor e-lytes, I/Os; add free water, d/c NS  - Goal euglycemia (-180), ISS; cont home statin  - VTE prophylaxis: SCDs, SQL; VEnofer for 5 days

## 2024-12-27 NOTE — PROGRESS NOTE ADULT - ASSESSMENT
COMPLETE A/P PENDING   ASSESSMENT: 85yo F with PMHx HTN, HLD, osteoporosis, GERD, h/o breast cancer s/p left mastectomy, left hysterectomy BIBEMs for left-sided weakness and LUE rhythmic shaking. Per family, patient was last known well at 1:30am 12/24/24. When EMS arrived patient was alert but sloughing over the left side. Patient's left arm was noted to be weak and shortly started twitching, was given versed by EMS. By the time patient arrived to ER, Pt was intubated for airway protection. 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 Stroke neuro consulted for further evaluation.     Impression: CT findings with no mass effect, acute hemorrhage, or midline shift.    Nonacute left posterior temporal/occipital ischemic event.   MRI without evidence of stroke.   Etiology of symptoms more likely related to seizure. Recommend outpatient follow up with neuro IR to address carotid stenosis.     NEURO:   -Neurologically ***  -Continue close monitoring for neurologic deterioration   - SBP goal 100-160mmHg   -ANTITHROMBOTIC THERAPY: On hold per NSICU; resume DAPT when cleared by primary team   -titrate statin to LDL goal less than 70; LDL=41, c/w home regimen of atorvastatin 10mg daily    -MRI Brain w/ and w/o results as above, no evidence of stroke   -LDL/A1C results as above  -Stroke education completed 12/24/24  -Outpatient neuro IR evaluation to address carotid stenosis   -Stroke team to sign off, please call back with any questions/concerns     OTHER:  condition and plan of care d/w primary team; questions and concerns addressed.     CORE MEASURES:        Admission NIHSS: n/a      Tenecteplase : [] YES [x] NO      LDL/A1C: 41/5.2     Depression Screen- if depression hx and/or present      Statin Therapy: atorvastatin 10mg     Dysphagia Screen: [] PASS [x] FAIL     Smoking [] YES [x] NO      Afib [] YES [x] NO     Stroke Education [x] YES 12/24/24 [] NO     COMPLETE A/P PENDING   ASSESSMENT: 83yo F with PMHx HTN, HLD, osteoporosis, GERD, h/o breast cancer s/p left mastectomy, left hysterectomy BIBEMs for left-sided weakness and LUE rhythmic shaking. Per family, patient was last known well at 1:30am 12/24/24. When EMS arrived patient was alert but sloughing over the left side. Patient's left arm was noted to be weak and shortly started twitching, was given versed by EMS. By the time patient arrived to ER, Pt was intubated for airway protection. 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 Stroke neuro consulted for further evaluation.     Impression: CT findings with no mass effect, acute hemorrhage, or midline shift.    Nonacute left posterior temporal/occipital ischemic event.   MRI without evidence of stroke.   Etiology of symptoms more likely related to seizure. Recommend outpatient follow up with neuro IR to address carotid stenosis.     NEURO:   -Neurologically intubated, sedated w/ limited neurologic exam   -Continue close monitoring for neurologic deterioration   - SBP goal 100-160mmHg   -ANTITHROMBOTIC THERAPY: On hold per NSICU; resume DAPT when cleared by primary team   -titrate statin to LDL goal less than 70; LDL=41, c/w home regimen of atorvastatin 10mg daily    -MRI Brain w/ and w/o results as above, no evidence of stroke   -LDL/A1C results as above  -Stroke education completed 12/24/24  -Outpatient neuro IR evaluation to address carotid stenosis   -Stroke team to sign off, please call back with any questions/concerns     OTHER:  condition and plan of care d/w primary team; questions and concerns addressed.     CORE MEASURES:        Admission NIHSS: n/a      Tenecteplase : [] YES [x] NO      LDL/A1C: 41/5.2     Depression Screen- if depression hx and/or present      Statin Therapy: atorvastatin 10mg     Dysphagia Screen: [] PASS [x] FAIL     Smoking [] YES [x] NO      Afib [] YES [x] NO     Stroke Education [x] YES 12/24/24 [] NO

## 2024-12-27 NOTE — PROGRESS NOTE ADULT - SUBJECTIVE AND OBJECTIVE BOX
HPI:  85yo F with PMHx HTN, HLD, osteoporosis, GERD, h/o breast cancer s/p left mastectomy, left hysterectomy BIBEMs for left-sided weakness and LUE rhythmic shaking. Per family, patient was last known well at 1:30am. When EMS arrived patient was alert but sloughing over the left side. Patient's left arm was noted to be weak and shortly started twitching, was given versed by EMS. By the time patient arrived to ER, Pt was intubated for airway protection. 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 CTA without LVO however with findings of high grade stenosis 75% right  ICA and 30% stenosis L ICA. NeuroICU consulted for further intervention. (24 Dec 2024 20:57)    O/n events: still encephalopathic off sedation, cEEG with IIC initially; episode of LUE twitching, received Ativan, Ketamine IVp.  LP performed.        ICU Vital Signs Last 24 Hrs  T(C): 37.7 (27 Dec 2024 13:00), Max: 38.1 (26 Dec 2024 23:00)  T(F): 99.9 (27 Dec 2024 13:00), Max: 100.6 (26 Dec 2024 23:00)  HR: 71 (27 Dec 2024 17:00) (63 - 76)  BP: 146/67 (27 Dec 2024 17:00) (102/57 - 192/170)  BP(mean): 89 (27 Dec 2024 17:00) (67 - 179)  ABP: --  ABP(mean): --  RR: 18 (27 Dec 2024 17:00) (11 - 21)  SpO2: 100% (27 Dec 2024 17:00) (100% - 100%)    O2 Parameters below as of 27 Dec 2024 17:00  Patient On (Oxygen Delivery Method): ventilator    O2 Concentration (%): 40        Exam:   no EO, not FC, no blink to threat, no tracking, PERRL, gaze midline, o/b the vent, cough present; motor - trace wdrl in all 4.  CTAB  S1S2 present  Abd soft, NT, ND  No peripheral swelling, no rash noted.

## 2024-12-27 NOTE — PROGRESS NOTE ADULT - SUBJECTIVE AND OBJECTIVE BOX
Preliminary note, offical recommendations pending attending review/signature   [facility] Stroke Team  Progress Note     HPI:  85yo F with PMHx HTN, HLD, osteoporosis, GERD, h/o breast cancer s/p left mastectomy, left hysterectomy BIBEMs for left-sided weakness and LUE rhythmic shaking. Per family, patient was last known well at 1:30am 12/24/24. When EMS arrived patient was alert but sloughing over the left side. Patient's left arm was noted to be weak and shortly started twitching, was given versed by EMS. By the time patient arrived to ER, Pt was intubated for airway protection. 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 CTA without LVO however with findings of high grade stenosis 75% right  ICA and 30% stenosis L ICA. NeuroICU consulted for further intervention.    SUBJECTIVE: No events overnight. Patient remains intubated. Unable to obtain complete ROS secondary to intubated status.     acetaminophen   Oral Liquid .. 650 milliGRAM(s) Oral every 6 hours PRN  atorvastatin 10 milliGRAM(s) Oral at bedtime  calcium carbonate 1250 mG  + Vitamin D (OsCal 500 + D) 1 Tablet(s) Oral daily  carvedilol 25 milliGRAM(s) Oral every 12 hours  chlorhexidine 0.12% Liquid 15 milliLiter(s) Oral Mucosa every 12 hours  cyanocobalamin 1000 MICROGram(s) Oral daily  dexMEDEtomidine Infusion 0.2 MICROgram(s)/kG/Hr IV Continuous <Continuous>  enoxaparin Injectable 40 milliGRAM(s) SubCutaneous <User Schedule>  fentaNYL    Injectable 25 MICROGram(s) IV Push every 2 hours PRN  ferrous    sulfate 325 milliGRAM(s) Oral daily  hydrALAZINE Injectable 10 milliGRAM(s) IV Push every 3 hours PRN  insulin lispro (ADMELOG) corrective regimen sliding scale   SubCutaneous three times a day before meals  labetalol Injectable 10 milliGRAM(s) IV Push every 3 hours PRN  levETIRAcetam   Injectable 1500 milliGRAM(s) IV Push every 12 hours  pantoprazole  Injectable 40 milliGRAM(s) IV Push daily  polyethylene glycol 3350 17 Gram(s) Oral daily  propofol Infusion 40 MICROgram(s)/kG/Min IV Continuous <Continuous>  senna 2 Tablet(s) Oral at bedtime  sodium chloride 0.9%. 1000 milliLiter(s) IV Continuous <Continuous>      PHYSICAL EXAM:   Vital Signs Last 24 Hrs  T(C): 37.6 (27 Dec 2024 06:00), Max: 38.1 (26 Dec 2024 23:00)  T(F): 99.7 (27 Dec 2024 06:00), Max: 100.6 (26 Dec 2024 23:00)  HR: 74 (27 Dec 2024 06:00) (58 - 74)  BP: 138/68 (27 Dec 2024 06:00) (105/76 - 192/170)  BP(mean): 90 (27 Dec 2024 06:00) (67 - 179)  RR: 20 (27 Dec 2024 06:00) (11 - 22)  SpO2: 100% (27 Dec 2024 06:00) (100% - 100%)    Parameters below as of 27 Dec 2024 06:00  Patient On (Oxygen Delivery Method): ventilator    **COMPLETE PE PENDING**  Physical Exam LIMITED DUE TO PATIENT BEING INTUBATED AND SEDATED  Physical Exam:  General: patient with rhythmic b/l upper extremity twitching     Detailed Neurologic Exam:  Mental status: Patient was intubated and sedated    Cranial nerves: -dolls eyes. Pupils pinpoint but equal and react symmetrically to light.     Motor: There is normal bulk and tone.  Rhythmic motion in bilateral upper extremities  BL lower extremities withdraw from pain    Sensation: unable to assess at this time   Cerebellar: unable to assess at this time  Gait : deferred    LABS:                        10.1   10.32 )-----------( 149      ( 27 Dec 2024 03:07 )             31.8    12-27    143  |  111[H]  |  14.7  ----------------------------<  157[H]  3.9   |  21.0[L]  |  0.85    Ca    8.1[L]      27 Dec 2024 03:07  Phos  2.6     12-27  Mg     2.0     12-27      A1C: 5.2  LDL: 41    RADIOLOGY & ADDITIONAL STUDIES (independently reviewed unless otherwise noted):  MR Head w/wo IV Cont (12.26.24 @ 16:51)   IMPRESSION: No acute intracranial hemorrhage, acuteischemia, or abnormal   intracranial enhancement.    Encephalomalacia and gliosis within the left posterior temporal/occipital   lobes which may be related to prior chronic infarction.    Similar-appearing moderate severity chronic white matter microvascular   type changes.    CT Head No Cont (12.24.24 @ 15:01)   IMPRESSION:  No acute intracranial hemorrhage or acute territorial infarct. If   symptoms persist, consider MRI exam for further evaluation.    CT Head/Angio Head/Neck w/ IV Cont (12.24.24 @ 17:56)  IMPRESSION:    CT HEAD:  1. Age-appropriate involutional changes. Old left posterior temporal   occipital ischemic event. No acute intracranial pathology. No acute   intracranial hemorrhage.  2. Choanal narrowing with suggestion of fluid within the nasopharyngeal   region. Correlate with clinical assessment.    CTA NECK:  1. Short segment high-grade stenosis of 76% based on NASCET criteria at   the origin of the right internal carotid artery.  2. Moderate deposition of calcified plaque along the proximal aspect of   the left internal carotid artery with mild stenosis of 30% based on   NASCET criteria in this location.    CTA HEAD:  No large vessel occlusion, significant stenosis or vascular abnormality   identified.  --  EEG 12/26/24:  EEG IMPRESSION/CLINICAL CORRELATE  Clinical Impression:  Abnormal EEG study.  1. Severe nonspecific diffuse or multifocal cerebral dysfunction.   2. No epileptiform discharges or seizures were recorded.    EEG 12/25/24:  EEG IMPRESSION/CLINICAL CORRELATE  Clinical Impression:  -Prolonged right parietal focal-onset seizure with bilateral spread from 04:34 fluctuating/cycling until 05:53, with LLE twitching  -Multiple occipital focal seizures between 06:14-07:19  -Right parietal ictal-interictal continuum between 05:53-06:14, at times closer to the ictal side  -Risk of focal-onset seizures from the left centroparietal, right parietal, and occipital regions  -Right hemispheric focal cerebral dysfunction can be structural or functional (such as post-ictal) in etiology  -Variably severe to moderate diffuse/multifocal cerebral dysfunction, nonspecific in etiology, may be related to sedating medication with changes in dose.  --  Ultrasound:  US Duplex Carotid Arteries Complete, Bilateral (12.26.24 @ 12:34)   IMPRESSION:  Severe right internal carotid artery proximal segment stenosis secondary   to plaque, corresponding to findings on recent CTA neck.    Left carotid artery plaque without hemodynamically significant stenosis.  --  Cardiac Studies:  TTE W or WO Ultrasound Enhancing Agent (12.26.24 @ 17:06)   CONCLUSIONS:   1. Left ventricular systolic function is normal with an ejection fraction of 73 % by Lopez's method of disks. There are no regional wall motion abnormalities seen.   2. There is moderate (grade 2) left ventricular diastolic dysfunction, with elevated left ventricular filling pressure.   3. Normal right ventricular cavity size and normal right ventricular systolic function.   4. Mild to moderate tricuspid regurgitation.   5. Estimated pulmonary artery systolic pressure is 57 mmHg, consistent with echocardiographic evidence of pulmonary hyptertension.   6. Small pericardial effusion noted adjacent to the posterior left ventricle and small pericardialeffusion noted adjacent to the right atrium with no echocardiographic evidence of tamponade physiology.   7. No prior echocardiogram is available for comparison.     Preliminary note, offical recommendations pending attending review/signature   [facility] Stroke Team  Progress Note     HPI:  85yo F with PMHx HTN, HLD, osteoporosis, GERD, h/o breast cancer s/p left mastectomy, left hysterectomy BIBEMs for left-sided weakness and LUE rhythmic shaking. Per family, patient was last known well at 1:30am 12/24/24. When EMS arrived patient was alert but sloughing over the left side. Patient's left arm was noted to be weak and shortly started twitching, was given versed by EMS. By the time patient arrived to ER, Pt was intubated for airway protection. 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 CTA without LVO however with findings of high grade stenosis 75% right  ICA and 30% stenosis L ICA. NeuroICU consulted for further intervention.    SUBJECTIVE: No events overnight. Patient remains intubated. Unable to obtain complete ROS secondary to intubated status.     acetaminophen   Oral Liquid .. 650 milliGRAM(s) Oral every 6 hours PRN  atorvastatin 10 milliGRAM(s) Oral at bedtime  calcium carbonate 1250 mG  + Vitamin D (OsCal 500 + D) 1 Tablet(s) Oral daily  carvedilol 25 milliGRAM(s) Oral every 12 hours  chlorhexidine 0.12% Liquid 15 milliLiter(s) Oral Mucosa every 12 hours  cyanocobalamin 1000 MICROGram(s) Oral daily  dexMEDEtomidine Infusion 0.2 MICROgram(s)/kG/Hr IV Continuous <Continuous>  enoxaparin Injectable 40 milliGRAM(s) SubCutaneous <User Schedule>  fentaNYL    Injectable 25 MICROGram(s) IV Push every 2 hours PRN  ferrous    sulfate 325 milliGRAM(s) Oral daily  hydrALAZINE Injectable 10 milliGRAM(s) IV Push every 3 hours PRN  insulin lispro (ADMELOG) corrective regimen sliding scale   SubCutaneous three times a day before meals  labetalol Injectable 10 milliGRAM(s) IV Push every 3 hours PRN  levETIRAcetam   Injectable 1500 milliGRAM(s) IV Push every 12 hours  pantoprazole  Injectable 40 milliGRAM(s) IV Push daily  polyethylene glycol 3350 17 Gram(s) Oral daily  propofol Infusion 40 MICROgram(s)/kG/Min IV Continuous <Continuous>  senna 2 Tablet(s) Oral at bedtime  sodium chloride 0.9%. 1000 milliLiter(s) IV Continuous <Continuous>      PHYSICAL EXAM:   Vital Signs Last 24 Hrs  T(C): 37.6 (27 Dec 2024 06:00), Max: 38.1 (26 Dec 2024 23:00)  T(F): 99.7 (27 Dec 2024 06:00), Max: 100.6 (26 Dec 2024 23:00)  HR: 74 (27 Dec 2024 06:00) (58 - 74)  BP: 138/68 (27 Dec 2024 06:00) (105/76 - 192/170)  BP(mean): 90 (27 Dec 2024 06:00) (67 - 179)  RR: 20 (27 Dec 2024 06:00) (11 - 22)  SpO2: 100% (27 Dec 2024 06:00) (100% - 100%)    Parameters below as of 27 Dec 2024 06:00  Patient On (Oxygen Delivery Method): ventilator    Physical Exam LIMITED DUE TO PATIENT BEING INTUBATED AND SEDATED  Physical Exam:  General: Lying in bed. No acute distress.     Detailed Neurologic Exam:  Mental status: Patient was intubated and sedated    Cranial nerves: -dolls eyes. Pupils pinpoint but equal and react symmetrically to light.     Motor: There is normal bulk and tone.  0/5 strength in all four extremities    Sensation: unable to assess at this time   Cerebellar: unable to assess at this time  Gait : deferred    LABS:                        10.1   10.32 )-----------( 149      ( 27 Dec 2024 03:07 )             31.8    12-27    143  |  111[H]  |  14.7  ----------------------------<  157[H]  3.9   |  21.0[L]  |  0.85    Ca    8.1[L]      27 Dec 2024 03:07  Phos  2.6     12-27  Mg     2.0     12-27      A1C: 5.2  LDL: 41    RADIOLOGY & ADDITIONAL STUDIES (independently reviewed unless otherwise noted):  MR Head w/wo IV Cont (12.26.24 @ 16:51)   IMPRESSION: No acute intracranial hemorrhage, acuteischemia, or abnormal   intracranial enhancement.    Encephalomalacia and gliosis within the left posterior temporal/occipital   lobes which may be related to prior chronic infarction.    Similar-appearing moderate severity chronic white matter microvascular   type changes.    CT Head No Cont (12.24.24 @ 15:01)   IMPRESSION:  No acute intracranial hemorrhage or acute territorial infarct. If   symptoms persist, consider MRI exam for further evaluation.    CT Head/Angio Head/Neck w/ IV Cont (12.24.24 @ 17:56)  IMPRESSION:    CT HEAD:  1. Age-appropriate involutional changes. Old left posterior temporal   occipital ischemic event. No acute intracranial pathology. No acute   intracranial hemorrhage.  2. Choanal narrowing with suggestion of fluid within the nasopharyngeal   region. Correlate with clinical assessment.    CTA NECK:  1. Short segment high-grade stenosis of 76% based on NASCET criteria at   the origin of the right internal carotid artery.  2. Moderate deposition of calcified plaque along the proximal aspect of   the left internal carotid artery with mild stenosis of 30% based on   NASCET criteria in this location.    CTA HEAD:  No large vessel occlusion, significant stenosis or vascular abnormality   identified.  --  EEG 12/26/24:  EEG IMPRESSION/CLINICAL CORRELATE  Clinical Impression:  Abnormal EEG study.  1. Severe nonspecific diffuse or multifocal cerebral dysfunction.   2. No epileptiform discharges or seizures were recorded.    EEG 12/25/24:  EEG IMPRESSION/CLINICAL CORRELATE  Clinical Impression:  -Prolonged right parietal focal-onset seizure with bilateral spread from 04:34 fluctuating/cycling until 05:53, with LLE twitching  -Multiple occipital focal seizures between 06:14-07:19  -Right parietal ictal-interictal continuum between 05:53-06:14, at times closer to the ictal side  -Risk of focal-onset seizures from the left centroparietal, right parietal, and occipital regions  -Right hemispheric focal cerebral dysfunction can be structural or functional (such as post-ictal) in etiology  -Variably severe to moderate diffuse/multifocal cerebral dysfunction, nonspecific in etiology, may be related to sedating medication with changes in dose.  --  Ultrasound:  US Duplex Carotid Arteries Complete, Bilateral (12.26.24 @ 12:34)   IMPRESSION:  Severe right internal carotid artery proximal segment stenosis secondary   to plaque, corresponding to findings on recent CTA neck.    Left carotid artery plaque without hemodynamically significant stenosis.  --  Cardiac Studies:  TTE W or WO Ultrasound Enhancing Agent (12.26.24 @ 17:06)   CONCLUSIONS:   1. Left ventricular systolic function is normal with an ejection fraction of 73 % by Lopez's method of disks. There are no regional wall motion abnormalities seen.   2. There is moderate (grade 2) left ventricular diastolic dysfunction, with elevated left ventricular filling pressure.   3. Normal right ventricular cavity size and normal right ventricular systolic function.   4. Mild to moderate tricuspid regurgitation.   5. Estimated pulmonary artery systolic pressure is 57 mmHg, consistent with echocardiographic evidence of pulmonary hyptertension.   6. Small pericardial effusion noted adjacent to the posterior left ventricle and small pericardialeffusion noted adjacent to the right atrium with no echocardiographic evidence of tamponade physiology.   7. No prior echocardiogram is available for comparison.       [facility] Stroke Team  Progress Note     HPI:  85yo F with PMHx HTN, HLD, osteoporosis, GERD, h/o breast cancer s/p left mastectomy, left hysterectomy BIBEMs for left-sided weakness and LUE rhythmic shaking. Per family, patient was last known well at 1:30am 12/24/24. When EMS arrived patient was alert but sloughing over the left side. Patient's left arm was noted to be weak and shortly started twitching, was given versed by EMS. By the time patient arrived to ER, Pt was intubated for airway protection. 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 CTA without LVO however with findings of high grade stenosis 75% right  ICA and 30% stenosis L ICA. NeuroICU consulted for further intervention.    SUBJECTIVE: No events overnight. Patient remains intubated. Unable to obtain complete ROS secondary to intubated status.     acetaminophen   Oral Liquid .. 650 milliGRAM(s) Oral every 6 hours PRN  atorvastatin 10 milliGRAM(s) Oral at bedtime  calcium carbonate 1250 mG  + Vitamin D (OsCal 500 + D) 1 Tablet(s) Oral daily  carvedilol 25 milliGRAM(s) Oral every 12 hours  chlorhexidine 0.12% Liquid 15 milliLiter(s) Oral Mucosa every 12 hours  cyanocobalamin 1000 MICROGram(s) Oral daily  dexMEDEtomidine Infusion 0.2 MICROgram(s)/kG/Hr IV Continuous <Continuous>  enoxaparin Injectable 40 milliGRAM(s) SubCutaneous <User Schedule>  fentaNYL    Injectable 25 MICROGram(s) IV Push every 2 hours PRN  ferrous    sulfate 325 milliGRAM(s) Oral daily  hydrALAZINE Injectable 10 milliGRAM(s) IV Push every 3 hours PRN  insulin lispro (ADMELOG) corrective regimen sliding scale   SubCutaneous three times a day before meals  labetalol Injectable 10 milliGRAM(s) IV Push every 3 hours PRN  levETIRAcetam   Injectable 1500 milliGRAM(s) IV Push every 12 hours  pantoprazole  Injectable 40 milliGRAM(s) IV Push daily  polyethylene glycol 3350 17 Gram(s) Oral daily  propofol Infusion 40 MICROgram(s)/kG/Min IV Continuous <Continuous>  senna 2 Tablet(s) Oral at bedtime  sodium chloride 0.9%. 1000 milliLiter(s) IV Continuous <Continuous>      PHYSICAL EXAM:   Vital Signs Last 24 Hrs  T(C): 37.6 (27 Dec 2024 06:00), Max: 38.1 (26 Dec 2024 23:00)  T(F): 99.7 (27 Dec 2024 06:00), Max: 100.6 (26 Dec 2024 23:00)  HR: 74 (27 Dec 2024 06:00) (58 - 74)  BP: 138/68 (27 Dec 2024 06:00) (105/76 - 192/170)  BP(mean): 90 (27 Dec 2024 06:00) (67 - 179)  RR: 20 (27 Dec 2024 06:00) (11 - 22)  SpO2: 100% (27 Dec 2024 06:00) (100% - 100%)    Parameters below as of 27 Dec 2024 06:00  Patient On (Oxygen Delivery Method): ventilator    Physical Exam LIMITED DUE TO PATIENT BEING INTUBATED AND SEDATED  Physical Exam:  General: Lying in bed. No acute distress.     Detailed Neurologic Exam:  Mental status: Patient was intubated and sedated    Cranial nerves: -dolls eyes. Pupils pinpoint but equal and react symmetrically to light.     Motor: There is normal bulk and tone.  0/5 strength in all four extremities    Sensation: unable to assess at this time   Cerebellar: unable to assess at this time  Gait : deferred    LABS:                        10.1   10.32 )-----------( 149      ( 27 Dec 2024 03:07 )             31.8    12-27    143  |  111[H]  |  14.7  ----------------------------<  157[H]  3.9   |  21.0[L]  |  0.85    Ca    8.1[L]      27 Dec 2024 03:07  Phos  2.6     12-27  Mg     2.0     12-27      A1C: 5.2  LDL: 41    RADIOLOGY & ADDITIONAL STUDIES (independently reviewed unless otherwise noted):  MR Head w/wo IV Cont (12.26.24 @ 16:51)   IMPRESSION: No acute intracranial hemorrhage, acuteischemia, or abnormal   intracranial enhancement.    Encephalomalacia and gliosis within the left posterior temporal/occipital   lobes which may be related to prior chronic infarction.    Similar-appearing moderate severity chronic white matter microvascular   type changes.    CT Head No Cont (12.24.24 @ 15:01)   IMPRESSION:  No acute intracranial hemorrhage or acute territorial infarct. If   symptoms persist, consider MRI exam for further evaluation.    CT Head/Angio Head/Neck w/ IV Cont (12.24.24 @ 17:56)  IMPRESSION:    CT HEAD:  1. Age-appropriate involutional changes. Old left posterior temporal   occipital ischemic event. No acute intracranial pathology. No acute   intracranial hemorrhage.  2. Choanal narrowing with suggestion of fluid within the nasopharyngeal   region. Correlate with clinical assessment.    CTA NECK:  1. Short segment high-grade stenosis of 76% based on NASCET criteria at   the origin of the right internal carotid artery.  2. Moderate deposition of calcified plaque along the proximal aspect of   the left internal carotid artery with mild stenosis of 30% based on   NASCET criteria in this location.    CTA HEAD:  No large vessel occlusion, significant stenosis or vascular abnormality   identified.  --  EEG 12/26/24:  EEG IMPRESSION/CLINICAL CORRELATE  Clinical Impression:  Abnormal EEG study.  1. Severe nonspecific diffuse or multifocal cerebral dysfunction.   2. No epileptiform discharges or seizures were recorded.    EEG 12/25/24:  EEG IMPRESSION/CLINICAL CORRELATE  Clinical Impression:  -Prolonged right parietal focal-onset seizure with bilateral spread from 04:34 fluctuating/cycling until 05:53, with LLE twitching  -Multiple occipital focal seizures between 06:14-07:19  -Right parietal ictal-interictal continuum between 05:53-06:14, at times closer to the ictal side  -Risk of focal-onset seizures from the left centroparietal, right parietal, and occipital regions  -Right hemispheric focal cerebral dysfunction can be structural or functional (such as post-ictal) in etiology  -Variably severe to moderate diffuse/multifocal cerebral dysfunction, nonspecific in etiology, may be related to sedating medication with changes in dose.  --  Ultrasound:  US Duplex Carotid Arteries Complete, Bilateral (12.26.24 @ 12:34)   IMPRESSION:  Severe right internal carotid artery proximal segment stenosis secondary   to plaque, corresponding to findings on recent CTA neck.    Left carotid artery plaque without hemodynamically significant stenosis.  --  Cardiac Studies:  TTE W or WO Ultrasound Enhancing Agent (12.26.24 @ 17:06)   CONCLUSIONS:   1. Left ventricular systolic function is normal with an ejection fraction of 73 % by Lopez's method of disks. There are no regional wall motion abnormalities seen.   2. There is moderate (grade 2) left ventricular diastolic dysfunction, with elevated left ventricular filling pressure.   3. Normal right ventricular cavity size and normal right ventricular systolic function.   4. Mild to moderate tricuspid regurgitation.   5. Estimated pulmonary artery systolic pressure is 57 mmHg, consistent with echocardiographic evidence of pulmonary hyptertension.   6. Small pericardial effusion noted adjacent to the posterior left ventricle and small pericardialeffusion noted adjacent to the right atrium with no echocardiographic evidence of tamponade physiology.   7. No prior echocardiogram is available for comparison.

## 2024-12-27 NOTE — PROGRESS NOTE ADULT - SUBJECTIVE AND OBJECTIVE BOX
CC: Patient being seen for rehabilitation follow up.  Patient without sedation, remains intubated.  Now having fevers.   EEG ongoing.     FUNCTIONAL PROGRESS  Total A    VITALS  T(C): 37.6 (12-27-24 @ 06:00), Max: 38.1 (12-26-24 @ 23:00)  HR: 74 (12-27-24 @ 06:00) (58 - 74)  BP: 138/68 (12-27-24 @ 06:00) (105/76 - 192/170)  RR: 20 (12-27-24 @ 06:00) (11 - 22)  SpO2: 100% (12-27-24 @ 06:00) (100% - 100%)  Wt(kg): --    MEDICATIONS   acetaminophen   Oral Liquid .. 650 milliGRAM(s) every 6 hours PRN  atorvastatin 10 milliGRAM(s) at bedtime  calcium carbonate 1250 mG  + Vitamin D (OsCal 500 + D) 1 Tablet(s) daily  carvedilol 25 milliGRAM(s) every 12 hours  chlorhexidine 0.12% Liquid 15 milliLiter(s) every 12 hours  cyanocobalamin 1000 MICROGram(s) daily  dexMEDEtomidine Infusion 0.2 MICROgram(s)/kG/Hr <Continuous>  enoxaparin Injectable 40 milliGRAM(s) <User Schedule>  fentaNYL    Injectable 25 MICROGram(s) every 2 hours PRN  ferrous    sulfate 325 milliGRAM(s) daily  hydrALAZINE Injectable 10 milliGRAM(s) every 3 hours PRN  insulin lispro (ADMELOG) corrective regimen sliding scale   three times a day before meals  labetalol Injectable 10 milliGRAM(s) every 3 hours PRN  levETIRAcetam   Injectable 1500 milliGRAM(s) every 12 hours  pantoprazole  Injectable 40 milliGRAM(s) daily  polyethylene glycol 3350 17 Gram(s) daily  propofol Infusion 40 MICROgram(s)/kG/Min <Continuous>  senna 2 Tablet(s) at bedtime  sodium chloride 0.9%. 1000 milliLiter(s) <Continuous>      RECENT LABS/IMAGING  - Reviewed Today                        10.1   10.32 )-----------( 149      ( 27 Dec 2024 03:07 )             31.8     12-27    143  |  111[H]  |  14.7  ----------------------------<  157[H]  3.9   |  21.0[L]  |  0.85    Ca    8.1[L]      27 Dec 2024 03:07  Phos  2.6     12-27  Mg     2.0     12-27        Urinalysis Basic - ( 27 Dec 2024 03:07 )    Color: x / Appearance: x / SG: x / pH: x  Gluc: 157 mg/dL / Ketone: x  / Bili: x / Urobili: x   Blood: x / Protein: x / Nitrite: x   Leuk Esterase: x / RBC: x / WBC x   Sq Epi: x / Non Sq Epi: x / Bacteria: x              CT HEAD 12/24 - 1. Age-appropriate involutional changes. Old left posterior temporal occipital ischemic event. No acute intracranial pathology. No acute intracranial hemorrhage.2. Choanal narrowing with suggestion of fluid within the nasopharyngeal region. Correlate with clinical assessment.    CTA NECK 12/24 - 1. Short segment high-grade stenosis of 76% based on NASCET criteria at the origin of the right internal carotid artery.2. Moderate deposition of calcified plaque along the proximal aspect of the left internal carotid artery with mild stenosis of 30% based on NASCET criteria in this location.    CTA HEAD 12/24 - No large vessel occlusion, significant stenosis or vascular abnormality identified.    EEG 12/26 - Abnormal EEG study. 1. Severe nonspecific diffuse or multifocal cerebral dysfunction.  2. No epileptiform discharges or seizures were recorded.    BLE V DOPPLER 12/26 - No evidence of deep venous thrombosis in either lower extremity.  ----------------------------------------------------------------------------------------  PHYSICAL EXAM  Constitutional - NAD, Appears Comfortable   Chest - +VENT   Extremities - No edema  Neurologic Exam -                    Cognitive - Intubated, +Blink      Communication - Intubated , No oromotor attempts     FUNCTIONAL MOTOR EXAM - Abnormal posturing of BLE to pain  Psychiatric - Intubated   ----------------------------------------------------------------------------------------  ASSESSMENT/PLAN  84yFemale with functional deficits after developing seizure like activity   Seizures - Keppra  ICA stenosis - Lipitor  HTN - Coreg, Lisinopril, Hydralazine, Labetalol  Respiratory Failure s/p VENT - Continue Precedex, Propofol, Duoneb  Pain - Tylenol  DVT PPX - SCDs, Lovenox  Rehab/Impaired mobility and function - Patient continues to require hospitalization for the above diagnoses and ongoing active management of comorbid complications that are substantially posing a threat to bodily function, functional ability and quality of life.     RECOMMEND - Turn Q2 when needed, HOB >30 degrees    Patient medically acute and being medically stabilized.   Weaning off of sedation as tolerated.   Ongoing EEG.  Pending MRI when medically more stable.   Therapy evals when more appropriate.    At this time needs to be mobilized by nursing staff to prevent secondary complications of immobility.   Unclear of medical improvement and therefore functional impairments.    Will continue to follow.  Recommend ongoing mobilization by staff to maintain cardiopulmonary function and prevention of secondary complications related to debility/immobility. Have discussed the specific rehabilitation management and recommendations documented above with rehab clinical care team/rehab liaison.      Total Time Spent on Encounter (reviewing clinical notes, labs, radiology and medications, reviewing patient history, physical exam, assessment and discussing rehabilitation options - with consideration of prior level of function, expected level of recovery and return to community living) - 50 minutes which excludes teaching and separately reported services.

## 2024-12-27 NOTE — EEG REPORT - NS EEG TEXT BOX
[facility]   COMPREHENSIVE EPILEPSY CENTER   REPORT OF CONTINUOUS VIDEO EEG     [facility]: [address], [place] [address], Ph#: [phone]  [facility]: [address], [place] [address], Ph#: [phone]  Office: [address] [place] [address], [facility] [address], [place] [address] Ph#: [phone]    Patient Name: RAÚL JASMINE  Age and : 84y (40)  MRN #: [id number]  Location: [place] [address]  Referring Physician: Eze Mitchell    Study Date: 24 at 08:00 - 24 at 08:00    _____________________________________________________________  STUDY INFORMATION    EEG Recording Technique:  The patient underwent continuous Video-EEG monitoring, using Telemetry System hardware on the XLTek Digital System. EEG and video data were stored on a computer hard drive with important events saved in digital archive files. The material was reviewed by a physician (electroencephalographer / epileptologist) on a daily basis. Huber and seizure detection algorithms were utilized and reviewed. An EEG Technician attended to the patient, and was available throughout daytime work hours.  The epilepsy center neurologist was available in person or on call 24-hours per day.    EEG Placement and Labeling of Electrodes:  The EEG was performed utilizing 20 channel referential EEG connections (coronal over temporal over parasagittal montage) using all standard 10-20 electrode placements with EKG, with additional electrodes placed in the inferior temporal region using the modified 10-10 montage electrode placements for elective admissions, or if deemed necessary. Recording was at a sampling rate of 256 samples per second per channel. Time synchronized digital video recording was done simultaneously with EEG recording. A low light infrared camera was used for low light recording.     _____________________________________________________________  HISTORY    Patient is a 84y old  Female who presents with a chief complaint of seizures (25 Dec 2024 17:29)      PERTINENT MEDICATION:  MEDICATIONS  (STANDING):  aspirin enteric coated 81 milliGRAM(s) Oral daily  atorvastatin 10 milliGRAM(s) Oral at bedtime  calcium carbonate 1250 mG  + Vitamin D (OsCal 500 + D) 1 Tablet(s) Oral daily  carvedilol 25 milliGRAM(s) Oral every 12 hours  chlorhexidine 0.12% Liquid 15 milliLiter(s) Oral Mucosa every 12 hours  cyanocobalamin 1000 MICROGram(s) Oral daily  dextrose 5%. 1000 milliLiter(s) (100 mL/Hr) IV Continuous <Continuous>  dextrose 5%. 1000 milliLiter(s) (50 mL/Hr) IV Continuous <Continuous>  dextrose 50% Injectable 25 Gram(s) IV Push once  dextrose 50% Injectable 12.5 Gram(s) IV Push once  dextrose 50% Injectable 25 Gram(s) IV Push once  enoxaparin Injectable 40 milliGRAM(s) SubCutaneous <User Schedule>  ferrous    sulfate 325 milliGRAM(s) Oral daily  glucagon  Injectable 1 milliGRAM(s) IntraMuscular once  insulin lispro (ADMELOG) corrective regimen sliding scale   SubCutaneous three times a day before meals  levETIRAcetam   Injectable 1500 milliGRAM(s) IV Push every 12 hours  pantoprazole  Injectable 40 milliGRAM(s) IV Push daily  polyethylene glycol 3350 17 Gram(s) Oral daily  propofol Infusion 40 MICROgram(s)/kG/Min (13.5 mL/Hr) IV Continuous <Continuous>  senna 2 Tablet(s) Oral at bedtime  sodium chloride 0.9%. 1000 milliLiter(s) (60 mL/Hr) IV Continuous <Continuous>    _____________________________________________________________  INTERPRETATION    Findings: The background was a generalized burst-suppression pattern (burst duration 1-5 seconds, interburst interval 1-6 seconds), seen throughout the first half of the recording.  The second half of the recording showed continuous generalized slowing    Background Slowing:  -Generalized burst-suppression (first half of recording)  -Continuous generalized polymorphic delta and theta slowing (second half of recording)    Focal Slowing:   -Continuous delta slowing in the right hemisphere, often with LRDA in right centroparietal region    Sleep Background:  Stage II sleep transients were not recorded.  Drowsiness and stage II sleep transients were not recorded.    Other Non-Epileptiform Findings:  None were present.    Interictal Epileptiform Activity:   -Frequent sharp waves in the right centroparietal region, maximum C4/P4, at times as LPDs at frequency of 1-1.5 hz  -GPDs, with triphasic morphology, frequency of 1-1.5 hz    Events:  Clinical events: None recorded.  Seizures: None recorded.    Artifacts:  Intermittent myogenic and movement artifacts were noted.    ECG:  The heart rate on single channel ECG was predominantly between 60-80 BPM.    _____________________________________________________________  EEG SUMMARY/CLASSIFICATION    Abnormal EEG in an encephalopathic patient.  -Sharp waves, focal, right centroparietal region  -GPDs, with triphasic morphology, frequency of 1-1.5 hz  -Continuous slowing, focal, right hemispheric  -LRDA, intermittent, right centroparietal region  -Generalized burst-suppression (first half of recording)  -Continuous slowing, generalized, moderate to severe (second half of recording)  _____________________________________________________________  EEG IMPRESSION/CLINICAL CORRELATE    Abnormal EEG study.  1. There is a risk for focal seizures with onset in the right centroparietal region.  2. Structural abnormality in the right hemisphere.  3. Severe diffuse or multifocal cerebral dysfunction, which may have a metabolic component.  4. No definitive seizures were recorded.    Chandana Koenig MD  Neurology Attending Physician

## 2024-12-28 NOTE — EEG REPORT - NS EEG TEXT BOX
[facility]   COMPREHENSIVE EPILEPSY CENTER   REPORT OF CONTINUOUS VIDEO EEG     [facility]: [address], [place] [address], Ph#: [phone]  [facility]: [id number], [place] [address], Ph#: [phone]  Office: [address] [place] [address], [facility] [address], [place] [address] Ph#: [phone]    Patient Name: RAÚL JASMINE  Age and : 84y (40)  MRN #: [id number]  Location: [place] [address]  Referring Physician: Eze Mitchell    Study Date: 24 at 08:00 - 24 at 08:00  _____________________________________________________________  STUDY INFORMATION    EEG Recording Technique:  The patient underwent continuous Video-EEG monitoring, using Telemetry System hardware on the XLTek Digital System. EEG and video data were stored on a computer hard drive with important events saved in digital archive files. The material was reviewed by a physician (electroencephalographer / epileptologist) on a daily basis. Huber and seizure detection algorithms were utilized and reviewed. An EEG Technician attended to the patient, and was available throughout daytime work hours.  The epilepsy center neurologist was available in person or on call 24-hours per day.    EEG Placement and Labeling of Electrodes:  The EEG was performed utilizing 20 channel referential EEG connections (coronal over temporal over parasagittal montage) using all standard 10-20 electrode placements with EKG, with additional electrodes placed in the inferior temporal region using the modified 10-10 montage electrode placements for elective admissions, or if deemed necessary. Recording was at a sampling rate of 256 samples per second per channel. Time synchronized digital video recording was done simultaneously with EEG recording. A low light infrared camera was used for low light recording.     _____________________________________________________________  HISTORY    Patient is a 84y old  Female who presents with a chief complaint of seizures (25 Dec 2024 17:29)      PERTINENT MEDICATION:  MEDICATIONS  (STANDING):  aspirin enteric coated 81 milliGRAM(s) Oral daily  atorvastatin 10 milliGRAM(s) Oral at bedtime  calcium carbonate 1250 mG  + Vitamin D (OsCal 500 + D) 1 Tablet(s) Oral daily  carvedilol 25 milliGRAM(s) Oral every 12 hours  chlorhexidine 0.12% Liquid 15 milliLiter(s) Oral Mucosa every 12 hours  cyanocobalamin 1000 MICROGram(s) Oral daily  dextrose 5%. 1000 milliLiter(s) (100 mL/Hr) IV Continuous <Continuous>  dextrose 5%. 1000 milliLiter(s) (50 mL/Hr) IV Continuous <Continuous>  dextrose 50% Injectable 25 Gram(s) IV Push once  dextrose 50% Injectable 12.5 Gram(s) IV Push once  dextrose 50% Injectable 25 Gram(s) IV Push once  enoxaparin Injectable 40 milliGRAM(s) SubCutaneous <User Schedule>  ferrous    sulfate 325 milliGRAM(s) Oral daily  glucagon  Injectable 1 milliGRAM(s) IntraMuscular once  insulin lispro (ADMELOG) corrective regimen sliding scale   SubCutaneous three times a day before meals  levETIRAcetam   Injectable 1500 milliGRAM(s) IV Push every 12 hours  pantoprazole  Injectable 40 milliGRAM(s) IV Push daily  polyethylene glycol 3350 17 Gram(s) Oral daily  propofol Infusion 40 MICROgram(s)/kG/Min (13.5 mL/Hr) IV Continuous <Continuous>  senna 2 Tablet(s) Oral at bedtime  sodium chloride 0.9%. 1000 milliLiter(s) (60 mL/Hr) IV Continuous <Continuous>    _____________________________________________________________  INTERPRETATION    Findings: Continuous generalized slowing with occasional periods of discontinuity up to 7-8 seconds. No clear PDR noted.     Background Slowing:  -Continuous generalized polymorphic delta and theta slowing    Focal Slowing:   -Continuous delta slowing in the right hemisphere    Sleep Background:  Stage II sleep transients were not recorded.  Drowsiness and stage II sleep transients were not recorded.    Other Non-Epileptiform Findings:  None were present.    Interictal Epileptiform Activity:   -Near continuous LPDs in the right centroparietal region, maximum C4/P4, at times with spread to F8, at frequency of 1-2 hz    Events:  Clinical events: None recorded.  Seizures: None recorded.    Artifacts:  Intermittent myogenic and movement artifacts were noted.    ECG:  The heart rate on single channel ECG was predominantly between 60-80 BPM.    _____________________________________________________________  EEG SUMMARY/CLASSIFICATION    Abnormal EEG in an encephalopathic patient.  -LPDs, 1-2 Hz right centroparietal region, C4/P4 > F8, continuous  -Continuous slowing, focal, right hemispheric  -Continuous slowing, generalized, moderate to severe  -Discontinuous pattern  _____________________________________________________________  EEG IMPRESSION/CLINICAL CORRELATE    Abnormal EEG study.  1. There is a risk for focal seizures with onset in the right centroparietal region.  2. Structural abnormality in the right hemisphere.  3. Severe diffuse or multifocal cerebral dysfunction, which may have a metabolic component.  4. No definitive seizures were recorded.    THIS IS A PRELIMINARY INTERPRETATION ONLY PENDING ATTENDING REVIEW/ATTESTATION    Steve Leblanc DO  Epilepsy Fellow, PGY-5    -------------------------------------------------------------------------------------------------------  [facility] EEG Reading Room Ph#: [phone]  Epilepsy Answering Service after 5PM and before 8:30AM: Ph#: [phone]     [facility]   COMPREHENSIVE EPILEPSY CENTER   REPORT OF CONTINUOUS VIDEO EEG     [facility]: [address], [place] [address], Ph#: [phone]  [facility]: [phone], [place] [address], Ph#: [phone]  Office: [address] [place] [address], [facility] [address], [place] [address] Ph#: [phone]    Patient Name: RAÚL JASMINE  Age and : 84y (40)  MRN #: [id number]  Location: [place] [address]  Referring Physician: Eze Mitchell    Study Date: 24 at 08:00 - 24 at 08:00  _____________________________________________________________  STUDY INFORMATION    EEG Recording Technique:  The patient underwent continuous Video-EEG monitoring, using Telemetry System hardware on the XLTek Digital System. EEG and video data were stored on a computer hard drive with important events saved in digital archive files. The material was reviewed by a physician (electroencephalographer / epileptologist) on a daily basis. Huber and seizure detection algorithms were utilized and reviewed. An EEG Technician attended to the patient, and was available throughout daytime work hours.  The epilepsy center neurologist was available in person or on call 24-hours per day.    EEG Placement and Labeling of Electrodes:  The EEG was performed utilizing 20 channel referential EEG connections (coronal over temporal over parasagittal montage) using all standard 10-20 electrode placements with EKG, with additional electrodes placed in the inferior temporal region using the modified 10-10 montage electrode placements for elective admissions, or if deemed necessary. Recording was at a sampling rate of 256 samples per second per channel. Time synchronized digital video recording was done simultaneously with EEG recording. A low light infrared camera was used for low light recording.     _____________________________________________________________  HISTORY    Patient is a 84y old  Female who presents with a chief complaint of seizures (25 Dec 2024 17:29)      PERTINENT MEDICATION:  MEDICATIONS  (STANDING):  aspirin enteric coated 81 milliGRAM(s) Oral daily  atorvastatin 10 milliGRAM(s) Oral at bedtime  calcium carbonate 1250 mG  + Vitamin D (OsCal 500 + D) 1 Tablet(s) Oral daily  carvedilol 25 milliGRAM(s) Oral every 12 hours  chlorhexidine 0.12% Liquid 15 milliLiter(s) Oral Mucosa every 12 hours  cyanocobalamin 1000 MICROGram(s) Oral daily  dextrose 5%. 1000 milliLiter(s) (100 mL/Hr) IV Continuous <Continuous>  dextrose 5%. 1000 milliLiter(s) (50 mL/Hr) IV Continuous <Continuous>  dextrose 50% Injectable 25 Gram(s) IV Push once  dextrose 50% Injectable 12.5 Gram(s) IV Push once  dextrose 50% Injectable 25 Gram(s) IV Push once  enoxaparin Injectable 40 milliGRAM(s) SubCutaneous <User Schedule>  ferrous    sulfate 325 milliGRAM(s) Oral daily  glucagon  Injectable 1 milliGRAM(s) IntraMuscular once  insulin lispro (ADMELOG) corrective regimen sliding scale   SubCutaneous three times a day before meals  levETIRAcetam   Injectable 1500 milliGRAM(s) IV Push every 12 hours  pantoprazole  Injectable 40 milliGRAM(s) IV Push daily  polyethylene glycol 3350 17 Gram(s) Oral daily  propofol Infusion 40 MICROgram(s)/kG/Min (13.5 mL/Hr) IV Continuous <Continuous>  senna 2 Tablet(s) Oral at bedtime  sodium chloride 0.9%. 1000 milliLiter(s) (60 mL/Hr) IV Continuous <Continuous>    _____________________________________________________________  INTERPRETATION    Findings: Continuous generalized slowing with occasional periods of discontinuity up to 7-8 seconds. No clear PDR noted.     Background Slowing:  -Continuous generalized polymorphic delta and theta slowing    Focal Slowing:   -Continuous delta slowing in the right hemisphere    Sleep Background:  Stage II sleep transients were not recorded.  Drowsiness and stage II sleep transients were not recorded.    Other Non-Epileptiform Findings:  None were present.    Interictal Epileptiform Activity:   -Near continuous fluctuant LPDs in the right centroparietal region, maximum C4/P4, at times with spread to F8, at frequency of 1-2 hz c/w IIC    Events:  Clinical events: None recorded.  Seizures: None recorded.    Artifacts:  Intermittent myogenic and movement artifacts were noted.    ECG:  The heart rate on single channel ECG was predominantly between 60-80 BPM.    _____________________________________________________________  EEG SUMMARY/CLASSIFICATION    Abnormal EEG in an encephalopathic patient.  -Fluctuant LPDs, 1-2 Hz right centroparietal region, C4/P4 > F8, continuous c/w IIC  -Continuous slowing, focal, right hemispheric  -Continuous slowing, generalized, moderate to severe  -Discontinuous pattern  _____________________________________________________________  EEG IMPRESSION/CLINICAL CORRELATE    Abnormal EEG study.  1. Highly epileptogenic pattern in the right hemispheric region, IIC continuum  2. Structural abnormality in the right hemisphere.  3. Severe diffuse or multifocal cerebral dysfunction.        Steve Leblanc,   Epilepsy Fellow, PGY-5    -------------------------------------------------------------------------------------------------------  [facility] EEG Reading Room Ph#: [phone]  Epilepsy Answering Service after 5PM and before 8:30AM: Ph#: [phone]

## 2024-12-28 NOTE — PROGRESS NOTE ADULT - SUBJECTIVE AND OBJECTIVE BOX
CC: Patient being seen for rehabilitation follow up.  Patient clinically unchanged.  Noted left UE twitching with stimulation.  No command following.   Remains febrile.     FUNCTIONAL PROGRESS  Total A    VITALS  T(C): 37.4 (12-28-24 @ 11:00), Max: 38.1 (12-27-24 @ 12:00)  HR: 76 (12-28-24 @ 11:00) (65 - 78)  BP: 162/84 (12-28-24 @ 11:00) (91/44 - 162/84)  RR: 16 (12-28-24 @ 11:00) (12 - 23)  SpO2: 100% (12-28-24 @ 11:00) (100% - 100%)  Wt(kg): --    MEDICATIONS   acetaminophen   Oral Liquid .. 650 milliGRAM(s) every 6 hours PRN  aspirin  chewable 81 milliGRAM(s) daily  atorvastatin 10 milliGRAM(s) at bedtime  brivaracetam  IVPB 100 milliGRAM(s) <User Schedule>  calcium carbonate 1250 mG  + Vitamin D (OsCal 500 + D) 1 Tablet(s) daily  carvedilol 25 milliGRAM(s) every 12 hours  chlorhexidine 0.12% Liquid 15 milliLiter(s) every 12 hours  cyanocobalamin 1000 MICROGram(s) daily  enoxaparin Injectable 40 milliGRAM(s) <User Schedule>  [id number]    Injectable 25 MICROGram(s) every 2 hours PRN  hydrALAZINE Injectable 10 milliGRAM(s) every 3 hours PRN  insulin lispro (ADMELOG) corrective regimen sliding scale   three times a day before meals  iron sucrose IVPB 200 milliGRAM(s) every 24 hours  labetalol Injectable 10 milliGRAM(s) every 3 hours PRN  lacosamide IVPB 100 milliGRAM(s) <User Schedule>  pantoprazole  Injectable 40 milliGRAM(s) daily  polyethylene glycol 3350 17 Gram(s) daily  senna 2 Tablet(s) at bedtime  sodium chloride 0.9%. 1000 milliLiter(s) <Continuous>      RECENT LABS/IMAGING  - Reviewed Today                        9.9    11.00 )-----------( 209      ( 28 Dec 2024 03:57 )             32.0     12-28    143  |  110[H]  |  14.9  ----------------------------<  149[H]  4.1   |  25.0  |  0.87    Ca    8.3[L]      28 Dec 2024 03:57  Phos  3.3     12-28  Mg     2.1     12-28    TPro  6.2[L]  /  Alb  2.7[L]  /  TBili  0.2[L]  /  DBili  x   /  AST  57[H]  /  ALT  37[H]  /  AlkPhos  88  12-28      Urinalysis Basic - ( 28 Dec 2024 03:57 )    Color: x / Appearance: x / SG: x / pH: x  Gluc: 149 mg/dL / Ketone: x  / Bili: x / Urobili: x   Blood: x / Protein: x / Nitrite: x   Leuk Esterase: x / RBC: x / WBC x   Sq Epi: x / Non Sq Epi: x / Bacteria: x            CT HEAD 12/24 - 1. Age-appropriate involutional changes. Old left posterior temporal occipital ischemic event. No acute intracranial pathology. No acute intracranial hemorrhage.2. Choanal narrowing with suggestion of fluid within the nasopharyngeal region. Correlate with clinical assessment.    CTA NECK 12/24 - 1. Short segment high-grade stenosis of 76% based on NASCET criteria at the origin of the right internal carotid artery.2. Moderate deposition of calcified plaque along the proximal aspect of the left internal carotid artery with mild stenosis of 30% based on NASCET criteria in this location.    CTA HEAD 12/24 - No large vessel occlusion, significant stenosis or vascular abnormality identified.    EEG 12/26 - Abnormal EEG study. 1. Severe nonspecific diffuse or multifocal cerebral dysfunction.  2. No epileptiform discharges or seizures were recorded.    BLE V DOPPLER 12/26 - No evidence of deep venous thrombosis in either lower extremity.    TTE 12/26 -  1. Left ventricular systolic function is normal with an ejection fraction of 73 % by Lopez's method of disks. There are no regional wall motion abnormalities seen.   2. There is moderate (grade 2) left ventricular diastolic dysfunction, with elevated left ventricular filling pressure.   3. Normal right ventricular cavity size and normal right ventricular systolic function.   4. Mild to moderate tricuspid regurgitation.   5. Estimated pulmonary artery systolic pressure is 57 mmHg, consistent with echocardiographic evidence of pulmonary hyptertension.   6. Small pericardial effusion noted adjacent to the posterior left ventricle and small pericardial effusion noted adjacent to the right atrium with no echocardiographic evidence of tamponade physiology.   7. No prior echocardiogram is available for comparison.    ----------------------------------------------------------------------------------------  PHYSICAL EXAM  Constitutional - NAD, Appears Comfortable - On no sedation  Chest - +VENT   Extremities - Mild swelling, Left UE>LE twitching  Neurologic Exam -                    Cognitive - Intubated/Sedated      Communication - Intubated/Sedated      Cranial Nerves - Intubated/Sedated      FUNCTIONAL MOTOR EXAM - Right LE response to pain - no flexion  Psychiatric - Intubated/Sedated   ----------------------------------------------------------------------------------------  ASSESSMENT/PLAN  84yFemale with functional deficits after developing seizure like activity   Seizures - Keelisera, Vimpat  ICA stenosis - ASA, Lipitor  HTN - Coreg, Lisinopril, Hydralazine, Labetalol  Respiratory Failure s/p VENT - Continue Fentanyl, Duoneb  Pain - Tylenol  DVT PPX - SCDs, Lovenox  Rehab/Impaired mobility and function - Patient continues to require hospitalization for the above diagnoses and ongoing active management of comorbid complications that are substantially posing a threat to bodily function, functional ability and quality of life.     RECOMMEND - Turn Q2 when needed, HOB >30 degrees    Patient medically acute and being medically stabilized. Recommend therapy evals when more appropriate.  At this time needs to be mobilized by nursing staff to prevent secondary complications of immobility.     Will continue to follow.  Recommend ongoing mobilization by staff to maintain cardiopulmonary function and prevention of secondary complications related to debility/immobility. Have discussed the specific rehabilitation management and recommendations documented above with rehab clinical care team/rehab liaison.      Total Time Spent on Encounter (reviewing clinical notes, labs, radiology and medications, reviewing patient history, physical exam, assessment and discussing rehabilitation options - with consideration of prior level of function, expected level of recovery and return to community living) - 50 minutes

## 2024-12-28 NOTE — PROGRESS NOTE ADULT - ASSESSMENT
84F new onset status epilepticus (clinically - with L UE and LLE twitching).    Patient is critically ill with high probability of imminent neurologic or life-threatening deterioration due to the following diagnoses:  1) Status Epilepticus  - For which we are monitoring neurologically and electrographically on EEG and providing continous sedating antiepileptic medications  2) Respiratory Failure  - For which we are treating with antibiotics and weaning ventilator settings with spontaneous breathing trials as tolerated    Plan:  - neurochecks  - cont cEEG  - Keppra + VPA -> Briviac + Vimpat per epilepsy  - ASA   - hold home dementia meds to avoid lowering sz threshold  - f/u CSF results. Will consider Steroid trial  - Ketamine at 1mg/kg  - maintain -180, avoid significant fluctuations; cont home Coreg  - maintain Osats>92%, vent support, SBT as tolerated  - cont TF, BM regimen; PPI for ulcer ppx  - monitor e-lytes, I/Os; add free water, d/c NS  - Goal euglycemia (-180), ISS; cont home statin  - VTE prophylaxis: SCDs, SQL    My full attention was spent providing medically necessary critical care to the patient with details documented in my note above.   Critical care time spent examining patient, reviewing vitals, labs, medications, imaging and discussing with the team goals of care   The combined critical care time provided to the patient was 60 minutes  This time does not include bedside procedures that are documented separately.

## 2024-12-28 NOTE — PROGRESS NOTE ADULT - SUBJECTIVE AND OBJECTIVE BOX
HPI:  83yo F with PMHx HTN, HLD, osteoporosis, GERD, h/o breast cancer s/p left mastectomy, left hysterectomy BIBEMs for left-sided weakness and LUE rhythmic shaking. Per family, patient was last known well at 1:30am. When EMS arrived patient was alert but sloughing over the left side. Patient's left arm was noted to be weak and shortly started twitching, was given versed by EMS. By the time patient arrived to ER, Pt was intubated for airway protection. 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 CTA without LVO however with findings of high grade stenosis 75% right  ICA and 30% stenosis L ICA. NeuroICU consulted for further intervention. (24 Dec 2024 20:57)    24h Events  12/28 - Continuous LPDs with possible improvement on ativan challenge. Started Ketamine gtt    Exam:   no EO, not FC, no blink to threat, no tracking  PERRL, gaze midline, o/b the vent, cough present; motor - trace wdrl in all 4.  CTAB  S1S2 present  Abd soft, NT, ND  No peripheral swelling, no rash noted.    ------------------------------------------------------------------------------------------------------  ICU Vital Signs Last 24 Hrs  T(C): 38 (28 Dec 2024 16:45), Max: 38 (27 Dec 2024 21:00)  T(F): 100.4 (28 Dec 2024 16:45), Max: 100.4 (27 Dec 2024 21:00)  HR: 67 (28 Dec 2024 16:46) (65 - 78)  BP: 82/53 (28 Dec 2024 16:45) (82/53 - 162/84)  BP(mean): 60 (28 Dec 2024 16:45) (57 - 118)  ABP: --  ABP(mean): --  RR: 12 (28 Dec 2024 16:45) (12 - 24)  SpO2: 100% (28 Dec 2024 16:46) (100% - 100%)    O2 Parameters below as of 28 Dec 2024 16:00  Patient On (Oxygen Delivery Method): ventilator            I&O's Summary    27 Dec 2024 07:01  -  28 Dec 2024 07:00  --------------------------------------------------------  IN: 2080 mL / OUT: 330 mL / NET: 1750 mL    28 Dec 2024 07:01  -  28 Dec 2024 17:48  --------------------------------------------------------  IN: 2281.2 mL / OUT: 317 mL / NET: 1964.2 mL        MEDICATIONS  (STANDING):  aspirin  chewable 81 milliGRAM(s) Oral daily  atorvastatin 10 milliGRAM(s) Oral at bedtime  brivaracetam  Injectable 100 milliGRAM(s) IV Push two times a day  calcium carbonate 1250 mG  + Vitamin D (OsCal 500 + D) 1 Tablet(s) Oral daily  carvedilol 25 milliGRAM(s) Oral every 12 hours  chlorhexidine 0.12% Liquid 15 milliLiter(s) Oral Mucosa every 12 hours  cyanocobalamin 1000 MICROGram(s) Oral daily  enoxaparin Injectable 40 milliGRAM(s) SubCutaneous <User Schedule>  ferrous    sulfate 325 milliGRAM(s) Oral daily  insulin lispro (ADMELOG) corrective regimen sliding scale   SubCutaneous three times a day before meals  iron sucrose IVPB 200 milliGRAM(s) IV Intermittent every 24 hours  ketamine Infusion 1 mG/kG/Hr (5.61 mL/Hr) IV Continuous <Continuous>  lacosamide IVPB 100 milliGRAM(s) IV Intermittent <User Schedule>  pantoprazole  Injectable 40 milliGRAM(s) IV Push daily  polyethylene glycol 3350 17 Gram(s) Oral daily  senna 2 Tablet(s) Oral at bedtime  sodium chloride 0.9%. 1000 milliLiter(s) (50 mL/Hr) IV Continuous <Continuous>      RESPIRATORY:  Mode: AC/ CMV (Assist Control/ Continuous Mandatory Ventilation)  RR (machine): 12  TV (machine): 400  FiO2: 40  PEEP: 6  ITime: 1  MAP: 12  PIP: 24      IMAGING:   Recent imaging studies were reviewed.    LAB RESULTS:                          9.9    11.00 )-----------( 209      ( 28 Dec 2024 03:57 )             32.0           12-28    143  |  110[H]  |  14.9  ----------------------------<  149[H]  4.1   |  25.0  |  0.87    Ca    8.3[L]      28 Dec 2024 03:57  Phos  3.3     12-28  Mg     2.1     12-28    TPro  6.2[L]  /  Alb  2.7[L]  /  TBili  0.2[L]  /  DBili  x   /  AST  57[H]  /  ALT  37[H]  /  AlkPhos  88  12-28

## 2024-12-29 NOTE — PROGRESS NOTE ADULT - SUBJECTIVE AND OBJECTIVE BOX
HPI:  83yo F with PMHx HTN, HLD, osteoporosis, GERD, h/o breast cancer s/p left mastectomy, left hysterectomy BIBEMs for left-sided weakness and LUE rhythmic shaking. Per family, patient was last known well at 1:30am. When EMS arrived patient was alert but sloughing over the left side. Patient's left arm was noted to be weak and shortly started twitching, was given versed by EMS. By the time patient arrived to ER, Pt was intubated for airway protection. 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 CTA without LVO however with findings of high grade stenosis 75% right  ICA and 30% stenosis L ICA. NeuroICU consulted for further intervention. (24 Dec 2024 20:57)    24h Events  12/28 - Continuous LPDs with possible improvement on ativan challenge. Started Ketamine gtt  12/29 - Minimal response to Ketamine. Started trial of empiric high-dose steroids + added Fycompa    Exam:   no EO, not FC, no blink to threat, no tracking  PERRL, gaze midline, o/b the vent, cough present; motor - trace wdrl in all 4.  CTAB  S1S2 present  Abd soft, NT, ND  No peripheral swelling, no rash noted.    ------------------------------------------------------------------------------------------------------  ICU Vital Signs Last 24 Hrs  T(C): 37.9 (29 Dec 2024 10:00), Max: 38 (28 Dec 2024 15:00)  T(F): 100.2 (29 Dec 2024 10:00), Max: 100.4 (28 Dec 2024 15:00)  HR: 76 (29 Dec 2024 10:09) (60 - 78)  BP: 173/69 (29 Dec 2024 10:00) (82/53 - 179/79)  BP(mean): 97 (29 Dec 2024 10:00) (60 - 126)  ABP: --  ABP(mean): --  RR: 17 (29 Dec 2024 10:00) (12 - 24)  SpO2: 100% (29 Dec 2024 10:09) (100% - 100%)    O2 Parameters below as of 29 Dec 2024 08:00  Patient On (Oxygen Delivery Method): ventilator            I&O's Summary    28 Dec 2024 07:01  -  29 Dec 2024 07:00  --------------------------------------------------------  IN: 4059.6 mL / OUT: 592 mL / NET: 3467.6 mL        MEDICATIONS  (STANDING):  aspirin  chewable 81 milliGRAM(s) Oral daily  atorvastatin 10 milliGRAM(s) Oral at bedtime  brivaracetam  Injectable 100 milliGRAM(s) IV Push two times a day  calcium carbonate 1250 mG  + Vitamin D (OsCal 500 + D) 1 Tablet(s) Oral daily  carvedilol 25 milliGRAM(s) Oral every 12 hours  chlorhexidine 0.12% Liquid 15 milliLiter(s) Oral Mucosa every 12 hours  cyanocobalamin 1000 MICROGram(s) Oral daily  enoxaparin Injectable 40 milliGRAM(s) SubCutaneous <User Schedule>  insulin lispro (ADMELOG) corrective regimen sliding scale   SubCutaneous three times a day before meals  iron sucrose IVPB 200 milliGRAM(s) IV Intermittent every 24 hours  ketamine Infusion. 0.5 mG/kG/Hr (2.81 mL/Hr) IV Continuous <Continuous>  lacosamide IVPB 100 milliGRAM(s) IV Intermittent <User Schedule>  methylPREDNISolone sodium succinate IVPB 1000 milliGRAM(s) IV Intermittent daily  pantoprazole  Injectable 40 milliGRAM(s) IV Push daily  perampanel 2 milliGRAM(s) Oral <User Schedule>  polyethylene glycol 3350 17 Gram(s) Oral daily  senna 2 Tablet(s) Oral at bedtime  sodium chloride 0.9%. 1000 milliLiter(s) (50 mL/Hr) IV Continuous <Continuous>      RESPIRATORY:  Mode: CPAP with PS  FiO2: 40  PEEP: 6  PS: 10  ITime: 1  MAP: 9  PIP: 16      IMAGING:   Recent imaging studies were reviewed.    LAB RESULTS:                          9.5    11.35 )-----------( 227      ( 29 Dec 2024 03:29 )             30.8           12-29    145  |  112[H]  |  15.6  ----------------------------<  133[H]  4.1   |  25.0  |  0.71    Ca    8.6      29 Dec 2024 03:29  Phos  3.2     12-29  Mg     2.2     12-29    TPro  6.2[L]  /  Alb  2.7[L]  /  TBili  0.2[L]  /  DBili  x   /  AST  57[H]  /  ALT  37[H]  /  AlkPhos  88  12-28

## 2024-12-29 NOTE — PROCEDURE NOTE - ADDITIONAL PROCEDURE DETAILS
Risks and benefits were explained to the patient and family and consent was obtained. Patient placed in lateral decubitus position. Skin was prepped and draped in usual sterile fashion. Dura was pierced. CSF was noted after the first attempt. Opening pressure was [id number]. 4 collection tubes of CSF were collected. Needle was removed. Pressure was held. Bandage applied to skin.    The patient tolerated the procedure well with no immediate complications.
Pt placed in lateral recumbent position w/ knees to chest and chin tucked to chest, iliac crest palpated, bony spinal processes palpated and draped in sterile fashion, Spinal needle used to puncture, several attempts made w/ clear CSF obtained for sampling. Pt remained flat 1 hour post puncture.

## 2024-12-29 NOTE — EEG REPORT - NS EEG TEXT BOX
[facility]   COMPREHENSIVE EPILEPSY CENTER   REPORT OF CONTINUOUS VIDEO EEG     [facility]: [address], [place] [address], Ph#: [phone]  [facility]: [id number], [place] [address], Ph#: [phone]  Office: [address] [place] [address], [facility] [address], [place] [address] Ph#: [phone]    Patient Name: RAÚL JASMINE  Age and : 84y (40)  MRN #: [id number]  Location: [place] [address]  Referring Physician: Eze Mitchell    Study Date: 24 at 08:00 - 24 at 08:00  Duration: 24 hours  _____________________________________________________________  STUDY INFORMATION    EEG Recording Technique:  The patient underwent continuous Video-EEG monitoring, using Telemetry System hardware on the XLTek Digital System. EEG and video data were stored on a computer hard drive with important events saved in digital archive files. The material was reviewed by a physician (electroencephalographer / epileptologist) on a daily basis. Huber and seizure detection algorithms were utilized and reviewed. An EEG Technician attended to the patient, and was available throughout daytime work hours.  The epilepsy center neurologist was available in person or on call 24-hours per day.    EEG Placement and Labeling of Electrodes:  The EEG was performed utilizing 20 channel referential EEG connections (coronal over temporal over parasagittal montage) using all standard 10-20 electrode placements with EKG, with additional electrodes placed in the inferior temporal region using the modified 10-10 montage electrode placements for elective admissions, or if deemed necessary. Recording was at a sampling rate of 256 samples per second per channel. Time synchronized digital video recording was done simultaneously with EEG recording. A low light infrared camera was used for low light recording.     _____________________________________________________________  HISTORY    Patient is a 84y old  Female who presents with a chief complaint of seizures (25 Dec 2024 17:29)      PERTINENT MEDICATION:  MEDICATIONS  (STANDING):  aspirin enteric coated 81 milliGRAM(s) Oral daily  atorvastatin 10 milliGRAM(s) Oral at bedtime  calcium carbonate 1250 mG  + Vitamin D (OsCal 500 + D) 1 Tablet(s) Oral daily  carvedilol 25 milliGRAM(s) Oral every 12 hours  chlorhexidine 0.12% Liquid 15 milliLiter(s) Oral Mucosa every 12 hours  cyanocobalamin 1000 MICROGram(s) Oral daily  dextrose 5%. 1000 milliLiter(s) (100 mL/Hr) IV Continuous <Continuous>  dextrose 5%. 1000 milliLiter(s) (50 mL/Hr) IV Continuous <Continuous>  dextrose 50% Injectable 25 Gram(s) IV Push once  dextrose 50% Injectable 12.5 Gram(s) IV Push once  dextrose 50% Injectable 25 Gram(s) IV Push once  enoxaparin Injectable 40 milliGRAM(s) SubCutaneous <User Schedule>  ferrous    sulfate 325 milliGRAM(s) Oral daily  glucagon  Injectable 1 milliGRAM(s) IntraMuscular once  insulin lispro (ADMELOG) corrective regimen sliding scale   SubCutaneous three times a day before meals  levETIRAcetam   Injectable 1500 milliGRAM(s) IV Push every 12 hours  pantoprazole  Injectable 40 milliGRAM(s) IV Push daily  polyethylene glycol 3350 17 Gram(s) Oral daily  propofol Infusion 40 MICROgram(s)/kG/Min (13.5 mL/Hr) IV Continuous <Continuous>  senna 2 Tablet(s) Oral at bedtime  sodium chloride 0.9%. 1000 milliLiter(s) (60 mL/Hr) IV Continuous <Continuous>    _____________________________________________________________  INTERPRETATION    Findings: Continuous generalized slowing with occasional periods of discontinuity up to 7-8 seconds. No clear PDR noted.     Background Slowing:  -Continuous generalized polymorphic delta and theta slowing    Focal Slowing:   -Continuous delta slowing in the right hemisphere    Sleep Background:  Stage II sleep transients were not recorded.  Drowsiness and stage II sleep transients were not recorded.    Other Non-Epileptiform Findings:  None were present.    Interictal Epileptiform Activity:   -Near continuous fluctuant LPDs in the right centroparietal region, maximum C4/P4, at times with spread to F8, at frequency of 1-2 hz c/w IIC    Events:  Clinical events: None recorded.  Seizures: None recorded.    Artifacts:  Intermittent myogenic and movement artifacts were noted.    ECG:  The heart rate on single channel ECG was predominantly between 60-80 BPM.    _____________________________________________________________  EEG SUMMARY/CLASSIFICATION    Abnormal EEG in an encephalopathic patient.  -Fluctuant LPDs, 1-2 Hz right centroparietal region, C4/P4 > F8, continuous c/w IIC  -Continuous slowing, focal, right hemispheric  -Continuous slowing, generalized, moderate to severe  -Discontinuous pattern  _____________________________________________________________  EEG IMPRESSION/CLINICAL CORRELATE    Abnormal EEG study.  1. Highly epileptogenic pattern in the right hemispheric region, IIC continuum  2. Structural abnormality in the right hemisphere.  3. Severe diffuse or multifocal cerebral dysfunction.      THIS IS A PRELIMINARY INTERPRETATION ONLY PENDING ATTENDING REVIEW/ATTESTATION    Steve Leblanc DO  Epilepsy Fellow, PGY-5    -------------------------------------------------------------------------------------------------------  [facility] EEG Reading Room Ph#: [phone]  Epilepsy Answering Service after 5PM and before 8:30AM: Ph#: [phone]   [facility]   COMPREHENSIVE EPILEPSY CENTER   REPORT OF CONTINUOUS VIDEO EEG     [facility]: [address], [place] [address], Ph#: [phone]  [facility]: [id number], [place] [address], Ph#: [phone]  Office: [address] [place] [address], [facility] [address], [place] [address] Ph#: [phone]    Patient Name: RAÚL JASMINE  Age and : 84y (40)  MRN #: [id number]  Location: [place] [address]  Referring Physician: Eze Mitchell    Study Date: 24 at 08:00 - 24 at 08:00  Duration: 24 hours  _____________________________________________________________  STUDY INFORMATION    EEG Recording Technique:  The patient underwent continuous Video-EEG monitoring, using Telemetry System hardware on the XLTek Digital System. EEG and video data were stored on a computer hard drive with important events saved in digital archive files. The material was reviewed by a physician (electroencephalographer / epileptologist) on a daily basis. Huber and seizure detection algorithms were utilized and reviewed. An EEG Technician attended to the patient, and was available throughout daytime work hours.  The epilepsy center neurologist was available in person or on call 24-hours per day.    EEG Placement and Labeling of Electrodes:  The EEG was performed utilizing 20 channel referential EEG connections (coronal over temporal over parasagittal montage) using all standard 10-20 electrode placements with EKG, with additional electrodes placed in the inferior temporal region using the modified 10-10 montage electrode placements for elective admissions, or if deemed necessary. Recording was at a sampling rate of 256 samples per second per channel. Time synchronized digital video recording was done simultaneously with EEG recording. A low light infrared camera was used for low light recording.     _____________________________________________________________  HISTORY    Patient is a 84y old  Female who presents with a chief complaint of seizures (25 Dec 2024 17:29)      PERTINENT MEDICATION:  MEDICATIONS  (STANDING):  aspirin enteric coated 81 milliGRAM(s) Oral daily  atorvastatin 10 milliGRAM(s) Oral at bedtime  calcium carbonate 1250 mG  + Vitamin D (OsCal 500 + D) 1 Tablet(s) Oral daily  carvedilol 25 milliGRAM(s) Oral every 12 hours  chlorhexidine 0.12% Liquid 15 milliLiter(s) Oral Mucosa every 12 hours  cyanocobalamin 1000 MICROGram(s) Oral daily  dextrose 5%. 1000 milliLiter(s) (100 mL/Hr) IV Continuous <Continuous>  dextrose 5%. 1000 milliLiter(s) (50 mL/Hr) IV Continuous <Continuous>  dextrose 50% Injectable 25 Gram(s) IV Push once  dextrose 50% Injectable 12.5 Gram(s) IV Push once  dextrose 50% Injectable 25 Gram(s) IV Push once  enoxaparin Injectable 40 milliGRAM(s) SubCutaneous <User Schedule>  ferrous    sulfate 325 milliGRAM(s) Oral daily  glucagon  Injectable 1 milliGRAM(s) IntraMuscular once  insulin lispro (ADMELOG) corrective regimen sliding scale   SubCutaneous three times a day before meals  levETIRAcetam   Injectable 1500 milliGRAM(s) IV Push every 12 hours  pantoprazole  Injectable 40 milliGRAM(s) IV Push daily  polyethylene glycol 3350 17 Gram(s) Oral daily  propofol Infusion 40 MICROgram(s)/kG/Min (13.5 mL/Hr) IV Continuous <Continuous>  senna 2 Tablet(s) Oral at bedtime  sodium chloride 0.9%. 1000 milliLiter(s) (60 mL/Hr) IV Continuous <Continuous>    _____________________________________________________________  INTERPRETATION    Findings: Continuous generalized slowing with occasional periods of discontinuity up to 7-8 seconds. No clear PDR noted.     Background Slowing:  -Continuous generalized polymorphic delta and theta slowing    Focal Slowing:   -Continuous delta slowing in the right hemisphere    Sleep Background:  Stage II sleep transients were not recorded.  Drowsiness and stage II sleep transients were not recorded.    Other Non-Epileptiform Findings:  None were present.    Interictal Epileptiform Activity:   -Near continuous fluctuant LPDs in the right centroparietal region, maximum C4/P4, at times with spread to F8, at frequency of 1-2 hz c/w IIC    Events:  Clinical events: None recorded.  Seizures: None recorded.    Artifacts:  Intermittent myogenic and movement artifacts were noted.    ECG:  The heart rate on single channel ECG was predominantly between 60-80 BPM.    _____________________________________________________________  EEG SUMMARY/CLASSIFICATION    Abnormal EEG in an encephalopathic patient.  -Fluctuant LPDs, 1-2 Hz right centroparietal region, C4/P4 > F8, continuous c/w IIC  -Continuous slowing, focal, right hemispheric  -Continuous slowing, generalized, moderate to severe  -Discontinuous pattern  _____________________________________________________________  EEG IMPRESSION/CLINICAL CORRELATE    Abnormal EEG study.  1. Highly epileptogenic pattern in the right hemispheric region, IIC continuum  2. Structural abnormality in the right hemisphere.  3. Severe diffuse or multifocal cerebral dysfunction.          Steve Leblanc DO  Epilepsy Fellow, PGY-5    -------------------------------------------------------------------------------------------------------  [facility] EEG Reading Room Ph#: [phone]  Epilepsy Answering Service after 5PM and before 8:30AM: Ph#: [phone]

## 2024-12-29 NOTE — PROGRESS NOTE ADULT - ASSESSMENT
84F new onset status epilepticus (clinically - with L UE and LLE twitching).    Patient is critically ill with high probability of imminent neurologic or life-threatening deterioration due to the following diagnoses:  1) Status Epilepticus  - For which we are monitoring neurologically and electrographically on EEG and providing continous sedating antiepileptic medications  2) Respiratory Failure  - For which we are treating with antibiotics and weaning ventilator settings with spontaneous breathing trials as tolerated    Plan:  - neurochecks  - cont cEEG  - Briviac + Vimpat, added Fycompa 3 day load plus 4mg qhs per epilepsy  - ASA   - hold Aricept for now   - Start 1g Solumedrol for 7 days for empiric autoimmune encephalitis  - Ketamine at 1 -> 0.5mg/kg  - -180, avoid significant fluctuations; cont home Coreg  - Osats>92%, vent support, SBT as tolerated  - cont TF, BM regimen; PPI for ulcer ppx  - monitor e-lytes, I/Os; add free water, d/c NS  - Goal euglycemia (-180), ISS; cont home statin  - VTE prophylaxis: SCDs, SQL    My full attention was spent providing medically necessary critical care to the patient with details documented in my note above.   Critical care time spent examining patient, reviewing vitals, labs, medications, imaging and discussing with the team goals of care   The combined critical care time provided to the patient was 60 minutes  This time does not include bedside procedures that are documented separately.

## 2024-12-30 NOTE — PROGRESS NOTE ADULT - SUBJECTIVE AND OBJECTIVE BOX
CC: Patient being seen for rehabilitation follow up.  Patient clinically remains intubated, having fevers.  On Ketamine.     FUNCTIONAL PROGRESS  Total A    VITALS  T(C): 38 (12-30-24 @ 10:00), Max: 38.1 (12-29-24 @ 11:15)  HR: 79 (12-30-24 @ 10:00) (66 - 89)  BP: 165/64 (12-30-24 @ 09:45) (109/52 - 185/84)  RR: 17 (12-30-24 @ 10:00) (12 - 25)  SpO2: 100% (12-30-24 @ 10:00) (97% - 100%)  Wt(kg): --    MEDICATIONS   acetaminophen   Oral Liquid .. 650 milliGRAM(s) every 6 hours PRN  aspirin  chewable 81 milliGRAM(s) daily  atorvastatin 10 milliGRAM(s) at bedtime  brivaracetam  Injectable 100 milliGRAM(s) two times a day  calcium carbonate 1250 mG  + Vitamin D (OsCal 500 + D) 1 Tablet(s) daily  carvedilol 25 milliGRAM(s) every 12 hours  chlorhexidine 0.12% Liquid 15 milliLiter(s) every 12 hours  cyanocobalamin 1000 MICROGram(s) daily  enoxaparin Injectable 40 milliGRAM(s) <User Schedule>  fentaNYL    Injectable 25 MICROGram(s) every 2 hours PRN  hydrALAZINE Injectable 10 milliGRAM(s) every 3 hours PRN  insulin lispro (ADMELOG) corrective regimen sliding scale   three times a day before meals  iron sucrose IVPB 200 milliGRAM(s) every 24 hours  ketamine Infusion. 0.4 mG/kG/Hr <Continuous>  labetalol Injectable 10 milliGRAM(s) every 3 hours PRN  lacosamide IVPB 100 milliGRAM(s) <User Schedule>  methylPREDNISolone sodium succinate IVPB 1000 milliGRAM(s) daily  pantoprazole  Injectable 40 milliGRAM(s) daily  perampanel 2 milliGRAM(s) <User Schedule>  polyethylene glycol 3350 17 Gram(s) daily  senna 2 Tablet(s) at bedtime  sodium chloride 0.9% Bolus 500 milliLiter(s) once  sodium chloride 0.9%. 1000 milliLiter(s) <Continuous>      RECENT LABS/IMAGING  - Reviewed Today                        9.9    14.47 )-----------( 221      ( 30 Dec 2024 02:30 )             32.1     12-30    146[H]  |  112[H]  |  20.3[H]  ----------------------------<  156[H]  4.3   |  25.0  |  0.77    Ca    8.9      30 Dec 2024 02:30  Phos  2.8     12-30  Mg     2.1     12-30        Urinalysis Basic - ( 30 Dec 2024 02:30 )    Color: x / Appearance: x / SG: x / pH: x  Gluc: 156 mg/dL / Ketone: x  / Bili: x / Urobili: x   Blood: x / Protein: x / Nitrite: x   Leuk Esterase: x / RBC: x / WBC x   Sq Epi: x / Non Sq Epi: x / Bacteria: x                    CT HEAD 12/24 - 1. Age-appropriate involutional changes. Old left posterior temporal occipital ischemic event. No acute intracranial pathology. No acute intracranial hemorrhage.2. Choanal narrowing with suggestion of fluid within the nasopharyngeal region. Correlate with clinical assessment.    CTA NECK 12/24 - 1. Short segment high-grade stenosis of 76% based on NASCET criteria at the origin of the right internal carotid artery.2. Moderate deposition of calcified plaque along the proximal aspect of the left internal carotid artery with mild stenosis of 30% based on NASCET criteria in this location.    CTA HEAD 12/24 - No large vessel occlusion, significant stenosis or vascular abnormality identified.    EEG 12/26 - Abnormal EEG study. 1. Severe nonspecific diffuse or multifocal cerebral dysfunction.  2. No epileptiform discharges or seizures were recorded.    BLE V DOPPLER 12/26 - No evidence of deep venous thrombosis in either lower extremity.    TTE 12/26 -  1. Left ventricular systolic function is normal with an ejection fraction of 73 % by Lopez's method of disks. There are no regional wall motion abnormalities seen.   2. There is moderate (grade 2) left ventricular diastolic dysfunction, with elevated left ventricular filling pressure.   3. Normal right ventricular cavity size and normal right ventricular systolic function.   4. Mild to moderate tricuspid regurgitation.   5. Estimated pulmonary artery systolic pressure is 57 mmHg, consistent with echocardiographic evidence of pulmonary hyptertension.   6. Small pericardial effusion noted adjacent to the posterior left ventricle and small pericardial effusion noted adjacent to the right atrium with no echocardiographic evidence of tamponade physiology.   7. No prior echocardiogram is available for comparison.    EEG 12/29 - 1. Highly epileptogenic pattern in the right hemispheric region, IIC continuum 2. Structural abnormality in the right hemisphere. 3. Severe diffuse or multifocal cerebral dysfunction.  ----------------------------------------------------------------------------------------  PHYSICAL EXAM  Constitutional - NAD, Appears Uncomfortable - increased cough/secretions  Chest - +VENT   Extremities - Diffuse BUE swelling, Left>Right   Neurologic Exam -                    Cognitive - Intubated/Sedated      Communication - Intubated/Sedated      Cranial Nerves - Intubated/Sedated      FUNCTIONAL MOTOR EXAM - Right LE response to pain - no flexion  Psychiatric - Intubated/Sedated   ----------------------------------------------------------------------------------------  ASSESSMENT/PLAN  84yFemale with functional deficits after developing seizure like activity   Status Epilepticus Seizures - Continue Keppra, Vimpat, Fycompa  Possible Encephalitis - Continue Empiric Solumedrol  ICA stenosis - Continue ASA, Lipitor  HTN - Continue Coreg, Lisinopril, Hydralazine, Labetalol  Respiratory Failure s/p VENT - Continue Ketamine, Fentanyl, Duoneb  Pain - Tylenol  DVT PPX - SCDs, Continue Lovenox  Rehab/Impaired mobility and function - Patient continues to require hospitalization for the above diagnoses and ongoing active management of comorbid complications that are substantially posing a threat to bodily function, functional ability and quality of life.     RECOMMEND - Turn Q2 when needed, HOB >30 degrees    Patient medically acute and being medically stabilized. Recommend therapy evals when more appropriate.  At this time needs to be mobilized by nursing staff to prevent secondary complications of immobility.     Patient with limited clinical improvement at this time, remains acute with ongoing optimization/workup. Will sign off at this time. Thank you for allowing me to be part of your patient's care. Please reconsult PMR for additional rehab recommendations or dispo needs if functional status changes. Have discussed the specific management and recommendations above with rehab clinical care team/rehab liaison.      Total Time Spent on Encounter (reviewing clinical notes, labs, radiology and medications, reviewing patient history, pre-rounding, physical exam, assessment and discussing rehabilitation options - with consideration of prior level of function, expected level of recovery and return to community living, rounding with team) - 50 minutes

## 2024-12-30 NOTE — PROGRESS NOTE ADULT - SUBJECTIVE AND OBJECTIVE BOX
HPI:  85yo F with PMHx HTN, HLD, osteoporosis, GERD, h/o breast cancer s/p left mastectomy, left hysterectomy BIBEMs for left-sided weakness and LUE rhythmic shaking. Per family, patient was last known well at 1:30am. When EMS arrived patient was alert but sloughing over the left side. Patient's left arm was noted to be weak and shortly started twitching, was given versed by EMS. By the time patient arrived to ER, Pt was intubated for airway protection. 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 CTA without LVO however with findings of high grade stenosis 75% right  ICA and 30% stenosis L ICA. NeuroICU consulted for further intervention. (24 Dec 2024 20:57)    24h Events  12/28 - Continuous LPDs with possible improvement on ativan challenge. Started Ketamine gtt  12/29 - Minimal response to Ketamine. Started trial of empiric high-dose steroids + added Fycompa  12/30 - Ketamine weaned off    Exam:   no EO, not FC, no blink to threat, no tracking  PERRL, gaze midline, o/b the vent, cough present; motor - trace wdrl in all 4.  CTAB  S1S2 present  Abd soft, NT, ND  No peripheral swelling, no rash noted.    ------------------------------------------------------------------------------------------------------  ICU Vital Signs Last 24 Hrs  T(C): 38.2 (30 Dec 2024 14:15), Max: 38.2 (30 Dec 2024 13:30)  T(F): 100.8 (30 Dec 2024 14:15), Max: 100.8 (30 Dec 2024 13:30)  HR: 87 (30 Dec 2024 14:15) (66 - 89)  BP: 177/71 (30 Dec 2024 14:15) (109/52 - 186/82)  BP(mean): 95 (30 Dec 2024 14:15) (68 - 134)  ABP: --  ABP(mean): --  RR: 22 (30 Dec 2024 14:15) (12 - 25)  SpO2: 100% (30 Dec 2024 14:15) (97% - 100%)    O2 Parameters below as of 30 Dec 2024 12:00  Patient On (Oxygen Delivery Method): ventilator            I&O's Summary    29 Dec 2024 07:01  -  30 Dec 2024 07:00  --------------------------------------------------------  IN: 3100 mL / OUT: 685 mL / NET: 2415 mL    30 Dec 2024 07:01  -  30 Dec 2024 14:24  --------------------------------------------------------  IN: 1225.1 mL / OUT: 215 mL / NET: 1010.1 mL        MEDICATIONS  (STANDING):  aspirin  chewable 81 milliGRAM(s) Oral daily  atorvastatin 10 milliGRAM(s) Oral at bedtime  brivaracetam  Injectable 100 milliGRAM(s) IV Push two times a day  calcium carbonate 1250 mG  + Vitamin D (OsCal 500 + D) 1 Tablet(s) Oral daily  carvedilol 25 milliGRAM(s) Oral every 12 hours  chlorhexidine 0.12% Liquid 15 milliLiter(s) Oral Mucosa every 12 hours  cyanocobalamin 1000 MICROGram(s) Oral daily  enoxaparin Injectable 40 milliGRAM(s) SubCutaneous <User Schedule>  insulin lispro (ADMELOG) corrective regimen sliding scale   SubCutaneous three times a day before meals  iron sucrose IVPB 200 milliGRAM(s) IV Intermittent every 24 hours  ketamine Infusion. 0.3 mG/kG/Hr (1.68 mL/Hr) IV Continuous <Continuous>  lacosamide IVPB 100 milliGRAM(s) IV Intermittent <User Schedule>  methylPREDNISolone sodium succinate IVPB 1000 milliGRAM(s) IV Intermittent daily  pantoprazole  Injectable 40 milliGRAM(s) IV Push daily  perampanel 2 milliGRAM(s) Oral <User Schedule>  polyethylene glycol 3350 17 Gram(s) Oral daily  senna 2 Tablet(s) Oral at bedtime  sodium chloride 0.9%. 1000 milliLiter(s) (50 mL/Hr) IV Continuous <Continuous>      RESPIRATORY:  Mode: AC/ CMV (Assist Control/ Continuous Mandatory Ventilation)  RR (machine): 12  TV (machine): 400  FiO2: 40  PEEP: 6  ITime: 1  MAP: 12  PIP: 28      IMAGING:   Recent imaging studies were reviewed.    LAB RESULTS:                          9.9    14.47 )-----------( 221      ( 30 Dec 2024 02:30 )             32.1           12-30    146[H]  |  112[H]  |  20.3[H]  ----------------------------<  156[H]  4.3   |  25.0  |  0.77    Ca    8.9      30 Dec 2024 02:30  Phos  2.8     12-30  Mg     2.1     12-30

## 2024-12-30 NOTE — EEG REPORT - NS EEG TEXT BOX
RAÚL JASMINE [id number]     Study Date: 		12-29-24 (0800) - 12-30-24 (0800)  Duration: 24hr  --------------------------------------------------------------------------------------------------  History:  CC/ HPI Patient is a 84y old  Female who presents with a chief complaint of seizures, severe R ICA stenosis (29 Dec 2024 12:35)    MEDICATIONS  (STANDING):  ketamine Infusion. 0.5 mG/kG/Hr (2.81 mL/Hr) IV Continuous <Continuous>  lacosamide IVPB 100 milliGRAM(s) IV Intermittent <User Schedule>  perampanel 2 milliGRAM(s) Oral <User Schedule>    --------------------------------------------------------------------------------------------------  Study Interpretation:    [[[Abbreviation Key:  PDR=alpha rhythm/posterior dominant rhythm. A-P=anterior posterior gradient.  Amplitude: ‘very low’:<20; ‘low’:20-50; ‘medium’:; ‘high’:>200uV.  Persistence for periodic/rhythmic patterns (% of epoch) ‘rare’:<1%; ‘occasional’:1-10%; ‘frequent’:10-50%; ‘abundant’:50-90%; ‘continuous’:>90%.  Persistence for sporadic discharges: ‘rare’:<1/hr; ‘occasional’:1/min-1/hr; ‘frequent’:>1/min; ‘abundant’:>1/10 sec.  GRDA=generalized rhythmic delta activity; FIRDA=frontal intermittent GRDA; LRDA=lateralized rhythmic delta activity; TIRDA=temporal intermittent rhythmic delta activity;  LPD=PLED=lateralized periodic discharges; GPD=generalized periodic discharges; BiPDs=BiPLEDs=bilateral independent periodic epileptiform discharges; SIRPID=stimulus induced rhythmic, periodic, or ictal appearing discharges; BIRDs=brief potentially ictal rhythmic discharges >4 Hz, lasting .5-10s; PFA=paroxysmal bursts of beta/gamma; LVFA=low voltage fast activity.  Modifiers: +F=with fast component; +S=with spike component; +R=with rhythmic component.  S-B=burst suppression pattern.  Max=maximal. N1-drowsy; N2-stage II sleep; N3-slow wave sleep. SSS/BETS=small sharp spikes/benign epileptiform transients of sleep. HV=hyperventilation; PS=photic stimulation]]]    Daily EEG Visual Analysis    FINDINGS:      Background:  Continuity: continuous, with occasional periods of 1-3 seconds suppression   Symmetry: asymmetric, continuous LPDs over the right hemisphere  PDR: none  Reactivity: present  Voltage: normal  Anterior Posterior Gradient: absent  Other background findings: none  Breach: absent    Background Slowing:  Generalized slowing: diffuse theta-delta slowing  Focal slowing: Continuous polymorphic delta frequency slowing over the right hemisphere, particularly, the right centrotemporal region    State Changes:   -N2 sleep transients were not recorded.    Sporadic Epileptiform Discharges:    None    Rhythmic and Periodic Patterns (RPPs):  Continuous Lateralized periodic discharges, fluctuating in frequency between 1-2.5Hz, maximal over C4/P4, consistent with a pattern of ictal-interictal continuum    Electrographic and Electroclinical seizures:  None    Other Clinical Events:  At 0803 and 1156 (12/29) the event button was pushed for left sided head twitching that was asynchronous with patient's discharges     Activation Procedures:   -Hyperventilation was not performed.    -Photic stimulation was not performed.    Artifacts:  Intermittent myogenic and movement artifacts were noted.     ECG:  The heart rate on single channel ECG was irregular, predominantly between 70-90 BPM.    EEG Classification / Summary:   Abnormal  EEG in a lethargic patient:   1. Ictal-interictal continuum, right centroparietal region   2. Lateralized periodic discharges, continuous, 1-2.5Hz, C4/P4 max   3. Continuous polymorphic delta slowing, focal, right hemisphere   4. Generalized background slowing, moderate     Clinical Impression:   1. A pattern on the ictal-interictal continuum is a pattern that does not qualify as a definitive electrographic seizure or electrographic status epilepticus, but there is a reasonable chance that it may be contributing to impaired alertness, causing other clinical symptoms, and/or contributing to neuronal injury.  2. Risk of focal seizures from the right centroparietal region.   3. Structural abnormality in the right hemisphere   4. Moderate diffuse cerebral dysfunction.   5. Event button pushes at 0803 and 1156 (12/29) for left head twitching, asynchronous with discharges   6. Irregular heart rate.     *PRELIM READ, ATTENDING REVIEW PENDING*       -------------------------------------------------------------------------------------------------------  [facility] EEG Reading Room Ph#: [phone]  Epilepsy Answering Service after 5PM and before 8:30AM: Ph#: [phone]    Kei Mock,    Epilepsy Fellow    RAÚL JASMINE [id number]     Study Date: 		12-29-24 (0800) - 12-30-24 (0800)  Duration: 24hr  --------------------------------------------------------------------------------------------------  History:  CC/ HPI Patient is a 84y old  Female who presents with a chief complaint of seizures, severe R ICA stenosis (29 Dec 2024 12:35)    MEDICATIONS  (STANDING):  ketamine Infusion. 0.5 mG/kG/Hr (2.81 mL/Hr) IV Continuous <Continuous>  lacosamide IVPB 100 milliGRAM(s) IV Intermittent <User Schedule>  perampanel 2 milliGRAM(s) Oral <User Schedule>    --------------------------------------------------------------------------------------------------  Study Interpretation:    [[[Abbreviation Key:  PDR=alpha rhythm/posterior dominant rhythm. A-P=anterior posterior gradient.  Amplitude: ‘very low’:<20; ‘low’:20-50; ‘medium’:; ‘high’:>200uV.  Persistence for periodic/rhythmic patterns (% of epoch) ‘rare’:<1%; ‘occasional’:1-10%; ‘frequent’:10-50%; ‘abundant’:50-90%; ‘continuous’:>90%.  Persistence for sporadic discharges: ‘rare’:<1/hr; ‘occasional’:1/min-1/hr; ‘frequent’:>1/min; ‘abundant’:>1/10 sec.  GRDA=generalized rhythmic delta activity; FIRDA=frontal intermittent GRDA; LRDA=lateralized rhythmic delta activity; TIRDA=temporal intermittent rhythmic delta activity;  LPD=PLED=lateralized periodic discharges; GPD=generalized periodic discharges; BiPDs=BiPLEDs=bilateral independent periodic epileptiform discharges; SIRPID=stimulus induced rhythmic, periodic, or ictal appearing discharges; BIRDs=brief potentially ictal rhythmic discharges >4 Hz, lasting .5-10s; PFA=paroxysmal bursts of beta/gamma; LVFA=low voltage fast activity.  Modifiers: +F=with fast component; +S=with spike component; +R=with rhythmic component.  S-B=burst suppression pattern.  Max=maximal. N1-drowsy; N2-stage II sleep; N3-slow wave sleep. SSS/BETS=small sharp spikes/benign epileptiform transients of sleep. HV=hyperventilation; PS=photic stimulation]]]    Daily EEG Visual Analysis    FINDINGS:      Background:  Continuity: continuous, with occasional periods of 1-3 seconds suppression   Symmetry: asymmetric, continuous LPDs over the right hemisphere  PDR: none  Reactivity: present  Voltage: normal  Anterior Posterior Gradient: absent  Other background findings: none  Breach: absent    Background Slowing:  Generalized slowing: diffuse theta-delta slowing  Focal slowing: Continuous polymorphic delta frequency slowing over the right hemisphere, particularly, the right centrotemporal region    State Changes:   -N2 sleep transients were not recorded.    Sporadic Epileptiform Discharges:    None    Rhythmic and Periodic Patterns (RPPs):  Continuous Lateralized periodic discharges, fluctuating in frequency between 1-2.5Hz, maximal over P4>C4 Pz Cz, consistent with a pattern of ictal-interictal continuum.  Discharges faster with stimulation/arousal.    Electrographic and Electroclinical seizures:  None    Other Clinical Events:  At 0803 and 1156 (12/29) the event button was pushed for left sided head movements asynchronous with patient's discharges     Activation Procedures:   -Hyperventilation was not performed.    -Photic stimulation was not performed.    Artifacts:  Intermittent myogenic and movement artifacts were noted.     ECG:  The heart rate on single channel ECG was predominantly between 60-90 BPM.    EEG Classification / Summary:   Abnormal  EEG in a lethargic patient:    Ictal-interictal continuum, right centroparietal region: Lateralized periodic discharges, continuous, 1-2.5Hz increased with stimulation/arousal   Continuous polymorphic delta slowing, focal, right hemisphere    Generalized background slowing, moderate     Clinical Impression:   Elevated risk of focal seizures from the right centroparietal region. At times discharges near 2-2.5hz frequency c/w IIC.  A pattern on the ictal-interictal continuum is a pattern that does not qualify as a definitive electrographic seizure or electrographic status epilepticus, but there is a reasonable chance that it may be contributing to impaired alertness, causing other clinical symptoms, and/or contributing to neuronal injury.  Structural abnormality in the right hemisphere   Moderate diffuse cerebral dysfunction.   Event button pushes at 0803 and 1156 (12/29) for left head movements, not synchronous with discharges as described      -------------------------------------------------------------------------------------------------------  [facility] EEG Reading Room Ph#: [phone]  Epilepsy Answering Service after 5PM and before 8:30AM: Ph#: [phone]    Kei Mock DO   Epilepsy Fellow     MD ALFREDO Daugherty  Director, Continuous EEG Monitoring Program, [facility]   and Epilepsy Fellowship ,   Department of Neurology, [facility] EEG Reading Room Ph#: [phone]  Epilepsy Answering Service after 5PM and before 8:30AM: Ph#: [phone]

## 2024-12-30 NOTE — PROGRESS NOTE ADULT - ASSESSMENT
84F new onset status epilepticus (clinically - with L UE and LLE twitching).    Patient is critically ill with high probability of imminent neurologic or life-threatening deterioration due to the following diagnoses:  1) Status Epilepticus  - For which we are monitoring neurologically and electrographically on EEG and providing continous sedating antiepileptic medications  2) Respiratory Failure  - For which we are treating with antibiotics and weaning ventilator settings with spontaneous breathing trials as tolerated    Plan:  - neurochecks  - cont cEEG  - Briviac + Vimpat, added Fycompa 3 day load plus 4mg qhs per epilepsy  - ASA   - hold Aricept for now   - 1g Solumedrol for 7 days for empiric autoimmune encephalitis  - d/c Ketamine  - -180, avoid significant fluctuations; cont home Coreg  - Osats>92%, vent support, SBT as tolerated  - cont TF, BM regimen; PPI for ulcer ppx  - monitor e-lytes, I/Os; add free water, d/c NS  - Goal euglycemia (-180), ISS; cont home statin  - VTE prophylaxis: SCDs, SQL    My full attention was spent providing medically necessary critical care to the patient with details documented in my note above.   Critical care time spent examining patient, reviewing vitals, labs, medications, imaging and discussing with the team goals of care   The combined critical care time provided to the patient was 60 minutes  This time does not include bedside procedures that are documented separately.

## 2024-12-31 NOTE — PROGRESS NOTE ADULT - ASSESSMENT
84F new onset status epilepticus (clinically - with L UE and LLE twitching).    Patient is critically ill with high probability of imminent neurologic or life-threatening deterioration due to the following diagnoses:  1) Status Epilepticus  - For which we are monitoring neurologically and electrographically on EEG and providing continous sedating antiepileptic medications  2) Respiratory Failure  - For which we are treating with antibiotics and weaning ventilator settings with spontaneous breathing trials as tolerated    Plan:  - neurochecks  - cont cEEG  - Briviac   - Increase Vimpat to 150mg TID  - Fycompa 3 day load plus 4mg qhs per epilepsy  - ASA   - hold Aricept for now   - 1g Solumedrol for 7 days for empiric autoimmune encephalitis  - -180, avoid significant fluctuations; cont home Coreg  - Osats>92%, vent support, SBT as tolerated  - cont TF, BM regimen; PPI for ulcer ppx  - monitor e-lytes, I/Os; add free water, d/c NS  - Goal euglycemia (-180), ISS; cont home statin  - VTE prophylaxis: SCDs, SQL    My full attention was spent providing medically necessary critical care to the patient with details documented in my note above.   Critical care time spent examining patient, reviewing vitals, labs, medications, imaging and discussing with the team goals of care   The combined critical care time provided to the patient was 60 minutes  This time does not include bedside procedures that are documented separately. 84F new onset status epilepticus (clinically - with L UE and LLE twitching).    Patient is critically ill with high probability of imminent neurologic or life-threatening deterioration due to the following diagnoses:  1) Status Epilepticus  - For which we are monitoring neurologically and electrographically on EEG and providing continous sedating antiepileptic medications  2) Respiratory Failure  - For which we are treating with antibiotics and weaning ventilator settings with spontaneous breathing trials as tolerated    Plan:  - neurochecks  - cont cEEG  - Briviac   - Increase Vimpat to 150mg TID  - Fycompa 3 day load plus 4mg qhs per epilepsy  - ASA   - hold Aricept for now   - 1g Solumedrol for 7 days for empiric autoimmune encephalitis  - -180, avoid significant fluctuations; cont home Coreg  - Osats>92%, vent support, SBT as tolerated  - Nebs q6h  - cont TF, BM regimen; PPI for ulcer ppx  - monitor e-lytes, I/Os; add free water, d/c NS  - Goal euglycemia (-180), ISS; cont home statin  - VTE prophylaxis: SCDs, SQL    My full attention was spent providing medically necessary critical care to the patient with details documented in my note above.   Critical care time spent examining patient, reviewing vitals, labs, medications, imaging and discussing with the team goals of care   The combined critical care time provided to the patient was 60 minutes  This time does not include bedside procedures that are documented separately.

## 2024-12-31 NOTE — EEG REPORT - NS EEG TEXT BOX
RAÚL JASMINE [id number]     Study Date: 		12-30-24 (0800) - 12-31-24 (0800)  Duration: 24hr  --------------------------------------------------------------------------------------------------  History:  CC/ HPI Patient is a 84y old  Female who presents with a chief complaint of seizures, severe R ICA stenosis (29 Dec 2024 12:35)    MEDICATIONS  (STANDING):  acetaminophen   IVPB .. 1000 milliGRAM(s) IV Intermittent once  albuterol    0.083% 2.5 milliGRAM(s) Nebulizer every 6 hours  aspirin  chewable 81 milliGRAM(s) Oral daily  atorvastatin 10 milliGRAM(s) Oral at bedtime  brivaracetam  Injectable 100 milliGRAM(s) IV Push two times a day  calcium carbonate 1250 mG  + Vitamin D (OsCal 500 + D) 1 Tablet(s) Oral daily  carvedilol 25 milliGRAM(s) Oral every 12 hours  chlorhexidine 0.12% Liquid 15 milliLiter(s) Oral Mucosa every 12 hours  cyanocobalamin 1000 MICROGram(s) Oral daily  enoxaparin Injectable 40 milliGRAM(s) SubCutaneous <User Schedule>  insulin lispro (ADMELOG) corrective regimen sliding scale   SubCutaneous three times a day before meals  iron sucrose IVPB 200 milliGRAM(s) IV Intermittent every 24 hours  lacosamide IVPB 100 milliGRAM(s) IV Intermittent <User Schedule>  methylPREDNISolone sodium succinate IVPB 1000 milliGRAM(s) IV Intermittent daily  pantoprazole  Injectable 40 milliGRAM(s) IV Push daily  perampanel 2 milliGRAM(s) Oral <User Schedule>  piperacillin/tazobactam IVPB.. 3.375 Gram(s) IV Intermittent every 8 hours  polyethylene glycol 3350 17 Gram(s) Oral daily  senna 2 Tablet(s) Oral at bedtime  sodium chloride 0.9%. 1000 milliLiter(s) (50 mL/Hr) IV Continuous <Continuous>      --------------------------------------------------------------------------------------------------  Study Interpretation:    [[[Abbreviation Key:  PDR=alpha rhythm/posterior dominant rhythm. A-P=anterior posterior gradient.  Amplitude: ‘very low’:<20; ‘low’:20-50; ‘medium’:; ‘high’:>200uV.  Persistence for periodic/rhythmic patterns (% of epoch) ‘rare’:<1%; ‘occasional’:1-10%; ‘frequent’:10-50%; ‘abundant’:50-90%; ‘continuous’:>90%.  Persistence for sporadic discharges: ‘rare’:<1/hr; ‘occasional’:1/min-1/hr; ‘frequent’:>1/min; ‘abundant’:>1/10 sec.  GRDA=generalized rhythmic delta activity; FIRDA=frontal intermittent GRDA; LRDA=lateralized rhythmic delta activity; TIRDA=temporal intermittent rhythmic delta activity;  LPD=PLED=lateralized periodic discharges; GPD=generalized periodic discharges; BiPDs=BiPLEDs=bilateral independent periodic epileptiform discharges; SIRPID=stimulus induced rhythmic, periodic, or ictal appearing discharges; BIRDs=brief potentially ictal rhythmic discharges >4 Hz, lasting .5-10s; PFA=paroxysmal bursts of beta/gamma; LVFA=low voltage fast activity.  Modifiers: +F=with fast component; +S=with spike component; +R=with rhythmic component.  S-B=burst suppression pattern.  Max=maximal. N1-drowsy; N2-stage II sleep; N3-slow wave sleep. SSS/BETS=small sharp spikes/benign epileptiform transients of sleep. HV=hyperventilation; PS=photic stimulation]]]    Daily EEG Visual Analysis    FINDINGS:      Background:  Continuity: continuous  Symmetry: asymmetric  PDR: none  Reactivity: present  Voltage: normal  Anterior Posterior Gradient: absent  Other background findings: none  Breach: absent    Background Slowing:  Generalized slowing: diffuse theta-delta slowing  Focal slowing: Continuous polymorphic delta frequency slowing over the right hemisphere, right centrotemporal max    State Changes:   -N2 sleep transients were not recorded.    Sporadic Epileptiform Discharges:    As below    Rhythmic and Periodic Patterns (RPPs):  Abundant/continuous Lateralized periodic discharges, fluctuating in frequency between 1-2.5Hz, maximal over P4>C4 Pz Cz, consistent with a pattern of ictal-interictal continuum.  Discharges faster with stimulation/arousal.    Electrographic and Electroclinical seizures:  None    Other Clinical Events:    Activation Procedures:   -Hyperventilation was not performed.    -Photic stimulation was not performed.    Artifacts:  Intermittent myogenic and movement artifacts were noted.     ECG:  The heart rate on single channel ECG was predominantly between 60-90 BPM.    EEG Classification / Summary:   Abnormal  EEG in a lethargic patient:    Ictal-interictal continuum, right centroparietal region: Lateralized periodic discharges, abundant/continuous, 1-2.5Hz increased with stimulation/arousal   Continuous polymorphic delta slowing, focal, right hemisphere    Generalized background slowing, moderate     Clinical Impression:   Elevated risk of focal seizures from the right centroparietal region. At times discharges near 2-2.5hz frequency c/w IIC.  A pattern on the ictal-interictal continuum is a pattern that does not qualify as a definitive electrographic seizure or electrographic status epilepticus, but there is a reasonable chance that it may be contributing to impaired alertness, causing other clinical symptoms, and/or contributing to neuronal injury.  Structural abnormality in the right hemisphere   Moderate diffuse cerebral dysfunction.       MD ALFREDO Daugherty  Director, Continuous EEG Monitoring Program, [facility]   and Epilepsy Fellowship ,   Department of Neurology, [employer] [facility]    [facility] EEG Reading Room Ph#: [phone]  Epilepsy Answering Service after 5PM and before 8:30AM: Ph#: [phone]   RAÚL JASMINE [id number]     Study Date: 		12-30-24 (0800) - 12-31-24 (0800)  Duration: 24hr  --------------------------------------------------------------------------------------------------  History:  CC/ HPI Patient is a 84y old  Female who presents with a chief complaint of seizures, severe R ICA stenosis (29 Dec 2024 12:35)    MEDICATIONS  (STANDING):  acetaminophen   IVPB .. 1000 milliGRAM(s) IV Intermittent once  albuterol    0.083% 2.5 milliGRAM(s) Nebulizer every 6 hours  aspirin  chewable 81 milliGRAM(s) Oral daily  atorvastatin 10 milliGRAM(s) Oral at bedtime  brivaracetam  Injectable 100 milliGRAM(s) IV Push two times a day  calcium carbonate 1250 mG  + Vitamin D (OsCal 500 + D) 1 Tablet(s) Oral daily  carvedilol 25 milliGRAM(s) Oral every 12 hours  chlorhexidine 0.12% Liquid 15 milliLiter(s) Oral Mucosa every 12 hours  cyanocobalamin 1000 MICROGram(s) Oral daily  enoxaparin Injectable 40 milliGRAM(s) SubCutaneous <User Schedule>  insulin lispro (ADMELOG) corrective regimen sliding scale   SubCutaneous three times a day before meals  iron sucrose IVPB 200 milliGRAM(s) IV Intermittent every 24 hours  lacosamide IVPB 100 milliGRAM(s) IV Intermittent <User Schedule>  methylPREDNISolone sodium succinate IVPB 1000 milliGRAM(s) IV Intermittent daily  pantoprazole  Injectable 40 milliGRAM(s) IV Push daily  perampanel 2 milliGRAM(s) Oral <User Schedule>  piperacillin/tazobactam IVPB.. 3.375 Gram(s) IV Intermittent every 8 hours  polyethylene glycol 3350 17 Gram(s) Oral daily  senna 2 Tablet(s) Oral at bedtime  sodium chloride 0.9%. 1000 milliLiter(s) (50 mL/Hr) IV Continuous <Continuous>      --------------------------------------------------------------------------------------------------  Study Interpretation:    [[[Abbreviation Key:  PDR=alpha rhythm/posterior dominant rhythm. A-P=anterior posterior gradient.  Amplitude: ‘very low’:<20; ‘low’:20-50; ‘medium’:; ‘high’:>200uV.  Persistence for periodic/rhythmic patterns (% of epoch) ‘rare’:<1%; ‘occasional’:1-10%; ‘frequent’:10-50%; ‘abundant’:50-90%; ‘continuous’:>90%.  Persistence for sporadic discharges: ‘rare’:<1/hr; ‘occasional’:1/min-1/hr; ‘frequent’:>1/min; ‘abundant’:>1/10 sec.  GRDA=generalized rhythmic delta activity; FIRDA=frontal intermittent GRDA; LRDA=lateralized rhythmic delta activity; TIRDA=temporal intermittent rhythmic delta activity;  LPD=PLED=lateralized periodic discharges; GPD=generalized periodic discharges; BiPDs=BiPLEDs=bilateral independent periodic epileptiform discharges; SIRPID=stimulus induced rhythmic, periodic, or ictal appearing discharges; BIRDs=brief potentially ictal rhythmic discharges >4 Hz, lasting .5-10s; PFA=paroxysmal bursts of beta/gamma; LVFA=low voltage fast activity.  Modifiers: +F=with fast component; +S=with spike component; +R=with rhythmic component.  S-B=burst suppression pattern.  Max=maximal. N1-drowsy; N2-stage II sleep; N3-slow wave sleep. SSS/BETS=small sharp spikes/benign epileptiform transients of sleep. HV=hyperventilation; PS=photic stimulation]]]    Daily EEG Visual Analysis    FINDINGS:      Background:  Continuity: continuous  Symmetry: asymmetric  PDR: none  Reactivity: present  Voltage: normal  Anterior Posterior Gradient: absent  Other background findings: none  Breach: absent    Background Slowing:  Generalized slowing: diffuse theta-delta slowing  Focal slowing: Continuous polymorphic delta frequency slowing over the right hemisphere, right centrotemporal max    State Changes:   -N2 sleep transients were not recorded.    Sporadic Epileptiform Discharges:    As below    Rhythmic and Periodic Patterns (RPPs):  Abundant/continuous Lateralized periodic discharges, fluctuating in frequency between 1-2.5Hz, maximal over P4>C4 Pz Cz, consistent with a pattern of ictal-interictal continuum.  Discharges faster with stimulation/arousal.  Voltage of discharges lower vs few days prior before ketamine infusion.    Electrographic and Electroclinical seizures:  None    Other Clinical Events:    Activation Procedures:   -Hyperventilation was not performed.    -Photic stimulation was not performed.    Artifacts:  Intermittent myogenic and movement artifacts were noted.     ECG:  The heart rate on single channel ECG was predominantly between 60-90 BPM.    EEG Classification / Summary:   Abnormal  EEG in a lethargic patient:    Ictal-interictal continuum, right centroparietal region: Lateralized periodic discharges, abundant/continuous, 1-2.5Hz increased with stimulation/arousal. Voltage of discharges lower vs few days prior before ketamine infusion.   Continuous polymorphic delta slowing, focal, right hemisphere    Generalized background slowing, moderate     Clinical Impression:   Elevated risk of focal seizures from the right centroparietal region. At times discharges near 2-2.5hz frequency c/w IIC.  A pattern on the ictal-interictal continuum is a pattern that does not qualify as a definitive electrographic seizure or electrographic status epilepticus, but there is a reasonable chance that it may be contributing to impaired alertness, causing other clinical symptoms, and/or contributing to neuronal injury.  Voltage of discharges lower vs few days prior before ketamine infusion.  Structural abnormality in the right hemisphere   Moderate diffuse cerebral dysfunction.       Evan Palacio MD FABOLA  Director, Continuous EEG Monitoring Program, [facility]   and Epilepsy Fellowship ,   Department of Neurology, [facility]    [facility] EEG Reading Room Ph#: [phone]  Epilepsy Answering Service after 5PM and before 8:30AM: Ph#: [phone]

## 2024-12-31 NOTE — PROGRESS NOTE ADULT - SUBJECTIVE AND OBJECTIVE BOX
HPI:  85yo F with PMHx HTN, HLD, osteoporosis, GERD, h/o breast cancer s/p left mastectomy, left hysterectomy BIBEMs for left-sided weakness and LUE rhythmic shaking. Per family, patient was last known well at 1:30am. When EMS arrived patient was alert but sloughing over the left side. Patient's left arm was noted to be weak and shortly started twitching, was given versed by EMS. By the time patient arrived to ER, Pt was intubated for airway protection. 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 CTA without LVO however with findings of high grade stenosis 75% right  ICA and 30% stenosis L ICA. NeuroICU consulted for further intervention. (24 Dec 2024 20:57)    24h Events  12/28 - Continuous LPDs with possible improvement on ativan challenge. Started Ketamine gtt  12/29 - Minimal response to Ketamine. Started trial of empiric high-dose steroids + added Fycompa  12/30 - Weaning Ketamine. Febrile, started empiric Zosyn  12/31 - Ketamine off. EEG unchanged although improvements clinically. BCx with GNR x1    Exam:   no EO, not FC  PERRL, gaze midline, o/b the vent, cough present; motor - localizing with AG extremities  Wheeze in lower airways  S1S2 present  Abd soft, NT, ND  No peripheral swelling, no rash noted.    ------------------------------------------------------------------------------------------------------  ICU Vital Signs Last 24 Hrs  T(C): 37.7 (31 Dec 2024 12:00), Max: 38.4 (30 Dec 2024 16:15)  T(F): 99.9 (31 Dec 2024 12:00), Max: 101.1 (30 Dec 2024 16:15)  HR: 81 (31 Dec 2024 12:00) (72 - 96)  BP: 160/113 (31 Dec 2024 12:00) (144/97 - 187/84)  BP(mean): 128 (31 Dec 2024 12:00) (87 - 128)  ABP: --  ABP(mean): --  RR: 25 (31 Dec 2024 12:00) (15 - 28)  SpO2: 100% (31 Dec 2024 12:00) (99% - 100%)    O2 Parameters below as of 31 Dec 2024 12:00  Patient On (Oxygen Delivery Method): ventilator    O2 Concentration (%): 40        I&O's Summary    30 Dec 2024 07:01  -  31 Dec 2024 07:00  --------------------------------------------------------  IN: 3175.5 mL / OUT: 600 mL / NET: 2575.5 mL    31 Dec 2024 07:01  -  31 Dec 2024 14:04  --------------------------------------------------------  IN: 812.2 mL / OUT: 200 mL / NET: 612.2 mL        MEDICATIONS  (STANDING):  acetaminophen   IVPB .. 1000 milliGRAM(s) IV Intermittent once  albuterol    0.083% 2.5 milliGRAM(s) Nebulizer every 6 hours  aspirin  chewable 81 milliGRAM(s) Oral daily  atorvastatin 10 milliGRAM(s) Oral at bedtime  brivaracetam  Injectable 100 milliGRAM(s) IV Push two times a day  calcium carbonate 1250 mG  + Vitamin D (OsCal 500 + D) 1 Tablet(s) Oral daily  carvedilol 25 milliGRAM(s) Oral every 12 hours  chlorhexidine 0.12% Liquid 15 milliLiter(s) Oral Mucosa every 12 hours  cyanocobalamin 1000 MICROGram(s) Oral daily  enoxaparin Injectable 40 milliGRAM(s) SubCutaneous <User Schedule>  insulin lispro (ADMELOG) corrective regimen sliding scale   SubCutaneous every 6 hours  lacosamide IVPB 150 milliGRAM(s) IV Intermittent <User Schedule>  methylPREDNISolone sodium succinate IVPB 1000 milliGRAM(s) IV Intermittent daily  pantoprazole  Injectable 40 milliGRAM(s) IV Push daily  perampanel 2 milliGRAM(s) Oral <User Schedule>  piperacillin/tazobactam IVPB.. 3.375 Gram(s) IV Intermittent every 8 hours  polyethylene glycol 3350 17 Gram(s) Oral daily  senna 2 Tablet(s) Oral at bedtime  sodium chloride 0.9%. 1000 milliLiter(s) (50 mL/Hr) IV Continuous <Continuous>      RESPIRATORY:  Mode: AC/ CMV (Assist Control/ Continuous Mandatory Ventilation)  RR (machine): 12  TV (machine): 400  FiO2: 40  PEEP: 6  ITime: 1  MAP: 15  PIP: 36      IMAGING:   Recent imaging studies were reviewed.    LAB RESULTS:                          9.5    16.74 )-----------( 253      ( 31 Dec 2024 02:28 )             29.9           12-31    144  |  109[H]  |  27.9[H]  ----------------------------<  130[H]  4.0   |  26.0  |  0.82    Ca    8.7      31 Dec 2024 02:28  Phos  2.2     12-31  Mg     2.2     12-31                  Culture - Sputum (collected 12-30-24 @ 20:05)  Source: ET Tube ET Tube  Gram Stain (12-31-24 @ 03:24):    Few polymorphonuclear leukocytes per low power field    Few Squamous epithelial cells per low power field    Moderate Gram Negative Rods seen per oil power field    Few Gram positive cocci in pairs seen per oil power field    Culture - Blood (collected 12-30-24 @ 16:55)  Source: .Blood BLOOD  Gram Stain (12-31-24 @ 09:21):    Growth in aerobic bottle: Gram Negative Rods  Preliminary Report (12-31-24 @ 09:21):    Growth in aerobic bottle: Gram Negative Rods    Direct identification is available within approximately 3-5    hours either by Blood Panel Multiplexed PCR or Direct    MALDI-TOF. Details: [email or web address]  Organism: Blood Culture PCR (12-31-24 @ 10:54)  Organism: Blood Culture PCR (12-31-24 @ 10:54)      Method Type: PCR      -  Enterobacterales: Detec

## 2024-12-31 NOTE — CONSULT NOTE ADULT - SUBJECTIVE AND OBJECTIVE BOX
INFECTIOUS DISEASES AND INTERNAL MEDICINE at [place]  =======================================================  Meek Mckee MD  Diplomates American Board of Internal Medicine and Infectious Diseases  Telephone [phone]  Fax            [phone]  =======================================================    RAÚL [id number]      HPI:  83yo F with PMHx HTN, HLD, osteoporosis, GERD, h/o breast cancer s/p left mastectomy, left hysterectomy BIBEMs for left-sided weakness and LUE rhythmic shaking.   . Patient's left arm was noted to be weak and shortly started twitching, was given versed by EMS. By the time patient arrived to ER, Pt was intubated for airway protection. 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 CTA without LVO however with findings of high grade stenosis 75% right  ICA and 30% stenosis L ICA. NeuroICU consulted for further intervention.   AS ABOVE ADMITTED 12/24  WITH WEAKNESS AND   WAS  INTUBATED  AND FOUDN TO HAVE SEIZURES  NOW WITH BLOOD CX  GM ENG RODS ENTEROBACTERALES   ASKED TO EVALUATE FROM ID STANDPOINT         PAST MEDICAL & SURGICAL HISTORY:  Hypertension      Osteoporosis      Dyslipidemia      GERD (gastroesophageal reflux disease)      Breast cancer      History of hysterectomy  total abdominal      S/P mastectomy, left          ANTIBIOTICS  piperacillin/tazobactam IVPB.. 3.375 Gram(s) IV Intermittent every 8 hours      Allergies    No Known Allergies    Intolerances        SOCIAL HISTORY:     FAMILY HX   FAMILY HISTORY:      Vital Signs Last 24 Hrs  T(C): 37.9 (31 Dec 2024 15:00), Max: 38.4 (30 Dec 2024 16:30)  T(F): 100.2 (31 Dec 2024 15:00), Max: 101.1 (30 Dec 2024 16:30)  HR: 81 (31 Dec 2024 15:00) (72 - 96)  BP: 168/84 (31 Dec 2024 15:00) (144/97 - 187/84)  BP(mean): 105 (31 Dec 2024 15:00) (87 - 128)  RR: 17 (31 Dec 2024 15:00) (15 - 28)  SpO2: 100% (31 Dec 2024 15:00) (99% - 100%)    Parameters below as of 31 Dec 2024 12:00  Patient On (Oxygen Delivery Method): ventilator    O2 Concentration (%): 40  Drug Dosing Weight  Height (cm): 167.6 (24 Dec 2024 21:00)  Weight (kg): 56.1 (24 Dec 2024 21:00)  BMI (kg/m2): 20 (24 Dec 2024 21:00)  BSA (m2): 1.63 (24 Dec 2024 21:00)      REVIEW OF SYSTEMS:    UNABLE TO OBTAIN            PHYSICAL EXAMINATION:    GENERAL: The patient is a __ON VENT NOT RESPONSIVE     VITAL SIGNS: T(C): 37.9 (12-31-24 @ 15:00), Max: 38.4 (12-30-24 @ 16:30)  HR: 81 (12-31-24 @ 15:00) (72 - 96)  BP: 168/84 (12-31-24 @ 15:00) (144/97 - 187/84)  RR: 17 (12-31-24 @ 15:00) (15 - 28)  SpO2: 100% (12-31-24 @ 15:00) (99% - 100%)  Wt(kg): --    HEENT: Head is normocephalic and atraumatic.  ANICTERIC  NECK: Supple. No carotid bruits.  No lymphadenopathy or thyromegaly.    LUNGS:COARSE BREATH SOUNDS    HEART: Regular rate and rhythm without murmur.    ABDOMEN: Soft, nontender, and nondistended.  Positive bowel sounds.  No hepatosplenomegaly was noted. NO REBOUND NO GUARDING    EXTREMITIES: NO EDEMA NO ERYTHEMA    NEUROLOGIC: ON VENT UNRESPONSIVE       SKIN: No ulceration or induration present. NO RASH        BLOOD CULTURES  Culture Results:   Growth in aerobic bottle: Gram Negative Rods  Direct identification is available within approximately 3-5  hours either by Blood Panel Multiplexed PCR or Direct  MALDI-TOF. Details: [email or web address] (12-30 @ 16:55)  Culture Results:   Culture is being performed. (12-27 @ 13:50)  Culture Results:   No growth (12-27 @ 13:50)  Culture Results:   No growth to date. (12-27 @ 13:50)  Culture Results:   No growth at 72 Hours (12-27 @ 10:55)  Culture Results:   No growth at 72 Hours (12-27 @ 10:47)       URINE CX          LABS:                        9.5    16.74 )-----------( 253      ( 31 Dec 2024 02:28 )             29.9     12-31    144  |  109[H]  |  27.9[H]  ----------------------------<  130[H]  4.0   |  26.0  |  0.82    Ca    8.7      31 Dec 2024 02:28  Phos  2.2     12-31  Mg     2.2     12-31        Urinalysis Basic - ( 31 Dec 2024 02:28 )    Color: x / Appearance: x / SG: x / pH: x  Gluc: 130 mg/dL / Ketone: x  / Bili: x / Urobili: x   Blood: x / Protein: x / Nitrite: x   Leuk Esterase: x / RBC: x / WBC x   Sq Epi: x / Non Sq Epi: x / Bacteria: x        RADIOLOGY & ADDITIONAL STUDIES:      ASSESSMENT/PLAN  83yo F with PMHx HTN, HLD, osteoporosis, GERD, h/o breast cancer s/p left mastectomy, left hysterectomy BIBEMs for left-sided weakness and LUE rhythmic shaking.   . Patient's left arm was noted to be weak and shortly started twitching, was given versed by EMS. By the time patient arrived to ER, Pt was intubated for airway protection. 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 CTA without LVO however with findings of high grade stenosis 75% right  ICA and 30% stenosis L ICA. NeuroICU consulted for further intervention.   AS ABOVE ADMITTED 12/24  WITH WEAKNESS AND   WAS  INTUBATED  AND FOUDN TO HAVE SEIZURES  NOW WITH BLOOD CX  GM ENG RODS ENTEROBACTERALES   USUALLY GI OR  SOURCE SUGGEST CT ABD PELVIS TO LOOK FOR GI SOURCE   CONTINUE ZOSYN FOR NOW  WILL FOLLOWUP  FINAL ID AND SENSIT IVITES                  BECCA QUESADA MD

## 2025-01-01 VITALS — OXYGEN SATURATION: 99 %

## 2025-01-01 DIAGNOSIS — G40.901 EPILEPSY, UNSPECIFIED, NOT INTRACTABLE, WITH STATUS EPILEPTICUS: ICD-10-CM

## 2025-01-01 DIAGNOSIS — G93.49 OTHER ENCEPHALOPATHY: ICD-10-CM

## 2025-01-01 DIAGNOSIS — Z99.11 DEPENDENCE ON RESPIRATOR [VENTILATOR] STATUS: ICD-10-CM

## 2025-01-01 DIAGNOSIS — A49.8 OTHER BACTERIAL INFECTIONS OF UNSPECIFIED SITE: ICD-10-CM

## 2025-01-01 DIAGNOSIS — G04.81 OTHER ENCEPHALITIS AND ENCEPHALOMYELITIS: ICD-10-CM

## 2025-01-01 DIAGNOSIS — Z51.5 ENCOUNTER FOR PALLIATIVE CARE: ICD-10-CM

## 2025-01-01 DIAGNOSIS — J96.01 ACUTE RESPIRATORY FAILURE WITH HYPOXIA: ICD-10-CM

## 2025-01-01 DIAGNOSIS — J15.1 PNEUMONIA DUE TO PSEUDOMONAS: ICD-10-CM

## 2025-01-01 LAB
-  AMIKACIN: SIGNIFICANT CHANGE UP
-  AMPICILLIN/SULBACTAM: SIGNIFICANT CHANGE UP
-  AMPICILLIN: SIGNIFICANT CHANGE UP
-  AZTREONAM: SIGNIFICANT CHANGE UP
-  AZTREONAM: SIGNIFICANT CHANGE UP
-  CEFAZOLIN: SIGNIFICANT CHANGE UP
-  CEFEPIME: SIGNIFICANT CHANGE UP
-  CEFEPIME: SIGNIFICANT CHANGE UP
-  CEFOXITIN: SIGNIFICANT CHANGE UP
-  CEFTRIAXONE: SIGNIFICANT CHANGE UP
-  CEFTRIAXONE: SIGNIFICANT CHANGE UP
-  CIPROFLOXACIN: SIGNIFICANT CHANGE UP
-  CIPROFLOXACIN: SIGNIFICANT CHANGE UP
-  ERTAPENEM: SIGNIFICANT CHANGE UP
-  GENTAMICIN: SIGNIFICANT CHANGE UP
-  GENTAMICIN: SIGNIFICANT CHANGE UP
-  IMIPENEM: SIGNIFICANT CHANGE UP
-  LEVOFLOXACIN: SIGNIFICANT CHANGE UP
-  LEVOFLOXACIN: SIGNIFICANT CHANGE UP
-  MEROPENEM: SIGNIFICANT CHANGE UP
-  MEROPENEM: SIGNIFICANT CHANGE UP
-  PIPERACILLIN/TAZOBACTAM: SIGNIFICANT CHANGE UP
-  PIPERACILLIN/TAZOBACTAM: SIGNIFICANT CHANGE UP
-  TOBRAMYCIN: SIGNIFICANT CHANGE UP
-  TOBRAMYCIN: SIGNIFICANT CHANGE UP
-  TRIMETHOPRIM/SULFAMETHOXAZOLE: SIGNIFICANT CHANGE UP
-  TRIMETHOPRIM/SULFAMETHOXAZOLE: SIGNIFICANT CHANGE UP
A-TUMOR NECROSIS FACT SERPL-MCNC: <1.7 PG/ML — SIGNIFICANT CHANGE UP
ACANTHOCYTES BLD QL SMEAR: SLIGHT — SIGNIFICANT CHANGE UP
ALBUMIN SERPL ELPH-MCNC: 1.8 G/DL — LOW (ref 3.3–5.2)
ALBUMIN SERPL ELPH-MCNC: 2.6 G/DL — LOW (ref 3.3–5.2)
ALP SERPL-CCNC: 66 U/L — SIGNIFICANT CHANGE UP (ref 40–120)
ALP SERPL-CCNC: 70 U/L — SIGNIFICANT CHANGE UP (ref 40–120)
ALT FLD-CCNC: 32 U/L — SIGNIFICANT CHANGE UP
ALT FLD-CCNC: 33 U/L — HIGH
AMMONIA BLD-MCNC: 18 UMOL/L — SIGNIFICANT CHANGE UP (ref 11–55)
AMPA-R AB CBA, CSF: NEGATIVE — SIGNIFICANT CHANGE UP
AMPA-RECEPTOR ANTIBODY BY CBA, SERUM: NEGATIVE — SIGNIFICANT CHANGE UP
AMPHIPHYSIN AB TITR CSF: NEGATIVE — SIGNIFICANT CHANGE UP
AMPHIPHYSIN AB TITR SER: NEGATIVE — SIGNIFICANT CHANGE UP
AMPHIPHYSIN AB TITR SER: NEGATIVE — SIGNIFICANT CHANGE UP
ANION GAP SERPL CALC-SCNC: 10 MMOL/L — SIGNIFICANT CHANGE UP (ref 5–17)
ANION GAP SERPL CALC-SCNC: 11 MMOL/L — SIGNIFICANT CHANGE UP (ref 5–17)
ANION GAP SERPL CALC-SCNC: 5 MMOL/L — SIGNIFICANT CHANGE UP (ref 5–17)
ANION GAP SERPL CALC-SCNC: 5 MMOL/L — SIGNIFICANT CHANGE UP (ref 5–17)
ANION GAP SERPL CALC-SCNC: 6 MMOL/L — SIGNIFICANT CHANGE UP (ref 5–17)
ANION GAP SERPL CALC-SCNC: 6 MMOL/L — SIGNIFICANT CHANGE UP (ref 5–17)
ANION GAP SERPL CALC-SCNC: 7 MMOL/L — SIGNIFICANT CHANGE UP (ref 5–17)
ANION GAP SERPL CALC-SCNC: 8 MMOL/L — SIGNIFICANT CHANGE UP (ref 5–17)
ANION GAP SERPL CALC-SCNC: 9 MMOL/L — SIGNIFICANT CHANGE UP (ref 5–17)
ANISOCYTOSIS BLD QL: SLIGHT — SIGNIFICANT CHANGE UP
ANISOCYTOSIS BLD QL: SLIGHT — SIGNIFICANT CHANGE UP
APPEARANCE UR: CLEAR — SIGNIFICANT CHANGE UP
APTT BLD: 27 SEC — SIGNIFICANT CHANGE UP (ref 24.5–35.6)
APTT BLD: 28.6 SEC — SIGNIFICANT CHANGE UP (ref 24.5–35.6)
AST SERPL-CCNC: 28 U/L — SIGNIFICANT CHANGE UP
AST SERPL-CCNC: 29 U/L — SIGNIFICANT CHANGE UP
B BURGDOR DNA SPEC QL NAA+PROBE: NEGATIVE — SIGNIFICANT CHANGE UP
BACTERIA # UR AUTO: NEGATIVE /HPF — SIGNIFICANT CHANGE UP
BASOPHILS # BLD AUTO: 0 K/UL — SIGNIFICANT CHANGE UP (ref 0–0.2)
BASOPHILS # BLD AUTO: 0 K/UL — SIGNIFICANT CHANGE UP (ref 0–0.2)
BASOPHILS # BLD AUTO: 0.01 K/UL — SIGNIFICANT CHANGE UP (ref 0–0.2)
BASOPHILS # BLD AUTO: 0.05 K/UL — SIGNIFICANT CHANGE UP (ref 0–0.2)
BASOPHILS NFR BLD AUTO: 0 % — SIGNIFICANT CHANGE UP (ref 0–2)
BASOPHILS NFR BLD AUTO: 0 % — SIGNIFICANT CHANGE UP (ref 0–2)
BASOPHILS NFR BLD AUTO: 0.1 % — SIGNIFICANT CHANGE UP (ref 0–2)
BASOPHILS NFR BLD AUTO: 0.2 % — SIGNIFICANT CHANGE UP (ref 0–2)
BILIRUB DIRECT SERPL-MCNC: 0.1 MG/DL — SIGNIFICANT CHANGE UP (ref 0–0.3)
BILIRUB INDIRECT FLD-MCNC: 0.2 MG/DL — SIGNIFICANT CHANGE UP (ref 0.2–1)
BILIRUB SERPL-MCNC: 0.2 MG/DL — LOW (ref 0.4–2)
BILIRUB SERPL-MCNC: <0.2 MG/DL — LOW (ref 0.4–2)
BILIRUB UR-MCNC: NEGATIVE — SIGNIFICANT CHANGE UP
BLD GP AB SCN SERPL QL: SIGNIFICANT CHANGE UP
BLD GP AB SCN SERPL QL: SIGNIFICANT CHANGE UP
BUN SERPL-MCNC: 13.2 MG/DL — SIGNIFICANT CHANGE UP (ref 8–20)
BUN SERPL-MCNC: 16.3 MG/DL — SIGNIFICANT CHANGE UP (ref 8–20)
BUN SERPL-MCNC: 17.5 MG/DL — SIGNIFICANT CHANGE UP (ref 8–20)
BUN SERPL-MCNC: 19.7 MG/DL — SIGNIFICANT CHANGE UP (ref 8–20)
BUN SERPL-MCNC: 20.5 MG/DL — HIGH (ref 8–20)
BUN SERPL-MCNC: 20.7 MG/DL — HIGH (ref 8–20)
BUN SERPL-MCNC: 23.1 MG/DL — HIGH (ref 8–20)
BUN SERPL-MCNC: 23.3 MG/DL — HIGH (ref 8–20)
BUN SERPL-MCNC: 24.1 MG/DL — HIGH (ref 8–20)
BUN SERPL-MCNC: 24.6 MG/DL — HIGH (ref 8–20)
BUN SERPL-MCNC: 24.7 MG/DL — HIGH (ref 8–20)
BUN SERPL-MCNC: 25 MG/DL — HIGH (ref 8–20)
BUN SERPL-MCNC: 27.2 MG/DL — HIGH (ref 8–20)
BUN SERPL-MCNC: 30.5 MG/DL — HIGH (ref 8–20)
BUN SERPL-MCNC: 31.9 MG/DL — HIGH (ref 8–20)
BUN SERPL-MCNC: 33.2 MG/DL — HIGH (ref 8–20)
CALCIUM SERPL-MCNC: 7.1 MG/DL — LOW (ref 8.4–10.5)
CALCIUM SERPL-MCNC: 7.1 MG/DL — LOW (ref 8.4–10.5)
CALCIUM SERPL-MCNC: 7.2 MG/DL — LOW (ref 8.4–10.5)
CALCIUM SERPL-MCNC: 7.5 MG/DL — LOW (ref 8.4–10.5)
CALCIUM SERPL-MCNC: 7.5 MG/DL — LOW (ref 8.4–10.5)
CALCIUM SERPL-MCNC: 7.6 MG/DL — LOW (ref 8.4–10.5)
CALCIUM SERPL-MCNC: 7.6 MG/DL — LOW (ref 8.4–10.5)
CALCIUM SERPL-MCNC: 7.7 MG/DL — LOW (ref 8.4–10.5)
CALCIUM SERPL-MCNC: 7.8 MG/DL — LOW (ref 8.4–10.5)
CALCIUM SERPL-MCNC: 7.8 MG/DL — LOW (ref 8.4–10.5)
CALCIUM SERPL-MCNC: 8 MG/DL — LOW (ref 8.4–10.5)
CALCIUM SERPL-MCNC: 8 MG/DL — LOW (ref 8.4–10.5)
CALCIUM SERPL-MCNC: 8.1 MG/DL — LOW (ref 8.4–10.5)
CALCIUM SERPL-MCNC: 8.2 MG/DL — LOW (ref 8.4–10.5)
CALCIUM SERPL-MCNC: 8.3 MG/DL — LOW (ref 8.4–10.5)
CALCIUM SERPL-MCNC: 8.7 MG/DL — SIGNIFICANT CHANGE UP (ref 8.4–10.5)
CASPR2-IGG CBA, CSF: NEGATIVE — SIGNIFICANT CHANGE UP
CASPR2-IGG CBA, SERUM: NEGATIVE — SIGNIFICANT CHANGE UP
CAST: 2 /LPF — SIGNIFICANT CHANGE UP (ref 0–4)
CHLORIDE SERPL-SCNC: 100 MMOL/L — SIGNIFICANT CHANGE UP (ref 96–108)
CHLORIDE SERPL-SCNC: 100 MMOL/L — SIGNIFICANT CHANGE UP (ref 96–108)
CHLORIDE SERPL-SCNC: 102 MMOL/L — SIGNIFICANT CHANGE UP (ref 96–108)
CHLORIDE SERPL-SCNC: 103 MMOL/L — SIGNIFICANT CHANGE UP (ref 96–108)
CHLORIDE SERPL-SCNC: 103 MMOL/L — SIGNIFICANT CHANGE UP (ref 96–108)
CHLORIDE SERPL-SCNC: 104 MMOL/L — SIGNIFICANT CHANGE UP (ref 96–108)
CHLORIDE SERPL-SCNC: 106 MMOL/L — SIGNIFICANT CHANGE UP (ref 96–108)
CHLORIDE SERPL-SCNC: 106 MMOL/L — SIGNIFICANT CHANGE UP (ref 96–108)
CHLORIDE SERPL-SCNC: 107 MMOL/L — SIGNIFICANT CHANGE UP (ref 96–108)
CHLORIDE SERPL-SCNC: 111 MMOL/L — HIGH (ref 96–108)
CHLORIDE SERPL-SCNC: 98 MMOL/L — SIGNIFICANT CHANGE UP (ref 96–108)
CHLORIDE SERPL-SCNC: 98 MMOL/L — SIGNIFICANT CHANGE UP (ref 96–108)
CHLORIDE SERPL-SCNC: 99 MMOL/L — SIGNIFICANT CHANGE UP (ref 96–108)
CO2 SERPL-SCNC: 25 MMOL/L — SIGNIFICANT CHANGE UP (ref 22–29)
CO2 SERPL-SCNC: 26 MMOL/L — SIGNIFICANT CHANGE UP (ref 22–29)
CO2 SERPL-SCNC: 27 MMOL/L — SIGNIFICANT CHANGE UP (ref 22–29)
CO2 SERPL-SCNC: 28 MMOL/L — SIGNIFICANT CHANGE UP (ref 22–29)
CO2 SERPL-SCNC: 29 MMOL/L — SIGNIFICANT CHANGE UP (ref 22–29)
CO2 SERPL-SCNC: 29 MMOL/L — SIGNIFICANT CHANGE UP (ref 22–29)
CO2 SERPL-SCNC: 31 MMOL/L — HIGH (ref 22–29)
CO2 SERPL-SCNC: 32 MMOL/L — HIGH (ref 22–29)
COLOR SPEC: YELLOW — SIGNIFICANT CHANGE UP
CREAT SERPL-MCNC: 0.51 MG/DL — SIGNIFICANT CHANGE UP (ref 0.5–1.3)
CREAT SERPL-MCNC: 0.52 MG/DL — SIGNIFICANT CHANGE UP (ref 0.5–1.3)
CREAT SERPL-MCNC: 0.52 MG/DL — SIGNIFICANT CHANGE UP (ref 0.5–1.3)
CREAT SERPL-MCNC: 0.58 MG/DL — SIGNIFICANT CHANGE UP (ref 0.5–1.3)
CREAT SERPL-MCNC: 0.6 MG/DL — SIGNIFICANT CHANGE UP (ref 0.5–1.3)
CREAT SERPL-MCNC: 0.6 MG/DL — SIGNIFICANT CHANGE UP (ref 0.5–1.3)
CREAT SERPL-MCNC: 0.61 MG/DL — SIGNIFICANT CHANGE UP (ref 0.5–1.3)
CREAT SERPL-MCNC: 0.63 MG/DL — SIGNIFICANT CHANGE UP (ref 0.5–1.3)
CREAT SERPL-MCNC: 0.64 MG/DL — SIGNIFICANT CHANGE UP (ref 0.5–1.3)
CREAT SERPL-MCNC: 0.65 MG/DL — SIGNIFICANT CHANGE UP (ref 0.5–1.3)
CREAT SERPL-MCNC: 0.73 MG/DL — SIGNIFICANT CHANGE UP (ref 0.5–1.3)
CREAT SERPL-MCNC: 0.74 MG/DL — SIGNIFICANT CHANGE UP (ref 0.5–1.3)
CRMP-5-IGG WESTERN BLOT: NEGATIVE — SIGNIFICANT CHANGE UP
CRMP-5-IGG WESTERN BLOT: NEGATIVE — SIGNIFICANT CHANGE UP
CULTURE RESULTS: ABNORMAL
CULTURE RESULTS: NO GROWTH — SIGNIFICANT CHANGE UP
CULTURE RESULTS: SIGNIFICANT CHANGE UP
CV2 IGG TITR CSF: NEGATIVE — SIGNIFICANT CHANGE UP
DIFF PNL FLD: NEGATIVE — SIGNIFICANT CHANGE UP
EGFR: 80 ML/MIN/1.73M2 — SIGNIFICANT CHANGE UP
EGFR: 81 ML/MIN/1.73M2 — SIGNIFICANT CHANGE UP
EGFR: 87 ML/MIN/1.73M2 — SIGNIFICANT CHANGE UP
EGFR: 88 ML/MIN/1.73M2 — SIGNIFICANT CHANGE UP
EGFR: 89 ML/MIN/1.73M2 — SIGNIFICANT CHANGE UP
EGFR: 92 ML/MIN/1.73M2 — SIGNIFICANT CHANGE UP
EOSINOPHIL # BLD AUTO: 0 K/UL — SIGNIFICANT CHANGE UP (ref 0–0.5)
EOSINOPHIL # BLD AUTO: 0 K/UL — SIGNIFICANT CHANGE UP (ref 0–0.5)
EOSINOPHIL # BLD AUTO: 0.03 K/UL — SIGNIFICANT CHANGE UP (ref 0–0.5)
EOSINOPHIL # BLD AUTO: 0.17 K/UL — SIGNIFICANT CHANGE UP (ref 0–0.5)
EOSINOPHIL NFR BLD AUTO: 0 % — SIGNIFICANT CHANGE UP (ref 0–6)
EOSINOPHIL NFR BLD AUTO: 0 % — SIGNIFICANT CHANGE UP (ref 0–6)
EOSINOPHIL NFR BLD AUTO: 0.1 % — SIGNIFICANT CHANGE UP (ref 0–6)
EOSINOPHIL NFR BLD AUTO: 1.3 % — SIGNIFICANT CHANGE UP (ref 0–6)
GABA-B-R AB CBA, CSF: NEGATIVE — SIGNIFICANT CHANGE UP
GABA-B-RECEPTOR ANTIBODY BY CBA, SERUM: NEGATIVE — SIGNIFICANT CHANGE UP
GAD65 AB CSF-SCNC: 0 NMOL/L — SIGNIFICANT CHANGE UP
GAD65 AB SER-MCNC: 0.02 NMOL/L — SIGNIFICANT CHANGE UP
GAMMA INTERFERON BACKGROUND BLD IA-ACNC: 0.03 IU/ML — SIGNIFICANT CHANGE UP
GAS PNL BLDA: SIGNIFICANT CHANGE UP
GFAP ALPHA IGG SER QL IF: NEGATIVE — SIGNIFICANT CHANGE UP
GFAP IFA, CSF: NEGATIVE — SIGNIFICANT CHANGE UP
GIANT PLATELETS BLD QL SMEAR: PRESENT — SIGNIFICANT CHANGE UP
GLIAL NUC TYPE 1 AB TITR CSF: NEGATIVE — SIGNIFICANT CHANGE UP
GLIAL NUC TYPE 1 AB TITR SER: NEGATIVE — SIGNIFICANT CHANGE UP
GLIAL NUC TYPE 1 AB TITR SER: NEGATIVE — SIGNIFICANT CHANGE UP
GLUCOSE BLDC GLUCOMTR-MCNC: 115 MG/DL — HIGH (ref 70–99)
GLUCOSE BLDC GLUCOMTR-MCNC: 115 MG/DL — HIGH (ref 70–99)
GLUCOSE BLDC GLUCOMTR-MCNC: 118 MG/DL — HIGH (ref 70–99)
GLUCOSE BLDC GLUCOMTR-MCNC: 119 MG/DL — HIGH (ref 70–99)
GLUCOSE BLDC GLUCOMTR-MCNC: 121 MG/DL — HIGH (ref 70–99)
GLUCOSE BLDC GLUCOMTR-MCNC: 122 MG/DL — HIGH (ref 70–99)
GLUCOSE BLDC GLUCOMTR-MCNC: 123 MG/DL — HIGH (ref 70–99)
GLUCOSE BLDC GLUCOMTR-MCNC: 125 MG/DL — HIGH (ref 70–99)
GLUCOSE BLDC GLUCOMTR-MCNC: 127 MG/DL — HIGH (ref 70–99)
GLUCOSE BLDC GLUCOMTR-MCNC: 128 MG/DL — HIGH (ref 70–99)
GLUCOSE BLDC GLUCOMTR-MCNC: 129 MG/DL — HIGH (ref 70–99)
GLUCOSE BLDC GLUCOMTR-MCNC: 129 MG/DL — HIGH (ref 70–99)
GLUCOSE BLDC GLUCOMTR-MCNC: 130 MG/DL — HIGH (ref 70–99)
GLUCOSE BLDC GLUCOMTR-MCNC: 130 MG/DL — HIGH (ref 70–99)
GLUCOSE BLDC GLUCOMTR-MCNC: 131 MG/DL — HIGH (ref 70–99)
GLUCOSE BLDC GLUCOMTR-MCNC: 132 MG/DL — HIGH (ref 70–99)
GLUCOSE BLDC GLUCOMTR-MCNC: 132 MG/DL — HIGH (ref 70–99)
GLUCOSE BLDC GLUCOMTR-MCNC: 133 MG/DL — HIGH (ref 70–99)
GLUCOSE BLDC GLUCOMTR-MCNC: 134 MG/DL — HIGH (ref 70–99)
GLUCOSE BLDC GLUCOMTR-MCNC: 135 MG/DL — HIGH (ref 70–99)
GLUCOSE BLDC GLUCOMTR-MCNC: 135 MG/DL — HIGH (ref 70–99)
GLUCOSE BLDC GLUCOMTR-MCNC: 136 MG/DL — HIGH (ref 70–99)
GLUCOSE BLDC GLUCOMTR-MCNC: 137 MG/DL — HIGH (ref 70–99)
GLUCOSE BLDC GLUCOMTR-MCNC: 138 MG/DL — HIGH (ref 70–99)
GLUCOSE BLDC GLUCOMTR-MCNC: 138 MG/DL — HIGH (ref 70–99)
GLUCOSE BLDC GLUCOMTR-MCNC: 144 MG/DL — HIGH (ref 70–99)
GLUCOSE BLDC GLUCOMTR-MCNC: 146 MG/DL — HIGH (ref 70–99)
GLUCOSE BLDC GLUCOMTR-MCNC: 147 MG/DL — HIGH (ref 70–99)
GLUCOSE BLDC GLUCOMTR-MCNC: 148 MG/DL — HIGH (ref 70–99)
GLUCOSE BLDC GLUCOMTR-MCNC: 148 MG/DL — HIGH (ref 70–99)
GLUCOSE BLDC GLUCOMTR-MCNC: 150 MG/DL — HIGH (ref 70–99)
GLUCOSE BLDC GLUCOMTR-MCNC: 150 MG/DL — HIGH (ref 70–99)
GLUCOSE BLDC GLUCOMTR-MCNC: 152 MG/DL — HIGH (ref 70–99)
GLUCOSE BLDC GLUCOMTR-MCNC: 152 MG/DL — HIGH (ref 70–99)
GLUCOSE BLDC GLUCOMTR-MCNC: 154 MG/DL — HIGH (ref 70–99)
GLUCOSE BLDC GLUCOMTR-MCNC: 155 MG/DL — HIGH (ref 70–99)
GLUCOSE BLDC GLUCOMTR-MCNC: 157 MG/DL — HIGH (ref 70–99)
GLUCOSE BLDC GLUCOMTR-MCNC: 159 MG/DL — HIGH (ref 70–99)
GLUCOSE BLDC GLUCOMTR-MCNC: 160 MG/DL — HIGH (ref 70–99)
GLUCOSE BLDC GLUCOMTR-MCNC: 163 MG/DL — HIGH (ref 70–99)
GLUCOSE BLDC GLUCOMTR-MCNC: 165 MG/DL — HIGH (ref 70–99)
GLUCOSE BLDC GLUCOMTR-MCNC: 166 MG/DL — HIGH (ref 70–99)
GLUCOSE BLDC GLUCOMTR-MCNC: 166 MG/DL — HIGH (ref 70–99)
GLUCOSE BLDC GLUCOMTR-MCNC: 167 MG/DL — HIGH (ref 70–99)
GLUCOSE BLDC GLUCOMTR-MCNC: 169 MG/DL — HIGH (ref 70–99)
GLUCOSE BLDC GLUCOMTR-MCNC: 173 MG/DL — HIGH (ref 70–99)
GLUCOSE BLDC GLUCOMTR-MCNC: 178 MG/DL — HIGH (ref 70–99)
GLUCOSE BLDC GLUCOMTR-MCNC: 178 MG/DL — HIGH (ref 70–99)
GLUCOSE BLDC GLUCOMTR-MCNC: 179 MG/DL — HIGH (ref 70–99)
GLUCOSE BLDC GLUCOMTR-MCNC: 179 MG/DL — HIGH (ref 70–99)
GLUCOSE BLDC GLUCOMTR-MCNC: 183 MG/DL — HIGH (ref 70–99)
GLUCOSE BLDC GLUCOMTR-MCNC: 184 MG/DL — HIGH (ref 70–99)
GLUCOSE BLDC GLUCOMTR-MCNC: 185 MG/DL — HIGH (ref 70–99)
GLUCOSE BLDC GLUCOMTR-MCNC: 185 MG/DL — HIGH (ref 70–99)
GLUCOSE BLDC GLUCOMTR-MCNC: 186 MG/DL — HIGH (ref 70–99)
GLUCOSE BLDC GLUCOMTR-MCNC: 186 MG/DL — HIGH (ref 70–99)
GLUCOSE BLDC GLUCOMTR-MCNC: 192 MG/DL — HIGH (ref 70–99)
GLUCOSE BLDC GLUCOMTR-MCNC: 194 MG/DL — HIGH (ref 70–99)
GLUCOSE BLDC GLUCOMTR-MCNC: 198 MG/DL — HIGH (ref 70–99)
GLUCOSE BLDC GLUCOMTR-MCNC: 200 MG/DL — HIGH (ref 70–99)
GLUCOSE BLDC GLUCOMTR-MCNC: 202 MG/DL — HIGH (ref 70–99)
GLUCOSE BLDC GLUCOMTR-MCNC: 208 MG/DL — HIGH (ref 70–99)
GLUCOSE BLDC GLUCOMTR-MCNC: 217 MG/DL — HIGH (ref 70–99)
GLUCOSE BLDC GLUCOMTR-MCNC: 217 MG/DL — HIGH (ref 70–99)
GLUCOSE BLDC GLUCOMTR-MCNC: 222 MG/DL — HIGH (ref 70–99)
GLUCOSE BLDC GLUCOMTR-MCNC: 95 MG/DL — SIGNIFICANT CHANGE UP (ref 70–99)
GLUCOSE BLDC GLUCOMTR-MCNC: 97 MG/DL — SIGNIFICANT CHANGE UP (ref 70–99)
GLUCOSE SERPL-MCNC: 103 MG/DL — HIGH (ref 70–99)
GLUCOSE SERPL-MCNC: 109 MG/DL — HIGH (ref 70–99)
GLUCOSE SERPL-MCNC: 111 MG/DL — HIGH (ref 70–99)
GLUCOSE SERPL-MCNC: 126 MG/DL — HIGH (ref 70–99)
GLUCOSE SERPL-MCNC: 128 MG/DL — HIGH (ref 70–99)
GLUCOSE SERPL-MCNC: 129 MG/DL — HIGH (ref 70–99)
GLUCOSE SERPL-MCNC: 135 MG/DL — HIGH (ref 70–99)
GLUCOSE SERPL-MCNC: 136 MG/DL — HIGH (ref 70–99)
GLUCOSE SERPL-MCNC: 138 MG/DL — HIGH (ref 70–99)
GLUCOSE SERPL-MCNC: 138 MG/DL — HIGH (ref 70–99)
GLUCOSE SERPL-MCNC: 148 MG/DL — HIGH (ref 70–99)
GLUCOSE SERPL-MCNC: 161 MG/DL — HIGH (ref 70–99)
GLUCOSE SERPL-MCNC: 162 MG/DL — HIGH (ref 70–99)
GLUCOSE SERPL-MCNC: 176 MG/DL — HIGH (ref 70–99)
GLUCOSE SERPL-MCNC: 189 MG/DL — HIGH (ref 70–99)
GLUCOSE SERPL-MCNC: 191 MG/DL — HIGH (ref 70–99)
GLUCOSE UR QL: 100 MG/DL
GRAM STN FLD: ABNORMAL
HBV CORE AB SER-ACNC: REACTIVE
HBV CORE IGM SER-ACNC: SIGNIFICANT CHANGE UP
HBV SURFACE AG SER-ACNC: SIGNIFICANT CHANGE UP
HCT VFR BLD CALC: 18.2 % — CRITICAL LOW (ref 34.5–45)
HCT VFR BLD CALC: 24.2 % — LOW (ref 34.5–45)
HCT VFR BLD CALC: 27.3 % — LOW (ref 34.5–45)
HCT VFR BLD CALC: 27.4 % — LOW (ref 34.5–45)
HCT VFR BLD CALC: 28.6 % — LOW (ref 34.5–45)
HCT VFR BLD CALC: 29.2 % — LOW (ref 34.5–45)
HCT VFR BLD CALC: 29.6 % — LOW (ref 34.5–45)
HCT VFR BLD CALC: 30 % — LOW (ref 34.5–45)
HCT VFR BLD CALC: 30.3 % — LOW (ref 34.5–45)
HCT VFR BLD CALC: 30.4 % — LOW (ref 34.5–45)
HCT VFR BLD CALC: 30.7 % — LOW (ref 34.5–45)
HCT VFR BLD CALC: 31.2 % — LOW (ref 34.5–45)
HCT VFR BLD CALC: 32.1 % — LOW (ref 34.5–45)
HCT VFR BLD CALC: 34.5 % — SIGNIFICANT CHANGE UP (ref 34.5–45)
HGB BLD-MCNC: 10 G/DL — LOW (ref 11.5–15.5)
HGB BLD-MCNC: 10.1 G/DL — LOW (ref 11.5–15.5)
HGB BLD-MCNC: 10.8 G/DL — LOW (ref 11.5–15.5)
HGB BLD-MCNC: 5.9 G/DL — CRITICAL LOW (ref 11.5–15.5)
HGB BLD-MCNC: 7.5 G/DL — LOW (ref 11.5–15.5)
HGB BLD-MCNC: 8.6 G/DL — LOW (ref 11.5–15.5)
HGB BLD-MCNC: 8.8 G/DL — LOW (ref 11.5–15.5)
HGB BLD-MCNC: 9.2 G/DL — LOW (ref 11.5–15.5)
HGB BLD-MCNC: 9.3 G/DL — LOW (ref 11.5–15.5)
HGB BLD-MCNC: 9.6 G/DL — LOW (ref 11.5–15.5)
HGB BLD-MCNC: 9.7 G/DL — LOW (ref 11.5–15.5)
HGB BLD-MCNC: 9.8 G/DL — LOW (ref 11.5–15.5)
HGB BLD-MCNC: 9.8 G/DL — LOW (ref 11.5–15.5)
HGB BLD-MCNC: 9.9 G/DL — LOW (ref 11.5–15.5)
HU1 AB TITR CSF IF: NEGATIVE — SIGNIFICANT CHANGE UP
HU1 AB TITR SER: NEGATIVE — SIGNIFICANT CHANGE UP
HU1 AB TITR SER: NEGATIVE — SIGNIFICANT CHANGE UP
HU2 AB TITR CSF IF: NEGATIVE — SIGNIFICANT CHANGE UP
HU2 AB TITR SER IF: NEGATIVE — SIGNIFICANT CHANGE UP
HU2 AB TITR SER IF: NEGATIVE — SIGNIFICANT CHANGE UP
HU3 AB TITR CSF: NEGATIVE — SIGNIFICANT CHANGE UP
HU3 AB TITR SER: NEGATIVE — SIGNIFICANT CHANGE UP
HU3 AB TITR SER: NEGATIVE — SIGNIFICANT CHANGE UP
HYPOCHROMIA BLD QL: SLIGHT — SIGNIFICANT CHANGE UP
IFA NOTES: SIGNIFICANT CHANGE UP
IL10 SERPL-MCNC: 13.7 PG/ML — HIGH
IL12 SERPL-MCNC: <1.9 PG/ML — SIGNIFICANT CHANGE UP
IL13 SERPL-MCNC: 3.7 PG/ML — HIGH
IL17A SERPL-MCNC: <1.4 PG/ML — SIGNIFICANT CHANGE UP
IL2 SERPL-MCNC: 317.3 PG/ML — SIGNIFICANT CHANGE UP (ref 175.3–858.2)
IL2 SERPL-MCNC: <2.1 PG/ML — SIGNIFICANT CHANGE UP
IL4 SERPL-MCNC: <2.2 PG/ML — SIGNIFICANT CHANGE UP
IL6 SERPL-MCNC: 4.5 PG/ML — HIGH
IL8 SERPL-MCNC: 7.1 PG/ML — HIGH
IMM GRANULOCYTES NFR BLD AUTO: 1.4 % — HIGH (ref 0–0.9)
IMM GRANULOCYTES NFR BLD AUTO: 3.4 % — HIGH (ref 0–0.9)
IMMUNOLOGIST REVIEW: SIGNIFICANT CHANGE UP
IMMUNOLOGIST REVIEW: SIGNIFICANT CHANGE UP
INR BLD: 1.44 RATIO — HIGH (ref 0.85–1.16)
INR BLD: 1.66 RATIO — HIGH (ref 0.85–1.16)
INTERFERON GAMMA, BLOOD: <4.2 PG/ML — SIGNIFICANT CHANGE UP
INTERLEUKIN 1 BETA: <6.5 PG/ML — SIGNIFICANT CHANGE UP
INTERLEUKIN 5: <2.1 PG/ML — SIGNIFICANT CHANGE UP
KETONES UR-MCNC: NEGATIVE MG/DL — SIGNIFICANT CHANGE UP
LACTATE SERPL-SCNC: 2 MMOL/L — SIGNIFICANT CHANGE UP (ref 0.5–2)
LEUKOCYTE ESTERASE UR-ACNC: NEGATIVE — SIGNIFICANT CHANGE UP
LGI1-IGG CBA, CSF: NEGATIVE — SIGNIFICANT CHANGE UP
LGI1-IGG CBA, SERUM: NEGATIVE — SIGNIFICANT CHANGE UP
LYME IGG LINE BLOT INTERP.: NEGATIVE — SIGNIFICANT CHANGE UP
LYME IGM LINE BLOT INTERP.: NEGATIVE — SIGNIFICANT CHANGE UP
LYMPHOCYTES # BLD AUTO: 0.16 K/UL — LOW (ref 1–3.3)
LYMPHOCYTES # BLD AUTO: 0.9 % — LOW (ref 13–44)
LYMPHOCYTES # BLD AUTO: 0.96 K/UL — LOW (ref 1–3.3)
LYMPHOCYTES # BLD AUTO: 0.98 K/UL — LOW (ref 1–3.3)
LYMPHOCYTES # BLD AUTO: 1.35 K/UL — SIGNIFICANT CHANGE UP (ref 1–3.3)
LYMPHOCYTES # BLD AUTO: 6.1 % — LOW (ref 13–44)
LYMPHOCYTES # BLD AUTO: 6.1 % — LOW (ref 13–44)
LYMPHOCYTES # BLD AUTO: 7.3 % — LOW (ref 13–44)
M TB IFN-G BLD-IMP: ABNORMAL
M TB IFN-G CD4+ BCKGRND COR BLD-ACNC: 0 IU/ML — SIGNIFICANT CHANGE UP
M TB IFN-G CD4+CD8+ BCKGRND COR BLD-ACNC: 0.08 IU/ML — SIGNIFICANT CHANGE UP
MACROCYTES BLD QL: SLIGHT — SIGNIFICANT CHANGE UP
MACROCYTES BLD QL: SLIGHT — SIGNIFICANT CHANGE UP
MAGNESIUM SERPL-MCNC: 1.7 MG/DL — SIGNIFICANT CHANGE UP (ref 1.6–2.6)
MAGNESIUM SERPL-MCNC: 1.9 MG/DL — SIGNIFICANT CHANGE UP (ref 1.6–2.6)
MAGNESIUM SERPL-MCNC: 2 MG/DL — SIGNIFICANT CHANGE UP (ref 1.6–2.6)
MAGNESIUM SERPL-MCNC: 2 MG/DL — SIGNIFICANT CHANGE UP (ref 1.6–2.6)
MAGNESIUM SERPL-MCNC: 2.1 MG/DL — SIGNIFICANT CHANGE UP (ref 1.6–2.6)
MAGNESIUM SERPL-MCNC: 2.1 MG/DL — SIGNIFICANT CHANGE UP (ref 1.6–2.6)
MAGNESIUM SERPL-MCNC: 2.2 MG/DL — SIGNIFICANT CHANGE UP (ref 1.6–2.6)
MAGNESIUM SERPL-MCNC: 2.3 MG/DL — SIGNIFICANT CHANGE UP (ref 1.6–2.6)
MAGNESIUM SERPL-MCNC: 2.4 MG/DL — SIGNIFICANT CHANGE UP (ref 1.6–2.6)
MANUAL SMEAR VERIFICATION: SIGNIFICANT CHANGE UP
MANUAL SMEAR VERIFICATION: SIGNIFICANT CHANGE UP
MCHC RBC-ENTMCNC: 27.4 PG — SIGNIFICANT CHANGE UP (ref 27–34)
MCHC RBC-ENTMCNC: 27.5 PG — SIGNIFICANT CHANGE UP (ref 27–34)
MCHC RBC-ENTMCNC: 27.8 PG — SIGNIFICANT CHANGE UP (ref 27–34)
MCHC RBC-ENTMCNC: 27.8 PG — SIGNIFICANT CHANGE UP (ref 27–34)
MCHC RBC-ENTMCNC: 28 PG — SIGNIFICANT CHANGE UP (ref 27–34)
MCHC RBC-ENTMCNC: 28.3 PG — SIGNIFICANT CHANGE UP (ref 27–34)
MCHC RBC-ENTMCNC: 28.4 PG — SIGNIFICANT CHANGE UP (ref 27–34)
MCHC RBC-ENTMCNC: 28.4 PG — SIGNIFICANT CHANGE UP (ref 27–34)
MCHC RBC-ENTMCNC: 28.5 PG — SIGNIFICANT CHANGE UP (ref 27–34)
MCHC RBC-ENTMCNC: 28.5 PG — SIGNIFICANT CHANGE UP (ref 27–34)
MCHC RBC-ENTMCNC: 28.6 PG — SIGNIFICANT CHANGE UP (ref 27–34)
MCHC RBC-ENTMCNC: 28.7 PG — SIGNIFICANT CHANGE UP (ref 27–34)
MCHC RBC-ENTMCNC: 28.7 PG — SIGNIFICANT CHANGE UP (ref 27–34)
MCHC RBC-ENTMCNC: 28.9 PG — SIGNIFICANT CHANGE UP (ref 27–34)
MCHC RBC-ENTMCNC: 28.9 PG — SIGNIFICANT CHANGE UP (ref 27–34)
MCHC RBC-ENTMCNC: 29.2 PG — SIGNIFICANT CHANGE UP (ref 27–34)
MCHC RBC-ENTMCNC: 31 G/DL — LOW (ref 32–36)
MCHC RBC-ENTMCNC: 31 G/DL — LOW (ref 32–36)
MCHC RBC-ENTMCNC: 31.3 G/DL — LOW (ref 32–36)
MCHC RBC-ENTMCNC: 31.4 G/DL — LOW (ref 32–36)
MCHC RBC-ENTMCNC: 31.4 G/DL — LOW (ref 32–36)
MCHC RBC-ENTMCNC: 31.5 G/DL — LOW (ref 32–36)
MCHC RBC-ENTMCNC: 31.7 G/DL — LOW (ref 32–36)
MCHC RBC-ENTMCNC: 31.7 G/DL — LOW (ref 32–36)
MCHC RBC-ENTMCNC: 31.8 G/DL — LOW (ref 32–36)
MCHC RBC-ENTMCNC: 31.9 G/DL — LOW (ref 32–36)
MCHC RBC-ENTMCNC: 32.2 G/DL — SIGNIFICANT CHANGE UP (ref 32–36)
MCHC RBC-ENTMCNC: 32.2 G/DL — SIGNIFICANT CHANGE UP (ref 32–36)
MCHC RBC-ENTMCNC: 32.3 G/DL — SIGNIFICANT CHANGE UP (ref 32–36)
MCHC RBC-ENTMCNC: 32.4 G/DL — SIGNIFICANT CHANGE UP (ref 32–36)
MCHC RBC-ENTMCNC: 32.7 G/DL — SIGNIFICANT CHANGE UP (ref 32–36)
MCHC RBC-ENTMCNC: 32.9 G/DL — SIGNIFICANT CHANGE UP (ref 32–36)
MCV RBC AUTO: 86.4 FL — SIGNIFICANT CHANGE UP (ref 80–100)
MCV RBC AUTO: 87.2 FL — SIGNIFICANT CHANGE UP (ref 80–100)
MCV RBC AUTO: 87.4 FL — SIGNIFICANT CHANGE UP (ref 80–100)
MCV RBC AUTO: 87.5 FL — SIGNIFICANT CHANGE UP (ref 80–100)
MCV RBC AUTO: 87.7 FL — SIGNIFICANT CHANGE UP (ref 80–100)
MCV RBC AUTO: 88.3 FL — SIGNIFICANT CHANGE UP (ref 80–100)
MCV RBC AUTO: 88.4 FL — SIGNIFICANT CHANGE UP (ref 80–100)
MCV RBC AUTO: 88.5 FL — SIGNIFICANT CHANGE UP (ref 80–100)
MCV RBC AUTO: 88.5 FL — SIGNIFICANT CHANGE UP (ref 80–100)
MCV RBC AUTO: 89 FL — SIGNIFICANT CHANGE UP (ref 80–100)
MCV RBC AUTO: 89.5 FL — SIGNIFICANT CHANGE UP (ref 80–100)
MCV RBC AUTO: 89.9 FL — SIGNIFICANT CHANGE UP (ref 80–100)
MCV RBC AUTO: 90.1 FL — SIGNIFICANT CHANGE UP (ref 80–100)
MCV RBC AUTO: 91.3 FL — SIGNIFICANT CHANGE UP (ref 80–100)
MCV RBC AUTO: 92.2 FL — SIGNIFICANT CHANGE UP (ref 80–100)
MCV RBC AUTO: 92.6 FL — SIGNIFICANT CHANGE UP (ref 80–100)
METHOD TYPE: SIGNIFICANT CHANGE UP
METHOD TYPE: SIGNIFICANT CHANGE UP
MGLUR1 AB IFA, CSF: NEGATIVE — SIGNIFICANT CHANGE UP
MGLUR1 IGG SER QL IF: NEGATIVE — SIGNIFICANT CHANGE UP
MICROCYTES BLD QL: SLIGHT — SIGNIFICANT CHANGE UP
MONOCYTES # BLD AUTO: 0.29 K/UL — SIGNIFICANT CHANGE UP (ref 0–0.9)
MONOCYTES # BLD AUTO: 0.96 K/UL — HIGH (ref 0–0.9)
MONOCYTES # BLD AUTO: 0.96 K/UL — HIGH (ref 0–0.9)
MONOCYTES # BLD AUTO: 1.21 K/UL — HIGH (ref 0–0.9)
MONOCYTES NFR BLD AUTO: 1.7 % — LOW (ref 2–14)
MONOCYTES NFR BLD AUTO: 5.5 % — SIGNIFICANT CHANGE UP (ref 2–14)
MONOCYTES NFR BLD AUTO: 6.1 % — SIGNIFICANT CHANGE UP (ref 2–14)
MONOCYTES NFR BLD AUTO: 7.1 % — SIGNIFICANT CHANGE UP (ref 2–14)
MRSA PCR RESULT.: SIGNIFICANT CHANGE UP
MYELOCYTES NFR BLD: 1.7 % — HIGH (ref 0–0)
NEUROCHONDRIN AB SERPL QL IF: NEGATIVE — SIGNIFICANT CHANGE UP
NEUTROPHILS # BLD AUTO: 11.2 K/UL — HIGH (ref 1.8–7.4)
NEUTROPHILS # BLD AUTO: 13.35 K/UL — HIGH (ref 1.8–7.4)
NEUTROPHILS # BLD AUTO: 16.86 K/UL — HIGH (ref 1.8–7.4)
NEUTROPHILS # BLD AUTO: 18.69 K/UL — HIGH (ref 1.8–7.4)
NEUTROPHILS NFR BLD AUTO: 82.8 % — HIGH (ref 43–77)
NEUTROPHILS NFR BLD AUTO: 83.5 % — HIGH (ref 43–77)
NEUTROPHILS NFR BLD AUTO: 84.7 % — HIGH (ref 43–77)
NEUTROPHILS NFR BLD AUTO: 97.4 % — HIGH (ref 43–77)
NEUTS BAND # BLD: 1.7 % — SIGNIFICANT CHANGE UP (ref 0–8)
NEUTS BAND NFR BLD: 1.7 % — SIGNIFICANT CHANGE UP (ref 0–8)
NITRITE UR-MCNC: NEGATIVE — SIGNIFICANT CHANGE UP
NMDAR1 IGG SER QL CBA IFA: NEGATIVE — SIGNIFICANT CHANGE UP
NRBC # BLD: 1 /100 WBCS — HIGH (ref 0–0)
NRBC BLD-RTO: 1 /100 WBCS — HIGH (ref 0–0)
OB PNL STL: POSITIVE
ORGANISM # SPEC MICROSCOPIC CNT: ABNORMAL
ORGANISM # SPEC MICROSCOPIC CNT: SIGNIFICANT CHANGE UP
OVALOCYTES BLD QL SMEAR: SLIGHT — SIGNIFICANT CHANGE UP
P/Q TYPE ANTIBODY: 0.1 NMOL/L — HIGH
P18 AB. IGG: SIGNIFICANT CHANGE UP
P23 AB. IGG: SIGNIFICANT CHANGE UP
P23 AB. IGM: SIGNIFICANT CHANGE UP
P28 AB. IGG: SIGNIFICANT CHANGE UP
P30 AB. IGG: SIGNIFICANT CHANGE UP
P39 AB. IGG: PRESENT
P39 AB. IGM: SIGNIFICANT CHANGE UP
P41 AB. IGG: PRESENT
P41 AB. IGM: SIGNIFICANT CHANGE UP
P45 AB. IGG: SIGNIFICANT CHANGE UP
P58 AB. IGG: SIGNIFICANT CHANGE UP
P66 AB. IGG: SIGNIFICANT CHANGE UP
P93 AB. IGG: SIGNIFICANT CHANGE UP
PARANEOPLASTIC AB SER-IMP: SIGNIFICANT CHANGE UP
PCA-1 AB TITR SER: NEGATIVE — SIGNIFICANT CHANGE UP
PCA-1 AB TITR SER: NEGATIVE — SIGNIFICANT CHANGE UP
PCA-2 AB TITR SER: NEGATIVE — SIGNIFICANT CHANGE UP
PCA-2 AB TITR SER: NEGATIVE — SIGNIFICANT CHANGE UP
PCA-TR AB TITR CSF: NEGATIVE — SIGNIFICANT CHANGE UP
PCA-TR AB TITR SER: NEGATIVE — SIGNIFICANT CHANGE UP
PCA-TR AB TITR SER: NEGATIVE — SIGNIFICANT CHANGE UP
PH UR: 8.5 (ref 5–8)
PHOSPHATE SERPL-MCNC: 1.4 MG/DL — LOW (ref 2.4–4.7)
PHOSPHATE SERPL-MCNC: 1.5 MG/DL — LOW (ref 2.4–4.7)
PHOSPHATE SERPL-MCNC: 1.5 MG/DL — LOW (ref 2.4–4.7)
PHOSPHATE SERPL-MCNC: 1.7 MG/DL — LOW (ref 2.4–4.7)
PHOSPHATE SERPL-MCNC: 1.7 MG/DL — LOW (ref 2.4–4.7)
PHOSPHATE SERPL-MCNC: 1.8 MG/DL — LOW (ref 2.4–4.7)
PHOSPHATE SERPL-MCNC: 1.9 MG/DL — LOW (ref 2.4–4.7)
PHOSPHATE SERPL-MCNC: 2 MG/DL — LOW (ref 2.4–4.7)
PHOSPHATE SERPL-MCNC: 2 MG/DL — LOW (ref 2.4–4.7)
PHOSPHATE SERPL-MCNC: 2.1 MG/DL — LOW (ref 2.4–4.7)
PHOSPHATE SERPL-MCNC: 3.7 MG/DL — SIGNIFICANT CHANGE UP (ref 2.4–4.7)
PLAT MORPH BLD: NORMAL — SIGNIFICANT CHANGE UP
PLAT MORPH BLD: NORMAL — SIGNIFICANT CHANGE UP
PLATELET # BLD AUTO: 119 K/UL — LOW (ref 150–400)
PLATELET # BLD AUTO: 145 K/UL — LOW (ref 150–400)
PLATELET # BLD AUTO: 163 K/UL — SIGNIFICANT CHANGE UP (ref 150–400)
PLATELET # BLD AUTO: 174 K/UL — SIGNIFICANT CHANGE UP (ref 150–400)
PLATELET # BLD AUTO: 230 K/UL — SIGNIFICANT CHANGE UP (ref 150–400)
PLATELET # BLD AUTO: 237 K/UL — SIGNIFICANT CHANGE UP (ref 150–400)
PLATELET # BLD AUTO: 246 K/UL — SIGNIFICANT CHANGE UP (ref 150–400)
PLATELET # BLD AUTO: 272 K/UL — SIGNIFICANT CHANGE UP (ref 150–400)
PLATELET # BLD AUTO: 280 K/UL — SIGNIFICANT CHANGE UP (ref 150–400)
PLATELET # BLD AUTO: 288 K/UL — SIGNIFICANT CHANGE UP (ref 150–400)
PLATELET # BLD AUTO: 293 K/UL — SIGNIFICANT CHANGE UP (ref 150–400)
PLATELET # BLD AUTO: 293 K/UL — SIGNIFICANT CHANGE UP (ref 150–400)
PLATELET # BLD AUTO: 300 K/UL — SIGNIFICANT CHANGE UP (ref 150–400)
PLATELET # BLD AUTO: 308 K/UL — SIGNIFICANT CHANGE UP (ref 150–400)
PLATELET # BLD AUTO: 309 K/UL — SIGNIFICANT CHANGE UP (ref 150–400)
PLATELET # BLD AUTO: 96 K/UL — LOW (ref 150–400)
POIKILOCYTOSIS BLD QL AUTO: SIGNIFICANT CHANGE UP
POIKILOCYTOSIS BLD QL AUTO: SLIGHT — SIGNIFICANT CHANGE UP
POLYCHROMASIA BLD QL SMEAR: SIGNIFICANT CHANGE UP
POLYCHROMASIA BLD QL SMEAR: SLIGHT — SIGNIFICANT CHANGE UP
POTASSIUM SERPL-MCNC: 3.5 MMOL/L — SIGNIFICANT CHANGE UP (ref 3.5–5.3)
POTASSIUM SERPL-MCNC: 3.5 MMOL/L — SIGNIFICANT CHANGE UP (ref 3.5–5.3)
POTASSIUM SERPL-MCNC: 3.7 MMOL/L — SIGNIFICANT CHANGE UP (ref 3.5–5.3)
POTASSIUM SERPL-MCNC: 3.8 MMOL/L — SIGNIFICANT CHANGE UP (ref 3.5–5.3)
POTASSIUM SERPL-MCNC: 3.9 MMOL/L — SIGNIFICANT CHANGE UP (ref 3.5–5.3)
POTASSIUM SERPL-MCNC: 4 MMOL/L — SIGNIFICANT CHANGE UP (ref 3.5–5.3)
POTASSIUM SERPL-MCNC: 4.1 MMOL/L — SIGNIFICANT CHANGE UP (ref 3.5–5.3)
POTASSIUM SERPL-MCNC: 4.2 MMOL/L — SIGNIFICANT CHANGE UP (ref 3.5–5.3)
POTASSIUM SERPL-MCNC: 4.2 MMOL/L — SIGNIFICANT CHANGE UP (ref 3.5–5.3)
POTASSIUM SERPL-MCNC: 4.3 MMOL/L — SIGNIFICANT CHANGE UP (ref 3.5–5.3)
POTASSIUM SERPL-SCNC: 3.5 MMOL/L — SIGNIFICANT CHANGE UP (ref 3.5–5.3)
POTASSIUM SERPL-SCNC: 3.5 MMOL/L — SIGNIFICANT CHANGE UP (ref 3.5–5.3)
POTASSIUM SERPL-SCNC: 3.7 MMOL/L — SIGNIFICANT CHANGE UP (ref 3.5–5.3)
POTASSIUM SERPL-SCNC: 3.8 MMOL/L — SIGNIFICANT CHANGE UP (ref 3.5–5.3)
POTASSIUM SERPL-SCNC: 3.9 MMOL/L — SIGNIFICANT CHANGE UP (ref 3.5–5.3)
POTASSIUM SERPL-SCNC: 4 MMOL/L — SIGNIFICANT CHANGE UP (ref 3.5–5.3)
POTASSIUM SERPL-SCNC: 4.1 MMOL/L — SIGNIFICANT CHANGE UP (ref 3.5–5.3)
POTASSIUM SERPL-SCNC: 4.2 MMOL/L — SIGNIFICANT CHANGE UP (ref 3.5–5.3)
POTASSIUM SERPL-SCNC: 4.2 MMOL/L — SIGNIFICANT CHANGE UP (ref 3.5–5.3)
POTASSIUM SERPL-SCNC: 4.3 MMOL/L — SIGNIFICANT CHANGE UP (ref 3.5–5.3)
PROMYELOCYTES # FLD: 0.9 % — HIGH (ref 0–0)
PROMYELOCYTES NFR BLD: 0.9 % — HIGH (ref 0–0)
PROT SERPL-MCNC: 4.6 G/DL — LOW (ref 6.6–8.7)
PROT SERPL-MCNC: 5.8 G/DL — LOW (ref 6.6–8.7)
PROT UR-MCNC: 30 MG/DL
PROTHROM AB SERPL-ACNC: 16.2 SEC — HIGH (ref 9.9–13.4)
PROTHROM AB SERPL-ACNC: 18.7 SEC — HIGH (ref 9.9–13.4)
PURKINJE CELL CYTOPLASMIC AB TYPE 2: NEGATIVE — SIGNIFICANT CHANGE UP
PURKINJE CELLS AB TITR CSF IF: NEGATIVE — SIGNIFICANT CHANGE UP
QUANT TB PLUS MITOGEN MINUS NIL: 0.03 IU/ML — SIGNIFICANT CHANGE UP
RBC # BLD: 2.02 M/UL — LOW (ref 3.8–5.2)
RBC # BLD: 2.65 M/UL — LOW (ref 3.8–5.2)
RBC # BLD: 3.05 M/UL — LOW (ref 3.8–5.2)
RBC # BLD: 3.13 M/UL — LOW (ref 3.8–5.2)
RBC # BLD: 3.23 M/UL — LOW (ref 3.8–5.2)
RBC # BLD: 3.24 M/UL — LOW (ref 3.8–5.2)
RBC # BLD: 3.34 M/UL — LOW (ref 3.8–5.2)
RBC # BLD: 3.35 M/UL — LOW (ref 3.8–5.2)
RBC # BLD: 3.39 M/UL — LOW (ref 3.8–5.2)
RBC # BLD: 3.42 M/UL — LOW (ref 3.8–5.2)
RBC # BLD: 3.43 M/UL — LOW (ref 3.8–5.2)
RBC # BLD: 3.45 M/UL — LOW (ref 3.8–5.2)
RBC # BLD: 3.47 M/UL — LOW (ref 3.8–5.2)
RBC # BLD: 3.52 M/UL — LOW (ref 3.8–5.2)
RBC # BLD: 3.68 M/UL — LOW (ref 3.8–5.2)
RBC # BLD: 3.74 M/UL — LOW (ref 3.8–5.2)
RBC # FLD: 15.6 % — HIGH (ref 10.3–14.5)
RBC # FLD: 15.6 % — HIGH (ref 10.3–14.5)
RBC # FLD: 15.7 % — HIGH (ref 10.3–14.5)
RBC # FLD: 15.7 % — HIGH (ref 10.3–14.5)
RBC # FLD: 15.9 % — HIGH (ref 10.3–14.5)
RBC # FLD: 16.2 % — HIGH (ref 10.3–14.5)
RBC # FLD: 16.5 % — HIGH (ref 10.3–14.5)
RBC # FLD: 17 % — HIGH (ref 10.3–14.5)
RBC # FLD: 17.2 % — HIGH (ref 10.3–14.5)
RBC # FLD: 18.4 % — HIGH (ref 10.3–14.5)
RBC # FLD: 19.2 % — HIGH (ref 10.3–14.5)
RBC # FLD: 19.5 % — HIGH (ref 10.3–14.5)
RBC # FLD: 20.2 % — HIGH (ref 10.3–14.5)
RBC # FLD: 20.5 % — HIGH (ref 10.3–14.5)
RBC # FLD: 20.6 % — HIGH (ref 10.3–14.5)
RBC # FLD: 21.2 % — HIGH (ref 10.3–14.5)
RBC BLD AUTO: ABNORMAL
RBC BLD AUTO: ABNORMAL
RBC CASTS # UR COMP ASSIST: 2 /HPF — SIGNIFICANT CHANGE UP (ref 0–4)
S AUREUS DNA NOSE QL NAA+PROBE: SIGNIFICANT CHANGE UP
SMUDGE CELLS # BLD: PRESENT — SIGNIFICANT CHANGE UP
SODIUM SERPL-SCNC: 133 MMOL/L — LOW (ref 135–145)
SODIUM SERPL-SCNC: 136 MMOL/L — SIGNIFICANT CHANGE UP (ref 135–145)
SODIUM SERPL-SCNC: 136 MMOL/L — SIGNIFICANT CHANGE UP (ref 135–145)
SODIUM SERPL-SCNC: 137 MMOL/L — SIGNIFICANT CHANGE UP (ref 135–145)
SODIUM SERPL-SCNC: 138 MMOL/L — SIGNIFICANT CHANGE UP (ref 135–145)
SODIUM SERPL-SCNC: 140 MMOL/L — SIGNIFICANT CHANGE UP (ref 135–145)
SODIUM SERPL-SCNC: 141 MMOL/L — SIGNIFICANT CHANGE UP (ref 135–145)
SODIUM SERPL-SCNC: 141 MMOL/L — SIGNIFICANT CHANGE UP (ref 135–145)
SODIUM SERPL-SCNC: 147 MMOL/L — HIGH (ref 135–145)
SP GR SPEC: 1.02 — SIGNIFICANT CHANGE UP (ref 1–1.03)
SPECIMEN SOURCE: SIGNIFICANT CHANGE UP
SQUAMOUS # UR AUTO: 0 /HPF — SIGNIFICANT CHANGE UP (ref 0–5)
STOMATOCYTES BLD QL SMEAR: SLIGHT — SIGNIFICANT CHANGE UP
TARGETS BLD QL SMEAR: SLIGHT — SIGNIFICANT CHANGE UP
UROBILINOGEN FLD QL: 1 MG/DL — SIGNIFICANT CHANGE UP (ref 0.2–1)
VGKC AB SER-SCNC: 0 NMOL/L — SIGNIFICANT CHANGE UP
WBC # BLD: 11.57 K/UL — HIGH (ref 3.8–10.5)
WBC # BLD: 13.04 K/UL — HIGH (ref 3.8–10.5)
WBC # BLD: 13.51 K/UL — HIGH (ref 3.8–10.5)
WBC # BLD: 13.58 K/UL — HIGH (ref 3.8–10.5)
WBC # BLD: 15.52 K/UL — HIGH (ref 3.8–10.5)
WBC # BLD: 15.67 K/UL — HIGH (ref 3.8–10.5)
WBC # BLD: 17.31 K/UL — HIGH (ref 3.8–10.5)
WBC # BLD: 17.39 K/UL — HIGH (ref 3.8–10.5)
WBC # BLD: 18 K/UL — HIGH (ref 3.8–10.5)
WBC # BLD: 18.33 K/UL — HIGH (ref 3.8–10.5)
WBC # BLD: 18.38 K/UL — HIGH (ref 3.8–10.5)
WBC # BLD: 20.6 K/UL — HIGH (ref 3.8–10.5)
WBC # BLD: 20.78 K/UL — HIGH (ref 3.8–10.5)
WBC # BLD: 22.16 K/UL — HIGH (ref 3.8–10.5)
WBC # BLD: 22.39 K/UL — HIGH (ref 3.8–10.5)
WBC # BLD: 23.33 K/UL — HIGH (ref 3.8–10.5)
WBC # FLD AUTO: 11.57 K/UL — HIGH (ref 3.8–10.5)
WBC # FLD AUTO: 13.04 K/UL — HIGH (ref 3.8–10.5)
WBC # FLD AUTO: 13.51 K/UL — HIGH (ref 3.8–10.5)
WBC # FLD AUTO: 13.58 K/UL — HIGH (ref 3.8–10.5)
WBC # FLD AUTO: 15.52 K/UL — HIGH (ref 3.8–10.5)
WBC # FLD AUTO: 15.67 K/UL — HIGH (ref 3.8–10.5)
WBC # FLD AUTO: 17.31 K/UL — HIGH (ref 3.8–10.5)
WBC # FLD AUTO: 17.39 K/UL — HIGH (ref 3.8–10.5)
WBC # FLD AUTO: 18 K/UL — HIGH (ref 3.8–10.5)
WBC # FLD AUTO: 18.33 K/UL — HIGH (ref 3.8–10.5)
WBC # FLD AUTO: 18.38 K/UL — HIGH (ref 3.8–10.5)
WBC # FLD AUTO: 20.6 K/UL — HIGH (ref 3.8–10.5)
WBC # FLD AUTO: 20.78 K/UL — HIGH (ref 3.8–10.5)
WBC # FLD AUTO: 22.16 K/UL — HIGH (ref 3.8–10.5)
WBC # FLD AUTO: 22.39 K/UL — HIGH (ref 3.8–10.5)
WBC # FLD AUTO: 23.33 K/UL — HIGH (ref 3.8–10.5)
WBC UR QL: 0 /HPF — SIGNIFICANT CHANGE UP (ref 0–5)

## 2025-01-01 PROCEDURE — 99232 SBSQ HOSP IP/OBS MODERATE 35: CPT

## 2025-01-01 PROCEDURE — 87040 BLOOD CULTURE FOR BACTERIA: CPT

## 2025-01-01 PROCEDURE — 87102 FUNGUS ISOLATION CULTURE: CPT

## 2025-01-01 PROCEDURE — 99291 CRITICAL CARE FIRST HOUR: CPT

## 2025-01-01 PROCEDURE — 93005 ELECTROCARDIOGRAM TRACING: CPT

## 2025-01-01 PROCEDURE — 84157 ASSAY OF PROTEIN OTHER: CPT

## 2025-01-01 PROCEDURE — 87150 DNA/RNA AMPLIFIED PROBE: CPT

## 2025-01-01 PROCEDURE — 95720 EEG PHY/QHP EA INCR W/VEEG: CPT

## 2025-01-01 PROCEDURE — 99497 ADVNCD CARE PLAN 30 MIN: CPT

## 2025-01-01 PROCEDURE — 85652 RBC SED RATE AUTOMATED: CPT

## 2025-01-01 PROCEDURE — 99233 SBSQ HOSP IP/OBS HIGH 50: CPT

## 2025-01-01 PROCEDURE — 95711 VEEG 2-12 HR UNMONITORED: CPT

## 2025-01-01 PROCEDURE — 96374 THER/PROPH/DIAG INJ IV PUSH: CPT

## 2025-01-01 PROCEDURE — 70496 CT ANGIOGRAPHY HEAD: CPT | Mod: MC

## 2025-01-01 PROCEDURE — 85014 HEMATOCRIT: CPT

## 2025-01-01 PROCEDURE — 82607 VITAMIN B-12: CPT

## 2025-01-01 PROCEDURE — 85610 PROTHROMBIN TIME: CPT

## 2025-01-01 PROCEDURE — 71045 X-RAY EXAM CHEST 1 VIEW: CPT | Mod: 26,76

## 2025-01-01 PROCEDURE — 82553 CREATINE MB FRACTION: CPT

## 2025-01-01 PROCEDURE — 0225U NFCT DS DNA&RNA 21 SARSCOV2: CPT

## 2025-01-01 PROCEDURE — 83540 ASSAY OF IRON: CPT

## 2025-01-01 PROCEDURE — 84484 ASSAY OF TROPONIN QUANT: CPT

## 2025-01-01 PROCEDURE — 31600 PLANNED TRACHEOSTOMY: CPT

## 2025-01-01 PROCEDURE — 70498 CT ANGIOGRAPHY NECK: CPT | Mod: MC

## 2025-01-01 PROCEDURE — 87116 MYCOBACTERIA CULTURE: CPT

## 2025-01-01 PROCEDURE — G0545: CPT

## 2025-01-01 PROCEDURE — 87483 CNS DNA AMP PROBE TYPE 12-25: CPT

## 2025-01-01 PROCEDURE — 80048 BASIC METABOLIC PNL TOTAL CA: CPT

## 2025-01-01 PROCEDURE — 74176 CT ABD & PELVIS W/O CONTRAST: CPT | Mod: 26

## 2025-01-01 PROCEDURE — 81001 URINALYSIS AUTO W/SCOPE: CPT

## 2025-01-01 PROCEDURE — 86617 LYME DISEASE ANTIBODY: CPT

## 2025-01-01 PROCEDURE — 84132 ASSAY OF SERUM POTASSIUM: CPT

## 2025-01-01 PROCEDURE — 95718 EEG PHYS/QHP 2-12 HR W/VEEG: CPT

## 2025-01-01 PROCEDURE — 93306 TTE W/DOPPLER COMPLETE: CPT

## 2025-01-01 PROCEDURE — 31622 DX BRONCHOSCOPE/WASH: CPT

## 2025-01-01 PROCEDURE — 85027 COMPLETE CBC AUTOMATED: CPT

## 2025-01-01 PROCEDURE — 82272 OCCULT BLD FECES 1-3 TESTS: CPT

## 2025-01-01 PROCEDURE — 82947 ASSAY GLUCOSE BLOOD QUANT: CPT

## 2025-01-01 PROCEDURE — 83519 RIA NONANTIBODY: CPT

## 2025-01-01 PROCEDURE — 94002 VENT MGMT INPAT INIT DAY: CPT

## 2025-01-01 PROCEDURE — 85018 HEMOGLOBIN: CPT

## 2025-01-01 PROCEDURE — 36415 COLL VENOUS BLD VENIPUNCTURE: CPT

## 2025-01-01 PROCEDURE — 87186 SC STD MICRODIL/AGAR DIL: CPT

## 2025-01-01 PROCEDURE — 74177 CT ABD & PELVIS W/CONTRAST: CPT | Mod: MC

## 2025-01-01 PROCEDURE — 82803 BLOOD GASES ANY COMBINATION: CPT

## 2025-01-01 PROCEDURE — 71045 X-RAY EXAM CHEST 1 VIEW: CPT | Mod: 26

## 2025-01-01 PROCEDURE — 87340 HEPATITIS B SURFACE AG IA: CPT

## 2025-01-01 PROCEDURE — 87077 CULTURE AEROBIC IDENTIFY: CPT

## 2025-01-01 PROCEDURE — 86140 C-REACTIVE PROTEIN: CPT

## 2025-01-01 PROCEDURE — 84295 ASSAY OF SERUM SODIUM: CPT

## 2025-01-01 PROCEDURE — 86480 TB TEST CELL IMMUN MEASURE: CPT

## 2025-01-01 PROCEDURE — 99223 1ST HOSP IP/OBS HIGH 75: CPT

## 2025-01-01 PROCEDURE — 94003 VENT MGMT INPAT SUBQ DAY: CPT

## 2025-01-01 PROCEDURE — 84145 PROCALCITONIN (PCT): CPT

## 2025-01-01 PROCEDURE — C9254: CPT

## 2025-01-01 PROCEDURE — 85730 THROMBOPLASTIN TIME PARTIAL: CPT

## 2025-01-01 PROCEDURE — 84443 ASSAY THYROID STIM HORMONE: CPT

## 2025-01-01 PROCEDURE — 84100 ASSAY OF PHOSPHORUS: CPT

## 2025-01-01 PROCEDURE — 87070 CULTURE OTHR SPECIMN AEROBIC: CPT

## 2025-01-01 PROCEDURE — 80307 DRUG TEST PRSMV CHEM ANLYZR: CPT

## 2025-01-01 PROCEDURE — 86403 PARTICLE AGGLUT ANTBDY SCRN: CPT

## 2025-01-01 PROCEDURE — 74176 CT ABD & PELVIS W/O CONTRAST: CPT | Mod: MC

## 2025-01-01 PROCEDURE — 87641 MR-STAPH DNA AMP PROBE: CPT

## 2025-01-01 PROCEDURE — 95714 VEEG EA 12-26 HR UNMNTR: CPT

## 2025-01-01 PROCEDURE — 82746 ASSAY OF FOLIC ACID SERUM: CPT

## 2025-01-01 PROCEDURE — 80076 HEPATIC FUNCTION PANEL: CPT

## 2025-01-01 PROCEDURE — 83520 IMMUNOASSAY QUANT NOS NONAB: CPT

## 2025-01-01 PROCEDURE — 87529 HSV DNA AMP PROBE: CPT

## 2025-01-01 PROCEDURE — 36600 WITHDRAWAL OF ARTERIAL BLOOD: CPT

## 2025-01-01 PROCEDURE — 89051 BODY FLUID CELL COUNT: CPT

## 2025-01-01 PROCEDURE — 85025 COMPLETE CBC W/AUTO DIFF WBC: CPT

## 2025-01-01 PROCEDURE — C1769: CPT

## 2025-01-01 PROCEDURE — 99233 SBSQ HOSP IP/OBS HIGH 50: CPT | Mod: 25

## 2025-01-01 PROCEDURE — 96375 TX/PRO/DX INJ NEW DRUG ADDON: CPT

## 2025-01-01 PROCEDURE — 83735 ASSAY OF MAGNESIUM: CPT

## 2025-01-01 PROCEDURE — 82550 ASSAY OF CK (CPK): CPT

## 2025-01-01 PROCEDURE — 86788 WEST NILE VIRUS AB IGM: CPT

## 2025-01-01 PROCEDURE — 86255 FLUORESCENT ANTIBODY SCREEN: CPT

## 2025-01-01 PROCEDURE — 82945 GLUCOSE OTHER FLUID: CPT

## 2025-01-01 PROCEDURE — 82330 ASSAY OF CALCIUM: CPT

## 2025-01-01 PROCEDURE — 87640 STAPH A DNA AMP PROBE: CPT

## 2025-01-01 PROCEDURE — 86596 VOLTAGE-GTD CA CHNL ANTB EA: CPT

## 2025-01-01 PROCEDURE — 74177 CT ABD & PELVIS W/CONTRAST: CPT | Mod: 26

## 2025-01-01 PROCEDURE — 87205 SMEAR GRAM STAIN: CPT

## 2025-01-01 PROCEDURE — 80053 COMPREHEN METABOLIC PANEL: CPT

## 2025-01-01 PROCEDURE — 87086 URINE CULTURE/COLONY COUNT: CPT

## 2025-01-01 PROCEDURE — 86705 HEP B CORE ANTIBODY IGM: CPT

## 2025-01-01 PROCEDURE — 93880 EXTRACRANIAL BILAT STUDY: CPT

## 2025-01-01 PROCEDURE — 83529 ASAY OF INTERLEUKIN-6 (IL-6): CPT

## 2025-01-01 PROCEDURE — G0316 PROLONG INPT EVAL ADD15 M: CPT

## 2025-01-01 PROCEDURE — 95700 EEG CONT REC W/VID EEG TECH: CPT

## 2025-01-01 PROCEDURE — 80061 LIPID PANEL: CPT

## 2025-01-01 PROCEDURE — 43246 EGD PLACE GASTROSTOMY TUBE: CPT

## 2025-01-01 PROCEDURE — 99222 1ST HOSP IP/OBS MODERATE 55: CPT

## 2025-01-01 PROCEDURE — 83605 ASSAY OF LACTIC ACID: CPT

## 2025-01-01 PROCEDURE — 94799 UNLISTED PULMONARY SVC/PX: CPT

## 2025-01-01 PROCEDURE — 94640 AIRWAY INHALATION TREATMENT: CPT

## 2025-01-01 PROCEDURE — 84466 ASSAY OF TRANSFERRIN: CPT

## 2025-01-01 PROCEDURE — 82140 ASSAY OF AMMONIA: CPT

## 2025-01-01 PROCEDURE — 70450 CT HEAD/BRAIN W/O DYE: CPT | Mod: MC

## 2025-01-01 PROCEDURE — 82435 ASSAY OF BLOOD CHLORIDE: CPT

## 2025-01-01 PROCEDURE — 86900 BLOOD TYPING SEROLOGIC ABO: CPT

## 2025-01-01 PROCEDURE — 86592 SYPHILIS TEST NON-TREP QUAL: CPT

## 2025-01-01 PROCEDURE — 82962 GLUCOSE BLOOD TEST: CPT

## 2025-01-01 PROCEDURE — 93970 EXTREMITY STUDY: CPT | Mod: 26

## 2025-01-01 PROCEDURE — 86704 HEP B CORE ANTIBODY TOTAL: CPT

## 2025-01-01 PROCEDURE — 87476 LYME DIS DNA AMP PROBE: CPT

## 2025-01-01 PROCEDURE — 82728 ASSAY OF FERRITIN: CPT

## 2025-01-01 PROCEDURE — 86789 WEST NILE VIRUS ANTIBODY: CPT

## 2025-01-01 PROCEDURE — L8699: CPT

## 2025-01-01 PROCEDURE — 70553 MRI BRAIN STEM W/O & W/DYE: CPT | Mod: MC

## 2025-01-01 PROCEDURE — 86341 ISLET CELL ANTIBODY: CPT

## 2025-01-01 PROCEDURE — 86901 BLOOD TYPING SEROLOGIC RH(D): CPT

## 2025-01-01 PROCEDURE — 83550 IRON BINDING TEST: CPT

## 2025-01-01 PROCEDURE — 71045 X-RAY EXAM CHEST 1 VIEW: CPT

## 2025-01-01 PROCEDURE — 70450 CT HEAD/BRAIN W/O DYE: CPT | Mod: 26

## 2025-01-01 PROCEDURE — 93970 EXTREMITY STUDY: CPT

## 2025-01-01 PROCEDURE — 87799 DETECT AGENT NOS DNA QUANT: CPT

## 2025-01-01 PROCEDURE — 87015 SPECIMEN INFECT AGNT CONCNTJ: CPT

## 2025-01-01 PROCEDURE — 31500 INSERT EMERGENCY AIRWAY: CPT

## 2025-01-01 PROCEDURE — 83036 HEMOGLOBIN GLYCOSYLATED A1C: CPT

## 2025-01-01 PROCEDURE — 86850 RBC ANTIBODY SCREEN: CPT

## 2025-01-01 DEVICE — TUBE TRACH SZ 6 CUFF FLEX DISP: Type: IMPLANTABLE DEVICE | Status: FUNCTIONAL

## 2025-01-01 DEVICE — SET PERC TRACH FULL NO TUBE BLU RHINO: Type: IMPLANTABLE DEVICE | Status: FUNCTIONAL

## 2025-01-01 DEVICE — FEEDING TUBE PEG KIT MIC 20FR PUSH: Type: IMPLANTABLE DEVICE | Status: FUNCTIONAL

## 2025-01-01 RX ORDER — IMMUNE GLOBULIN INFUSION (HUMAN) 100 MG/ML
20 INJECTION, SOLUTION INTRAVENOUS; SUBCUTANEOUS DAILY
Refills: 0 | Status: COMPLETED | OUTPATIENT
Start: 2025-01-01 | End: 2025-01-01

## 2025-01-01 RX ORDER — LACOSAMIDE 200 MG/1
150 TABLET, FILM COATED ORAL
Refills: 0 | Status: DISCONTINUED | OUTPATIENT
Start: 2025-01-01 | End: 2025-01-01

## 2025-01-01 RX ORDER — ROCURONIUM BROMIDE 10 MG/ML
30 INJECTION INTRAVENOUS ONCE
Refills: 0 | Status: COMPLETED | OUTPATIENT
Start: 2025-01-01 | End: 2025-01-01

## 2025-01-01 RX ORDER — SILDENAFIL CITRATE 100 MG/1
20 TABLET, FILM COATED ORAL THREE TIMES A DAY
Refills: 0 | Status: DISCONTINUED | OUTPATIENT
Start: 2025-01-01 | End: 2025-01-01

## 2025-01-01 RX ORDER — CEFEPIME HCL 1 G
2000 IV SOLUTION, PIGGYBACK, BOTTLE (EA) INTRAVENOUS EVERY 12 HOURS
Refills: 0 | Status: COMPLETED | OUTPATIENT
Start: 2025-01-01 | End: 2025-01-01

## 2025-01-01 RX ORDER — MORPHINE SULFATE 60 MG/1
4 TABLET, FILM COATED, EXTENDED RELEASE ORAL
Refills: 0 | Status: DISCONTINUED | OUTPATIENT
Start: 2025-01-01 | End: 2025-01-01

## 2025-01-01 RX ORDER — CEFEPIME HCL 1 G
2000 IV SOLUTION, PIGGYBACK, BOTTLE (EA) INTRAVENOUS ONCE
Refills: 0 | Status: COMPLETED | OUTPATIENT
Start: 2025-01-01 | End: 2025-01-01

## 2025-01-01 RX ORDER — DIPHENHYDRAMINE HCL 25 MG
50 CAPSULE ORAL ONCE
Refills: 0 | Status: COMPLETED | OUTPATIENT
Start: 2025-01-01 | End: 2025-01-01

## 2025-01-01 RX ORDER — CEFEPIME HCL 1 G
IV SOLUTION, PIGGYBACK, BOTTLE (EA) INTRAVENOUS
Refills: 0 | Status: DISCONTINUED | OUTPATIENT
Start: 2025-01-01 | End: 2025-01-01

## 2025-01-01 RX ORDER — CLONIDINE HYDROCHLORIDE 0.2 MG/1
0.1 TABLET ORAL
Refills: 0 | Status: DISCONTINUED | OUTPATIENT
Start: 2025-01-01 | End: 2025-01-01

## 2025-01-01 RX ORDER — SOD PHOSPHATE,MONOBASIC-DIBAS 3MMOL/ML
30 VIAL (ML) INTRAVENOUS EVERY 6 HOURS
Refills: 0 | Status: COMPLETED | OUTPATIENT
Start: 2025-01-01 | End: 2025-01-01

## 2025-01-01 RX ORDER — BRIVARACETAM 50 MG/1
100 TABLET, FILM COATED ORAL
Refills: 0 | Status: DISCONTINUED | OUTPATIENT
Start: 2025-01-01 | End: 2025-01-01

## 2025-01-01 RX ORDER — BACTERIOSTATIC SODIUM CHLORIDE 0.9 %
500 VIAL (ML) INJECTION ONCE
Refills: 0 | Status: COMPLETED | OUTPATIENT
Start: 2025-01-01 | End: 2025-01-01

## 2025-01-01 RX ORDER — CARVEDILOL 6.25 MG
6.25 TABLET ORAL EVERY 12 HOURS
Refills: 0 | Status: DISCONTINUED | OUTPATIENT
Start: 2025-01-01 | End: 2025-01-01

## 2025-01-01 RX ORDER — CEFEPIME HCL 1 G
2000 IV SOLUTION, PIGGYBACK, BOTTLE (EA) INTRAVENOUS EVERY 12 HOURS
Refills: 0 | Status: DISCONTINUED | OUTPATIENT
Start: 2025-01-01 | End: 2025-01-01

## 2025-01-01 RX ORDER — ACETAMINOPHEN, DIPHENHYDRAMINE HCL, PHENYLEPHRINE HCL 325; 25; 5 MG/1; MG/1; MG/1
5 TABLET ORAL AT BEDTIME
Refills: 0 | Status: DISCONTINUED | OUTPATIENT
Start: 2025-01-01 | End: 2025-01-01

## 2025-01-01 RX ORDER — SOD PHOSPHATE,MONOBASIC-DIBAS 3MMOL/ML
30 VIAL (ML) INTRAVENOUS ONCE
Refills: 0 | Status: COMPLETED | OUTPATIENT
Start: 2025-01-01 | End: 2025-01-01

## 2025-01-01 RX ORDER — CHLORHEXIDINE GLUCONATE 4 %
1 LIQUID (ML) TOPICAL THREE TIMES A DAY
Refills: 0 | Status: DISCONTINUED | OUTPATIENT
Start: 2025-01-01 | End: 2025-01-01

## 2025-01-01 RX ORDER — MORPHINE SULFATE 60 MG/1
4 TABLET, FILM COATED, EXTENDED RELEASE ORAL
Qty: 100 | Refills: 0 | Status: DISCONTINUED | OUTPATIENT
Start: 2025-01-01 | End: 2025-01-01

## 2025-01-01 RX ORDER — DEXMEDETOMIDINE HYDROCHLORIDE 4 UG/ML
0.2 INJECTION, SOLUTION INTRAVENOUS
Qty: 400 | Refills: 0 | Status: DISCONTINUED | OUTPATIENT
Start: 2025-01-01 | End: 2025-01-01

## 2025-01-01 RX ORDER — POLYETHYLENE GLYCOL 3350 17 G/17G
17 POWDER, FOR SOLUTION ORAL DAILY
Refills: 0 | Status: DISCONTINUED | OUTPATIENT
Start: 2025-01-01 | End: 2025-01-01

## 2025-01-01 RX ORDER — ACETAMINOPHEN 160 MG/5ML
650 SUSPENSION ORAL EVERY 6 HOURS
Refills: 0 | Status: DISCONTINUED | OUTPATIENT
Start: 2025-01-01 | End: 2025-01-01

## 2025-01-01 RX ORDER — POTASSIUM PHOSPHATE, MONOBASIC POTASSIUM PHOSPHATE, DIBASIC 236; 224 MG/ML; MG/ML
30 INJECTION, SOLUTION INTRAVENOUS ONCE
Refills: 0 | Status: COMPLETED | OUTPATIENT
Start: 2025-01-01 | End: 2025-01-01

## 2025-01-01 RX ORDER — FENTANYL CITRATE 50 UG/ML
25 INJECTION INTRAMUSCULAR; INTRAVENOUS ONCE
Refills: 0 | Status: DISCONTINUED | OUTPATIENT
Start: 2025-01-01 | End: 2025-01-01

## 2025-01-01 RX ORDER — FENTANYL CITRATE 50 UG/ML
25 INJECTION INTRAMUSCULAR; INTRAVENOUS
Refills: 0 | Status: DISCONTINUED | OUTPATIENT
Start: 2025-01-01 | End: 2025-01-01

## 2025-01-01 RX ORDER — CEFEPIME HCL 1 G
2000 IV SOLUTION, PIGGYBACK, BOTTLE (EA) INTRAVENOUS ONCE
Refills: 0 | Status: DISCONTINUED | OUTPATIENT
Start: 2025-01-01 | End: 2025-01-01

## 2025-01-01 RX ORDER — CARVEDILOL 6.25 MG
12.5 TABLET ORAL EVERY 12 HOURS
Refills: 0 | Status: DISCONTINUED | OUTPATIENT
Start: 2025-01-01 | End: 2025-01-01

## 2025-01-01 RX ORDER — METHYLPREDNISOLONE ACETATE 40 MG/ML
1000 VIAL (ML) INJECTION
Refills: 0 | Status: COMPLETED | OUTPATIENT
Start: 2025-01-01 | End: 2025-01-01

## 2025-01-01 RX ORDER — CHLORHEXIDINE GLUCONATE 4 %
1 LIQUID (ML) TOPICAL
Refills: 0 | Status: DISCONTINUED | OUTPATIENT
Start: 2025-01-01 | End: 2025-01-01

## 2025-01-01 RX ORDER — MECOBAL/LEVOMEFOLAT CA/B6 PHOS 2-3-35 MG
1 TABLET ORAL DAILY
Refills: 0 | Status: DISCONTINUED | OUTPATIENT
Start: 2025-01-01 | End: 2025-01-01

## 2025-01-01 RX ORDER — IPRATROPIUM BROMIDE AND ALBUTEROL SULFATE .5; 2.5 MG/3ML; MG/3ML
3 SOLUTION RESPIRATORY (INHALATION) EVERY 6 HOURS
Refills: 0 | Status: DISCONTINUED | OUTPATIENT
Start: 2025-01-01 | End: 2025-01-01

## 2025-01-01 RX ORDER — ANTISEPTIC SURGICAL SCRUB 0.04 MG/ML
1 SOLUTION TOPICAL DAILY
Refills: 0 | Status: DISCONTINUED | OUTPATIENT
Start: 2025-01-01 | End: 2025-01-01

## 2025-01-01 RX ORDER — ANTISEPTIC SURGICAL SCRUB 0.04 MG/ML
15 SOLUTION TOPICAL EVERY 12 HOURS
Refills: 0 | Status: DISCONTINUED | OUTPATIENT
Start: 2025-01-01 | End: 2025-01-01

## 2025-01-01 RX ORDER — ASPIRIN 81 MG/1
81 TABLET, COATED ORAL DAILY
Refills: 0 | Status: DISCONTINUED | OUTPATIENT
Start: 2025-01-01 | End: 2025-01-01

## 2025-01-01 RX ORDER — SODIUM PHOSPHATE, DIBASIC, ANHYDROUS, POTASSIUM PHOSPHATE, MONOBASIC, AND SODIUM PHOSPHATE, MONOBASIC, MONOHYDRATE 852; 155; 130 MG/1; MG/1; MG/1
2 TABLET, COATED ORAL EVERY 6 HOURS
Refills: 0 | Status: COMPLETED | OUTPATIENT
Start: 2025-01-01 | End: 2025-01-01

## 2025-01-01 RX ORDER — ATORVASTATIN CALCIUM 80 MG/1
10 TABLET, FILM COATED ORAL AT BEDTIME
Refills: 0 | Status: DISCONTINUED | OUTPATIENT
Start: 2025-01-01 | End: 2025-01-01

## 2025-01-01 RX ORDER — MORPHINE SULFATE 60 MG/1
2 TABLET, FILM COATED, EXTENDED RELEASE ORAL EVERY 6 HOURS
Refills: 0 | Status: DISCONTINUED | OUTPATIENT
Start: 2025-01-01 | End: 2025-01-01

## 2025-01-01 RX ORDER — HYDROMORPHONE HYDROCHLORIDE 4 MG/ML
0.5 INJECTION, SOLUTION INTRAMUSCULAR; INTRAVENOUS; SUBCUTANEOUS ONCE
Refills: 0 | Status: DISCONTINUED | OUTPATIENT
Start: 2025-01-01 | End: 2025-01-01

## 2025-01-01 RX ORDER — ASCORBIC ACID 500 MG/ML
500 VIAL (ML) INJECTION DAILY
Refills: 0 | Status: DISCONTINUED | OUTPATIENT
Start: 2025-01-01 | End: 2025-01-01

## 2025-01-01 RX ORDER — MORPHINE SULFATE 60 MG/1
4 TABLET, FILM COATED, EXTENDED RELEASE ORAL ONCE
Refills: 0 | Status: DISCONTINUED | OUTPATIENT
Start: 2025-01-01 | End: 2025-01-01

## 2025-01-01 RX ORDER — SODIUM PHOSPHATE, DIBASIC, ANHYDROUS, POTASSIUM PHOSPHATE, MONOBASIC, AND SODIUM PHOSPHATE, MONOBASIC, MONOHYDRATE 852; 155; 130 MG/1; MG/1; MG/1
1 TABLET, COATED ORAL ONCE
Refills: 0 | Status: COMPLETED | OUTPATIENT
Start: 2025-01-01 | End: 2025-01-01

## 2025-01-01 RX ORDER — CLONIDINE HYDROCHLORIDE 0.2 MG/1
0.1 TABLET ORAL EVERY 12 HOURS
Refills: 0 | Status: DISCONTINUED | OUTPATIENT
Start: 2025-01-01 | End: 2025-01-01

## 2025-01-01 RX ORDER — ROCURONIUM BROMIDE 10 MG/ML
3 INJECTION INTRAVENOUS ONCE
Refills: 0 | Status: DISCONTINUED | OUTPATIENT
Start: 2025-01-01 | End: 2025-01-01

## 2025-01-01 RX ORDER — SODIUM PHOSPHATE, DIBASIC, ANHYDROUS, POTASSIUM PHOSPHATE, MONOBASIC, AND SODIUM PHOSPHATE, MONOBASIC, MONOHYDRATE 852; 155; 130 MG/1; MG/1; MG/1
2 TABLET, COATED ORAL ONCE
Refills: 0 | Status: COMPLETED | OUTPATIENT
Start: 2025-01-01 | End: 2025-01-01

## 2025-01-01 RX ORDER — DEXAMETHASONE SODIUM PHOSPHATE 4 MG/ML
35 INJECTION, SOLUTION INTRA-ARTICULAR; INTRALESIONAL; INTRAMUSCULAR; INTRAVENOUS; SOFT TISSUE DAILY
Refills: 0 | Status: COMPLETED | OUTPATIENT
Start: 2025-01-01 | End: 2025-01-01

## 2025-01-01 RX ORDER — PROPOFOL 10 MG/ML
5 INJECTION, EMULSION INTRAVENOUS
Qty: 500 | Refills: 0 | Status: DISCONTINUED | OUTPATIENT
Start: 2025-01-01 | End: 2025-01-01

## 2025-01-01 RX ORDER — DEXAMETHASONE SODIUM PHOSPHATE 4 MG/ML
40 INJECTION, SOLUTION INTRA-ARTICULAR; INTRALESIONAL; INTRAMUSCULAR; INTRAVENOUS; SOFT TISSUE DAILY
Refills: 0 | Status: COMPLETED | OUTPATIENT
Start: 2025-01-01 | End: 2025-01-01

## 2025-01-01 RX ORDER — MORPHINE SULFATE 60 MG/1
1 TABLET, FILM COATED, EXTENDED RELEASE ORAL ONCE
Refills: 0 | Status: DISCONTINUED | OUTPATIENT
Start: 2025-01-01 | End: 2025-01-01

## 2025-01-01 RX ORDER — PERAMPANEL 12 MG/1
8 TABLET ORAL AT BEDTIME
Refills: 0 | Status: DISCONTINUED | OUTPATIENT
Start: 2025-01-01 | End: 2025-01-01

## 2025-01-01 RX ORDER — METHYLPREDNISOLONE ACETATE 40 MG/ML
125 VIAL (ML) INJECTION ONCE
Refills: 0 | Status: COMPLETED | OUTPATIENT
Start: 2025-01-01 | End: 2025-01-01

## 2025-01-01 RX ORDER — PROPOFOL 10 MG/ML
50 INJECTION, EMULSION INTRAVENOUS ONCE
Refills: 0 | Status: COMPLETED | OUTPATIENT
Start: 2025-01-01 | End: 2025-01-01

## 2025-01-01 RX ORDER — DIPHENHYDRAMINE HCL 25 MG
25 CAPSULE ORAL DAILY
Refills: 0 | Status: DISCONTINUED | OUTPATIENT
Start: 2025-01-01 | End: 2025-01-01

## 2025-01-01 RX ORDER — MORPHINE SULFATE 60 MG/1
2 TABLET, FILM COATED, EXTENDED RELEASE ORAL EVERY 4 HOURS
Refills: 0 | Status: DISCONTINUED | OUTPATIENT
Start: 2025-01-01 | End: 2025-01-01

## 2025-01-01 RX ORDER — FENTANYL CITRATE 50 UG/ML
12.5 INJECTION INTRAMUSCULAR; INTRAVENOUS
Refills: 0 | Status: DISCONTINUED | OUTPATIENT
Start: 2025-01-01 | End: 2025-01-01

## 2025-01-01 RX ORDER — CALCIUM CARBONATE/VITAMIN D3 600 MG-10
1 TABLET ORAL DAILY
Refills: 0 | Status: DISCONTINUED | OUTPATIENT
Start: 2025-01-01 | End: 2025-01-01

## 2025-01-01 RX ORDER — DEXAMETHASONE SODIUM PHOSPHATE 4 MG/ML
INJECTION, SOLUTION INTRA-ARTICULAR; INTRALESIONAL; INTRAMUSCULAR; INTRAVENOUS; SOFT TISSUE
Refills: 0 | Status: DISCONTINUED | OUTPATIENT
Start: 2025-01-01 | End: 2025-01-01

## 2025-01-01 RX ORDER — DEXAMETHASONE SODIUM PHOSPHATE 4 MG/ML
40 INJECTION, SOLUTION INTRA-ARTICULAR; INTRALESIONAL; INTRAMUSCULAR; INTRAVENOUS; SOFT TISSUE DAILY
Refills: 0 | Status: DISCONTINUED | OUTPATIENT
Start: 2025-01-01 | End: 2025-01-01

## 2025-01-01 RX ORDER — IPRATROPIUM BROMIDE AND ALBUTEROL SULFATE .5; 2.5 MG/3ML; MG/3ML
3 SOLUTION RESPIRATORY (INHALATION) ONCE
Refills: 0 | Status: COMPLETED | OUTPATIENT
Start: 2025-01-01 | End: 2025-01-01

## 2025-01-01 RX ORDER — DEXAMETHASONE SODIUM PHOSPHATE 4 MG/ML
30 INJECTION, SOLUTION INTRA-ARTICULAR; INTRALESIONAL; INTRAMUSCULAR; INTRAVENOUS; SOFT TISSUE DAILY
Refills: 0 | Status: COMPLETED | OUTPATIENT
Start: 2025-01-01 | End: 2025-01-01

## 2025-01-01 RX ORDER — CLONIDINE HYDROCHLORIDE 0.2 MG/1
0.2 TABLET ORAL THREE TIMES A DAY
Refills: 0 | Status: DISCONTINUED | OUTPATIENT
Start: 2025-01-01 | End: 2025-01-01

## 2025-01-01 RX ORDER — PROPOFOL 10 MG/ML
5 INJECTION, EMULSION INTRAVENOUS
Qty: 1000 | Refills: 0 | Status: DISCONTINUED | OUTPATIENT
Start: 2025-01-01 | End: 2025-01-01

## 2025-01-01 RX ORDER — CYANOCOBALAMIN (VITAMIN B-12) 1000MCG/ML
1000 VIAL (ML) INJECTION DAILY
Refills: 0 | Status: DISCONTINUED | OUTPATIENT
Start: 2025-01-01 | End: 2025-01-01

## 2025-01-01 RX ORDER — LACOSAMIDE 200 MG/1
150 TABLET, FILM COATED ORAL ONCE
Refills: 0 | Status: DISCONTINUED | OUTPATIENT
Start: 2025-01-01 | End: 2025-01-01

## 2025-01-01 RX ADMIN — Medication 2000 MILLIGRAM(S): at 05:08

## 2025-01-01 RX ADMIN — Medication 500 MILLIGRAM(S): at 12:05

## 2025-01-01 RX ADMIN — BRIVARACETAM 100 MILLIGRAM(S): 50 TABLET, FILM COATED ORAL at 17:30

## 2025-01-01 RX ADMIN — LACOSAMIDE 130 MILLIGRAM(S): 200 TABLET, FILM COATED ORAL at 23:20

## 2025-01-01 RX ADMIN — BRIVARACETAM 100 MILLIGRAM(S): 50 TABLET, FILM COATED ORAL at 18:41

## 2025-01-01 RX ADMIN — LACOSAMIDE 150 MILLIGRAM(S): 200 TABLET, FILM COATED ORAL at 17:50

## 2025-01-01 RX ADMIN — Medication 1 TABLET(S): at 12:07

## 2025-01-01 RX ADMIN — Medication 1 PACKET(S): at 14:25

## 2025-01-01 RX ADMIN — IPRATROPIUM BROMIDE AND ALBUTEROL SULFATE 3 MILLILITER(S): .5; 2.5 SOLUTION RESPIRATORY (INHALATION) at 06:58

## 2025-01-01 RX ADMIN — ASPIRIN 81 MILLIGRAM(S): 81 TABLET, COATED ORAL at 12:17

## 2025-01-01 RX ADMIN — ACETAMINOPHEN, DIPHENHYDRAMINE HCL, PHENYLEPHRINE HCL 5 MILLIGRAM(S): 325; 25; 5 TABLET ORAL at 21:54

## 2025-01-01 RX ADMIN — LACOSAMIDE 150 MILLIGRAM(S): 200 TABLET, FILM COATED ORAL at 08:15

## 2025-01-01 RX ADMIN — ASPIRIN 81 MILLIGRAM(S): 81 TABLET, COATED ORAL at 12:12

## 2025-01-01 RX ADMIN — Medication 25 MILLIGRAM(S): at 17:10

## 2025-01-01 RX ADMIN — Medication 1 PACKET(S): at 17:37

## 2025-01-01 RX ADMIN — Medication 1 TABLET(S): at 10:56

## 2025-01-01 RX ADMIN — SILDENAFIL CITRATE 20 MILLIGRAM(S): 100 TABLET, FILM COATED ORAL at 11:03

## 2025-01-01 RX ADMIN — LACOSAMIDE 150 MILLIGRAM(S): 200 TABLET, FILM COATED ORAL at 17:06

## 2025-01-01 RX ADMIN — Medication 2: at 11:35

## 2025-01-01 RX ADMIN — ACETAMINOPHEN 650 MILLIGRAM(S): 160 SUSPENSION ORAL at 00:07

## 2025-01-01 RX ADMIN — MORPHINE SULFATE 2 MILLIGRAM(S): 60 TABLET, FILM COATED, EXTENDED RELEASE ORAL at 01:45

## 2025-01-01 RX ADMIN — Medication 85 MILLIMOLE(S): at 03:49

## 2025-01-01 RX ADMIN — SILDENAFIL CITRATE 20 MILLIGRAM(S): 100 TABLET, FILM COATED ORAL at 22:08

## 2025-01-01 RX ADMIN — MORPHINE SULFATE 4 MG/HR: 60 TABLET, FILM COATED, EXTENDED RELEASE ORAL at 13:55

## 2025-01-01 RX ADMIN — POLYETHYLENE GLYCOL 3350 17 GRAM(S): 17 POWDER, FOR SOLUTION ORAL at 11:10

## 2025-01-01 RX ADMIN — SILDENAFIL CITRATE 20 MILLIGRAM(S): 100 TABLET, FILM COATED ORAL at 13:05

## 2025-01-01 RX ADMIN — DEXAMETHASONE SODIUM PHOSPHATE 35 MILLIGRAM(S): 4 INJECTION, SOLUTION INTRA-ARTICULAR; INTRALESIONAL; INTRAMUSCULAR; INTRAVENOUS; SOFT TISSUE at 05:33

## 2025-01-01 RX ADMIN — LACOSAMIDE 150 MILLIGRAM(S): 200 TABLET, FILM COATED ORAL at 08:47

## 2025-01-01 RX ADMIN — CLONIDINE HYDROCHLORIDE 0.2 MILLIGRAM(S): 0.2 TABLET ORAL at 22:42

## 2025-01-01 RX ADMIN — MORPHINE SULFATE 2 MILLIGRAM(S): 60 TABLET, FILM COATED, EXTENDED RELEASE ORAL at 05:09

## 2025-01-01 RX ADMIN — Medication 0.5 MILLIGRAM(S): at 05:47

## 2025-01-01 RX ADMIN — ATORVASTATIN CALCIUM 10 MILLIGRAM(S): 80 TABLET, FILM COATED ORAL at 22:09

## 2025-01-01 RX ADMIN — SILDENAFIL CITRATE 20 MILLIGRAM(S): 100 TABLET, FILM COATED ORAL at 14:25

## 2025-01-01 RX ADMIN — MORPHINE SULFATE 2 MILLIGRAM(S): 60 TABLET, FILM COATED, EXTENDED RELEASE ORAL at 12:10

## 2025-01-01 RX ADMIN — Medication 2000 MILLIGRAM(S): at 05:00

## 2025-01-01 RX ADMIN — Medication 50 MILLIGRAM(S): at 05:41

## 2025-01-01 RX ADMIN — Medication 2: at 06:01

## 2025-01-01 RX ADMIN — PERAMPANEL 8 MILLIGRAM(S): 12 TABLET ORAL at 22:08

## 2025-01-01 RX ADMIN — ASPIRIN 81 MILLIGRAM(S): 81 TABLET, COATED ORAL at 10:57

## 2025-01-01 RX ADMIN — MORPHINE SULFATE 2 MILLIGRAM(S): 60 TABLET, FILM COATED, EXTENDED RELEASE ORAL at 17:20

## 2025-01-01 RX ADMIN — Medication 1000 MICROGRAM(S): at 12:28

## 2025-01-01 RX ADMIN — MORPHINE SULFATE 2 MILLIGRAM(S): 60 TABLET, FILM COATED, EXTENDED RELEASE ORAL at 13:19

## 2025-01-01 RX ADMIN — LACOSAMIDE 150 MILLIGRAM(S): 200 TABLET, FILM COATED ORAL at 18:02

## 2025-01-01 RX ADMIN — SILDENAFIL CITRATE 20 MILLIGRAM(S): 100 TABLET, FILM COATED ORAL at 10:56

## 2025-01-01 RX ADMIN — SILDENAFIL CITRATE 20 MILLIGRAM(S): 100 TABLET, FILM COATED ORAL at 21:40

## 2025-01-01 RX ADMIN — SILDENAFIL CITRATE 20 MILLIGRAM(S): 100 TABLET, FILM COATED ORAL at 05:22

## 2025-01-01 RX ADMIN — Medication 500 MILLIGRAM(S): at 13:02

## 2025-01-01 RX ADMIN — ACETAMINOPHEN, DIPHENHYDRAMINE HCL, PHENYLEPHRINE HCL 5 MILLIGRAM(S): 325; 25; 5 TABLET ORAL at 21:35

## 2025-01-01 RX ADMIN — Medication 1 PACKET(S): at 06:21

## 2025-01-01 RX ADMIN — ASPIRIN 81 MILLIGRAM(S): 81 TABLET, COATED ORAL at 11:15

## 2025-01-01 RX ADMIN — Medication 6.25 MILLIGRAM(S): at 17:30

## 2025-01-01 RX ADMIN — ATORVASTATIN CALCIUM 10 MILLIGRAM(S): 80 TABLET, FILM COATED ORAL at 21:42

## 2025-01-01 RX ADMIN — Medication 1 TABLET(S): at 13:31

## 2025-01-01 RX ADMIN — LACOSAMIDE 150 MILLIGRAM(S): 200 TABLET, FILM COATED ORAL at 23:25

## 2025-01-01 RX ADMIN — Medication 1 TABLET(S): at 12:17

## 2025-01-01 RX ADMIN — MORPHINE SULFATE 2 MILLIGRAM(S): 60 TABLET, FILM COATED, EXTENDED RELEASE ORAL at 00:43

## 2025-01-01 RX ADMIN — ANTISEPTIC SURGICAL SCRUB 1 APPLICATION(S): 0.04 SOLUTION TOPICAL at 12:00

## 2025-01-01 RX ADMIN — Medication 2: at 17:07

## 2025-01-01 RX ADMIN — MORPHINE SULFATE 2 MILLIGRAM(S): 60 TABLET, FILM COATED, EXTENDED RELEASE ORAL at 11:00

## 2025-01-01 RX ADMIN — LACOSAMIDE 150 MILLIGRAM(S): 200 TABLET, FILM COATED ORAL at 09:00

## 2025-01-01 RX ADMIN — MORPHINE SULFATE 2 MILLIGRAM(S): 60 TABLET, FILM COATED, EXTENDED RELEASE ORAL at 18:01

## 2025-01-01 RX ADMIN — SILDENAFIL CITRATE 20 MILLIGRAM(S): 100 TABLET, FILM COATED ORAL at 05:42

## 2025-01-01 RX ADMIN — ASPIRIN 81 MILLIGRAM(S): 81 TABLET, COATED ORAL at 11:03

## 2025-01-01 RX ADMIN — Medication 4: at 00:34

## 2025-01-01 RX ADMIN — SILDENAFIL CITRATE 20 MILLIGRAM(S): 100 TABLET, FILM COATED ORAL at 21:55

## 2025-01-01 RX ADMIN — LACOSAMIDE 150 MILLIGRAM(S): 200 TABLET, FILM COATED ORAL at 23:11

## 2025-01-01 RX ADMIN — Medication 1 TABLET(S): at 10:57

## 2025-01-01 RX ADMIN — LACOSAMIDE 150 MILLIGRAM(S): 200 TABLET, FILM COATED ORAL at 09:22

## 2025-01-01 RX ADMIN — BRIVARACETAM 100 MILLIGRAM(S): 50 TABLET, FILM COATED ORAL at 18:08

## 2025-01-01 RX ADMIN — LACOSAMIDE 150 MILLIGRAM(S): 200 TABLET, FILM COATED ORAL at 16:55

## 2025-01-01 RX ADMIN — SILDENAFIL CITRATE 20 MILLIGRAM(S): 100 TABLET, FILM COATED ORAL at 14:30

## 2025-01-01 RX ADMIN — Medication 0.5 MILLIGRAM(S): at 18:08

## 2025-01-01 RX ADMIN — PIPERACILLIN SODIUM AND TAZOBACTAM SODIUM 25 GRAM(S): 2; 250 INJECTION, POWDER, FOR SOLUTION INTRAVENOUS at 05:38

## 2025-01-01 RX ADMIN — ACETAMINOPHEN, DIPHENHYDRAMINE HCL, PHENYLEPHRINE HCL 5 MILLIGRAM(S): 325; 25; 5 TABLET ORAL at 23:26

## 2025-01-01 RX ADMIN — Medication 1 PACKET(S): at 23:08

## 2025-01-01 RX ADMIN — SILDENAFIL CITRATE 20 MILLIGRAM(S): 100 TABLET, FILM COATED ORAL at 05:35

## 2025-01-01 RX ADMIN — LACOSAMIDE 130 MILLIGRAM(S): 200 TABLET, FILM COATED ORAL at 18:13

## 2025-01-01 RX ADMIN — BRIVARACETAM 100 MILLIGRAM(S): 50 TABLET, FILM COATED ORAL at 05:08

## 2025-01-01 RX ADMIN — Medication 0.5 MILLIGRAM(S): at 05:16

## 2025-01-01 RX ADMIN — ATORVASTATIN CALCIUM 10 MILLIGRAM(S): 80 TABLET, FILM COATED ORAL at 21:35

## 2025-01-01 RX ADMIN — MORPHINE SULFATE 1 MILLIGRAM(S): 60 TABLET, FILM COATED, EXTENDED RELEASE ORAL at 06:57

## 2025-01-01 RX ADMIN — PERAMPANEL 8 MILLIGRAM(S): 12 TABLET ORAL at 21:38

## 2025-01-01 RX ADMIN — SILDENAFIL CITRATE 20 MILLIGRAM(S): 100 TABLET, FILM COATED ORAL at 13:40

## 2025-01-01 RX ADMIN — ANTISEPTIC SURGICAL SCRUB 15 MILLILITER(S): 0.04 SOLUTION TOPICAL at 16:56

## 2025-01-01 RX ADMIN — LACOSAMIDE 150 MILLIGRAM(S): 200 TABLET, FILM COATED ORAL at 18:24

## 2025-01-01 RX ADMIN — MORPHINE SULFATE 2 MILLIGRAM(S): 60 TABLET, FILM COATED, EXTENDED RELEASE ORAL at 05:39

## 2025-01-01 RX ADMIN — Medication 2000 MILLIGRAM(S): at 09:22

## 2025-01-01 RX ADMIN — FENTANYL CITRATE 25 MICROGRAM(S): 50 INJECTION INTRAMUSCULAR; INTRAVENOUS at 09:47

## 2025-01-01 RX ADMIN — Medication 6.25 MILLIGRAM(S): at 17:12

## 2025-01-01 RX ADMIN — PANTOPRAZOLE 40 MILLIGRAM(S): 20 TABLET, DELAYED RELEASE ORAL at 11:03

## 2025-01-01 RX ADMIN — ASPIRIN 81 MILLIGRAM(S): 81 TABLET, COATED ORAL at 12:13

## 2025-01-01 RX ADMIN — BRIVARACETAM 100 MILLIGRAM(S): 50 TABLET, FILM COATED ORAL at 17:37

## 2025-01-01 RX ADMIN — Medication 6.25 MILLIGRAM(S): at 17:25

## 2025-01-01 RX ADMIN — CLONIDINE HYDROCHLORIDE 0.1 MILLIGRAM(S): 0.2 TABLET ORAL at 18:16

## 2025-01-01 RX ADMIN — Medication 2000 MILLIGRAM(S): at 17:45

## 2025-01-01 RX ADMIN — MORPHINE SULFATE 2 MILLIGRAM(S): 60 TABLET, FILM COATED, EXTENDED RELEASE ORAL at 14:55

## 2025-01-01 RX ADMIN — Medication 1 PACKET(S): at 05:32

## 2025-01-01 RX ADMIN — ATORVASTATIN CALCIUM 10 MILLIGRAM(S): 80 TABLET, FILM COATED ORAL at 21:55

## 2025-01-01 RX ADMIN — PERAMPANEL 8 MILLIGRAM(S): 12 TABLET ORAL at 21:54

## 2025-01-01 RX ADMIN — Medication 1000 MICROGRAM(S): at 12:13

## 2025-01-01 RX ADMIN — BRIVARACETAM 100 MILLIGRAM(S): 50 TABLET, FILM COATED ORAL at 18:16

## 2025-01-01 RX ADMIN — PANTOPRAZOLE 40 MILLIGRAM(S): 20 TABLET, DELAYED RELEASE ORAL at 12:17

## 2025-01-01 RX ADMIN — Medication 500 MILLIGRAM(S): at 13:25

## 2025-01-01 RX ADMIN — SILDENAFIL CITRATE 20 MILLIGRAM(S): 100 TABLET, FILM COATED ORAL at 23:06

## 2025-01-01 RX ADMIN — POTASSIUM PHOSPHATE, MONOBASIC POTASSIUM PHOSPHATE, DIBASIC 83.33 MILLIMOLE(S): 236; 224 INJECTION, SOLUTION INTRAVENOUS at 05:55

## 2025-01-01 RX ADMIN — Medication 6.25 MILLIGRAM(S): at 17:06

## 2025-01-01 RX ADMIN — BRIVARACETAM 100 MILLIGRAM(S): 50 TABLET, FILM COATED ORAL at 05:18

## 2025-01-01 RX ADMIN — LACOSAMIDE 150 MILLIGRAM(S): 200 TABLET, FILM COATED ORAL at 10:43

## 2025-01-01 RX ADMIN — ANTISEPTIC SURGICAL SCRUB 15 MILLILITER(S): 0.04 SOLUTION TOPICAL at 06:20

## 2025-01-01 RX ADMIN — SILDENAFIL CITRATE 20 MILLIGRAM(S): 100 TABLET, FILM COATED ORAL at 13:03

## 2025-01-01 RX ADMIN — Medication 1000 MICROGRAM(S): at 11:14

## 2025-01-01 RX ADMIN — LACOSAMIDE 150 MILLIGRAM(S): 200 TABLET, FILM COATED ORAL at 17:11

## 2025-01-01 RX ADMIN — MORPHINE SULFATE 2 MILLIGRAM(S): 60 TABLET, FILM COATED, EXTENDED RELEASE ORAL at 16:03

## 2025-01-01 RX ADMIN — LACOSAMIDE 150 MILLIGRAM(S): 200 TABLET, FILM COATED ORAL at 13:10

## 2025-01-01 RX ADMIN — SILDENAFIL CITRATE 20 MILLIGRAM(S): 100 TABLET, FILM COATED ORAL at 21:53

## 2025-01-01 RX ADMIN — DEXAMETHASONE SODIUM PHOSPHATE 30 MILLIGRAM(S): 4 INJECTION, SOLUTION INTRA-ARTICULAR; INTRALESIONAL; INTRAMUSCULAR; INTRAVENOUS; SOFT TISSUE at 05:29

## 2025-01-01 RX ADMIN — Medication 4: at 05:20

## 2025-01-01 RX ADMIN — PANTOPRAZOLE 40 MILLIGRAM(S): 20 TABLET, DELAYED RELEASE ORAL at 12:01

## 2025-01-01 RX ADMIN — SILDENAFIL CITRATE 20 MILLIGRAM(S): 100 TABLET, FILM COATED ORAL at 06:20

## 2025-01-01 RX ADMIN — IPRATROPIUM BROMIDE AND ALBUTEROL SULFATE 3 MILLILITER(S): .5; 2.5 SOLUTION RESPIRATORY (INHALATION) at 18:07

## 2025-01-01 RX ADMIN — LACOSAMIDE 150 MILLIGRAM(S): 200 TABLET, FILM COATED ORAL at 09:23

## 2025-01-01 RX ADMIN — LACOSAMIDE 150 MILLIGRAM(S): 200 TABLET, FILM COATED ORAL at 00:16

## 2025-01-01 RX ADMIN — Medication 25 MILLIGRAM(S): at 17:07

## 2025-01-01 RX ADMIN — Medication 1 PACKET(S): at 21:39

## 2025-01-01 RX ADMIN — PERAMPANEL 8 MILLIGRAM(S): 12 TABLET ORAL at 21:40

## 2025-01-01 RX ADMIN — PERAMPANEL 8 MILLIGRAM(S): 12 TABLET ORAL at 22:51

## 2025-01-01 RX ADMIN — MORPHINE SULFATE 2 MILLIGRAM(S): 60 TABLET, FILM COATED, EXTENDED RELEASE ORAL at 17:38

## 2025-01-01 RX ADMIN — SILDENAFIL CITRATE 20 MILLIGRAM(S): 100 TABLET, FILM COATED ORAL at 21:41

## 2025-01-01 RX ADMIN — MORPHINE SULFATE 2 MILLIGRAM(S): 60 TABLET, FILM COATED, EXTENDED RELEASE ORAL at 15:38

## 2025-01-01 RX ADMIN — Medication 6.25 MILLIGRAM(S): at 06:20

## 2025-01-01 RX ADMIN — SODIUM PHOSPHATE, DIBASIC, ANHYDROUS, POTASSIUM PHOSPHATE, MONOBASIC, AND SODIUM PHOSPHATE, MONOBASIC, MONOHYDRATE 2 PACKET(S): 852; 155; 130 TABLET, COATED ORAL at 05:58

## 2025-01-01 RX ADMIN — ANTISEPTIC SURGICAL SCRUB 15 MILLILITER(S): 0.04 SOLUTION TOPICAL at 17:37

## 2025-01-01 RX ADMIN — ENOXAPARIN SODIUM 40 MILLIGRAM(S): 100 INJECTION SUBCUTANEOUS at 23:29

## 2025-01-01 RX ADMIN — Medication 2: at 00:22

## 2025-01-01 RX ADMIN — ATORVASTATIN CALCIUM 10 MILLIGRAM(S): 80 TABLET, FILM COATED ORAL at 23:07

## 2025-01-01 RX ADMIN — LACOSAMIDE 150 MILLIGRAM(S): 200 TABLET, FILM COATED ORAL at 01:05

## 2025-01-01 RX ADMIN — FENTANYL CITRATE 25 MICROGRAM(S): 50 INJECTION INTRAMUSCULAR; INTRAVENOUS at 16:30

## 2025-01-01 RX ADMIN — Medication 1 TABLET(S): at 11:14

## 2025-01-01 RX ADMIN — LACOSAMIDE 150 MILLIGRAM(S): 200 TABLET, FILM COATED ORAL at 09:59

## 2025-01-01 RX ADMIN — LACOSAMIDE 150 MILLIGRAM(S): 200 TABLET, FILM COATED ORAL at 10:27

## 2025-01-01 RX ADMIN — LACOSAMIDE 150 MILLIGRAM(S): 200 TABLET, FILM COATED ORAL at 18:58

## 2025-01-01 RX ADMIN — Medication 6.25 MILLIGRAM(S): at 06:02

## 2025-01-01 RX ADMIN — Medication 500 MILLIGRAM(S): at 12:17

## 2025-01-01 RX ADMIN — Medication 500 MILLIGRAM(S): at 13:05

## 2025-01-01 RX ADMIN — BRIVARACETAM 100 MILLIGRAM(S): 50 TABLET, FILM COATED ORAL at 05:29

## 2025-01-01 RX ADMIN — CLONIDINE HYDROCHLORIDE 0.2 MILLIGRAM(S): 0.2 TABLET ORAL at 05:21

## 2025-01-01 RX ADMIN — BRIVARACETAM 100 MILLIGRAM(S): 50 TABLET, FILM COATED ORAL at 17:20

## 2025-01-01 RX ADMIN — Medication 1000 MICROGRAM(S): at 12:07

## 2025-01-01 RX ADMIN — SILDENAFIL CITRATE 20 MILLIGRAM(S): 100 TABLET, FILM COATED ORAL at 21:26

## 2025-01-01 RX ADMIN — Medication 4: at 11:23

## 2025-01-01 RX ADMIN — MORPHINE SULFATE 2 MILLIGRAM(S): 60 TABLET, FILM COATED, EXTENDED RELEASE ORAL at 14:06

## 2025-01-01 RX ADMIN — BRIVARACETAM 100 MILLIGRAM(S): 50 TABLET, FILM COATED ORAL at 17:06

## 2025-01-01 RX ADMIN — SILDENAFIL CITRATE 20 MILLIGRAM(S): 100 TABLET, FILM COATED ORAL at 05:32

## 2025-01-01 RX ADMIN — Medication 50 MILLIGRAM(S): at 05:19

## 2025-01-01 RX ADMIN — PANTOPRAZOLE 40 MILLIGRAM(S): 20 TABLET, DELAYED RELEASE ORAL at 10:56

## 2025-01-01 RX ADMIN — LACOSAMIDE 150 MILLIGRAM(S): 200 TABLET, FILM COATED ORAL at 23:18

## 2025-01-01 RX ADMIN — ENOXAPARIN SODIUM 40 MILLIGRAM(S): 100 INJECTION SUBCUTANEOUS at 21:35

## 2025-01-01 RX ADMIN — PERAMPANEL 2 MILLIGRAM(S): 12 TABLET ORAL at 05:41

## 2025-01-01 RX ADMIN — Medication 2000 MILLIGRAM(S): at 17:07

## 2025-01-01 RX ADMIN — Medication 2000 MILLIGRAM(S): at 18:03

## 2025-01-01 RX ADMIN — ENOXAPARIN SODIUM 40 MILLIGRAM(S): 100 INJECTION SUBCUTANEOUS at 21:40

## 2025-01-01 RX ADMIN — MORPHINE SULFATE 2 MILLIGRAM(S): 60 TABLET, FILM COATED, EXTENDED RELEASE ORAL at 11:04

## 2025-01-01 RX ADMIN — Medication 1 PACKET(S): at 10:56

## 2025-01-01 RX ADMIN — Medication 1 PACKET(S): at 21:54

## 2025-01-01 RX ADMIN — LACOSAMIDE 150 MILLIGRAM(S): 200 TABLET, FILM COATED ORAL at 00:20

## 2025-01-01 RX ADMIN — BRIVARACETAM 100 MILLIGRAM(S): 50 TABLET, FILM COATED ORAL at 06:20

## 2025-01-01 RX ADMIN — PERAMPANEL 8 MILLIGRAM(S): 12 TABLET ORAL at 22:22

## 2025-01-01 RX ADMIN — Medication 1000 MICROGRAM(S): at 13:05

## 2025-01-01 RX ADMIN — PANTOPRAZOLE 40 MILLIGRAM(S): 20 TABLET, DELAYED RELEASE ORAL at 12:10

## 2025-01-01 RX ADMIN — LACOSAMIDE 150 MILLIGRAM(S): 200 TABLET, FILM COATED ORAL at 02:37

## 2025-01-01 RX ADMIN — Medication 500 MILLIGRAM(S): at 11:41

## 2025-01-01 RX ADMIN — ENOXAPARIN SODIUM 40 MILLIGRAM(S): 100 INJECTION SUBCUTANEOUS at 22:11

## 2025-01-01 RX ADMIN — PANTOPRAZOLE 40 MILLIGRAM(S): 20 TABLET, DELAYED RELEASE ORAL at 12:12

## 2025-01-01 RX ADMIN — Medication 2: at 05:40

## 2025-01-01 RX ADMIN — Medication 500 MILLIGRAM(S): at 10:57

## 2025-01-01 RX ADMIN — ACETAMINOPHEN, DIPHENHYDRAMINE HCL, PHENYLEPHRINE HCL 5 MILLIGRAM(S): 325; 25; 5 TABLET ORAL at 21:38

## 2025-01-01 RX ADMIN — PERAMPANEL 8 MILLIGRAM(S): 12 TABLET ORAL at 22:03

## 2025-01-01 RX ADMIN — SILDENAFIL CITRATE 20 MILLIGRAM(S): 100 TABLET, FILM COATED ORAL at 05:23

## 2025-01-01 RX ADMIN — Medication 1 TABLET(S): at 11:03

## 2025-01-01 RX ADMIN — LACOSAMIDE 150 MILLIGRAM(S): 200 TABLET, FILM COATED ORAL at 23:30

## 2025-01-01 RX ADMIN — BRIVARACETAM 100 MILLIGRAM(S): 50 TABLET, FILM COATED ORAL at 17:45

## 2025-01-01 RX ADMIN — ENOXAPARIN SODIUM 40 MILLIGRAM(S): 100 INJECTION SUBCUTANEOUS at 21:17

## 2025-01-01 RX ADMIN — Medication 20 MILLIGRAM(S): at 05:41

## 2025-01-01 RX ADMIN — Medication 1 PACKET(S): at 06:17

## 2025-01-01 RX ADMIN — Medication 500 MILLILITER(S): at 01:32

## 2025-01-01 RX ADMIN — PERAMPANEL 8 MILLIGRAM(S): 12 TABLET ORAL at 21:41

## 2025-01-01 RX ADMIN — Medication 0.5 MILLIGRAM(S): at 13:18

## 2025-01-01 RX ADMIN — Medication 12.5 MILLIGRAM(S): at 17:37

## 2025-01-01 RX ADMIN — Medication 0.5 MILLIGRAM(S): at 05:00

## 2025-01-01 RX ADMIN — BRIVARACETAM 100 MILLIGRAM(S): 50 TABLET, FILM COATED ORAL at 17:09

## 2025-01-01 RX ADMIN — LACOSAMIDE 150 MILLIGRAM(S): 200 TABLET, FILM COATED ORAL at 18:00

## 2025-01-01 RX ADMIN — MORPHINE SULFATE 1 MILLIGRAM(S): 60 TABLET, FILM COATED, EXTENDED RELEASE ORAL at 07:45

## 2025-01-01 RX ADMIN — LACOSAMIDE 150 MILLIGRAM(S): 200 TABLET, FILM COATED ORAL at 23:31

## 2025-01-01 RX ADMIN — Medication 85 MILLIMOLE(S): at 18:49

## 2025-01-01 RX ADMIN — CLONIDINE HYDROCHLORIDE 0.2 MILLIGRAM(S): 0.2 TABLET ORAL at 05:31

## 2025-01-01 RX ADMIN — BRIVARACETAM 100 MILLIGRAM(S): 50 TABLET, FILM COATED ORAL at 05:42

## 2025-01-01 RX ADMIN — MORPHINE SULFATE 2 MILLIGRAM(S): 60 TABLET, FILM COATED, EXTENDED RELEASE ORAL at 22:17

## 2025-01-01 RX ADMIN — Medication 2: at 00:16

## 2025-01-01 RX ADMIN — SILDENAFIL CITRATE 20 MILLIGRAM(S): 100 TABLET, FILM COATED ORAL at 05:01

## 2025-01-01 RX ADMIN — Medication 0.5 MILLIGRAM(S): at 17:38

## 2025-01-01 RX ADMIN — MORPHINE SULFATE 2 MILLIGRAM(S): 60 TABLET, FILM COATED, EXTENDED RELEASE ORAL at 09:12

## 2025-01-01 RX ADMIN — MORPHINE SULFATE 2 MILLIGRAM(S): 60 TABLET, FILM COATED, EXTENDED RELEASE ORAL at 16:04

## 2025-01-01 RX ADMIN — Medication 25 MILLIGRAM(S): at 18:14

## 2025-01-01 RX ADMIN — ATORVASTATIN CALCIUM 10 MILLIGRAM(S): 80 TABLET, FILM COATED ORAL at 21:52

## 2025-01-01 RX ADMIN — MORPHINE SULFATE 2 MILLIGRAM(S): 60 TABLET, FILM COATED, EXTENDED RELEASE ORAL at 21:56

## 2025-01-01 RX ADMIN — ROCURONIUM BROMIDE 30 MILLIGRAM(S): 10 INJECTION INTRAVENOUS at 21:30

## 2025-01-01 RX ADMIN — Medication 10 MILLIGRAM(S): at 17:03

## 2025-01-01 RX ADMIN — LACOSAMIDE 150 MILLIGRAM(S): 200 TABLET, FILM COATED ORAL at 23:14

## 2025-01-01 RX ADMIN — Medication 2000 MILLIGRAM(S): at 17:37

## 2025-01-01 RX ADMIN — MORPHINE SULFATE 2 MILLIGRAM(S): 60 TABLET, FILM COATED, EXTENDED RELEASE ORAL at 04:29

## 2025-01-01 RX ADMIN — IMMUNE GLOBULIN INFUSION (HUMAN) 25 GRAM(S): 100 INJECTION, SOLUTION INTRAVENOUS; SUBCUTANEOUS at 13:02

## 2025-01-01 RX ADMIN — ASPIRIN 81 MILLIGRAM(S): 81 TABLET, COATED ORAL at 11:41

## 2025-01-01 RX ADMIN — ANTISEPTIC SURGICAL SCRUB 15 MILLILITER(S): 0.04 SOLUTION TOPICAL at 05:23

## 2025-01-01 RX ADMIN — MORPHINE SULFATE 2 MILLIGRAM(S): 60 TABLET, FILM COATED, EXTENDED RELEASE ORAL at 17:14

## 2025-01-01 RX ADMIN — MORPHINE SULFATE 2 MILLIGRAM(S): 60 TABLET, FILM COATED, EXTENDED RELEASE ORAL at 05:43

## 2025-01-01 RX ADMIN — ACETAMINOPHEN 650 MILLIGRAM(S): 160 SUSPENSION ORAL at 14:01

## 2025-01-01 RX ADMIN — ACETAMINOPHEN, DIPHENHYDRAMINE HCL, PHENYLEPHRINE HCL 5 MILLIGRAM(S): 325; 25; 5 TABLET ORAL at 22:41

## 2025-01-01 RX ADMIN — Medication 2: at 12:13

## 2025-01-01 RX ADMIN — CLONIDINE HYDROCHLORIDE 0.2 MILLIGRAM(S): 0.2 TABLET ORAL at 23:25

## 2025-01-01 RX ADMIN — SILDENAFIL CITRATE 20 MILLIGRAM(S): 100 TABLET, FILM COATED ORAL at 22:10

## 2025-01-01 RX ADMIN — FENTANYL CITRATE 25 MICROGRAM(S): 50 INJECTION INTRAMUSCULAR; INTRAVENOUS at 21:51

## 2025-01-01 RX ADMIN — SILDENAFIL CITRATE 20 MILLIGRAM(S): 100 TABLET, FILM COATED ORAL at 06:05

## 2025-01-01 RX ADMIN — BRIVARACETAM 100 MILLIGRAM(S): 50 TABLET, FILM COATED ORAL at 05:43

## 2025-01-01 RX ADMIN — LACOSAMIDE 150 MILLIGRAM(S): 200 TABLET, FILM COATED ORAL at 17:14

## 2025-01-01 RX ADMIN — BRIVARACETAM 100 MILLIGRAM(S): 50 TABLET, FILM COATED ORAL at 05:00

## 2025-01-01 RX ADMIN — BRIVARACETAM 100 MILLIGRAM(S): 50 TABLET, FILM COATED ORAL at 18:13

## 2025-01-01 RX ADMIN — ANTISEPTIC SURGICAL SCRUB 1 APPLICATION(S): 0.04 SOLUTION TOPICAL at 11:02

## 2025-01-01 RX ADMIN — MORPHINE SULFATE 2 MILLIGRAM(S): 60 TABLET, FILM COATED, EXTENDED RELEASE ORAL at 05:29

## 2025-01-01 RX ADMIN — PANTOPRAZOLE 40 MILLIGRAM(S): 20 TABLET, DELAYED RELEASE ORAL at 11:40

## 2025-01-01 RX ADMIN — DEXAMETHASONE SODIUM PHOSPHATE 30 MILLIGRAM(S): 4 INJECTION, SOLUTION INTRA-ARTICULAR; INTRALESIONAL; INTRAMUSCULAR; INTRAVENOUS; SOFT TISSUE at 05:01

## 2025-01-01 RX ADMIN — PANTOPRAZOLE 40 MILLIGRAM(S): 20 TABLET, DELAYED RELEASE ORAL at 18:40

## 2025-01-01 RX ADMIN — LACOSAMIDE 150 MILLIGRAM(S): 200 TABLET, FILM COATED ORAL at 17:43

## 2025-01-01 RX ADMIN — PANTOPRAZOLE 40 MILLIGRAM(S): 20 TABLET, DELAYED RELEASE ORAL at 13:05

## 2025-01-01 RX ADMIN — FENTANYL CITRATE 12.5 MICROGRAM(S): 50 INJECTION INTRAMUSCULAR; INTRAVENOUS at 03:52

## 2025-01-01 RX ADMIN — ANTISEPTIC SURGICAL SCRUB 15 MILLILITER(S): 0.04 SOLUTION TOPICAL at 05:32

## 2025-01-01 RX ADMIN — SILDENAFIL CITRATE 20 MILLIGRAM(S): 100 TABLET, FILM COATED ORAL at 06:01

## 2025-01-01 RX ADMIN — CLONIDINE HYDROCHLORIDE 0.2 MILLIGRAM(S): 0.2 TABLET ORAL at 05:41

## 2025-01-01 RX ADMIN — Medication 0.5 MILLIGRAM(S): at 05:56

## 2025-01-01 RX ADMIN — BRIVARACETAM 100 MILLIGRAM(S): 50 TABLET, FILM COATED ORAL at 05:16

## 2025-01-01 RX ADMIN — SILDENAFIL CITRATE 20 MILLIGRAM(S): 100 TABLET, FILM COATED ORAL at 13:24

## 2025-01-01 RX ADMIN — Medication 1 PACKET(S): at 05:33

## 2025-01-01 RX ADMIN — PERAMPANEL 8 MILLIGRAM(S): 12 TABLET ORAL at 21:49

## 2025-01-01 RX ADMIN — Medication 2: at 12:52

## 2025-01-01 RX ADMIN — Medication 2: at 17:09

## 2025-01-01 RX ADMIN — MORPHINE SULFATE 2 MILLIGRAM(S): 60 TABLET, FILM COATED, EXTENDED RELEASE ORAL at 03:00

## 2025-01-01 RX ADMIN — Medication 1 PACKET(S): at 17:25

## 2025-01-01 RX ADMIN — ANTISEPTIC SURGICAL SCRUB 1 APPLICATION(S): 0.04 SOLUTION TOPICAL at 12:11

## 2025-01-01 RX ADMIN — PERAMPANEL 8 MILLIGRAM(S): 12 TABLET ORAL at 21:51

## 2025-01-01 RX ADMIN — BRIVARACETAM 100 MILLIGRAM(S): 50 TABLET, FILM COATED ORAL at 17:38

## 2025-01-01 RX ADMIN — Medication 20 MILLIGRAM(S): at 05:21

## 2025-01-01 RX ADMIN — ACETAMINOPHEN, DIPHENHYDRAMINE HCL, PHENYLEPHRINE HCL 5 MILLIGRAM(S): 325; 25; 5 TABLET ORAL at 21:53

## 2025-01-01 RX ADMIN — ASPIRIN 81 MILLIGRAM(S): 81 TABLET, COATED ORAL at 13:25

## 2025-01-01 RX ADMIN — FENTANYL CITRATE 25 MICROGRAM(S): 50 INJECTION INTRAMUSCULAR; INTRAVENOUS at 19:07

## 2025-01-01 RX ADMIN — BRIVARACETAM 100 MILLIGRAM(S): 50 TABLET, FILM COATED ORAL at 18:00

## 2025-01-01 RX ADMIN — ANTISEPTIC SURGICAL SCRUB 1 APPLICATION(S): 0.04 SOLUTION TOPICAL at 12:10

## 2025-01-01 RX ADMIN — SODIUM PHOSPHATE, DIBASIC, ANHYDROUS, POTASSIUM PHOSPHATE, MONOBASIC, AND SODIUM PHOSPHATE, MONOBASIC, MONOHYDRATE 1 PACKET(S): 852; 155; 130 TABLET, COATED ORAL at 05:36

## 2025-01-01 RX ADMIN — ATORVASTATIN CALCIUM 10 MILLIGRAM(S): 80 TABLET, FILM COATED ORAL at 21:19

## 2025-01-01 RX ADMIN — SILDENAFIL CITRATE 20 MILLIGRAM(S): 100 TABLET, FILM COATED ORAL at 15:24

## 2025-01-01 RX ADMIN — ANTISEPTIC SURGICAL SCRUB 15 MILLILITER(S): 0.04 SOLUTION TOPICAL at 05:00

## 2025-01-01 RX ADMIN — PROPOFOL 50 MILLIGRAM(S): 10 INJECTION, EMULSION INTRAVENOUS at 21:30

## 2025-01-01 RX ADMIN — LACOSAMIDE 150 MILLIGRAM(S): 200 TABLET, FILM COATED ORAL at 18:18

## 2025-01-01 RX ADMIN — Medication 1 TABLET(S): at 11:15

## 2025-01-01 RX ADMIN — Medication 2: at 18:24

## 2025-01-01 RX ADMIN — ASPIRIN 81 MILLIGRAM(S): 81 TABLET, COATED ORAL at 11:35

## 2025-01-01 RX ADMIN — Medication 85 MILLIMOLE(S): at 17:53

## 2025-01-01 RX ADMIN — ACETAMINOPHEN, DIPHENHYDRAMINE HCL, PHENYLEPHRINE HCL 5 MILLIGRAM(S): 325; 25; 5 TABLET ORAL at 22:09

## 2025-01-01 RX ADMIN — Medication 2: at 17:35

## 2025-01-01 RX ADMIN — Medication 2: at 11:15

## 2025-01-01 RX ADMIN — Medication 85 MILLIMOLE(S): at 09:10

## 2025-01-01 RX ADMIN — CLONIDINE HYDROCHLORIDE 0.1 MILLIGRAM(S): 0.2 TABLET ORAL at 17:25

## 2025-01-01 RX ADMIN — SILDENAFIL CITRATE 20 MILLIGRAM(S): 100 TABLET, FILM COATED ORAL at 21:49

## 2025-01-01 RX ADMIN — DEXAMETHASONE SODIUM PHOSPHATE 40 MILLIGRAM(S): 4 INJECTION, SOLUTION INTRA-ARTICULAR; INTRALESIONAL; INTRAMUSCULAR; INTRAVENOUS; SOFT TISSUE at 17:10

## 2025-01-01 RX ADMIN — CLONIDINE HYDROCHLORIDE 0.2 MILLIGRAM(S): 0.2 TABLET ORAL at 15:30

## 2025-01-01 RX ADMIN — ANTISEPTIC SURGICAL SCRUB 15 MILLILITER(S): 0.04 SOLUTION TOPICAL at 17:45

## 2025-01-01 RX ADMIN — LACOSAMIDE 150 MILLIGRAM(S): 200 TABLET, FILM COATED ORAL at 23:43

## 2025-01-01 RX ADMIN — CLONIDINE HYDROCHLORIDE 0.2 MILLIGRAM(S): 0.2 TABLET ORAL at 23:07

## 2025-01-01 RX ADMIN — LACOSAMIDE 150 MILLIGRAM(S): 200 TABLET, FILM COATED ORAL at 09:28

## 2025-01-01 RX ADMIN — BRIVARACETAM 100 MILLIGRAM(S): 50 TABLET, FILM COATED ORAL at 17:13

## 2025-01-01 RX ADMIN — Medication 1 PACKET(S): at 06:58

## 2025-01-01 RX ADMIN — DEXAMETHASONE SODIUM PHOSPHATE 35 MILLIGRAM(S): 4 INJECTION, SOLUTION INTRA-ARTICULAR; INTRALESIONAL; INTRAMUSCULAR; INTRAVENOUS; SOFT TISSUE at 06:46

## 2025-01-01 RX ADMIN — ENOXAPARIN SODIUM 40 MILLIGRAM(S): 100 INJECTION SUBCUTANEOUS at 21:26

## 2025-01-01 RX ADMIN — BRIVARACETAM 100 MILLIGRAM(S): 50 TABLET, FILM COATED ORAL at 05:22

## 2025-01-01 RX ADMIN — ACETAMINOPHEN 650 MILLIGRAM(S): 160 SUSPENSION ORAL at 13:01

## 2025-01-01 RX ADMIN — CLONIDINE HYDROCHLORIDE 0.1 MILLIGRAM(S): 0.2 TABLET ORAL at 06:02

## 2025-01-01 RX ADMIN — ENOXAPARIN SODIUM 40 MILLIGRAM(S): 100 INJECTION SUBCUTANEOUS at 21:55

## 2025-01-01 RX ADMIN — MORPHINE SULFATE 2 MILLIGRAM(S): 60 TABLET, FILM COATED, EXTENDED RELEASE ORAL at 10:11

## 2025-01-01 RX ADMIN — Medication 1 PACKET(S): at 06:05

## 2025-01-01 RX ADMIN — ANTISEPTIC SURGICAL SCRUB 15 MILLILITER(S): 0.04 SOLUTION TOPICAL at 05:16

## 2025-01-01 RX ADMIN — ANTISEPTIC SURGICAL SCRUB 15 MILLILITER(S): 0.04 SOLUTION TOPICAL at 17:30

## 2025-01-01 RX ADMIN — CLONIDINE HYDROCHLORIDE 0.2 MILLIGRAM(S): 0.2 TABLET ORAL at 13:02

## 2025-01-01 RX ADMIN — MORPHINE SULFATE 2 MILLIGRAM(S): 60 TABLET, FILM COATED, EXTENDED RELEASE ORAL at 01:42

## 2025-01-01 RX ADMIN — MORPHINE SULFATE 4 MG/HR: 60 TABLET, FILM COATED, EXTENDED RELEASE ORAL at 12:24

## 2025-01-01 RX ADMIN — MORPHINE SULFATE 2 MILLIGRAM(S): 60 TABLET, FILM COATED, EXTENDED RELEASE ORAL at 09:59

## 2025-01-01 RX ADMIN — MORPHINE SULFATE 2 MILLIGRAM(S): 60 TABLET, FILM COATED, EXTENDED RELEASE ORAL at 22:03

## 2025-01-01 RX ADMIN — BRIVARACETAM 100 MILLIGRAM(S): 50 TABLET, FILM COATED ORAL at 06:01

## 2025-01-01 RX ADMIN — DEXAMETHASONE SODIUM PHOSPHATE 35 MILLIGRAM(S): 4 INJECTION, SOLUTION INTRA-ARTICULAR; INTRALESIONAL; INTRAMUSCULAR; INTRAVENOUS; SOFT TISSUE at 05:44

## 2025-01-01 RX ADMIN — FENTANYL CITRATE 25 MICROGRAM(S): 50 INJECTION INTRAMUSCULAR; INTRAVENOUS at 13:05

## 2025-01-01 RX ADMIN — Medication 1 TABLET(S): at 12:12

## 2025-01-01 RX ADMIN — ATORVASTATIN CALCIUM 10 MILLIGRAM(S): 80 TABLET, FILM COATED ORAL at 23:25

## 2025-01-01 RX ADMIN — MORPHINE SULFATE 2 MILLIGRAM(S): 60 TABLET, FILM COATED, EXTENDED RELEASE ORAL at 18:08

## 2025-01-01 RX ADMIN — CLONIDINE HYDROCHLORIDE 0.1 MILLIGRAM(S): 0.2 TABLET ORAL at 05:32

## 2025-01-01 RX ADMIN — PERAMPANEL 8 MILLIGRAM(S): 12 TABLET ORAL at 22:35

## 2025-01-01 RX ADMIN — PERAMPANEL 4 MILLIGRAM(S): 12 TABLET ORAL at 21:18

## 2025-01-01 RX ADMIN — Medication 6.25 MILLIGRAM(S): at 05:31

## 2025-01-01 RX ADMIN — LACOSAMIDE 150 MILLIGRAM(S): 200 TABLET, FILM COATED ORAL at 09:12

## 2025-01-01 RX ADMIN — FENTANYL CITRATE 25 MICROGRAM(S): 50 INJECTION INTRAMUSCULAR; INTRAVENOUS at 18:10

## 2025-01-01 RX ADMIN — Medication 1000 MICROGRAM(S): at 11:11

## 2025-01-01 RX ADMIN — MORPHINE SULFATE 2 MILLIGRAM(S): 60 TABLET, FILM COATED, EXTENDED RELEASE ORAL at 23:32

## 2025-01-01 RX ADMIN — MORPHINE SULFATE 2 MILLIGRAM(S): 60 TABLET, FILM COATED, EXTENDED RELEASE ORAL at 18:34

## 2025-01-01 RX ADMIN — CLONIDINE HYDROCHLORIDE 0.2 MILLIGRAM(S): 0.2 TABLET ORAL at 11:03

## 2025-01-01 RX ADMIN — ASPIRIN 81 MILLIGRAM(S): 81 TABLET, COATED ORAL at 12:05

## 2025-01-01 RX ADMIN — HYDROMORPHONE HYDROCHLORIDE 0.5 MILLIGRAM(S): 4 INJECTION, SOLUTION INTRAMUSCULAR; INTRAVENOUS; SUBCUTANEOUS at 07:18

## 2025-01-01 RX ADMIN — ACETAMINOPHEN, DIPHENHYDRAMINE HCL, PHENYLEPHRINE HCL 5 MILLIGRAM(S): 325; 25; 5 TABLET ORAL at 23:07

## 2025-01-01 RX ADMIN — SILDENAFIL CITRATE 20 MILLIGRAM(S): 100 TABLET, FILM COATED ORAL at 13:12

## 2025-01-01 RX ADMIN — Medication 500 MILLIGRAM(S): at 11:04

## 2025-01-01 RX ADMIN — PIPERACILLIN SODIUM AND TAZOBACTAM SODIUM 25 GRAM(S): 2; 250 INJECTION, POWDER, FOR SOLUTION INTRAVENOUS at 21:17

## 2025-01-01 RX ADMIN — LACOSAMIDE 150 MILLIGRAM(S): 200 TABLET, FILM COATED ORAL at 23:46

## 2025-01-01 RX ADMIN — CLONIDINE HYDROCHLORIDE 0.2 MILLIGRAM(S): 0.2 TABLET ORAL at 21:40

## 2025-01-01 RX ADMIN — ASPIRIN 81 MILLIGRAM(S): 81 TABLET, COATED ORAL at 11:10

## 2025-01-01 RX ADMIN — IMMUNE GLOBULIN INFUSION (HUMAN) 25 GRAM(S): 100 INJECTION, SOLUTION INTRAVENOUS; SUBCUTANEOUS at 12:38

## 2025-01-01 RX ADMIN — LACOSAMIDE 130 MILLIGRAM(S): 200 TABLET, FILM COATED ORAL at 09:29

## 2025-01-01 RX ADMIN — DEXMEDETOMIDINE HYDROCHLORIDE 2.81 MICROGRAM(S)/KG/HR: 4 INJECTION, SOLUTION INTRAVENOUS at 23:27

## 2025-01-01 RX ADMIN — IMMUNE GLOBULIN INFUSION (HUMAN) 25 GRAM(S): 100 INJECTION, SOLUTION INTRAVENOUS; SUBCUTANEOUS at 11:53

## 2025-01-01 RX ADMIN — CLONIDINE HYDROCHLORIDE 0.2 MILLIGRAM(S): 0.2 TABLET ORAL at 13:32

## 2025-01-01 RX ADMIN — Medication 1 PACKET(S): at 14:30

## 2025-01-01 RX ADMIN — ANTISEPTIC SURGICAL SCRUB 15 MILLILITER(S): 0.04 SOLUTION TOPICAL at 17:13

## 2025-01-01 RX ADMIN — MORPHINE SULFATE 2 MILLIGRAM(S): 60 TABLET, FILM COATED, EXTENDED RELEASE ORAL at 02:52

## 2025-01-01 RX ADMIN — Medication 1000 MICROGRAM(S): at 13:27

## 2025-01-01 RX ADMIN — PERAMPANEL 8 MILLIGRAM(S): 12 TABLET ORAL at 23:06

## 2025-01-01 RX ADMIN — SILDENAFIL CITRATE 20 MILLIGRAM(S): 100 TABLET, FILM COATED ORAL at 22:41

## 2025-01-01 RX ADMIN — Medication 6.25 MILLIGRAM(S): at 06:04

## 2025-01-01 RX ADMIN — PERAMPANEL 8 MILLIGRAM(S): 12 TABLET ORAL at 01:05

## 2025-01-01 RX ADMIN — BRIVARACETAM 100 MILLIGRAM(S): 50 TABLET, FILM COATED ORAL at 18:24

## 2025-01-01 RX ADMIN — Medication 1 TABLET(S): at 11:11

## 2025-01-01 RX ADMIN — ENOXAPARIN SODIUM 40 MILLIGRAM(S): 100 INJECTION SUBCUTANEOUS at 22:08

## 2025-01-01 RX ADMIN — ATORVASTATIN CALCIUM 10 MILLIGRAM(S): 80 TABLET, FILM COATED ORAL at 21:26

## 2025-01-01 RX ADMIN — DEXAMETHASONE SODIUM PHOSPHATE 30 MILLIGRAM(S): 4 INJECTION, SOLUTION INTRA-ARTICULAR; INTRALESIONAL; INTRAMUSCULAR; INTRAVENOUS; SOFT TISSUE at 05:40

## 2025-01-01 RX ADMIN — ANTISEPTIC SURGICAL SCRUB 15 MILLILITER(S): 0.04 SOLUTION TOPICAL at 05:48

## 2025-01-01 RX ADMIN — Medication 1 TABLET(S): at 12:13

## 2025-01-01 RX ADMIN — PANTOPRAZOLE 40 MILLIGRAM(S): 20 TABLET, DELAYED RELEASE ORAL at 11:10

## 2025-01-01 RX ADMIN — Medication 1 TABLET(S): at 13:06

## 2025-01-01 RX ADMIN — Medication 1000 MICROGRAM(S): at 11:03

## 2025-01-01 RX ADMIN — PERAMPANEL 8 MILLIGRAM(S): 12 TABLET ORAL at 22:18

## 2025-01-01 RX ADMIN — ANTISEPTIC SURGICAL SCRUB 15 MILLILITER(S): 0.04 SOLUTION TOPICAL at 17:36

## 2025-01-01 RX ADMIN — ANTISEPTIC SURGICAL SCRUB 15 MILLILITER(S): 0.04 SOLUTION TOPICAL at 17:06

## 2025-01-01 RX ADMIN — DEXAMETHASONE SODIUM PHOSPHATE 30 MILLIGRAM(S): 4 INJECTION, SOLUTION INTRA-ARTICULAR; INTRALESIONAL; INTRAMUSCULAR; INTRAVENOUS; SOFT TISSUE at 05:20

## 2025-01-01 RX ADMIN — FENTANYL CITRATE 25 MICROGRAM(S): 50 INJECTION INTRAMUSCULAR; INTRAVENOUS at 20:34

## 2025-01-01 RX ADMIN — SILDENAFIL CITRATE 20 MILLIGRAM(S): 100 TABLET, FILM COATED ORAL at 21:38

## 2025-01-01 RX ADMIN — ENOXAPARIN SODIUM 40 MILLIGRAM(S): 100 INJECTION SUBCUTANEOUS at 21:41

## 2025-01-01 RX ADMIN — ANTISEPTIC SURGICAL SCRUB 15 MILLILITER(S): 0.04 SOLUTION TOPICAL at 05:38

## 2025-01-01 RX ADMIN — ANTISEPTIC SURGICAL SCRUB 1 APPLICATION(S): 0.04 SOLUTION TOPICAL at 12:31

## 2025-01-01 RX ADMIN — Medication 85 MILLIMOLE(S): at 12:27

## 2025-01-01 RX ADMIN — Medication 1 PACKET(S): at 22:11

## 2025-01-01 RX ADMIN — Medication 2: at 23:46

## 2025-01-01 RX ADMIN — Medication 1000 MICROGRAM(S): at 12:17

## 2025-01-01 RX ADMIN — ANTISEPTIC SURGICAL SCRUB 1 APPLICATION(S): 0.04 SOLUTION TOPICAL at 15:39

## 2025-01-01 RX ADMIN — LACOSAMIDE 130 MILLIGRAM(S): 200 TABLET, FILM COATED ORAL at 09:14

## 2025-01-01 RX ADMIN — Medication 25 MILLIGRAM(S): at 17:45

## 2025-01-01 RX ADMIN — PANTOPRAZOLE 40 MILLIGRAM(S): 20 TABLET, DELAYED RELEASE ORAL at 12:05

## 2025-01-01 RX ADMIN — PROPOFOL 50 MILLIGRAM(S): 10 INJECTION, EMULSION INTRAVENOUS at 09:13

## 2025-01-01 RX ADMIN — ANTISEPTIC SURGICAL SCRUB 15 MILLILITER(S): 0.04 SOLUTION TOPICAL at 17:09

## 2025-01-01 RX ADMIN — CLONIDINE HYDROCHLORIDE 0.1 MILLIGRAM(S): 0.2 TABLET ORAL at 17:37

## 2025-01-01 RX ADMIN — Medication 1 TABLET(S): at 13:05

## 2025-01-01 RX ADMIN — Medication 1 TABLET(S): at 13:25

## 2025-01-01 RX ADMIN — SODIUM PHOSPHATE, DIBASIC, ANHYDROUS, POTASSIUM PHOSPHATE, MONOBASIC, AND SODIUM PHOSPHATE, MONOBASIC, MONOHYDRATE 2 PACKET(S): 852; 155; 130 TABLET, COATED ORAL at 11:15

## 2025-01-01 RX ADMIN — ATORVASTATIN CALCIUM 10 MILLIGRAM(S): 80 TABLET, FILM COATED ORAL at 21:38

## 2025-01-01 RX ADMIN — MORPHINE SULFATE 2 MILLIGRAM(S): 60 TABLET, FILM COATED, EXTENDED RELEASE ORAL at 16:30

## 2025-01-01 RX ADMIN — Medication 50 MILLIGRAM(S): at 05:35

## 2025-01-01 RX ADMIN — ANTISEPTIC SURGICAL SCRUB 15 MILLILITER(S): 0.04 SOLUTION TOPICAL at 06:58

## 2025-01-01 RX ADMIN — ANTISEPTIC SURGICAL SCRUB 15 MILLILITER(S): 0.04 SOLUTION TOPICAL at 05:20

## 2025-01-01 RX ADMIN — Medication 1 TABLET(S): at 12:28

## 2025-01-01 RX ADMIN — CLONIDINE HYDROCHLORIDE 0.2 MILLIGRAM(S): 0.2 TABLET ORAL at 13:06

## 2025-01-01 RX ADMIN — FENTANYL CITRATE 25 MICROGRAM(S): 50 INJECTION INTRAMUSCULAR; INTRAVENOUS at 20:46

## 2025-01-01 RX ADMIN — PERAMPANEL 8 MILLIGRAM(S): 12 TABLET ORAL at 22:11

## 2025-01-01 RX ADMIN — Medication 2000 MILLIGRAM(S): at 05:18

## 2025-01-01 RX ADMIN — PANTOPRAZOLE 40 MILLIGRAM(S): 20 TABLET, DELAYED RELEASE ORAL at 12:26

## 2025-01-01 RX ADMIN — PANTOPRAZOLE 40 MILLIGRAM(S): 20 TABLET, DELAYED RELEASE ORAL at 10:44

## 2025-01-01 RX ADMIN — FENTANYL CITRATE 12.5 MICROGRAM(S): 50 INJECTION INTRAMUSCULAR; INTRAVENOUS at 04:10

## 2025-01-01 RX ADMIN — Medication 10 MILLIGRAM(S): at 15:10

## 2025-01-01 RX ADMIN — Medication 1 TABLET(S): at 13:24

## 2025-01-01 RX ADMIN — LACOSAMIDE 150 MILLIGRAM(S): 200 TABLET, FILM COATED ORAL at 00:34

## 2025-01-01 RX ADMIN — Medication 6.25 MILLIGRAM(S): at 18:48

## 2025-01-01 RX ADMIN — MORPHINE SULFATE 2 MILLIGRAM(S): 60 TABLET, FILM COATED, EXTENDED RELEASE ORAL at 22:11

## 2025-01-01 RX ADMIN — BRIVARACETAM 100 MILLIGRAM(S): 50 TABLET, FILM COATED ORAL at 17:26

## 2025-01-01 RX ADMIN — ANTISEPTIC SURGICAL SCRUB 15 MILLILITER(S): 0.04 SOLUTION TOPICAL at 18:00

## 2025-01-01 RX ADMIN — ANTISEPTIC SURGICAL SCRUB 15 MILLILITER(S): 0.04 SOLUTION TOPICAL at 18:04

## 2025-01-01 RX ADMIN — Medication 0.5 MILLIGRAM(S): at 03:19

## 2025-01-01 RX ADMIN — BRIVARACETAM 100 MILLIGRAM(S): 50 TABLET, FILM COATED ORAL at 16:55

## 2025-01-01 RX ADMIN — Medication 2 TABLET(S): at 21:18

## 2025-01-01 RX ADMIN — Medication 1 PACKET(S): at 11:04

## 2025-01-01 RX ADMIN — ASPIRIN 81 MILLIGRAM(S): 81 TABLET, COATED ORAL at 13:05

## 2025-01-01 RX ADMIN — MORPHINE SULFATE 2 MILLIGRAM(S): 60 TABLET, FILM COATED, EXTENDED RELEASE ORAL at 23:00

## 2025-01-01 RX ADMIN — PANTOPRAZOLE 40 MILLIGRAM(S): 20 TABLET, DELAYED RELEASE ORAL at 12:47

## 2025-01-01 RX ADMIN — CLONIDINE HYDROCHLORIDE 0.2 MILLIGRAM(S): 0.2 TABLET ORAL at 10:57

## 2025-01-01 RX ADMIN — MORPHINE SULFATE 4 MILLIGRAM(S): 60 TABLET, FILM COATED, EXTENDED RELEASE ORAL at 01:30

## 2025-01-01 RX ADMIN — Medication 2000 MILLIGRAM(S): at 05:47

## 2025-01-01 RX ADMIN — BRIVARACETAM 100 MILLIGRAM(S): 50 TABLET, FILM COATED ORAL at 17:07

## 2025-01-01 RX ADMIN — DEXMEDETOMIDINE HYDROCHLORIDE 2.81 MICROGRAM(S)/KG/HR: 4 INJECTION, SOLUTION INTRAVENOUS at 04:28

## 2025-01-01 RX ADMIN — FENTANYL CITRATE 25 MICROGRAM(S): 50 INJECTION INTRAMUSCULAR; INTRAVENOUS at 21:28

## 2025-01-01 RX ADMIN — LACOSAMIDE 150 MILLIGRAM(S): 200 TABLET, FILM COATED ORAL at 12:30

## 2025-01-01 RX ADMIN — MORPHINE SULFATE 2 MILLIGRAM(S): 60 TABLET, FILM COATED, EXTENDED RELEASE ORAL at 10:32

## 2025-01-01 RX ADMIN — IPRATROPIUM BROMIDE AND ALBUTEROL SULFATE 3 MILLILITER(S): .5; 2.5 SOLUTION RESPIRATORY (INHALATION) at 16:04

## 2025-01-01 RX ADMIN — PERAMPANEL 2 MILLIGRAM(S): 12 TABLET ORAL at 13:43

## 2025-01-01 RX ADMIN — SILDENAFIL CITRATE 20 MILLIGRAM(S): 100 TABLET, FILM COATED ORAL at 13:02

## 2025-01-01 RX ADMIN — FENTANYL CITRATE 25 MICROGRAM(S): 50 INJECTION INTRAMUSCULAR; INTRAVENOUS at 04:59

## 2025-01-01 RX ADMIN — LACOSAMIDE 150 MILLIGRAM(S): 200 TABLET, FILM COATED ORAL at 17:44

## 2025-01-01 RX ADMIN — FENTANYL CITRATE 25 MICROGRAM(S): 50 INJECTION INTRAMUSCULAR; INTRAVENOUS at 16:46

## 2025-01-01 RX ADMIN — Medication 2000 MILLIGRAM(S): at 17:25

## 2025-01-01 RX ADMIN — Medication 1 TABLET(S): at 12:46

## 2025-01-01 RX ADMIN — ANTISEPTIC SURGICAL SCRUB 15 MILLILITER(S): 0.04 SOLUTION TOPICAL at 17:11

## 2025-01-01 RX ADMIN — ACETAMINOPHEN 650 MILLIGRAM(S): 160 SUSPENSION ORAL at 01:00

## 2025-01-01 RX ADMIN — Medication 1 PACKET(S): at 13:03

## 2025-01-01 RX ADMIN — MORPHINE SULFATE 2 MILLIGRAM(S): 60 TABLET, FILM COATED, EXTENDED RELEASE ORAL at 10:26

## 2025-01-01 RX ADMIN — PERAMPANEL 8 MILLIGRAM(S): 12 TABLET ORAL at 21:27

## 2025-01-01 RX ADMIN — LACOSAMIDE 150 MILLIGRAM(S): 200 TABLET, FILM COATED ORAL at 08:27

## 2025-01-01 RX ADMIN — SILDENAFIL CITRATE 20 MILLIGRAM(S): 100 TABLET, FILM COATED ORAL at 23:29

## 2025-01-01 RX ADMIN — Medication 500 MILLIGRAM(S): at 12:27

## 2025-01-01 RX ADMIN — Medication 20 MILLIGRAM(S): at 10:56

## 2025-01-01 RX ADMIN — Medication 2: at 18:31

## 2025-01-01 RX ADMIN — ATORVASTATIN CALCIUM 10 MILLIGRAM(S): 80 TABLET, FILM COATED ORAL at 21:49

## 2025-01-01 RX ADMIN — MORPHINE SULFATE 2 MILLIGRAM(S): 60 TABLET, FILM COATED, EXTENDED RELEASE ORAL at 11:20

## 2025-01-01 RX ADMIN — Medication 2: at 17:10

## 2025-01-01 RX ADMIN — Medication 25 MILLIGRAM(S): at 05:23

## 2025-01-01 RX ADMIN — ANTISEPTIC SURGICAL SCRUB 15 MILLILITER(S): 0.04 SOLUTION TOPICAL at 18:16

## 2025-01-01 RX ADMIN — CLONIDINE HYDROCHLORIDE 0.2 MILLIGRAM(S): 0.2 TABLET ORAL at 13:25

## 2025-01-01 RX ADMIN — FENTANYL CITRATE 25 MICROGRAM(S): 50 INJECTION INTRAMUSCULAR; INTRAVENOUS at 13:08

## 2025-01-01 RX ADMIN — Medication 1 PACKET(S): at 05:42

## 2025-01-01 RX ADMIN — BRIVARACETAM 100 MILLIGRAM(S): 50 TABLET, FILM COATED ORAL at 06:58

## 2025-01-01 RX ADMIN — ATORVASTATIN CALCIUM 10 MILLIGRAM(S): 80 TABLET, FILM COATED ORAL at 21:41

## 2025-01-01 RX ADMIN — SILDENAFIL CITRATE 20 MILLIGRAM(S): 100 TABLET, FILM COATED ORAL at 05:18

## 2025-01-01 RX ADMIN — SILDENAFIL CITRATE 20 MILLIGRAM(S): 100 TABLET, FILM COATED ORAL at 13:18

## 2025-01-01 RX ADMIN — BRIVARACETAM 100 MILLIGRAM(S): 50 TABLET, FILM COATED ORAL at 05:38

## 2025-01-01 RX ADMIN — ATORVASTATIN CALCIUM 10 MILLIGRAM(S): 80 TABLET, FILM COATED ORAL at 21:40

## 2025-01-01 RX ADMIN — Medication 2000 MILLIGRAM(S): at 05:32

## 2025-01-01 RX ADMIN — BRIVARACETAM 100 MILLIGRAM(S): 50 TABLET, FILM COATED ORAL at 17:14

## 2025-01-01 RX ADMIN — ANTISEPTIC SURGICAL SCRUB 15 MILLILITER(S): 0.04 SOLUTION TOPICAL at 05:31

## 2025-01-01 RX ADMIN — FENTANYL CITRATE 25 MICROGRAM(S): 50 INJECTION INTRAMUSCULAR; INTRAVENOUS at 22:47

## 2025-01-01 RX ADMIN — PANTOPRAZOLE 40 MILLIGRAM(S): 20 TABLET, DELAYED RELEASE ORAL at 15:38

## 2025-01-01 RX ADMIN — ANTISEPTIC SURGICAL SCRUB 15 MILLILITER(S): 0.04 SOLUTION TOPICAL at 05:36

## 2025-01-01 RX ADMIN — ACETAMINOPHEN, DIPHENHYDRAMINE HCL, PHENYLEPHRINE HCL 5 MILLIGRAM(S): 325; 25; 5 TABLET ORAL at 21:41

## 2025-01-01 RX ADMIN — PIPERACILLIN SODIUM AND TAZOBACTAM SODIUM 25 GRAM(S): 2; 250 INJECTION, POWDER, FOR SOLUTION INTRAVENOUS at 13:43

## 2025-01-01 RX ADMIN — ACETAMINOPHEN, DIPHENHYDRAMINE HCL, PHENYLEPHRINE HCL 5 MILLIGRAM(S): 325; 25; 5 TABLET ORAL at 21:49

## 2025-01-01 RX ADMIN — Medication 2: at 17:30

## 2025-01-01 RX ADMIN — LACOSAMIDE 150 MILLIGRAM(S): 200 TABLET, FILM COATED ORAL at 17:09

## 2025-01-01 RX ADMIN — MORPHINE SULFATE 2 MILLIGRAM(S): 60 TABLET, FILM COATED, EXTENDED RELEASE ORAL at 02:48

## 2025-01-01 RX ADMIN — CLONIDINE HYDROCHLORIDE 0.2 MILLIGRAM(S): 0.2 TABLET ORAL at 06:22

## 2025-01-01 RX ADMIN — Medication 0.5 MILLIGRAM(S): at 17:20

## 2025-01-01 RX ADMIN — Medication 0.5 MILLIGRAM(S): at 05:39

## 2025-01-01 RX ADMIN — Medication 1000 MICROGRAM(S): at 12:47

## 2025-01-01 RX ADMIN — Medication 6.25 MILLIGRAM(S): at 18:24

## 2025-01-01 RX ADMIN — Medication 1000 MICROGRAM(S): at 13:02

## 2025-01-01 RX ADMIN — Medication 6.25 MILLIGRAM(S): at 16:56

## 2025-01-01 RX ADMIN — ANTISEPTIC SURGICAL SCRUB 15 MILLILITER(S): 0.04 SOLUTION TOPICAL at 06:02

## 2025-01-01 RX ADMIN — PANTOPRAZOLE 40 MILLIGRAM(S): 20 TABLET, DELAYED RELEASE ORAL at 13:24

## 2025-01-01 RX ADMIN — Medication 6.25 MILLIGRAM(S): at 05:21

## 2025-01-01 RX ADMIN — Medication 1000 MICROGRAM(S): at 10:57

## 2025-01-01 RX ADMIN — PERAMPANEL 8 MILLIGRAM(S): 12 TABLET ORAL at 23:20

## 2025-01-01 RX ADMIN — LACOSAMIDE 150 MILLIGRAM(S): 200 TABLET, FILM COATED ORAL at 23:06

## 2025-01-01 RX ADMIN — ANTISEPTIC SURGICAL SCRUB 15 MILLILITER(S): 0.04 SOLUTION TOPICAL at 17:22

## 2025-01-01 RX ADMIN — ANTISEPTIC SURGICAL SCRUB 15 MILLILITER(S): 0.04 SOLUTION TOPICAL at 18:49

## 2025-01-01 RX ADMIN — Medication 2000 MILLIGRAM(S): at 06:16

## 2025-01-01 RX ADMIN — Medication 6.25 MILLIGRAM(S): at 05:41

## 2025-01-01 RX ADMIN — Medication 1 PACKET(S): at 21:42

## 2025-01-01 RX ADMIN — Medication 50 MILLIGRAM(S): at 09:32

## 2025-01-01 RX ADMIN — Medication 2: at 00:55

## 2025-01-01 RX ADMIN — SILDENAFIL CITRATE 20 MILLIGRAM(S): 100 TABLET, FILM COATED ORAL at 05:09

## 2025-01-01 RX ADMIN — LACOSAMIDE 150 MILLIGRAM(S): 200 TABLET, FILM COATED ORAL at 00:07

## 2025-01-01 RX ADMIN — Medication 2: at 23:14

## 2025-01-01 RX ADMIN — ACETAMINOPHEN, DIPHENHYDRAMINE HCL, PHENYLEPHRINE HCL 5 MILLIGRAM(S): 325; 25; 5 TABLET ORAL at 21:40

## 2025-01-01 RX ADMIN — Medication 2.5 MILLIGRAM(S): at 08:50

## 2025-01-01 RX ADMIN — Medication 1 TABLET(S): at 11:41

## 2025-01-01 RX ADMIN — BRIVARACETAM 100 MILLIGRAM(S): 50 TABLET, FILM COATED ORAL at 06:29

## 2025-01-01 RX ADMIN — ACETAMINOPHEN 650 MILLIGRAM(S): 160 SUSPENSION ORAL at 13:24

## 2025-01-01 RX ADMIN — Medication 1 TABLET(S): at 11:35

## 2025-01-01 RX ADMIN — ENOXAPARIN SODIUM 40 MILLIGRAM(S): 100 INJECTION SUBCUTANEOUS at 22:40

## 2025-01-01 RX ADMIN — Medication 1 PACKET(S): at 15:23

## 2025-01-01 RX ADMIN — FENTANYL CITRATE 25 MICROGRAM(S): 50 INJECTION INTRAMUSCULAR; INTRAVENOUS at 22:25

## 2025-01-01 RX ADMIN — Medication 1 PACKET(S): at 21:34

## 2025-01-01 RX ADMIN — Medication 2: at 23:18

## 2025-01-01 RX ADMIN — Medication 50 MILLILITER(S): at 05:38

## 2025-01-01 RX ADMIN — DEXMEDETOMIDINE HYDROCHLORIDE 2.81 MICROGRAM(S)/KG/HR: 4 INJECTION, SOLUTION INTRAVENOUS at 22:02

## 2025-01-01 RX ADMIN — FENTANYL CITRATE 25 MICROGRAM(S): 50 INJECTION INTRAMUSCULAR; INTRAVENOUS at 21:05

## 2025-01-01 RX ADMIN — Medication 125 MILLIGRAM(S): at 09:25

## 2025-01-01 RX ADMIN — Medication 2: at 23:10

## 2025-01-01 RX ADMIN — DEXMEDETOMIDINE HYDROCHLORIDE 2.81 MICROGRAM(S)/KG/HR: 4 INJECTION, SOLUTION INTRAVENOUS at 21:17

## 2025-01-01 RX ADMIN — Medication 500 MILLIGRAM(S): at 12:12

## 2025-01-01 RX ADMIN — Medication 25 MILLIGRAM(S): at 17:11

## 2025-01-01 RX ADMIN — MORPHINE SULFATE 2 MILLIGRAM(S): 60 TABLET, FILM COATED, EXTENDED RELEASE ORAL at 05:01

## 2025-01-01 RX ADMIN — LACOSAMIDE 150 MILLIGRAM(S): 200 TABLET, FILM COATED ORAL at 17:36

## 2025-01-01 RX ADMIN — ACETAMINOPHEN 650 MILLIGRAM(S): 160 SUSPENSION ORAL at 01:30

## 2025-01-01 RX ADMIN — BRIVARACETAM 100 MILLIGRAM(S): 50 TABLET, FILM COATED ORAL at 05:36

## 2025-01-01 RX ADMIN — MORPHINE SULFATE 2 MILLIGRAM(S): 60 TABLET, FILM COATED, EXTENDED RELEASE ORAL at 10:29

## 2025-01-01 RX ADMIN — ACETAMINOPHEN 650 MILLIGRAM(S): 160 SUSPENSION ORAL at 14:23

## 2025-01-01 RX ADMIN — FENTANYL CITRATE 25 MICROGRAM(S): 50 INJECTION INTRAMUSCULAR; INTRAVENOUS at 05:30

## 2025-01-01 RX ADMIN — CLONIDINE HYDROCHLORIDE 0.1 MILLIGRAM(S): 0.2 TABLET ORAL at 18:02

## 2025-01-01 RX ADMIN — BRIVARACETAM 100 MILLIGRAM(S): 50 TABLET, FILM COATED ORAL at 05:33

## 2025-01-01 RX ADMIN — LACOSAMIDE 150 MILLIGRAM(S): 200 TABLET, FILM COATED ORAL at 00:17

## 2025-01-01 RX ADMIN — Medication 2.5 MILLIGRAM(S): at 04:11

## 2025-01-01 RX ADMIN — LACOSAMIDE 150 MILLIGRAM(S): 200 TABLET, FILM COATED ORAL at 09:31

## 2025-01-01 RX ADMIN — ATORVASTATIN CALCIUM 10 MILLIGRAM(S): 80 TABLET, FILM COATED ORAL at 22:11

## 2025-01-01 RX ADMIN — Medication 6.25 MILLIGRAM(S): at 18:16

## 2025-01-01 RX ADMIN — Medication 25 MILLIGRAM(S): at 05:35

## 2025-01-01 RX ADMIN — Medication 4: at 23:56

## 2025-01-01 RX ADMIN — Medication 2000 MILLIGRAM(S): at 05:36

## 2025-01-01 RX ADMIN — ANTISEPTIC SURGICAL SCRUB 15 MILLILITER(S): 0.04 SOLUTION TOPICAL at 18:25

## 2025-01-01 RX ADMIN — Medication 1 PACKET(S): at 13:06

## 2025-01-01 RX ADMIN — Medication 1 TABLET(S): at 12:05

## 2025-01-01 RX ADMIN — Medication 0.5 MILLIGRAM(S): at 06:09

## 2025-01-01 RX ADMIN — Medication 1 PACKET(S): at 13:26

## 2025-01-01 RX ADMIN — Medication 12.5 MILLIGRAM(S): at 05:32

## 2025-01-01 RX ADMIN — CLONIDINE HYDROCHLORIDE 0.2 MILLIGRAM(S): 0.2 TABLET ORAL at 21:27

## 2025-01-01 RX ADMIN — Medication 500 MILLILITER(S): at 09:18

## 2025-01-01 RX ADMIN — CLONIDINE HYDROCHLORIDE 0.1 MILLIGRAM(S): 0.2 TABLET ORAL at 05:48

## 2025-01-01 RX ADMIN — Medication 0.5 MILLIGRAM(S): at 05:28

## 2025-01-01 RX ADMIN — Medication 2: at 18:14

## 2025-01-01 RX ADMIN — BRIVARACETAM 100 MILLIGRAM(S): 50 TABLET, FILM COATED ORAL at 05:40

## 2025-01-01 RX ADMIN — Medication 2: at 18:17

## 2025-01-01 RX ADMIN — PIPERACILLIN SODIUM AND TAZOBACTAM SODIUM 25 GRAM(S): 2; 250 INJECTION, POWDER, FOR SOLUTION INTRAVENOUS at 05:22

## 2025-01-01 RX ADMIN — BRIVARACETAM 100 MILLIGRAM(S): 50 TABLET, FILM COATED ORAL at 18:01

## 2025-01-01 RX ADMIN — Medication 1 TABLET(S): at 12:27

## 2025-01-01 RX ADMIN — Medication 1 TABLET(S): at 13:03

## 2025-01-01 RX ADMIN — ANTISEPTIC SURGICAL SCRUB 15 MILLILITER(S): 0.04 SOLUTION TOPICAL at 05:41

## 2025-01-01 RX ADMIN — Medication 500 MILLILITER(S): at 04:24

## 2025-01-01 RX ADMIN — Medication 1 TABLET(S): at 13:02

## 2025-01-01 RX ADMIN — LACOSAMIDE 150 MILLIGRAM(S): 200 TABLET, FILM COATED ORAL at 10:32

## 2025-01-01 RX ADMIN — Medication 4: at 17:30

## 2025-01-01 RX ADMIN — Medication 50 MILLIGRAM(S): at 05:17

## 2025-01-01 RX ADMIN — Medication 1 PACKET(S): at 01:00

## 2025-01-01 RX ADMIN — PANTOPRAZOLE 40 MILLIGRAM(S): 20 TABLET, DELAYED RELEASE ORAL at 11:41

## 2025-01-01 RX ADMIN — Medication 25 MILLIGRAM(S): at 05:01

## 2025-01-01 RX ADMIN — Medication 1 PACKET(S): at 21:27

## 2025-01-01 RX ADMIN — PANTOPRAZOLE 40 MILLIGRAM(S): 20 TABLET, DELAYED RELEASE ORAL at 13:01

## 2025-01-01 RX ADMIN — FENTANYL CITRATE 25 MICROGRAM(S): 50 INJECTION INTRAMUSCULAR; INTRAVENOUS at 19:30

## 2025-01-01 RX ADMIN — ANTISEPTIC SURGICAL SCRUB 15 MILLILITER(S): 0.04 SOLUTION TOPICAL at 05:09

## 2025-01-01 RX ADMIN — LACOSAMIDE 150 MILLIGRAM(S): 200 TABLET, FILM COATED ORAL at 08:16

## 2025-01-01 RX ADMIN — ASPIRIN 81 MILLIGRAM(S): 81 TABLET, COATED ORAL at 12:26

## 2025-01-01 RX ADMIN — Medication 1 PACKET(S): at 05:22

## 2025-01-01 RX ADMIN — ATORVASTATIN CALCIUM 10 MILLIGRAM(S): 80 TABLET, FILM COATED ORAL at 22:42

## 2025-01-01 RX ADMIN — Medication 20 MILLIGRAM(S): at 05:31

## 2025-01-01 RX ADMIN — Medication 50 MILLIGRAM(S): at 12:06

## 2025-01-01 RX ADMIN — ANTISEPTIC SURGICAL SCRUB 1 APPLICATION(S): 0.04 SOLUTION TOPICAL at 23:30

## 2025-01-01 RX ADMIN — MORPHINE SULFATE 2 MILLIGRAM(S): 60 TABLET, FILM COATED, EXTENDED RELEASE ORAL at 02:38

## 2025-01-01 RX ADMIN — Medication 2000 MILLIGRAM(S): at 17:09

## 2025-01-01 RX ADMIN — ASPIRIN 81 MILLIGRAM(S): 81 TABLET, COATED ORAL at 13:02

## 2025-01-01 RX ADMIN — PANTOPRAZOLE 40 MILLIGRAM(S): 20 TABLET, DELAYED RELEASE ORAL at 11:15

## 2025-01-01 RX ADMIN — Medication 6.25 MILLIGRAM(S): at 05:48

## 2025-01-01 RX ADMIN — PERAMPANEL 8 MILLIGRAM(S): 12 TABLET ORAL at 21:35

## 2025-01-01 RX ADMIN — Medication 2: at 12:20

## 2025-01-01 RX ADMIN — Medication 2 TABLET(S): at 22:22

## 2025-01-01 RX ADMIN — Medication 500 MILLIGRAM(S): at 12:46

## 2025-01-01 RX ADMIN — ACETAMINOPHEN, DIPHENHYDRAMINE HCL, PHENYLEPHRINE HCL 5 MILLIGRAM(S): 325; 25; 5 TABLET ORAL at 21:26

## 2025-01-01 RX ADMIN — LACOSAMIDE 150 MILLIGRAM(S): 200 TABLET, FILM COATED ORAL at 01:08

## 2025-01-01 RX ADMIN — ASPIRIN 81 MILLIGRAM(S): 81 TABLET, COATED ORAL at 18:49

## 2025-01-01 RX ADMIN — Medication 0.5 MILLIGRAM(S): at 22:17

## 2025-01-01 RX ADMIN — ACETAMINOPHEN, DIPHENHYDRAMINE HCL, PHENYLEPHRINE HCL 5 MILLIGRAM(S): 325; 25; 5 TABLET ORAL at 22:11

## 2025-01-01 RX ADMIN — LACOSAMIDE 150 MILLIGRAM(S): 200 TABLET, FILM COATED ORAL at 17:20

## 2025-01-01 RX ADMIN — ACETAMINOPHEN 650 MILLIGRAM(S): 160 SUSPENSION ORAL at 01:07

## 2025-01-01 RX ADMIN — Medication 1 PACKET(S): at 05:48

## 2025-01-01 RX ADMIN — BRIVARACETAM 100 MILLIGRAM(S): 50 TABLET, FILM COATED ORAL at 05:39

## 2025-01-01 RX ADMIN — DEXAMETHASONE SODIUM PHOSPHATE 40 MILLIGRAM(S): 4 INJECTION, SOLUTION INTRA-ARTICULAR; INTRALESIONAL; INTRAMUSCULAR; INTRAVENOUS; SOFT TISSUE at 05:22

## 2025-01-01 RX ADMIN — FENTANYL CITRATE 25 MICROGRAM(S): 50 INJECTION INTRAMUSCULAR; INTRAVENOUS at 08:13

## 2025-01-01 RX ADMIN — LACOSAMIDE 150 MILLIGRAM(S): 200 TABLET, FILM COATED ORAL at 17:30

## 2025-01-01 RX ADMIN — ASPIRIN 81 MILLIGRAM(S): 81 TABLET, COATED ORAL at 12:46

## 2025-01-01 RX ADMIN — MORPHINE SULFATE 2 MILLIGRAM(S): 60 TABLET, FILM COATED, EXTENDED RELEASE ORAL at 14:36

## 2025-01-01 RX ADMIN — Medication 85 MILLIMOLE(S): at 07:51

## 2025-01-01 RX ADMIN — ENOXAPARIN SODIUM 40 MILLIGRAM(S): 100 INJECTION SUBCUTANEOUS at 22:22

## 2025-01-01 RX ADMIN — BRIVARACETAM 100 MILLIGRAM(S): 50 TABLET, FILM COATED ORAL at 06:04

## 2025-01-01 RX ADMIN — Medication 25 MILLIGRAM(S): at 05:09

## 2025-01-01 RX ADMIN — LACOSAMIDE 150 MILLIGRAM(S): 200 TABLET, FILM COATED ORAL at 09:20

## 2025-01-01 RX ADMIN — MORPHINE SULFATE 2 MILLIGRAM(S): 60 TABLET, FILM COATED, EXTENDED RELEASE ORAL at 11:39

## 2025-01-01 RX ADMIN — LACOSAMIDE 150 MILLIGRAM(S): 200 TABLET, FILM COATED ORAL at 18:08

## 2025-01-01 RX ADMIN — ROCURONIUM BROMIDE 30 MILLIGRAM(S): 10 INJECTION INTRAVENOUS at 09:13

## 2025-01-01 RX ADMIN — Medication 500 MILLIGRAM(S): at 11:14

## 2025-01-01 RX ADMIN — ANTISEPTIC SURGICAL SCRUB 15 MILLILITER(S): 0.04 SOLUTION TOPICAL at 05:01

## 2025-01-01 RX ADMIN — CLONIDINE HYDROCHLORIDE 0.1 MILLIGRAM(S): 0.2 TABLET ORAL at 06:05

## 2025-01-01 RX ADMIN — Medication 2000 MILLIGRAM(S): at 17:10

## 2025-01-01 RX ADMIN — SODIUM PHOSPHATE, DIBASIC, ANHYDROUS, POTASSIUM PHOSPHATE, MONOBASIC, AND SODIUM PHOSPHATE, MONOBASIC, MONOHYDRATE 2 PACKET(S): 852; 155; 130 TABLET, COATED ORAL at 07:04

## 2025-01-01 RX ADMIN — FENTANYL CITRATE 25 MICROGRAM(S): 50 INJECTION INTRAMUSCULAR; INTRAVENOUS at 04:25

## 2025-01-01 RX ADMIN — ATORVASTATIN CALCIUM 10 MILLIGRAM(S): 80 TABLET, FILM COATED ORAL at 22:22

## 2025-01-01 RX ADMIN — MORPHINE SULFATE 2 MILLIGRAM(S): 60 TABLET, FILM COATED, EXTENDED RELEASE ORAL at 22:51

## 2025-01-01 RX ADMIN — Medication 1 TABLET(S): at 12:47

## 2025-01-01 RX ADMIN — Medication 2 TABLET(S): at 21:54

## 2025-01-01 RX ADMIN — SILDENAFIL CITRATE 20 MILLIGRAM(S): 100 TABLET, FILM COATED ORAL at 06:58

## 2025-01-01 RX ADMIN — SILDENAFIL CITRATE 20 MILLIGRAM(S): 100 TABLET, FILM COATED ORAL at 22:22

## 2025-01-01 RX ADMIN — LACOSAMIDE 150 MILLIGRAM(S): 200 TABLET, FILM COATED ORAL at 17:13

## 2025-01-01 RX ADMIN — Medication 50 MILLIGRAM(S): at 10:39

## 2025-01-01 RX ADMIN — Medication 25 MILLIGRAM(S): at 05:18

## 2025-01-01 RX ADMIN — Medication 50 MILLIGRAM(S): at 10:00

## 2025-01-01 RX ADMIN — PERAMPANEL 8 MILLIGRAM(S): 12 TABLET ORAL at 22:42

## 2025-01-01 RX ADMIN — PIPERACILLIN SODIUM AND TAZOBACTAM SODIUM 25 GRAM(S): 2; 250 INJECTION, POWDER, FOR SOLUTION INTRAVENOUS at 13:04

## 2025-01-01 RX ADMIN — LACOSAMIDE 150 MILLIGRAM(S): 200 TABLET, FILM COATED ORAL at 00:05

## 2025-01-01 RX ADMIN — Medication 6.25 MILLIGRAM(S): at 17:38

## 2025-01-01 RX ADMIN — SILDENAFIL CITRATE 20 MILLIGRAM(S): 100 TABLET, FILM COATED ORAL at 05:48

## 2025-01-01 RX ADMIN — LACOSAMIDE 150 MILLIGRAM(S): 200 TABLET, FILM COATED ORAL at 17:27

## 2025-01-01 RX ADMIN — ANTISEPTIC SURGICAL SCRUB 15 MILLILITER(S): 0.04 SOLUTION TOPICAL at 17:38

## 2025-01-01 RX ADMIN — ENOXAPARIN SODIUM 40 MILLIGRAM(S): 100 INJECTION SUBCUTANEOUS at 23:07

## 2025-01-01 RX ADMIN — LACOSAMIDE 150 MILLIGRAM(S): 200 TABLET, FILM COATED ORAL at 01:34

## 2025-01-01 RX ADMIN — ENOXAPARIN SODIUM 40 MILLIGRAM(S): 100 INJECTION SUBCUTANEOUS at 21:54

## 2025-01-01 RX ADMIN — Medication 2000 MILLIGRAM(S): at 06:58

## 2025-01-01 RX ADMIN — ANTISEPTIC SURGICAL SCRUB 15 MILLILITER(S): 0.04 SOLUTION TOPICAL at 18:14

## 2025-01-01 RX ADMIN — PANTOPRAZOLE 40 MILLIGRAM(S): 20 TABLET, DELAYED RELEASE ORAL at 12:31

## 2025-01-01 RX ADMIN — Medication 6.25 MILLIGRAM(S): at 18:03

## 2025-01-01 RX ADMIN — SILDENAFIL CITRATE 20 MILLIGRAM(S): 100 TABLET, FILM COATED ORAL at 21:18

## 2025-01-01 RX ADMIN — Medication 1 PACKET(S): at 23:21

## 2025-01-01 RX ADMIN — SILDENAFIL CITRATE 20 MILLIGRAM(S): 100 TABLET, FILM COATED ORAL at 15:20

## 2025-01-01 RX ADMIN — BRIVARACETAM 100 MILLIGRAM(S): 50 TABLET, FILM COATED ORAL at 05:47

## 2025-01-01 RX ADMIN — PANTOPRAZOLE 40 MILLIGRAM(S): 20 TABLET, DELAYED RELEASE ORAL at 11:35

## 2025-01-01 RX ADMIN — IMMUNE GLOBULIN INFUSION (HUMAN) 25 GRAM(S): 100 INJECTION, SOLUTION INTRAVENOUS; SUBCUTANEOUS at 11:42

## 2025-01-01 RX ADMIN — IMMUNE GLOBULIN INFUSION (HUMAN) 25 GRAM(S): 100 INJECTION, SOLUTION INTRAVENOUS; SUBCUTANEOUS at 12:06

## 2025-01-01 RX ADMIN — DEXAMETHASONE SODIUM PHOSPHATE 40 MILLIGRAM(S): 4 INJECTION, SOLUTION INTRA-ARTICULAR; INTRALESIONAL; INTRAMUSCULAR; INTRAVENOUS; SOFT TISSUE at 13:10

## 2025-01-01 RX ADMIN — Medication 25 MILLIGRAM(S): at 05:42

## 2025-01-01 RX ADMIN — LACOSAMIDE 150 MILLIGRAM(S): 200 TABLET, FILM COATED ORAL at 22:17

## 2025-01-01 RX ADMIN — DEXMEDETOMIDINE HYDROCHLORIDE 2.81 MICROGRAM(S)/KG/HR: 4 INJECTION, SOLUTION INTRAVENOUS at 12:26

## 2025-01-01 RX ADMIN — POLYETHYLENE GLYCOL 3350 17 GRAM(S): 17 POWDER, FOR SOLUTION ORAL at 12:13

## 2025-01-01 RX ADMIN — ANTISEPTIC SURGICAL SCRUB 15 MILLILITER(S): 0.04 SOLUTION TOPICAL at 05:53

## 2025-01-01 RX ADMIN — LACOSAMIDE 150 MILLIGRAM(S): 200 TABLET, FILM COATED ORAL at 17:41

## 2025-01-01 RX ADMIN — CLONIDINE HYDROCHLORIDE 0.1 MILLIGRAM(S): 0.2 TABLET ORAL at 18:48

## 2025-01-01 RX ADMIN — SILDENAFIL CITRATE 20 MILLIGRAM(S): 100 TABLET, FILM COATED ORAL at 21:35

## 2025-01-01 RX ADMIN — LACOSAMIDE 150 MILLIGRAM(S): 200 TABLET, FILM COATED ORAL at 09:37

## 2025-01-01 RX ADMIN — Medication 1000 MICROGRAM(S): at 11:41

## 2025-01-01 RX ADMIN — ANTISEPTIC SURGICAL SCRUB 15 MILLILITER(S): 0.04 SOLUTION TOPICAL at 05:39

## 2025-01-01 RX ADMIN — Medication 1 MILLIGRAM(S): at 13:36

## 2025-01-01 RX ADMIN — CLONIDINE HYDROCHLORIDE 0.2 MILLIGRAM(S): 0.2 TABLET ORAL at 21:38

## 2025-01-01 RX ADMIN — LACOSAMIDE 130 MILLIGRAM(S): 200 TABLET, FILM COATED ORAL at 00:55

## 2025-01-01 RX ADMIN — FENTANYL CITRATE 25 MICROGRAM(S): 50 INJECTION INTRAMUSCULAR; INTRAVENOUS at 10:08

## 2025-01-01 RX ADMIN — ANTISEPTIC SURGICAL SCRUB 15 MILLILITER(S): 0.04 SOLUTION TOPICAL at 06:17

## 2025-01-01 RX ADMIN — Medication 0.5 MILLIGRAM(S): at 18:01

## 2025-01-01 RX ADMIN — MORPHINE SULFATE 2 MILLIGRAM(S): 60 TABLET, FILM COATED, EXTENDED RELEASE ORAL at 05:17

## 2025-01-01 RX ADMIN — BRIVARACETAM 100 MILLIGRAM(S): 50 TABLET, FILM COATED ORAL at 05:32

## 2025-01-01 RX ADMIN — SILDENAFIL CITRATE 20 MILLIGRAM(S): 100 TABLET, FILM COATED ORAL at 14:37

## 2025-01-01 RX ADMIN — POTASSIUM PHOSPHATE, MONOBASIC POTASSIUM PHOSPHATE, DIBASIC 83.33 MILLIMOLE(S): 236; 224 INJECTION, SOLUTION INTRAVENOUS at 06:33

## 2025-01-01 RX ADMIN — BRIVARACETAM 100 MILLIGRAM(S): 50 TABLET, FILM COATED ORAL at 17:11

## 2025-01-01 RX ADMIN — HYDROMORPHONE HYDROCHLORIDE 0.5 MILLIGRAM(S): 4 INJECTION, SOLUTION INTRAMUSCULAR; INTRAVENOUS; SUBCUTANEOUS at 07:45

## 2025-01-01 RX ADMIN — Medication 2: at 11:42

## 2025-01-01 RX ADMIN — Medication 2000 MILLIGRAM(S): at 18:41

## 2025-01-01 RX ADMIN — FENTANYL CITRATE 25 MICROGRAM(S): 50 INJECTION INTRAMUSCULAR; INTRAVENOUS at 20:15

## 2025-01-01 RX ADMIN — MORPHINE SULFATE 2 MILLIGRAM(S): 60 TABLET, FILM COATED, EXTENDED RELEASE ORAL at 10:44

## 2025-01-01 RX ADMIN — Medication 1000 MICROGRAM(S): at 11:35

## 2025-01-01 RX ADMIN — ENOXAPARIN SODIUM 40 MILLIGRAM(S): 100 INJECTION SUBCUTANEOUS at 21:50

## 2025-01-01 RX ADMIN — Medication 2: at 23:11

## 2025-01-01 RX ADMIN — Medication 50 MILLIGRAM(S): at 05:38

## 2025-01-01 NOTE — PROGRESS NOTE ADULT - CRITICAL CARE ATTENDING COMMENT
My full attention was spent providing medically necessary critical care to the patient with details documented in my note above.   Critical care time spent examining patient, reviewing vitals, labs, medications, imaging and discussing with the team goals of care   The combined critical care time provided to the patient was   60 minutes  This time does not include bedside procedures that are documented separately.

## 2025-01-01 NOTE — PROGRESS NOTE ADULT - SUBJECTIVE AND OBJECTIVE BOX
Chief complaint:   Patient is a 84y old  Female who presents with a chief complaint of seizures, severe R ICA stenosis (31 Dec 2024 16:00)    HPI:  85yo F with PMHx HTN, HLD, osteoporosis, GERD, h/o breast cancer s/p left mastectomy, left hysterectomy BIBEMs for left-sided weakness and LUE rhythmic shaking. Per family, patient was last known well at 1:30am. When EMS arrived patient was alert but sloughing over the left side. Patient's left arm was noted to be weak and shortly started twitching, was given versed by EMS. By the time patient arrived to ER, Pt was intubated for airway protection. 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 CTA without LVO however with findings of high grade stenosis 75% right  ICA and 30% stenosis L ICA. NeuroICU consulted for further intervention. (24 Dec 2024 20:57)        24hr EVENTS:      ROS: [ ]  Unable to assess due to mental status   All other systems negative    -----------------------------------------------------------------------------------------------------------------------------------------------------------------------------------  ICU Vital Signs Last 24 Hrs  T(C): 37.7 (01 Jan 2025 09:00), Max: 38.1 (31 Dec 2024 17:00)  T(F): 99.9 (01 Jan 2025 09:00), Max: 100.6 (31 Dec 2024 17:00)  HR: 72 (01 Jan 2025 09:00) (55 - 90)  BP: 168/71 (01 Jan 2025 09:00) (103/55 - 185/79)  BP(mean): 96 (01 Jan 2025 09:00) (67 - 128)  ABP: --  ABP(mean): --  RR: 15 (01 Jan 2025 09:00) (12 - 25)  SpO2: 100% (01 Jan 2025 09:00) (100% - 100%)    O2 Parameters below as of 01 Jan 2025 08:00  Patient On (Oxygen Delivery Method): ventilator    O2 Concentration (%): 40        I&O's Summary    31 Dec 2024 07:01  -  01 Jan 2025 07:00  --------------------------------------------------------  IN: 2550 mL / OUT: 700 mL / NET: 1850 mL    01 Jan 2025 07:01  -  01 Jan 2025 10:02  --------------------------------------------------------  IN: 125 mL / OUT: 0 mL / NET: 125 mL        MEDICATIONS  (STANDING):  acetaminophen   IVPB .. 1000 milliGRAM(s) IV Intermittent once  albuterol    0.083% 2.5 milliGRAM(s) Nebulizer every 6 hours  aspirin  chewable 81 milliGRAM(s) Oral daily  atorvastatin 10 milliGRAM(s) Oral at bedtime  brivaracetam  Injectable 100 milliGRAM(s) IV Push two times a day  calcium carbonate 1250 mG  + Vitamin D (OsCal 500 + D) 1 Tablet(s) Oral daily  carvedilol 25 milliGRAM(s) Oral every 12 hours  chlorhexidine 0.12% Liquid 15 milliLiter(s) Oral Mucosa every 12 hours  cyanocobalamin 1000 MICROGram(s) Oral daily  dexMEDEtomidine Infusion 0.2 MICROgram(s)/kG/Hr (2.81 mL/Hr) IV Continuous <Continuous>  enoxaparin Injectable 40 milliGRAM(s) SubCutaneous <User Schedule>  insulin lispro (ADMELOG) corrective regimen sliding scale   SubCutaneous every 6 hours  lacosamide IVPB 150 milliGRAM(s) IV Intermittent <User Schedule>  methylPREDNISolone sodium succinate IVPB 1000 milliGRAM(s) IV Intermittent daily  pantoprazole  Injectable 40 milliGRAM(s) IV Push daily  perampanel 2 milliGRAM(s) Oral <User Schedule>  perampanel 4 milliGRAM(s) Oral at bedtime  piperacillin/tazobactam IVPB.. 3.375 Gram(s) IV Intermittent every 8 hours  polyethylene glycol 3350 17 Gram(s) Oral daily  senna 2 Tablet(s) Oral at bedtime  sodium chloride 0.9%. 1000 milliLiter(s) (50 mL/Hr) IV Continuous <Continuous>      RESPIRATORY:  Mode: AC/ CMV (Assist Control/ Continuous Mandatory Ventilation)  RR (machine): 12  TV (machine): 500  FiO2: 40  PEEP: 6  ITime: 1  MAP: 11  PIP: 23        IMAGING:   Recent imaging studies were reviewed.    LAB RESULTS:                          9.3    15.52 )-----------( 237      ( 01 Jan 2025 04:20 )             29.6           01-01    147[H]  |  111[H]  |  33.2[H]  ----------------------------<  109[H]  4.0   |  26.0  |  0.74    Ca    8.7      01 Jan 2025 04:20  Phos  2.1     01-01  Mg     2.4     01-01                  -----------------------------------------------------------------------------------------------------------------------------------------------------------------------------------    PHYSICAL EXAM:  General: Calm  HEENT: MMM  Neuro:  -Mental status- No acute distress  -CN- PERRL 3mm, EOMI, tongue midline, face symmetric    CV: RRR  Pulm: clear to auscultation  Abd: Soft, nontender, nondistended  Ext: no noted edema in lower ext  Skin: warm, dry       Chief complaint:   Patient is a 84y old  Female who presents with a chief complaint of seizures, severe R ICA stenosis (31 Dec 2024 16:00)    HPI:  83yo F with PMHx HTN, HLD, osteoporosis, GERD, h/o breast cancer s/p left mastectomy, left hysterectomy BIBEMs for left-sided weakness and LUE rhythmic shaking. Per family, patient was last known well at 1:30am. When EMS arrived patient was alert but sloughing over the left side. Patient's left arm was noted to be weak and shortly started twitching, was given versed by EMS. By the time patient arrived to ER, Pt was intubated for airway protection. 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 CTA without LVO however with findings of high grade stenosis 75% right  ICA and 30% stenosis L ICA. NeuroICU consulted for further intervention. (24 Dec 2024 20:57)    24hr EVENTS: off ketamine, still IIC on EEG      ROS: [x ]  Unable to assess due to mental status   All other systems negative    -----------------------------------------------------------------------------------------------------------------------------------------------------------------------------------  ICU Vital Signs Last 24 Hrs  T(C): 37.7 (01 Jan 2025 09:00), Max: 38.1 (31 Dec 2024 17:00)  T(F): 99.9 (01 Jan 2025 09:00), Max: 100.6 (31 Dec 2024 17:00)  HR: 72 (01 Jan 2025 09:00) (55 - 90)  BP: 168/71 (01 Jan 2025 09:00) (103/55 - 185/79)  BP(mean): 96 (01 Jan 2025 09:00) (67 - 128)  ABP: --  ABP(mean): --  RR: 15 (01 Jan 2025 09:00) (12 - 25)  SpO2: 100% (01 Jan 2025 09:00) (100% - 100%)    O2 Parameters below as of 01 Jan 2025 08:00  Patient On (Oxygen Delivery Method): ventilator  O2 Concentration (%): 40      I&O's Summary    31 Dec 2024 07:01  -  01 Jan 2025 07:00  --------------------------------------------------------  IN: 2550 mL / OUT: 700 mL / NET: 1850 mL    01 Jan 2025 07:01  -  01 Jan 2025 10:02  --------------------------------------------------------  IN: 125 mL / OUT: 0 mL / NET: 125 mL        MEDICATIONS  (STANDING):  acetaminophen   IVPB .. 1000 milliGRAM(s) IV Intermittent once  albuterol    0.083% 2.5 milliGRAM(s) Nebulizer every 6 hours  aspirin  chewable 81 milliGRAM(s) Oral daily  atorvastatin 10 milliGRAM(s) Oral at bedtime  brivaracetam  Injectable 100 milliGRAM(s) IV Push two times a day  calcium carbonate 1250 mG  + Vitamin D (OsCal 500 + D) 1 Tablet(s) Oral daily  carvedilol 25 milliGRAM(s) Oral every 12 hours  chlorhexidine 0.12% Liquid 15 milliLiter(s) Oral Mucosa every 12 hours  cyanocobalamin 1000 MICROGram(s) Oral daily  dexMEDEtomidine Infusion 0.2 MICROgram(s)/kG/Hr (2.81 mL/Hr) IV Continuous <Continuous>  enoxaparin Injectable 40 milliGRAM(s) SubCutaneous <User Schedule>  insulin lispro (ADMELOG) corrective regimen sliding scale   SubCutaneous every 6 hours  lacosamide IVPB 150 milliGRAM(s) IV Intermittent <User Schedule>  methylPREDNISolone sodium succinate IVPB 1000 milliGRAM(s) IV Intermittent daily  pantoprazole  Injectable 40 milliGRAM(s) IV Push daily  perampanel 2 milliGRAM(s) Oral <User Schedule>  perampanel 4 milliGRAM(s) Oral at bedtime  piperacillin/tazobactam IVPB.. 3.375 Gram(s) IV Intermittent every 8 hours  polyethylene glycol 3350 17 Gram(s) Oral daily  senna 2 Tablet(s) Oral at bedtime  sodium chloride 0.9%. 1000 milliLiter(s) (50 mL/Hr) IV Continuous <Continuous>      RESPIRATORY:  Mode: AC/ CMV (Assist Control/ Continuous Mandatory Ventilation)  RR (machine): 12  TV (machine): 500  FiO2: 40  PEEP: 6  ITime: 1  MAP: 11  PIP: 23        IMAGING:   Recent imaging studies were reviewed.    LAB RESULTS:                          9.3    15.52 )-----------( 237      ( 01 Jan 2025 04:20 )             29.6           01-01    147[H]  |  111[H]  |  33.2[H]  ----------------------------<  109[H]  4.0   |  26.0  |  0.74    Ca    8.7      01 Jan 2025 04:20  Phos  2.1     01-01  Mg     2.4     01-01        -----------------------------------------------------------------------------------------------------------------------------------------------------------------------------------    PHYSICAL EXAM:  General: Calm, intubated  HEENT: MMM  Neuro:  -Mental status- No acute distress, no FC  -CN- PERRL 2mm, EOMI, tongue midline, face symmetric  bilat upper WD  bilat lower WD    CV: RRR  Pulm: clear to auscultation  Abd: Soft, nontender, nondistended  Ext: no noted edema in lower ext  Skin: warm, dry

## 2025-01-01 NOTE — PROGRESS NOTE ADULT - SUBJECTIVE AND OBJECTIVE BOX
INFECTIOUS DISEASES AND INTERNAL MEDICINE at [place]  =======================================================  Meek Mckee MD  Diplomates American Board of Internal Medicine and Infectious Diseases  Telephone [phone]  Fax            [phone]  =======================================================    RAÚL JASMINE [id number]    Follow up:  GM NEG BACTEREMIA    Allergies:  No Known Allergies      Medications:  acetaminophen   IVPB .. 1000 milliGRAM(s) IV Intermittent once  acetaminophen   Oral Liquid .. 650 milliGRAM(s) Oral every 6 hours PRN  albuterol    0.083% 2.5 milliGRAM(s) Nebulizer every 6 hours  aspirin  chewable 81 milliGRAM(s) Oral daily  atorvastatin 10 milliGRAM(s) Oral at bedtime  brivaracetam  Injectable 100 milliGRAM(s) IV Push two times a day  calcium carbonate 1250 mG  + Vitamin D (OsCal 500 + D) 1 Tablet(s) Oral daily  carvedilol 25 milliGRAM(s) Oral every 12 hours  chlorhexidine 0.12% Liquid 15 milliLiter(s) Oral Mucosa every 12 hours  cyanocobalamin 1000 MICROGram(s) Oral daily  dexMEDEtomidine Infusion 0.2 MICROgram(s)/kG/Hr IV Continuous <Continuous>  enoxaparin Injectable 40 milliGRAM(s) SubCutaneous <User Schedule>  [id number]    Injectable 25 MICROGram(s) IV Push every 2 hours PRN  hydrALAZINE Injectable 10 milliGRAM(s) IV Push every 3 hours PRN  insulin lispro (ADMELOG) corrective regimen sliding scale   SubCutaneous every 6 hours  labetalol Injectable 10 milliGRAM(s) IV Push every 3 hours PRN  lacosamide IVPB 150 milliGRAM(s) IV Intermittent <User Schedule>  methylPREDNISolone sodium succinate IVPB 1000 milliGRAM(s) IV Intermittent daily  pantoprazole  Injectable 40 milliGRAM(s) IV Push daily  perampanel 2 milliGRAM(s) Oral <User Schedule>  perampanel 4 milliGRAM(s) Oral at bedtime  piperacillin/tazobactam IVPB.. 3.375 Gram(s) IV Intermittent every 8 hours  polyethylene glycol 3350 17 Gram(s) Oral daily  senna 2 Tablet(s) Oral at bedtime  sodium chloride 0.9%. 1000 milliLiter(s) IV Continuous <Continuous>    SOCIAL       FAMILY   FAMILY HISTORY:    REVIEW OF SYSTEMS:  UNABLE TO OBTAIN           Physical Exam:  ICU Vital Signs Last 24 Hrs  T(C): 37.8 (01 Jan 2025 10:00), Max: 38.1 (31 Dec 2024 17:00)  T(F): 100 (01 Jan 2025 10:00), Max: 100.6 (31 Dec 2024 17:00)  HR: 68 (01 Jan 2025 10:00) (55 - 90)  BP: 132/76 (01 Jan 2025 10:00) (103/55 - 185/79)  BP(mean): 95 (01 Jan 2025 10:00) (67 - 128)  ABP: --  ABP(mean): --  RR: 13 (01 Jan 2025 10:00) (12 - 25)  SpO2: 100% (01 Jan 2025 10:00) (100% - 100%)    O2 Parameters below as of 01 Jan 2025 08:00  Patient On (Oxygen Delivery Method): ventilator    O2 Concentration (%): 40      GEN  ON VENT UNRESPONSIVE   HEENT: normocephalic and atraumatic. EOMI. JARED.    NECK: Supple. No carotid bruits.  No lymphadenopathy or thyromegaly.  LUNGS: Clear to auscultation.  HEART: Regular rate and rhythm without murmur.  ABDOMEN: Soft, nontender, and nondistended.  Positive bowel sounds.    : No CVA tenderness  EXTREMITIES: Without any cyanosis, clubbing, rash, lesions or edema.  MSK: no joint swelling  NEUROLOGIC: ON VENT UNRESPONSIVE       Labs:  Vitals:  ============  T(F): 100 (01 Jan 2025 10:00), Max: 100.6 (31 Dec 2024 17:00)  HR: 68 (01 Jan 2025 10:00)  BP: 132/76 (01 Jan 2025 10:00)  RR: 13 (01 Jan 2025 10:00)  SpO2: 100% (01 Jan 2025 10:00) (100% - 100%)  temp max in last 48H T(F): , Max: 101.1 (12-30-24 @ 16:15)    =======================================================  Current Antibiotics:  piperacillin/tazobactam IVPB.. 3.375 Gram(s) IV Intermittent every 8 hours    Other medications:  acetaminophen   IVPB .. 1000 milliGRAM(s) IV Intermittent once  albuterol    0.083% 2.5 milliGRAM(s) Nebulizer every 6 hours  aspirin  chewable 81 milliGRAM(s) Oral daily  atorvastatin 10 milliGRAM(s) Oral at bedtime  brivaracetam  Injectable 100 milliGRAM(s) IV Push two times a day  calcium carbonate 1250 mG  + Vitamin D (OsCal 500 + D) 1 Tablet(s) Oral daily  carvedilol 25 milliGRAM(s) Oral every 12 hours  chlorhexidine 0.12% Liquid 15 milliLiter(s) Oral Mucosa every 12 hours  cyanocobalamin 1000 MICROGram(s) Oral daily  dexMEDEtomidine Infusion 0.2 MICROgram(s)/kG/Hr IV Continuous <Continuous>  enoxaparin Injectable 40 milliGRAM(s) SubCutaneous <User Schedule>  insulin lispro (ADMELOG) corrective regimen sliding scale   SubCutaneous every 6 hours  lacosamide IVPB 150 milliGRAM(s) IV Intermittent <User Schedule>  methylPREDNISolone sodium succinate IVPB 1000 milliGRAM(s) IV Intermittent daily  pantoprazole  Injectable 40 milliGRAM(s) IV Push daily  perampanel 2 milliGRAM(s) Oral <User Schedule>  perampanel 4 milliGRAM(s) Oral at bedtime  polyethylene glycol 3350 17 Gram(s) Oral daily  senna 2 Tablet(s) Oral at bedtime  sodium chloride 0.9%. 1000 milliLiter(s) IV Continuous <Continuous>      =======================================================  Labs:                        9.3    15.52 )-----------( 237      ( 01 Jan 2025 04:20 )             29.6     01-01    147[H]  |  111[H]  |  33.2[H]  ----------------------------<  109[H]  4.0   |  26.0  |  0.74    Ca    8.7      01 Jan 2025 04:20  Phos  2.1     01-01  Mg     2.4     01-01        Culture - Sputum (collected 12-30-24 @ 20:05)  Source: ET Tube ET Tube  Gram Stain (12-31-24 @ 03:24):    Few polymorphonuclear leukocytes per low power field    Few Squamous epithelial cells per low power field    Moderate Gram Negative Rods seen per oil power field    Few Gram positive cocci in pairs seen per oil power field    Culture - Blood (collected 12-30-24 @ 16:55)  Source: .Blood BLOOD  Gram Stain (12-31-24 @ 09:21):    Growth in aerobic bottle: Gram Negative Rods  Preliminary Report (12-31-24 @ 23:11):    Growth in aerobic bottle: Citrobacter freundii complex    Direct identification is available within approximately 3-5    hours either by Blood Panel Multiplexed PCR or Direct    MALDI-TOF. Details: [email or web address]  Organism: Blood Culture PCR (12-31-24 @ 10:54)  Organism: Blood Culture PCR (12-31-24 @ 10:54)    Sensitivities:      Method Type: PCR      -  Enterobacterales: Detec    Culture - Blood (collected 12-30-24 @ 16:45)  Source: .Blood BLOOD  Preliminary Report (12-31-24 @ 23:02):    No growth at 24 hours    Culture - CSF with Gram Stain (collected 12-27-24 @ 13:50)  Source: .CSF CSF  Gram Stain (12-27-24 @ 22:52):    polymorphonuclear leukocytes seen    No organisms seen    by cytocentrifuge  Preliminary Report (12-28-24 @ 15:26):    No growth to date.    Culture - Fungal, CSF (collected 12-27-24 @ 13:50)  Source: .CSF CSF  Preliminary Report (12-30-24 @ 11:01):    No growth    Culture - Acid Fast - CSF (collected 12-27-24 @ 13:50)  Source: .CSF CSF  Preliminary Report (12-28-24 @ 23:06):    Culture is being performed.    Culture - Blood (collected 12-27-24 @ 10:55)  Source: .Blood BLOOD  Preliminary Report (12-31-24 @ 17:01):    No growth at 4 days    Culture - Blood (collected 12-27-24 @ 10:47)  Source: .Blood BLOOD  Preliminary Report (12-31-24 @ 17:01):    No growth at 4 days      Creatinine: 0.74 mg/dL (01-01-25 @ 04:20)  Creatinine: 0.82 mg/dL (12-31-24 @ 02:28)  Creatinine: 0.77 mg/dL (12-30-24 @ 02:30)  Creatinine: 0.71 mg/dL (12-29-24 @ 03:29)  Creatinine: 0.87 mg/dL (12-28-24 @ 03:57)    Procalcitonin: 0.13 ng/mL (12-30-24 @ 16:50)  Procalcitonin: 0.17 ng/mL (12-27-24 @ 10:47)      Ferritin: 878 ng/mL (12-25-24 @ 14:50)    C-Reactive Protein: 140 mg/L (12-27-24 @ 10:47)  C-Reactive Protein: 104 mg/L (12-26-24 @ 02:30)    WBC Count: 15.52 K/uL (01-01-25 @ 04:20)  WBC Count: 16.74 K/uL (12-31-24 @ 02:28)  WBC Count: 14.59 K/uL (12-30-24 @ 16:50)  WBC Count: 14.47 K/uL (12-30-24 @ 02:30)  WBC Count: 11.35 K/uL (12-29-24 @ 03:29)  WBC Count: 11.00 K/uL (12-28-24 @ 03:57)    SARS-CoV-2: NotDetec (12-30-24 @ 16:55)  SARS-CoV-2: NotDetec (12-27-24 @ 10:47)

## 2025-01-01 NOTE — PROGRESS NOTE ADULT - ASSESSMENT
83yo F with PMHx HTN, HLD, osteoporosis, GERD, h/o breast cancer s/p left mastectomy, left hysterectomy BIBEMs for left-sided weakness and LUE rhythmic shaking.   . Patient's left arm was noted to be weak and shortly started twitching, was given versed by EMS. By the time patient arrived to ER, Pt was intubated for airway protection. 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 CTA without LVO however with findings of high grade stenosis 75% right  ICA and 30% stenosis L ICA. NeuroICU consulted for further intervention.   AS ABOVE ADMITTED 12/24  WITH WEAKNESS AND   WAS  INTUBATED  AND FOUND  TO HAVE SEIZURES  NOW WITH BLOOD CX  GM NEG RODS ENTEROBACTERALES  IDENTIFIES AS CITROBACTER FREUNDII  USUALLY GI OR  SOURCE SUGGEST CT ABD PELVIS TO LOOK FOR GI SOURCE   CONTINUE ZOSYN FOR NOW  WILL FOLLOWUP  FINAL ID AND SENSIT MARTINA  SPOKE TO NEUROSURGICAL TEAM

## 2025-01-01 NOTE — PROGRESS NOTE ADULT - ASSESSMENT
84F new onset status epilepticus (clinically - with L UE and LLE twitching).    Patient is critically ill with high probability of imminent neurologic or life-threatening deterioration due to the following diagnoses:  1) Status Epilepticus  - For which we are monitoring neurologically and electrographically on EEG and providing continous sedating antiepileptic medications  2) Respiratory Failure  - For which we are treating with antibiotics and weaning ventilator settings with spontaneous breathing trials as tolerated    Plan:  - neurochecks  - cont cEEG  - Briviact  - Vimpat to 150mg TID  - Fycompa 3 day load plus 4mg qhs per epilepsy  - ASA   - hold Aricept for now   - 1g Solumedrol for 7 days for empiric autoimmune encephalitis  - -180, avoid significant fluctuations; cont home Coreg  - Osats>92%, vent support, SBT as tolerated  - Nebs q6h  - cont TF, BM regimen; PPI for ulcer ppx  - monitor e-lytes, I/Os; add free water, d/c NS  - Goal euglycemia (-180), ISS; cont home statin  - VTE prophylaxis: SCDs, SQL   84F new onset status epilepticus (clinically - with L UE and LLE twitching). h/o breast cancer (2019)    Patient is critically ill with high probability of imminent neurologic or life-threatening deterioration due to the following diagnoses:  1) Status Epilepticus  - For which we are monitoring neurologically and electrographically on EEG and providing continous sedating antiepileptic medications  2) Respiratory Failure  - For which we are treating with antibiotics and weaning ventilator settings with spontaneous breathing trials as tolerated    Plan:  Neuro  - neurochecks q4h  - cont cEEG- IIC, Relative decrease in frequency of discharges between 1400 (12/31) and 0030 (01/01)   - Briviact and Vimpat    - Fycompa 3 day load plus 4mg qhs per epilepsy  - hold Aricept for now   - 1g Solumedrol for 7 days (-1/5)  for empiric autoimmune encephalitis  - bradycardia with precedex    CV  - -180, avoid significant fluctuations; cont home Coreg  - restart sildenafil  - ASA  - cont home statin    Pulm  - Osats>92%, vent support, SBT as tolerated  - duoNebs q6h prn    GI  - cont TF, BM regimen; PPI for ulcer ppx  - LBM 12/31      - monitor e-lytes, I/Os; 300 cc q6 free water   - voiding    Endo  - Goal euglycemia (-180), ISS    Heme  - VTE prophylaxis: SCDs, SQL    ID  - followup cultures  - appreciate ID recs- GNRs in one tube blood culture (Citrobacter)  - empiric zosyn  - CT ab pelvis pending

## 2025-01-01 NOTE — EEG REPORT - NS EEG TEXT BOX
Study Date: 		12-31-24 (0800) - 01-01-25 (0800)  Duration: 24hr  --------------------------------------------------------------------------------------------------  History:  CC/ HPI Patient is a 84y old  Female who presents with a chief complaint of seizures, severe R ICA stenosis (29 Dec 2024 12:35)    MEDICATIONS  (STANDING):  brivaracetam  Injectable 100 milliGRAM(s) IV Push two times a day  lacosamide IVPB 100 milliGRAM(s) IV Intermittent <User Schedule>  methylPREDNISolone sodium succinate IVPB 1000 milliGRAM(s) IV Intermittent daily  perampanel 2 milliGRAM(s) Oral <User Schedule>        --------------------------------------------------------------------------------------------------  Study Interpretation:    [[[Abbreviation Key:  PDR=alpha rhythm/posterior dominant rhythm. A-P=anterior posterior gradient.  Amplitude: ‘very low’:<20; ‘low’:20-50; ‘medium’:; ‘high’:>200uV.  Persistence for periodic/rhythmic patterns (% of epoch) ‘rare’:<1%; ‘occasional’:1-10%; ‘frequent’:10-50%; ‘abundant’:50-90%; ‘continuous’:>90%.  Persistence for sporadic discharges: ‘rare’:<1/hr; ‘occasional’:1/min-1/hr; ‘frequent’:>1/min; ‘abundant’:>1/10 sec.  GRDA=generalized rhythmic delta activity; FIRDA=frontal intermittent GRDA; LRDA=lateralized rhythmic delta activity; TIRDA=temporal intermittent rhythmic delta activity;  LPD=PLED=lateralized periodic discharges; GPD=generalized periodic discharges; BiPDs=BiPLEDs=bilateral independent periodic epileptiform discharges; SIRPID=stimulus induced rhythmic, periodic, or ictal appearing discharges; BIRDs=brief potentially ictal rhythmic discharges >4 Hz, lasting .5-10s; PFA=paroxysmal bursts of beta/gamma; LVFA=low voltage fast activity.  Modifiers: +F=with fast component; +S=with spike component; +R=with rhythmic component.  S-B=burst suppression pattern.  Max=maximal. N1-drowsy; N2-stage II sleep; N3-slow wave sleep. SSS/BETS=small sharp spikes/benign epileptiform transients of sleep. HV=hyperventilation; PS=photic stimulation]]]    Daily EEG Visual Analysis    FINDINGS:      Background:  Continuity: continuous  Symmetry: asymmetric  PDR: none  Reactivity: present  Voltage: normal  Anterior Posterior Gradient: absent  Other background findings: none  Breach: absent    Background Slowing:  Generalized slowing: diffuse theta-delta slowing  Focal slowing: Continuous polymorphic delta frequency slowing over the right hemisphere, right centrotemporal max    State Changes:   -N2 sleep transients were not recorded.    Sporadic Epileptiform Discharges:    As below    Rhythmic and Periodic Patterns (RPPs):  Abundant/continuous Lateralized periodic discharges, fluctuating in frequency between 1-2.5Hz, maximal over P4>C4 Pz Cz, consistent with a pattern of ictal-interictal continuum.  Discharges faster with stimulation/arousal.  Voltage of discharges lower vs few days prior before ketamine infusion. Relative reduction in frequency of discharges between 1400 and 0030.    Electrographic and Electroclinical seizures:  None    Other Clinical Events:    Activation Procedures:   -Hyperventilation was not performed.    -Photic stimulation was not performed.    Artifacts:  Intermittent myogenic and movement artifacts were noted.     ECG:  The heart rate on single channel ECG was predominantly between 60-90 BPM.    EEG Classification / Summary:   Abnormal  EEG in a lethargic patient:   1. Ictal-interictal continuum, right centroparietal region: Lateralized periodic discharges, abundant/continuous, 1-2.5Hz increased with stimulation/arousal. Voltage of discharges lower vs few days prior before ketamine infusion.   2. Continuous polymorphic delta slowing, focal, right hemisphere   3.  Generalized background slowing, moderate     Clinical Impression:   1. Elevated risk of focal seizures from the right centroparietal region. At times discharges near 2-2.5hz frequency c/w IIC.  A pattern on the ictal-interictal continuum is a pattern that does not qualify as a definitive electrographic seizure or electrographic status epilepticus, but there is a reasonable chance that it may be contributing to impaired alertness, causing other clinical symptoms, and/or contributing to neuronal injury.  Voltage of discharges lower vs few days prior before ketamine infusion.   Relative decrease in frequency of discharges between 1400 (12/31) and 0030 (01/01)     2. Structural abnormality in the right hemisphere     3. Moderate diffuse cerebral dysfunction.       *PRELIM READ, ATTENDING REVIEW PENDING*     Kei Mock DO   Epilepsy Fellow    Study Date: 		12-31-24 (0800) - 01-01-25 (0800)  Duration: 24hr  --------------------------------------------------------------------------------------------------  History:  CC/ HPI Patient is a 84y old  Female who presents with a chief complaint of seizures, severe R ICA stenosis (29 Dec 2024 12:35)    MEDICATIONS  (STANDING):  brivaracetam  Injectable 100 milliGRAM(s) IV Push two times a day  lacosamide IVPB 100 milliGRAM(s) IV Intermittent <User Schedule>  methylPREDNISolone sodium succinate IVPB 1000 milliGRAM(s) IV Intermittent daily  perampanel 2 milliGRAM(s) Oral <User Schedule>        --------------------------------------------------------------------------------------------------  Study Interpretation:    [[[Abbreviation Key:  PDR=alpha rhythm/posterior dominant rhythm. A-P=anterior posterior gradient.  Amplitude: ‘very low’:<20; ‘low’:20-50; ‘medium’:; ‘high’:>200uV.  Persistence for periodic/rhythmic patterns (% of epoch) ‘rare’:<1%; ‘occasional’:1-10%; ‘frequent’:10-50%; ‘abundant’:50-90%; ‘continuous’:>90%.  Persistence for sporadic discharges: ‘rare’:<1/hr; ‘occasional’:1/min-1/hr; ‘frequent’:>1/min; ‘abundant’:>1/10 sec.  GRDA=generalized rhythmic delta activity; FIRDA=frontal intermittent GRDA; LRDA=lateralized rhythmic delta activity; TIRDA=temporal intermittent rhythmic delta activity;  LPD=PLED=lateralized periodic discharges; GPD=generalized periodic discharges; BiPDs=BiPLEDs=bilateral independent periodic epileptiform discharges; SIRPID=stimulus induced rhythmic, periodic, or ictal appearing discharges; BIRDs=brief potentially ictal rhythmic discharges >4 Hz, lasting .5-10s; PFA=paroxysmal bursts of beta/gamma; LVFA=low voltage fast activity.  Modifiers: +F=with fast component; +S=with spike component; +R=with rhythmic component.  S-B=burst suppression pattern.  Max=maximal. N1-drowsy; N2-stage II sleep; N3-slow wave sleep. SSS/BETS=small sharp spikes/benign epileptiform transients of sleep. HV=hyperventilation; PS=photic stimulation]]]    Daily EEG Visual Analysis    FINDINGS:      Background:  Continuity: continuous  Symmetry: asymmetric  PDR: none  Reactivity: present  Voltage: normal  Anterior Posterior Gradient: absent  Other background findings: none  Breach: absent    Background Slowing:  Generalized slowing: diffuse theta-delta slowing  Focal slowing: Continuous polymorphic delta frequency slowing over the right hemisphere, right centrotemporal max    State Changes:   -N2 sleep transients were not recorded.    Sporadic Epileptiform Discharges:    As below    Rhythmic and Periodic Patterns (RPPs):  Abundant/continuous Lateralized periodic discharges, fluctuating in frequency between 1-2.5Hz, maximal over P4>C4 Pz Cz, consistent with a pattern of ictal-interictal continuum.  Discharges faster with stimulation/arousal.  Voltage of discharges lower vs few days prior before ketamine infusion. Relative reduction in frequency of discharges between 1400 and 0030.    Electrographic and Electroclinical seizures:  None    Other Clinical Events:    Activation Procedures:   -Hyperventilation was not performed.    -Photic stimulation was not performed.    Artifacts:  Intermittent myogenic and movement artifacts were noted.     ECG:  The heart rate on single channel ECG was predominantly between 60-90 BPM.    -------------------------------------------------------------------------------------------------------  EEG Classification / Summary:   Abnormal  EEG in a lethargic patient:   1. Ictal-interictal continuum, right centroparietal region: Lateralized periodic discharges, abundant/continuous, 1-2.5Hz increased with stimulation/arousal. Voltage of discharges lower vs few days prior before ketamine infusion.   2. Continuous polymorphic delta slowing, focal, right hemisphere   3.  Generalized background slowing, moderate     -------------------------------------------------------------------------------------------------------  Clinical Impression:   1. Elevated risk of focal seizures from the right centroparietal region. At times discharges near 2-2.5hz frequency c/w IIC.  A pattern on the ictal-interictal continuum is a pattern that does not qualify as a definitive electrographic seizure or electrographic status epilepticus, but there is a reasonable chance that it may be contributing to impaired alertness, causing other clinical symptoms, and/or contributing to neuronal injury.  Voltage of discharges lower vs few days prior before ketamine infusion. Equivocal relative decrease in frequency of discharges between 1400 (12/31) and 0030 (01/01)     2. Structural abnormality in the right hemisphere     3. Moderate diffuse cerebral dysfunction.     Kei Mock DO   Epilepsy Fellow     Kalpesh Moya MD PhD  Director, Epilepsy Division, [facility]

## 2025-01-02 NOTE — PROGRESS NOTE ADULT - SUBJECTIVE AND OBJECTIVE BOX
INFECTIOUS DISEASES AND INTERNAL MEDICINE at [place]  =======================================================  Meek Mckee MD  Diplomates American Board of Internal Medicine and Infectious Diseases  Telephone [phone]  Fax            [phone]  =======================================================    RAÚL JASMINE [id number]    Follow up:  [facility]    Allergies:  No Known Allergies      Medications:  acetaminophen   IVPB .. 1000 milliGRAM(s) IV Intermittent once  acetaminophen   Oral Liquid .. 650 milliGRAM(s) Oral every 6 hours PRN  albuterol/ipratropium for Nebulization 3 milliLiter(s) Nebulizer every 6 hours PRN  aspirin  chewable 81 milliGRAM(s) Oral daily  atorvastatin 10 milliGRAM(s) Oral at bedtime  brivaracetam  Injectable 100 milliGRAM(s) IV Push two times a day  calcium carbonate 1250 mG  + Vitamin D (OsCal 500 + D) 1 Tablet(s) Oral daily  carvedilol 25 milliGRAM(s) Oral every 12 hours  cefepime  Injectable. 2000 milliGRAM(s) IV Push every 12 hours  chlorhexidine 0.12% Liquid 15 milliLiter(s) Oral Mucosa every 12 hours  cyanocobalamin 1000 MICROGram(s) Oral daily  dexMEDEtomidine Infusion 0.2 MICROgram(s)/kG/Hr IV Continuous <Continuous>  enoxaparin Injectable 40 milliGRAM(s) SubCutaneous <User Schedule>  fentaNYL    Injectable 25 MICROGram(s) IV Push every 2 hours PRN  hydrALAZINE Injectable 10 milliGRAM(s) IV Push every 3 hours PRN  insulin lispro (ADMELOG) corrective regimen sliding scale   SubCutaneous every 6 hours  labetalol Injectable 10 milliGRAM(s) IV Push every 3 hours PRN  lacosamide Solution 150 milliGRAM(s) Oral <User Schedule>  methylPREDNISolone sodium succinate IVPB 1000 milliGRAM(s) IV Intermittent daily  pantoprazole  Injectable 40 milliGRAM(s) IV Push daily  perampanel 8 milliGRAM(s) Oral at bedtime  polyethylene glycol 3350 17 Gram(s) Oral daily  senna 2 Tablet(s) Oral at bedtime  sildenafil (REVATIO) 20 milliGRAM(s) Oral three times a day    SOCIAL       FAMILY   FAMILY HISTORY:    REVIEW OF SYSTEMS:  CONSTITUTIONAL:  No Fever or chills  UNABLE TO OBTAIN       Physical Exam:  ICU Vital Signs Last 24 Hrs  T(C): 37.6 (02 Jan 2025 16:15), Max: 37.6 (02 Jan 2025 00:30)  T(F): 99.7 (02 Jan 2025 16:15), Max: 99.7 (02 Jan 2025 00:30)  HR: 61 (02 Jan 2025 16:15) (51 - 79)  BP: 146/68 (02 Jan 2025 16:15) (119/69 - 174/78)  BP(mean): 92 (02 Jan 2025 16:15) (70 - 131)  ABP: --  ABP(mean): --  RR: 14 (02 Jan 2025 16:15) (11 - 21)  SpO2: 100% (02 Jan 2025 16:15) (100% - 100%)    O2 Parameters below as of 02 Jan 2025 16:00  Patient On (Oxygen Delivery Method): ventilator          GEN:   UNRESPONSIVE ON VENT   HEENT: normocephalic and atraumatic. EOMI. JARED.    NECK: Supple. No carotid bruits.  No lymphadenopathy or thyromegaly.  LUNGS: Clear to auscultation.  HEART: Regular rate and rhythm without murmur.  ABDOMEN: Soft, nontender, and nondistended.  Positive bowel sounds.    : No CVA tenderness  EXTREMITIES: Without any cyanosis, clubbing, rash, lesions or edema.  MSK: no joint swelling  NEUROLOGIC:  UNRESPONSIVE ON VENT          Labs:  Vitals:  ============  T(F): 99.7 (02 Jan 2025 16:15), Max: 99.7 (02 Jan 2025 00:30)  HR: 61 (02 Jan 2025 16:15)  BP: 146/68 (02 Jan 2025 16:15)  RR: 14 (02 Jan 2025 16:15)  SpO2: 100% (02 Jan 2025 16:15) (100% - 100%)  temp max in last 48H T(F): , Max: 100.4 (01-01-25 @ 12:15)    =======================================================  Current Antibiotics:  cefepime  Injectable. 2000 milliGRAM(s) IV Push every 12 hours    Other medications:  acetaminophen   IVPB .. 1000 milliGRAM(s) IV Intermittent once  aspirin  chewable 81 milliGRAM(s) Oral daily  atorvastatin 10 milliGRAM(s) Oral at bedtime  brivaracetam  Injectable 100 milliGRAM(s) IV Push two times a day  calcium carbonate 1250 mG  + Vitamin D (OsCal 500 + D) 1 Tablet(s) Oral daily  carvedilol 25 milliGRAM(s) Oral every 12 hours  chlorhexidine 0.12% Liquid 15 milliLiter(s) Oral Mucosa every 12 hours  cyanocobalamin 1000 MICROGram(s) Oral daily  dexMEDEtomidine Infusion 0.2 MICROgram(s)/kG/Hr IV Continuous <Continuous>  enoxaparin Injectable 40 milliGRAM(s) SubCutaneous <User Schedule>  insulin lispro (ADMELOG) corrective regimen sliding scale   SubCutaneous every 6 hours  lacosamide Solution 150 milliGRAM(s) Oral <User Schedule>  methylPREDNISolone sodium succinate IVPB 1000 milliGRAM(s) IV Intermittent daily  pantoprazole  Injectable 40 milliGRAM(s) IV Push daily  perampanel 8 milliGRAM(s) Oral at bedtime  polyethylene glycol 3350 17 Gram(s) Oral daily  senna 2 Tablet(s) Oral at bedtime  sildenafil (REVATIO) 20 milliGRAM(s) Oral three times a day      =======================================================  Labs:                        8.6    11.57 )-----------( 230      ( 02 Jan 2025 03:00 )             27.4     01-02    141  |  106  |  31.9[H]  ----------------------------<  138[H]  3.7   |  26.0  |  0.73    Ca    8.2[L]      02 Jan 2025 03:00  Phos  1.5     01-02  Mg     2.3     01-02        Culture - Urine (collected 01-01-25 @ 01:20)  Source: Clean Catch Clean Catch (Midstream)  Final Report (01-02-25 @ 06:24):    No growth    Culture - Blood (collected 12-31-24 @ 12:15)  Source: .Blood BLOOD  Preliminary Report (01-01-25 @ 23:07):    No growth at 24 hours    Culture - Blood (collected 12-31-24 @ 12:05)  Source: .Blood BLOOD  Preliminary Report (01-01-25 @ 23:07):    No growth at 24 hours    Culture - Sputum (collected 12-30-24 @ 20:05)  Source: ET Tube ET Tube  Gram Stain (12-31-24 @ 03:24):    Few polymorphonuclear leukocytes per low power field    Few Squamous epithelial cells per low power field    Moderate Gram Negative Rods seen per oil power field    Few Gram positive cocci in pairs seen per oil power field  Final Report (01-01-25 @ 19:14):    Rare Pseudomonas putida group    Commensal michelle consistent with body site  Organism: Pseudomonas putida group (01-01-25 @ 19:14)  Organism: Pseudomonas putida group (01-01-25 @ 19:14)    Sensitivities:      Method Type: VIVI      -  Amikacin: S <=16      -  Aztreonam: S 8      -  Cefepime: S <=2      -  Ceftriaxone: S 8      -  Ciprofloxacin: S <=0.25      -  Gentamicin: S <=2      -  Levofloxacin: S <=0.5      -  Meropenem: S <=1      -  Piperacillin/Tazobactam: S <=8      -  Tobramycin: S <=2      -  Trimethoprim/Sulfamethoxazole: S 2/38    Culture - Blood (collected 12-30-24 @ 16:55)  Source: .Blood BLOOD  Gram Stain (12-31-24 @ 09:21):    Growth in aerobic bottle: Gram Negative Rods  Final Report (01-01-25 @ 17:27):    Growth in aerobic bottle: Citrobacter freundii complex    Direct identification is available within approximately 3-5    hours either by Blood Panel Multiplexed PCR or Direct    MALDI-TOF. Details: [email or web address]  Organism: Blood Culture PCR  Citrobacter freundii complex (01-01-25 @ 17:27)  Organism: Citrobacter freundii complex (01-01-25 @ 17:27)    Sensitivities:      Method Type: VIVI      -  Ampicillin: R <=8 These ampicillin results predict results for amoxicillin      -  Ampicillin/Sulbactam: R <=4/2      -  Aztreonam: S <=4      -  Cefazolin: R >16      -  Cefepime: S <=2      -  Cefoxitin: R >16      -  Ceftriaxone: S <=1 Enterobacter cloacae, Klebsiella aerogenes, and Citrobacter freundii may develop resistance during prolonged therapy.      -  Ciprofloxacin: S <=0.25      -  Ertapenem: S <=0.5      -  Gentamicin: S <=2      -  Imipenem: S <=1      -  Levofloxacin: S <=0.5      -  Meropenem: S <=1      -  Piperacillin/Tazobactam: S <=8 Treatment with Pipercillin/Tazobactam is not recommended in severe infections casued by Klebsiella aerogenes, Enterobacter cloacae complex, and Citrobacter freundii complex.      -  Tobramycin: S <=2      -  Trimethoprim/Sulfamethoxazole: S <=0.5/9.5  Organism: Blood Culture PCR (01-01-25 @ 17:27)    Sensitivities:      Method Type: PCR      -  Enterobacterales: Detec    Culture - Blood (collected 12-30-24 @ 16:45)  Source: .Blood BLOOD  Preliminary Report (01-01-25 @ 23:07):    No growth at 48 Hours    Culture - CSF with Gram Stain (collected 12-27-24 @ 13:50)  Source: .CSF CSF  Gram Stain (12-27-24 @ 22:52):    polymorphonuclear leukocytes seen    No organisms seen    by cytocentrifuge  Final Report (01-01-25 @ 15:49):    No growth at 5 days    Culture - Fungal, CSF (collected 12-27-24 @ 13:50)  Source: .CSF CSF  Preliminary Report (12-30-24 @ 11:01):    No growth    Culture - Acid Fast - CSF (collected 12-27-24 @ 13:50)  Source: .CSF CSF  Preliminary Report (12-28-24 @ 23:06):    Culture is being performed.    Culture - Blood (collected 12-27-24 @ 10:55)  Source: .Blood BLOOD  Final Report (01-01-25 @ 17:01):    No growth at 5 days    Culture - Blood (collected 12-27-24 @ 10:47)  Source: .Blood BLOOD  Final Report (01-01-25 @ 17:01):    No growth at 5 days      Creatinine: 0.73 mg/dL (01-02-25 @ 03:00)  Creatinine: 0.74 mg/dL (01-01-25 @ 04:20)  Creatinine: 0.82 mg/dL (12-31-24 @ 02:28)  Creatinine: 0.77 mg/dL (12-30-24 @ 02:30)  Creatinine: 0.71 mg/dL (12-29-24 @ 03:29)    Procalcitonin: 0.13 ng/mL (12-30-24 @ 16:50)  Procalcitonin: 0.17 ng/mL (12-27-24 @ 10:47)      Ferritin: 878 ng/mL (12-25-24 @ 14:50)    C-Reactive Protein: 140 mg/L (12-27-24 @ 10:47)  C-Reactive Protein: 104 mg/L (12-26-24 @ 02:30)    WBC Count: 11.57 K/uL (01-02-25 @ 03:00)  WBC Count: 15.52 K/uL (01-01-25 @ 04:20)  WBC Count: 16.74 K/uL (12-31-24 @ 02:28)  WBC Count: 14.59 K/uL (12-30-24 @ 16:50)  WBC Count: 14.47 K/uL (12-30-24 @ 02:30)  WBC Count: 11.35 K/uL (12-29-24 @ 03:29)    SARS-CoV-2: NotDetec (12-30-24 @ 16:55)  SARS-CoV-2: NotDetec (12-27-24 @ 10:47)

## 2025-01-02 NOTE — PROGRESS NOTE ADULT - SUBJECTIVE AND OBJECTIVE BOX
Chief complaint:   Patient is a 84y old  Female who presents with a chief complaint of seizures, severe R ICA stenosis (01 Jan 2025 10:43)    HPI:  85yo F with PMHx HTN, HLD, osteoporosis, GERD, h/o breast cancer s/p left mastectomy, left hysterectomy BIBEMs for left-sided weakness and LUE rhythmic shaking. Per family, patient was last known well at 1:30am. When EMS arrived patient was alert but sloughing over the left side. Patient's left arm was noted to be weak and shortly started twitching, was given versed by EMS. By the time patient arrived to ER, Pt was intubated for airway protection. 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 CTA without LVO however with findings of high grade stenosis 75% right  ICA and 30% stenosis L ICA. NeuroICU consulted for further intervention. (24 Dec 2024 20:57)        24hr EVENTS:      ROS: [ ]  Unable to assess due to mental status   All other systems negative    -----------------------------------------------------------------------------------------------------------------------------------------------------------------------------------  ICU Vital Signs Last 24 Hrs  T(C): 37.2 (02 Jan 2025 06:00), Max: 38 (01 Jan 2025 12:15)  T(F): 99 (02 Jan 2025 06:00), Max: 100.4 (01 Jan 2025 12:15)  HR: 58 (02 Jan 2025 06:00) (51 - 79)  BP: 154/65 (02 Jan 2025 06:00) (119/69 - 176/86)  BP(mean): 89 (02 Jan 2025 06:00) (70 - 115)  ABP: --  ABP(mean): --  RR: 12 (02 Jan 2025 06:00) (11 - 21)  SpO2: 100% (02 Jan 2025 06:00) (99% - 100%)    O2 Parameters below as of 02 Jan 2025 04:00  Patient On (Oxygen Delivery Method): ventilator            I&O's Summary    01 Jan 2025 07:01  -  02 Jan 2025 07:00  --------------------------------------------------------  IN: 2484.8 mL / OUT: 800 mL / NET: 1684.8 mL        MEDICATIONS  (STANDING):  acetaminophen   IVPB .. 1000 milliGRAM(s) IV Intermittent once  aspirin  chewable 81 milliGRAM(s) Oral daily  atorvastatin 10 milliGRAM(s) Oral at bedtime  brivaracetam  Injectable 100 milliGRAM(s) IV Push two times a day  calcium carbonate 1250 mG  + Vitamin D (OsCal 500 + D) 1 Tablet(s) Oral daily  carvedilol 25 milliGRAM(s) Oral every 12 hours  chlorhexidine 0.12% Liquid 15 milliLiter(s) Oral Mucosa every 12 hours  cyanocobalamin 1000 MICROGram(s) Oral daily  dexMEDEtomidine Infusion 0.2 MICROgram(s)/kG/Hr (2.81 mL/Hr) IV Continuous <Continuous>  enoxaparin Injectable 40 milliGRAM(s) SubCutaneous <User Schedule>  insulin lispro (ADMELOG) corrective regimen sliding scale   SubCutaneous every 6 hours  lacosamide IVPB 150 milliGRAM(s) IV Intermittent <User Schedule>  methylPREDNISolone sodium succinate IVPB 1000 milliGRAM(s) IV Intermittent daily  pantoprazole  Injectable 40 milliGRAM(s) IV Push daily  perampanel 4 milliGRAM(s) Oral at bedtime  piperacillin/tazobactam IVPB.. 3.375 Gram(s) IV Intermittent every 8 hours  polyethylene glycol 3350 17 Gram(s) Oral daily  senna 2 Tablet(s) Oral at bedtime  sildenafil (REVATIO) 20 milliGRAM(s) Oral three times a day      RESPIRATORY:  Mode: AC/ CMV (Assist Control/ Continuous Mandatory Ventilation)  RR (machine): 12  TV (machine): 500  FiO2: 40  PEEP: 6  ITime: 1  MAP: 15  PIP: 30        IMAGING:   Recent imaging studies were reviewed.    LAB RESULTS:                          8.6    11.57 )-----------( 230      ( 02 Jan 2025 03:00 )             27.4           01-02    141  |  106  |  31.9[H]  ----------------------------<  138[H]  3.7   |  26.0  |  0.73    Ca    8.2[L]      02 Jan 2025 03:00  Phos  1.5     01-02  Mg     2.3     01-02      -----------------------------------------------------------------------------------------------------------------------------------------------------------------------------------    PHYSICAL EXAM:  General: Calm, intubated  HEENT: MMM  Neuro:  -Mental status- No acute distress  -CN- PERRL 3mm, EOMI, tongue midline, face symmetric    CV: RRR  Pulm: clear to auscultation  Abd: Soft, nontender, nondistended  Ext: no noted edema in lower ext  Skin: warm, dry       Chief complaint:   Patient is a 84y old  Female who presents with a chief complaint of seizures, severe R ICA stenosis (01 Jan 2025 10:43)    HPI:  85yo F with PMHx HTN, HLD, osteoporosis, GERD, h/o breast cancer s/p left mastectomy, left hysterectomy BIBEMs for left-sided weakness and LUE rhythmic shaking. Per family, patient was last known well at 1:30am. When EMS arrived patient was alert but sloughing over the left side. Patient's left arm was noted to be weak and shortly started twitching, was given versed by EMS. By the time patient arrived to ER, Pt was intubated for airway protection. 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 CTA without LVO however with findings of high grade stenosis 75% right  ICA and 30% stenosis L ICA. NeuroICU consulted for further intervention. (24 Dec 2024 20:57)    24hr EVENTS:  no acute changes    ROS: [x ]  Unable to assess due to mental status   All other systems negative    -----------------------------------------------------------------------------------------------------------------------------------------------------------------------------------  ICU Vital Signs Last 24 Hrs  T(C): 37.2 (02 Jan 2025 06:00), Max: 38 (01 Jan 2025 12:15)  T(F): 99 (02 Jan 2025 06:00), Max: 100.4 (01 Jan 2025 12:15)  HR: 58 (02 Jan 2025 06:00) (51 - 79)  BP: 154/65 (02 Jan 2025 06:00) (119/69 - 176/86)  BP(mean): 89 (02 Jan 2025 06:00) (70 - 115)  ABP: --  ABP(mean): --  RR: 12 (02 Jan 2025 06:00) (11 - 21)  SpO2: 100% (02 Jan 2025 06:00) (99% - 100%)    O2 Parameters below as of 02 Jan 2025 04:00  Patient On (Oxygen Delivery Method): ventilator            I&O's Summary    01 Jan 2025 07:01  -  02 Jan 2025 07:00  --------------------------------------------------------  IN: 2484.8 mL / OUT: 800 mL / NET: 1684.8 mL        MEDICATIONS  (STANDING):  acetaminophen   IVPB .. 1000 milliGRAM(s) IV Intermittent once  aspirin  chewable 81 milliGRAM(s) Oral daily  atorvastatin 10 milliGRAM(s) Oral at bedtime  brivaracetam  Injectable 100 milliGRAM(s) IV Push two times a day  calcium carbonate 1250 mG  + Vitamin D (OsCal 500 + D) 1 Tablet(s) Oral daily  carvedilol 25 milliGRAM(s) Oral every 12 hours  chlorhexidine 0.12% Liquid 15 milliLiter(s) Oral Mucosa every 12 hours  cyanocobalamin 1000 MICROGram(s) Oral daily  dexMEDEtomidine Infusion 0.2 MICROgram(s)/kG/Hr (2.81 mL/Hr) IV Continuous <Continuous>  enoxaparin Injectable 40 milliGRAM(s) SubCutaneous <User Schedule>  insulin lispro (ADMELOG) corrective regimen sliding scale   SubCutaneous every 6 hours  lacosamide IVPB 150 milliGRAM(s) IV Intermittent <User Schedule>  methylPREDNISolone sodium succinate IVPB 1000 milliGRAM(s) IV Intermittent daily  pantoprazole  Injectable 40 milliGRAM(s) IV Push daily  perampanel 4 milliGRAM(s) Oral at bedtime  piperacillin/tazobactam IVPB.. 3.375 Gram(s) IV Intermittent every 8 hours  polyethylene glycol 3350 17 Gram(s) Oral daily  senna 2 Tablet(s) Oral at bedtime  sildenafil (REVATIO) 20 milliGRAM(s) Oral three times a day      RESPIRATORY:  Mode: AC/ CMV (Assist Control/ Continuous Mandatory Ventilation)  RR (machine): 12  TV (machine): 500  FiO2: 40  PEEP: 6  ITime: 1  MAP: 15  PIP: 30      IMAGING:   Recent imaging studies were reviewed.    LAB RESULTS:                          8.6    11.57 )-----------( 230      ( 02 Jan 2025 03:00 )             27.4           01-02    141  |  106  |  31.9[H]  ----------------------------<  138[H]  3.7   |  26.0  |  0.73    Ca    8.2[L]      02 Jan 2025 03:00  Phos  1.5     01-02  Mg     2.3     01-02      -----------------------------------------------------------------------------------------------------------------------------------------------------------------------------------    PHYSICAL EXAM:  General: Calm, intubated  HEENT: MMM  +macroglossia  Neuro:  -Mental status- No acute distress, no FC, EO with stim, no tracking  -CN- PERRL 2mm, EOMI, tongue midline, face symmetric  bilat upper WD  bilat lower WD    CV: RRR  Pulm: clear to auscultation  Abd: Soft, nontender, nondistended  Ext: diffuse edema in ext  Skin: warm, dry

## 2025-01-02 NOTE — PROGRESS NOTE ADULT - ASSESSMENT
84F new onset status epilepticus (clinically - with L UE and LLE twitching). h/o breast cancer (2019)    Patient is critically ill with high probability of imminent neurologic or life-threatening deterioration due to the following diagnoses:  1) Status Epilepticus  - For which we are monitoring neurologically and electrographically on EEG and providing continous sedating antiepileptic medications  2) Respiratory Failure  - For which we are treating with antibiotics and weaning ventilator settings with spontaneous breathing trials as tolerated    Plan:  Neuro  - neurochecks q4h  - cont cEEG- IIC, Relative decrease in frequency of discharges between 1400 (12/31) and 0030 (01/01)   - Briviact and Vimpat    - Fycompa 3 day load plus 4mg qhs per epilepsy  - hold Aricept for now   - 1g Solumedrol for 7 days (-1/5)  for empiric autoimmune encephalitis  - bradycardia with precedex    CV  - -180, avoid significant fluctuations; cont home Coreg  - restart sildenafil  - ASA  - cont home statin    Pulm  - Osats>92%, vent support, SBT as tolerated  - duoNebs q6h prn    GI  - cont TF, BM regimen; PPI for ulcer ppx  - LBM 12/31      - monitor e-lytes, I/Os; 300 cc q6 free water   - voiding    Endo  - Goal euglycemia (-180), ISS    Heme  - VTE prophylaxis: SCDs, SQL    ID  - followup cultures  - appreciate ID recs- GNRs in one tube blood culture (Citrobacter)  - empiric zosyn  - CT ab pelvis pending 84F new onset status epilepticus (clinically - with L UE and LLE twitching). h/o breast cancer (2019)    Patient is critically ill with high probability of imminent neurologic or life-threatening deterioration due to the following diagnoses:  1) Status Epilepticus  - For which we are monitoring neurologically and electrographically on EEG and providing continous sedating antiepileptic medications  2) Respiratory Failure  - For which we are treating with antibiotics and weaning ventilator settings with spontaneous breathing trials as tolerated    Plan:  Neuro  - neurochecks q4h  - cont cEEG- IIC, Relative decrease in frequency of discharges between 1400 (12/31) and 0030 (01/01)   - Briviact and Vimpat    - Fycompa 4mg qhs per epilepsy  - hold Aricept for now   - 1g Solumedrol for 7 days (-1/5)  for empiric autoimmune encephalitis  - precedex for vent dyssynchrony    CV  - -180, avoid significant fluctuations; cont home Coreg  - sildenafil  - ASA  - cont home statin    Pulm  - Osats>92%, vent support, SBT as tolerated  - duoNebs q6h prn    GI  - cont TF, BM regimen; PPI for ulcer ppx  - recheck LFTs in AM  - LBM 1/1      - monitor e-lytes, I/Os; 300 cc q6 free water   - voiding    Endo  - Goal euglycemia (-180), ISS    Heme  - VTE prophylaxis: SCDs, SQL    ID  - followup cultures  - appreciate ID recs- GNRs in one tube blood culture (Citrobacter), pseudomonas  - empiric zosyn  - CT ab pelvis- Small bilateral pleural effusion/atelectasis, mild ascites, and anasarca. no

## 2025-01-02 NOTE — EEG REPORT - NS EEG TEXT BOX
Study Date: 		01-01-25 (0800) - 01-01-25 (0800)  Duration: 24hr  --------------------------------------------------------------------------------------------------  History:  CC/ HPI Patient is a 84y old  Female who presents with a chief complaint of seizures, severe R ICA stenosis (29 Dec 2024 12:35)    MEDICATIONS  (STANDING):  brivaracetam  Injectable 100 milliGRAM(s) IV Push two times a day  lacosamide IVPB 100 milliGRAM(s) IV Intermittent <User Schedule>  methylPREDNISolone sodium succinate IVPB 1000 milliGRAM(s) IV Intermittent daily  perampanel 2 milliGRAM(s) Oral <User Schedule>        --------------------------------------------------------------------------------------------------  Study Interpretation:    [[[Abbreviation Key:  PDR=alpha rhythm/posterior dominant rhythm. A-P=anterior posterior gradient.  Amplitude: ‘very low’:<20; ‘low’:20-50; ‘medium’:; ‘high’:>200uV.  Persistence for periodic/rhythmic patterns (% of epoch) ‘rare’:<1%; ‘occasional’:1-10%; ‘frequent’:10-50%; ‘abundant’:50-90%; ‘continuous’:>90%.  Persistence for sporadic discharges: ‘rare’:<1/hr; ‘occasional’:1/min-1/hr; ‘frequent’:>1/min; ‘abundant’:>1/10 sec.  GRDA=generalized rhythmic delta activity; FIRDA=frontal intermittent GRDA; LRDA=lateralized rhythmic delta activity; TIRDA=temporal intermittent rhythmic delta activity;  LPD=PLED=lateralized periodic discharges; GPD=generalized periodic discharges; BiPDs=BiPLEDs=bilateral independent periodic epileptiform discharges; SIRPID=stimulus induced rhythmic, periodic, or ictal appearing discharges; BIRDs=brief potentially ictal rhythmic discharges >4 Hz, lasting .5-10s; PFA=paroxysmal bursts of beta/gamma; LVFA=low voltage fast activity.  Modifiers: +F=with fast component; +S=with spike component; +R=with rhythmic component.  S-B=burst suppression pattern.  Max=maximal. N1-drowsy; N2-stage II sleep; N3-slow wave sleep. SSS/BETS=small sharp spikes/benign epileptiform transients of sleep. HV=hyperventilation; PS=photic stimulation]]]    Daily EEG Visual Analysis    FINDINGS:      Background:  Continuity: continuous  Symmetry: asymmetric  PDR: none  Reactivity: present  Voltage: normal  Anterior Posterior Gradient: absent  Other background findings: none  Breach: absent    Background Slowing:  Generalized slowing: diffuse theta-delta slowing  Focal slowing: Continuous polymorphic delta frequency slowing over the right hemisphere, right centrotemporal max    State Changes:   -N2 sleep transients were not recorded.    Sporadic Epileptiform Discharges:    As below    Rhythmic and Periodic Patterns (RPPs):  Abundant/continuous Lateralized periodic discharges, fluctuating in frequency between 1-2.5Hz, maximal over P4>C4 Pz Cz, consistent with a pattern of ictal-interictal continuum.  Discharges faster with stimulation/arousal.  Voltage of discharges lower vs few days prior before ketamine infusion.     Electrographic and Electroclinical seizures:  None    Other Clinical Events:    Activation Procedures:   -Hyperventilation was not performed.    -Photic stimulation was not performed.    Artifacts:  Intermittent myogenic and movement artifacts were noted.     ECG:  The heart rate on single channel ECG was predominantly between 60-90 BPM.    -------------------------------------------------------------------------------------------------------  EEG Classification / Summary:   Abnormal  EEG in a lethargic patient:   1. Ictal-interictal continuum, right centroparietal region: Lateralized periodic discharges, abundant/continuous, 1-2.5Hz increased with stimulation/arousal. Voltage of discharges lower vs few days prior before ketamine infusion.   2. Continuous polymorphic delta slowing, focal, right hemisphere   3.  Generalized background slowing, moderate     -------------------------------------------------------------------------------------------------------  Clinical Impression:   1. Elevated risk of focal seizures from the right centroparietal region. At times discharges near 2-2.5hz frequency c/w IIC.  A pattern on the ictal-interictal continuum is a pattern that does not qualify as a definitive electrographic seizure or electrographic status epilepticus, but there is a reasonable chance that it may be contributing to impaired alertness, causing other clinical symptoms, and/or contributing to neuronal injury.  Voltage of discharges lower vs few days prior before ketamine infusion.     2. Structural abnormality in the right hemisphere     3. Moderate diffuse cerebral dysfunction.     Kei Mock, DO   Epilepsy Fellow    Study Date: 		01-01-25 (0800) - 01-01-25 (0800)  Duration: 24hr  --------------------------------------------------------------------------------------------------  History:  CC/ HPI Patient is a 84y old  Female who presents with a chief complaint of seizures, severe R ICA stenosis (29 Dec 2024 12:35)    MEDICATIONS  (STANDING):  brivaracetam  Injectable 100 milliGRAM(s) IV Push two times a day  lacosamide IVPB 100 milliGRAM(s) IV Intermittent <User Schedule>  methylPREDNISolone sodium succinate IVPB 1000 milliGRAM(s) IV Intermittent daily  perampanel 2 milliGRAM(s) Oral <User Schedule>        --------------------------------------------------------------------------------------------------  Study Interpretation:    [[[Abbreviation Key:  PDR=alpha rhythm/posterior dominant rhythm. A-P=anterior posterior gradient.  Amplitude: ‘very low’:<20; ‘low’:20-50; ‘medium’:; ‘high’:>200uV.  Persistence for periodic/rhythmic patterns (% of epoch) ‘rare’:<1%; ‘occasional’:1-10%; ‘frequent’:10-50%; ‘abundant’:50-90%; ‘continuous’:>90%.  Persistence for sporadic discharges: ‘rare’:<1/hr; ‘occasional’:1/min-1/hr; ‘frequent’:>1/min; ‘abundant’:>1/10 sec.  GRDA=generalized rhythmic delta activity; FIRDA=frontal intermittent GRDA; LRDA=lateralized rhythmic delta activity; TIRDA=temporal intermittent rhythmic delta activity;  LPD=PLED=lateralized periodic discharges; GPD=generalized periodic discharges; BiPDs=BiPLEDs=bilateral independent periodic epileptiform discharges; SIRPID=stimulus induced rhythmic, periodic, or ictal appearing discharges; BIRDs=brief potentially ictal rhythmic discharges >4 Hz, lasting .5-10s; PFA=paroxysmal bursts of beta/gamma; LVFA=low voltage fast activity.  Modifiers: +F=with fast component; +S=with spike component; +R=with rhythmic component.  S-B=burst suppression pattern.  Max=maximal. N1-drowsy; N2-stage II sleep; N3-slow wave sleep. SSS/BETS=small sharp spikes/benign epileptiform transients of sleep. HV=hyperventilation; PS=photic stimulation]]]    Daily EEG Visual Analysis    FINDINGS:      Background:  Continuity: continuous  Symmetry: asymmetric  PDR: none  Reactivity: present  Voltage: normal  Anterior Posterior Gradient: absent  Other background findings: none  Breach: absent    Background Slowing:  Generalized slowing: diffuse theta-delta slowing  Focal slowing: Continuous polymorphic delta frequency slowing over the right hemisphere, right centrotemporal max    State Changes:   -N2 sleep transients were not recorded.    Sporadic Epileptiform Discharges:    As below    Rhythmic and Periodic Patterns (RPPs):  Abundant/continuous Lateralized periodic discharges, fluctuating in frequency between 1-2.5Hz, maximal over P4>C4 Pz Cz, consistent with a pattern of ictal-interictal continuum.  Discharges faster with stimulation/arousal.  Voltage of discharges lower vs few days prior before ketamine infusion.     Electrographic and Electroclinical seizures:  None    Other Clinical Events:    Activation Procedures:   -Hyperventilation was not performed.    -Photic stimulation was not performed.    Artifacts:  Intermittent myogenic and movement artifacts were noted.     ECG:  The heart rate on single channel ECG was predominantly between 60-90 BPM.    -------------------------------------------------------------------------------------------------------  EEG Classification / Summary:   Abnormal  EEG in a lethargic patient:   1. Ictal-interictal continuum, right centroparietal region: Lateralized periodic discharges, abundant/continuous, 1-2.5Hz increased with stimulation/arousal. Similar to prior days  2. Continuous polymorphic delta slowing, focal, right hemisphere   3.  Generalized background slowing, moderate     -------------------------------------------------------------------------------------------------------  Clinical Impression:   1. Elevated risk of focal seizures from the right centroparietal region. At times discharges near 2-2.5hz frequency c/w IIC.  A pattern on the ictal-interictal continuum is a pattern that does not qualify as a definitive electrographic seizure or electrographic status epilepticus, but there is a reasonable chance that it may be contributing to impaired alertness, causing other clinical symptoms, and/or contributing to neuronal injury.  Voltage of discharges lower vs few days prior before ketamine infusion.   2. Structural abnormality in the right hemisphere   3. Moderate diffuse cerebral dysfunction.     Kei Mock DO   Epilepsy Fellow     MD ALFREDO Daugherty  Director, Continuous EEG Monitoring Program, [facility]   and Epilepsy Fellowship ,   Department of Neurology, [facility] [employer]    [facility] EEG Reading Room Ph#: [phone]  Epilepsy Answering Service after 5PM and before 8:30AM: Ph#: [phone]   Study Date: 		01-01-25 (08:00) - 01-01-25 (10:10)  Duration: 26:10hr  --------------------------------------------------------------------------------------------------  History:  CC/ HPI Patient is a 84y old  Female who presents with a chief complaint of seizures, severe R ICA stenosis (29 Dec 2024 12:35)    MEDICATIONS  (STANDING):  brivaracetam  Injectable 100 milliGRAM(s) IV Push two times a day  lacosamide IVPB 100 milliGRAM(s) IV Intermittent <User Schedule>  methylPREDNISolone sodium succinate IVPB 1000 milliGRAM(s) IV Intermittent daily  perampanel 2 milliGRAM(s) Oral <User Schedule>        --------------------------------------------------------------------------------------------------  Study Interpretation:    [[[Abbreviation Key:  PDR=alpha rhythm/posterior dominant rhythm. A-P=anterior posterior gradient.  Amplitude: ‘very low’:<20; ‘low’:20-50; ‘medium’:; ‘high’:>200uV.  Persistence for periodic/rhythmic patterns (% of epoch) ‘rare’:<1%; ‘occasional’:1-10%; ‘frequent’:10-50%; ‘abundant’:50-90%; ‘continuous’:>90%.  Persistence for sporadic discharges: ‘rare’:<1/hr; ‘occasional’:1/min-1/hr; ‘frequent’:>1/min; ‘abundant’:>1/10 sec.  GRDA=generalized rhythmic delta activity; FIRDA=frontal intermittent GRDA; LRDA=lateralized rhythmic delta activity; TIRDA=temporal intermittent rhythmic delta activity;  LPD=PLED=lateralized periodic discharges; GPD=generalized periodic discharges; BiPDs=BiPLEDs=bilateral independent periodic epileptiform discharges; SIRPID=stimulus induced rhythmic, periodic, or ictal appearing discharges; BIRDs=brief potentially ictal rhythmic discharges >4 Hz, lasting .5-10s; PFA=paroxysmal bursts of beta/gamma; LVFA=low voltage fast activity.  Modifiers: +F=with fast component; +S=with spike component; +R=with rhythmic component.  S-B=burst suppression pattern.  Max=maximal. N1-drowsy; N2-stage II sleep; N3-slow wave sleep. SSS/BETS=small sharp spikes/benign epileptiform transients of sleep. HV=hyperventilation; PS=photic stimulation]]]    Daily EEG Visual Analysis    FINDINGS:      Background:  Continuity: continuous  Symmetry: asymmetric  PDR: none  Reactivity: present  Voltage: normal  Anterior Posterior Gradient: absent  Other background findings: none  Breach: absent    Background Slowing:  Generalized slowing: diffuse theta-delta slowing  Focal slowing: Continuous polymorphic delta frequency slowing over the right hemisphere, right centrotemporal max    State Changes:   -N2 sleep transients were not recorded.    Sporadic Epileptiform Discharges:    As below    Rhythmic and Periodic Patterns (RPPs):  Abundant/continuous Lateralized periodic discharges, fluctuating in frequency between 1-2.5Hz, maximal over P4>C4 Pz Cz, consistent with a pattern of ictal-interictal continuum.  Discharges faster with stimulation/arousal.  Voltage of discharges lower vs few days prior before ketamine infusion.     Electrographic and Electroclinical seizures:  None    Other Clinical Events:    Activation Procedures:   -Hyperventilation was not performed.    -Photic stimulation was not performed.    Artifacts:  Intermittent myogenic and movement artifacts were noted.     ECG:  The heart rate on single channel ECG was predominantly between 60-90 BPM.    -------------------------------------------------------------------------------------------------------  EEG Classification / Summary:   Abnormal  EEG in a lethargic patient:   1. Ictal-interictal continuum, right centroparietal region: Lateralized periodic discharges, abundant/continuous, 1-2.5Hz increased with stimulation/arousal. Similar to prior days  2. Continuous polymorphic delta slowing, focal, right hemisphere   3.  Generalized background slowing, moderate     -------------------------------------------------------------------------------------------------------  Clinical Impression:   1. Elevated risk of focal seizures from the right centroparietal region. At times discharges near 2-2.5hz frequency c/w IIC.  A pattern on the ictal-interictal continuum is a pattern that does not qualify as a definitive electrographic seizure or electrographic status epilepticus, but there is a reasonable chance that it may be contributing to impaired alertness, causing other clinical symptoms, and/or contributing to neuronal injury.  Voltage of discharges lower vs few days prior before ketamine infusion.   2. Structural abnormality in the right hemisphere   3. Moderate diffuse cerebral dysfunction.     Kei Mock DO   Epilepsy Fellow     MD ALFREDO Daugherty  Director, Continuous EEG Monitoring Program, [facility]   and Epilepsy Fellowship ,   Department of Neurology, [facility] [employer]    [facility] EEG Reading Room Ph#: [phone]  Epilepsy Answering Service after 5PM and before 8:30AM: Ph#: [phone]

## 2025-01-02 NOTE — PROGRESS NOTE ADULT - ASSESSMENT
83yo F with PMHx HTN, HLD, osteoporosis, GERD, h/o breast cancer s/p left mastectomy, left hysterectomy BIBEMs for left-sided weakness and LUE rhythmic shaking.   . Patient's left arm was noted to be weak and shortly started twitching, was given versed by EMS. By the time patient arrived to ER, Pt was intubated for airway protection. 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 CTA without LVO however with findings of high grade stenosis 75% right  ICA and 30% stenosis L ICA. NeuroICU consulted for further intervention.   AS ABOVE ADMITTED 12/24  WITH WEAKNESS AND   WAS  INTUBATED  AND FOUND  TO HAVE SEIZURES  NOW WITH BLOOD CX  GM NEG RODS ENTEROBACTERALES  IDENTIFIES AS CITROBACTER FREUNDII   T CT ABD PELVIS  PLEURAL EFFUSION OBVIOUS SOURCE OF BACTEREMIA   SPUTUM WITH PSEUDOOMONAS PUTIDA  WILL CHAGE TO  CEFEPIME  WILL LAMIN HAIR  SPOKE TO NEURO ICU TEAM

## 2025-01-02 NOTE — PROGRESS NOTE ADULT - CRITICAL CARE ATTENDING COMMENT
I spent 65 minutes of critical care time examining patient, reviewing vitals, labs, medications, imaging and discussing with the team goals of care to prevent life-threatening in this patient who is at high risk for deterioration or death due to:  encephalopathy

## 2025-01-03 NOTE — PROGRESS NOTE ADULT - SUBJECTIVE AND OBJECTIVE BOX
Chief complaint:   Patient is a 84y old  Female who presents with a chief complaint of seizures, severe R ICA stenosis (02 Jan 2025 17:11)    HPI:  83yo F with PMHx HTN, HLD, osteoporosis, GERD, h/o breast cancer s/p left mastectomy, left hysterectomy BIBEMs for left-sided weakness and LUE rhythmic shaking. Per family, patient was last known well at 1:30am. When EMS arrived patient was alert but sloughing over the left side. Patient's left arm was noted to be weak and shortly started twitching, was given versed by EMS. By the time patient arrived to ER, Pt was intubated for airway protection. 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 CTA without LVO however with findings of high grade stenosis 75% right  ICA and 30% stenosis L ICA. NeuroICU consulted for further intervention. (24 Dec 2024 20:57)        24hr EVENTS:      ROS: [ ]  Unable to assess due to mental status   All other systems negative    -----------------------------------------------------------------------------------------------------------------------------------------------------------------------------------  ICU Vital Signs Last 24 Hrs  T(C): 36.8 (03 Jan 2025 08:00), Max: 37.7 (02 Jan 2025 17:45)  T(F): 98.2 (03 Jan 2025 08:00), Max: 99.9 (02 Jan 2025 17:45)  HR: 72 (03 Jan 2025 08:37) (50 - 83)  BP: 157/73 (03 Jan 2025 08:00) (124/59 - 171/79)  BP(mean): 98 (03 Jan 2025 08:00) (75 - 114)  ABP: --  ABP(mean): --  RR: 12 (03 Jan 2025 08:00) (12 - 25)  SpO2: 100% (03 Jan 2025 08:37) (99% - 100%)    O2 Parameters below as of 03 Jan 2025 08:00  Patient On (Oxygen Delivery Method): ventilator            I&O's Summary    02 Jan 2025 07:01  -  03 Jan 2025 07:00  --------------------------------------------------------  IN: 3218 mL / OUT: 1250 mL / NET: 1968 mL    03 Jan 2025 07:01  -  03 Jan 2025 09:13  --------------------------------------------------------  IN: 33 mL / OUT: 0 mL / NET: 33 mL        MEDICATIONS  (STANDING):  acetaminophen   IVPB .. 1000 milliGRAM(s) IV Intermittent once  aspirin  chewable 81 milliGRAM(s) Oral daily  atorvastatin 10 milliGRAM(s) Oral at bedtime  brivaracetam  Injectable 100 milliGRAM(s) IV Push two times a day  calcium carbonate 1250 mG  + Vitamin D (OsCal 500 + D) 1 Tablet(s) Oral daily  carvedilol 25 milliGRAM(s) Oral every 12 hours  cefepime  Injectable. 2000 milliGRAM(s) IV Push every 12 hours  chlorhexidine 0.12% Liquid 15 milliLiter(s) Oral Mucosa every 12 hours  cyanocobalamin 1000 MICROGram(s) Oral daily  dexMEDEtomidine Infusion 0.2 MICROgram(s)/kG/Hr (2.81 mL/Hr) IV Continuous <Continuous>  enoxaparin Injectable 40 milliGRAM(s) SubCutaneous <User Schedule>  insulin lispro (ADMELOG) corrective regimen sliding scale   SubCutaneous every 6 hours  lacosamide Solution 150 milliGRAM(s) Oral <User Schedule>  methylPREDNISolone sodium succinate IVPB 1000 milliGRAM(s) IV Intermittent daily  pantoprazole  Injectable 40 milliGRAM(s) IV Push daily  perampanel 8 milliGRAM(s) Oral at bedtime  polyethylene glycol 3350 17 Gram(s) Oral daily  senna 2 Tablet(s) Oral at bedtime  sildenafil (REVATIO) 20 milliGRAM(s) Oral three times a day      RESPIRATORY:  Mode: CPAP with PS  FiO2: 40  PEEP: 6        IMAGING:   Recent imaging studies were reviewed.    LAB RESULTS:                          9.8    13.58 )-----------( 246      ( 03 Jan 2025 03:50 )             30.7           01-03    141  |  107  |  30.5[H]  ----------------------------<  138[H]  4.1   |  26.0  |  0.64    Ca    8.3[L]      03 Jan 2025 03:50  Phos  2.0     01-03  Mg     2.1     01-03    TPro  5.8[L]  /  Alb  2.6[L]  /  TBili  0.2[L]  /  DBili  0.1  /  AST  29  /  ALT  32  /  AlkPhos  70  01-03        -----------------------------------------------------------------------------------------------------------------------------------------------------------------------------------    PHYSICAL EXAM:  General: Calm, intubated  HEENT: MMM  Neuro:  -Mental status- No acute distress  -CN- PERRL 3mm, EOMI, tongue midline, face symmetric    CV: RRR  Pulm: clear to auscultation  Abd: Soft, nontender, nondistended  Ext: no noted edema in lower ext  Skin: warm, dry       Chief complaint:   Patient is a 84y old  Female who presents with a chief complaint of seizures, severe R ICA stenosis (02 Jan 2025 17:11)    HPI:  83yo F with PMHx HTN, HLD, osteoporosis, GERD, h/o breast cancer s/p left mastectomy, left hysterectomy BIBEMs for left-sided weakness and LUE rhythmic shaking. Per family, patient was last known well at 1:30am. When EMS arrived patient was alert but sloughing over the left side. Patient's left arm was noted to be weak and shortly started twitching, was given versed by EMS. By the time patient arrived to ER, Pt was intubated for airway protection. 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 CTA without LVO however with findings of high grade stenosis 75% right  ICA and 30% stenosis L ICA. NeuroICU consulted for further intervention. (24 Dec 2024 20:57)    24hr EVENTS:  improved exam    ROS: [x ]  Unable to assess due to mental status   All other systems negative    -----------------------------------------------------------------------------------------------------------------------------------------------------------------------------------  ICU Vital Signs Last 24 Hrs  T(C): 36.8 (03 Jan 2025 08:00), Max: 37.7 (02 Jan 2025 17:45)  T(F): 98.2 (03 Jan 2025 08:00), Max: 99.9 (02 Jan 2025 17:45)  HR: 72 (03 Jan 2025 08:37) (50 - 83)  BP: 157/73 (03 Jan 2025 08:00) (124/59 - 171/79)  BP(mean): 98 (03 Jan 2025 08:00) (75 - 114)  ABP: --  ABP(mean): --  RR: 12 (03 Jan 2025 08:00) (12 - 25)  SpO2: 100% (03 Jan 2025 08:37) (99% - 100%)    O2 Parameters below as of 03 Jan 2025 08:00  Patient On (Oxygen Delivery Method): ventilator            I&O's Summary    02 Jan 2025 07:01  -  03 Jan 2025 07:00  --------------------------------------------------------  IN: 3218 mL / OUT: 1250 mL / NET: 1968 mL    03 Jan 2025 07:01  -  03 Jan 2025 09:13  --------------------------------------------------------  IN: 33 mL / OUT: 0 mL / NET: 33 mL        MEDICATIONS  (STANDING):  acetaminophen   IVPB .. 1000 milliGRAM(s) IV Intermittent once  aspirin  chewable 81 milliGRAM(s) Oral daily  atorvastatin 10 milliGRAM(s) Oral at bedtime  brivaracetam  Injectable 100 milliGRAM(s) IV Push two times a day  calcium carbonate 1250 mG  + Vitamin D (OsCal 500 + D) 1 Tablet(s) Oral daily  carvedilol 25 milliGRAM(s) Oral every 12 hours  cefepime  Injectable. 2000 milliGRAM(s) IV Push every 12 hours  chlorhexidine 0.12% Liquid 15 milliLiter(s) Oral Mucosa every 12 hours  cyanocobalamin 1000 MICROGram(s) Oral daily  dexMEDEtomidine Infusion 0.2 MICROgram(s)/kG/Hr (2.81 mL/Hr) IV Continuous <Continuous>  enoxaparin Injectable 40 milliGRAM(s) SubCutaneous <User Schedule>  insulin lispro (ADMELOG) corrective regimen sliding scale   SubCutaneous every 6 hours  lacosamide Solution 150 milliGRAM(s) Oral <User Schedule>  methylPREDNISolone sodium succinate IVPB 1000 milliGRAM(s) IV Intermittent daily  pantoprazole  Injectable 40 milliGRAM(s) IV Push daily  perampanel 8 milliGRAM(s) Oral at bedtime  polyethylene glycol 3350 17 Gram(s) Oral daily  senna 2 Tablet(s) Oral at bedtime  sildenafil (REVATIO) 20 milliGRAM(s) Oral three times a day      RESPIRATORY:  Mode: CPAP with PS  FiO2: 40  PEEP: 6        IMAGING:   Recent imaging studies were reviewed.    LAB RESULTS:                          9.8    13.58 )-----------( 246      ( 03 Jan 2025 03:50 )             30.7     01-03    141  |  107  |  30.5[H]  ----------------------------<  138[H]  4.1   |  26.0  |  0.64    Ca    8.3[L]      03 Jan 2025 03:50  Phos  2.0     01-03  Mg     2.1     01-03    TPro  5.8[L]  /  Alb  2.6[L]  /  TBili  0.2[L]  /  DBili  0.1  /  AST  29  /  ALT  32  /  AlkPhos  70  01-03        -----------------------------------------------------------------------------------------------------------------------------------------------------------------------------------    PHYSICAL EXAM:  General: Calm, intubated  HEENT: MMM  macroglossia  Neuro:  -Mental status- No acute distress, no EO, following simple commands on R  -CN- PERRL 3mm, EOMI, tongue midline, face symmetric  RUE 2/5  RLE 2/5  LUE WD  LLE 1/5    CV: RRR  Pulm: clear to auscultation  Abd: Soft, nontender, nondistended  Ext: no noted edema in lower ext  Skin: warm, dry

## 2025-01-03 NOTE — CHART NOTE - NSCHARTNOTEFT_GEN_A_CORE
Patient tested for cuff leak. Pt with very minimal cuff leak, less than 50mL loss of volume on ventilator. Cuff reinflated to green line.

## 2025-01-03 NOTE — PROGRESS NOTE ADULT - CRITICAL CARE ATTENDING COMMENT
I spent 60 minutes of critical care time examining patient, reviewing vitals, labs, medications, imaging and discussing with the team goals of care to prevent life-threatening in this patient who is at high risk for deterioration or death due to:  seizures

## 2025-01-03 NOTE — CHART NOTE - NSCHARTNOTEFT_GEN_A_CORE
Pt ETT noticed to have a high pitched noise, RN unable to advance suction catheter, respiratory called to bedside unable to advance suction catheter, pt no longer pulling adequate tidal volumes, VSS, anesthesia called to bedside as unable to bag patient, ETT exchanged and confirmed w/ STAT xray, ETT pulled 2 cm from original placement. Pt satting 100%, no respiratory distress, VSS.

## 2025-01-03 NOTE — CONSULT NOTE ADULT - SUBJECTIVE AND OBJECTIVE BOX
HPI: 84F admitted to NICU new onset Status Epilepticus, Respiratory Failure       HPI: " 85yo F with PMHx HTN, HLD, osteoporosis, GERD, h/o breast cancer s/p left mastectomy, left hysterectomy BIBEMs for left-sided weakness and LUE rhythmic shaking. Per family, patient was last known well at 1:30am. When EMS arrived patient was alert but sloughing over the left side. Patient's left arm was noted to be weak and shortly started twitching, was given versed by EMS. By the time patient arrived to ER, Pt was intubated for airway protection. 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 CTA without LVO however with findings of high grade stenosis 75% right  ICA and 30% stenosis L ICA. NeuroICU consulted for further intervention. "   ROS: 10-system review is otherwise negative except HPI above.      PAST MEDICAL & SURGICAL HISTORY:  Hypertension      Osteoporosis      Dyslipidemia      GERD (gastroesophageal reflux disease)      Breast cancer      History of hysterectomy  total abdominal      S/P mastectomy, left        FAMILY HISTORY:    Family history not pertinent as reviewed with the patient.    SOCIAL HISTORY:  Denies any toxic habits    ALLERGIES: NKA No Known Allergies      HOME MEDICATIONS: ***  Home Medications:  alendronate 70 mg oral tablet: 1 tab(s) orally once a week (07 Mar 2019 10:59)  Aricept 10 mg oral tablet: 1 tab(s) orally once a day (at bedtime) (25 Dec 2024 13:45)  aspirin 81 mg oral tablet: 1 tab(s) orally once a day (11 Jul 2019 10:38)  atorvastatin 10 mg oral tablet: 1 tab(s) orally once a day (at bedtime) (25 Dec 2024 13:45)  Calcium 500+D oral tablet, chewable: 1 tab(s) orally 2 times a day (03 Jul 2019 20:35)  cloNIDine 0.3 mg/24 hr transdermal film, extended release: 1 patch transdermally once a week (25 Dec 2024 13:45)  Coreg 25 mg oral tablet: 1 tab(s) orally 2 times a day (25 Dec 2024 13:45)  cyanocobalamin 1000 mcg oral tablet: 1 tab(s) orally once a day (25 Dec 2024 13:45)  ferrous sulfate 325 mg (65 mg elemental iron) oral delayed release tablet: 1 tab(s) orally once a day (07 Mar 2019 10:59)  Loniten 2.5 mg oral tablet: 1 tab(s) orally once a day (25 Dec 2024 13:45)  pantoprazole 40 mg oral delayed release tablet: 1 tab(s) orally once a day (before a meal) (11 Jul 2019 10:35)  Remeron 45 mg oral tablet: 1 tab(s) orally once a day (at bedtime) (25 Dec 2024 13:45)  sildenafil 20 mg oral tablet: 1 tab(s) orally 3 times (25 Dec 2024 13:45)      --------------------------------------------------------------------------------------------    PHYSICAL EXAM:   General: intubated/sedated   GI/Abd: Soft, NT/ND, no rebound/guarding, no masses palpated  --------------------------------------------------------------------------------------------    LABS                 9.8    13.58  )----------(  246       ( 03 Jan 2025 03:50 )               30.7      141    |  107    |  30.5   ----------------------------<  138        ( 03 Jan 2025 03:50 )  4.1     |  26.0   |  0.64     Ca    8.3        ( 03 Jan 2025 03:50 )  Phos  2.0       ( 03 Jan 2025 03:50 )  Mg     2.1       ( 03 Jan 2025 03:50 )    TPro  5.8    /  Alb  2.6    /  TBili  0.2    /  DBili  0.1    /  AST  29     /  ALT  32     /  AlkPhos  70     ( 03 Jan 2025 03:50 )    LIVER FUNCTIONS - ( 03 Jan 2025 03:50 )  Alb: 2.6 g/dL / Pro: 5.8 g/dL / ALK PHOS: 70 U/L / ALT: 32 U/L / AST: 29 U/L / GGT: x               CAPILLARY BLOOD GLUCOSE        Urinalysis Basic - ( 03 Jan 2025 03:50 )    Color: x / Appearance: x / SG: x / pH: x  Gluc: 138 mg/dL / Ketone: x  / Bili: x / Urobili: x   Blood: x / Protein: x / Nitrite: x   Leuk Esterase: x / RBC: x / WBC x   Sq Epi: x / Non Sq Epi: x / Bacteria: x

## 2025-01-03 NOTE — CONSULT NOTE ADULT - ASSESSMENT
ASSESSMENT: 84F admitted to NICU new onset Status Epilepticus, Respiratory Failure    PLAN:    -plan for OR tomorrow PEG and trach   - please preop accordingly   - please hold TF at midnight   - discussed with Dr Garrido

## 2025-01-03 NOTE — PROGRESS NOTE ADULT - ASSESSMENT
85yo F with PMHx HTN, HLD, osteoporosis, GERD, h/o breast cancer s/p left mastectomy, left hysterectomy BIBEMs for left-sided weakness and LUE rhythmic shaking.   . Patient's left arm was noted to be weak and shortly started twitching, was given versed by EMS. By the time patient arrived to ER, Pt was intubated for airway protection. 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 CTA without LVO however with findings of high grade stenosis 75% right  ICA and 30% stenosis L ICA. NeuroICU consulted for further intervention.   AS ABOVE ADMITTED 12/24  WITH WEAKNESS AND   WAS  INTUBATED  AND FOUND  TO HAVE SEIZURES  NOW WITH BLOOD CX  GM NEG RODS ENTEROBACTERALES  IDENTIFIES AS CITROBACTER FREUNDII   T CT ABD PELVIS  PLEURAL EFFUSION OBVIOUS SOURCE OF BACTEREMIA   SPUTUM WITH PSEUDOOMONAS PUTIDA    CHANGED  TO  CEFEPIME  IVIG AS PER  NEUROSURGERY  WILL FOLLOW UP  SPOKE TO NEURO ICU TEAM

## 2025-01-03 NOTE — DIETITIAN NUTRITION RISK NOTIFICATION - ADDITIONAL COMMENTS/DIETITIAN RECOMMENDATIONS
Change enteral regime per ICU protocol to Jevity 1.5 @ 50ml/hr (x18 hours) 900ml/day; 1350kcal, 58gm protein, 684ml free water; additional free water per MD discretion.

## 2025-01-03 NOTE — CHART NOTE - NSCHARTNOTEFT_GEN_A_CORE
received a call from NICU   Neuro ICU wishes to postpone PEG and Trach for trial to extubate and SS eval

## 2025-01-03 NOTE — PROGRESS NOTE ADULT - SUBJECTIVE AND OBJECTIVE BOX
INFECTIOUS DISEASES AND INTERNAL MEDICINE at [facility]  =======================================================  Meek Mckee MD  Diplomates American Board of Internal Medicine and Infectious Diseases  Telephone [phone]  Fax            [phone]  =======================================================    RAÚL JASMINE [id number]    Follow up:  [facility]    Allergies:  No Known Allergies      Medications:  acetaminophen   IVPB .. 1000 milliGRAM(s) IV Intermittent once  acetaminophen   Oral Liquid .. 650 milliGRAM(s) Oral every 6 hours PRN  albuterol/ipratropium for Nebulization 3 milliLiter(s) Nebulizer every 6 hours PRN  aspirin  chewable 81 milliGRAM(s) Oral daily  atorvastatin 10 milliGRAM(s) Oral at bedtime  brivaracetam  Injectable 100 milliGRAM(s) IV Push two times a day  calcium carbonate 1250 mG  + Vitamin D (OsCal 500 + D) 1 Tablet(s) Oral daily  carvedilol 25 milliGRAM(s) Oral every 12 hours  cefepime  Injectable. 2000 milliGRAM(s) IV Push every 12 hours  chlorhexidine 0.12% Liquid 15 milliLiter(s) Oral Mucosa every 12 hours  cyanocobalamin 1000 MICROGram(s) Oral daily  dexMEDEtomidine Infusion 0.2 MICROgram(s)/kG/Hr IV Continuous <Continuous>  enoxaparin Injectable 40 milliGRAM(s) SubCutaneous <User Schedule>  fentaNYL    Injectable 25 MICROGram(s) IV Push every 2 hours PRN  hydrALAZINE Injectable 10 milliGRAM(s) IV Push every 3 hours PRN  insulin lispro (ADMELOG) corrective regimen sliding scale   SubCutaneous every 6 hours  labetalol Injectable 10 milliGRAM(s) IV Push every 3 hours PRN  lacosamide Solution 150 milliGRAM(s) Oral <User Schedule>  methylPREDNISolone sodium succinate IVPB 1000 milliGRAM(s) IV Intermittent daily  pantoprazole  Injectable 40 milliGRAM(s) IV Push daily  perampanel 8 milliGRAM(s) Oral at bedtime  polyethylene glycol 3350 17 Gram(s) Oral daily  senna 2 Tablet(s) Oral at bedtime  sildenafil (REVATIO) 20 milliGRAM(s) Oral three times a day    SOCIAL       FAMILY   FAMILY HISTORY:    REVIEW OF SYSTEMS:  CONSTITUTIONAL:  No Fever or chills  UNABLE TO OBTAIN       Physical Exam:  IC ICU Vital Signs Last 24 Hrs  T(C): 37.5 (03 Jan 2025 11:00), Max: 37.7 (02 Jan 2025 17:45)  T(F): 99.5 (03 Jan 2025 11:00), Max: 99.9 (02 Jan 2025 17:45)  HR: 78 (03 Jan 2025 11:46) (50 - 87)  BP: 165/86 (03 Jan 2025 11:00) (124/59 - 167/90)  BP(mean): 108 (03 Jan 2025 11:00) (75 - 116)  ABP: --  ABP(mean): --  RR: 24 (03 Jan 2025 11:00) (12 - 25)  SpO2: 100% (03 Jan 2025 11:46) (99% - 100%)    O2 Parameters below as of 03 Jan 2025 08:00  Patient On (Oxygen Delivery Method): ventilator                  GEN:   UNRESPONSIVE ON VENT   HEENT: normocephalic and atraumatic. EOMI. JARED.    NECK: Supple. No carotid bruits.  No lymphadenopathy or thyromegaly.  LUNGS: Clear to auscultation.  HEART: Regular rate and rhythm without murmur.  ABDOMEN: Soft, nontender, and nondistended.  Positive bowel sounds.    : No CVA tenderness  EXTREMITIES: Without any cyanosis, clubbing, rash, lesions or edema.  MSK: no joint swelling  NEUROLOGIC:  UNRESPONSIVE ON VENT          Labs:  Vitals:  ============       =======================================================  Current Antibiotics:  cefepime  Injectable. 2000 milliGRAM(s) IV Push every 12 hours    Other medications:  acetaminophen   IVPB .. 1000 milliGRAM(s) IV Intermittent once  aspirin  chewable 81 milliGRAM(s) Oral daily  atorvastatin 10 milliGRAM(s) Oral at bedtime  brivaracetam  Injectable 100 milliGRAM(s) IV Push two times a day  calcium carbonate 1250 mG  + Vitamin D (OsCal 500 + D) 1 Tablet(s) Oral daily  carvedilol 25 milliGRAM(s) Oral every 12 hours  chlorhexidine 0.12% Liquid 15 milliLiter(s) Oral Mucosa every 12 hours  cyanocobalamin 1000 MICROGram(s) Oral daily  dexMEDEtomidine Infusion 0.2 MICROgram(s)/kG/Hr IV Continuous <Continuous>  enoxaparin Injectable 40 milliGRAM(s) SubCutaneous <User Schedule>  insulin lispro (ADMELOG) corrective regimen sliding scale   SubCutaneous every 6 hours  lacosamide Solution 150 milliGRAM(s) Oral <User Schedule>  methylPREDNISolone sodium succinate IVPB 1000 milliGRAM(s) IV Intermittent daily  pantoprazole  Injectable 40 milliGRAM(s) IV Push daily  perampanel 8 milliGRAM(s) Oral at bedtime  polyethylene glycol 3350 17 Gram(s) Oral daily  senna 2 Tablet(s) Oral at bedtime  sildenafil (REVATIO) 20 milliGRAM(s) Oral three times a day      =======================================================  Labs:                                            9.8    13.58 )-----------( 246      ( 03 Jan 2025 03:50 )             30.7   01-03    141  |  107  |  30.5[H]  ----------------------------<  138[H]  4.1   |  26.0  |  0.64    Ca    8.3[L]      03 Jan 2025 03:50  Phos  2.0     01-03  Mg     2.1     01-03    TPro  5.8[L]  /  Alb  2.6[L]  /  TBili  0.2[L]  /  DBili  0.1  /  AST  29  /  ALT  32  /  AlkPhos  70  01-03        Culture - Urine (collected 01-01-25 @ 01:20)  Source: Clean Catch Clean Catch (Midstream)  Final Report (01-02-25 @ 06:24):    No growth    Culture - Blood (collected 12-31-24 @ 12:15)  Source: .Blood BLOOD  Preliminary Report (01-01-25 @ 23:07):    No growth at 24 hours    Culture - Blood (collected 12-31-24 @ 12:05)  Source: .Blood BLOOD  Preliminary Report (01-01-25 @ 23:07):    No growth at 24 hours    Culture - Sputum (collected 12-30-24 @ 20:05)  Source: ET Tube ET Tube  Gram Stain (12-31-24 @ 03:24):    Few polymorphonuclear leukocytes per low power field    Few Squamous epithelial cells per low power field    Moderate Gram Negative Rods seen per oil power field    Few Gram positive cocci in pairs seen per oil power field  Final Report (01-01-25 @ 19:14):    Rare Pseudomonas putida group    Commensal michelle consistent with body site  Organism: Pseudomonas putida group (01-01-25 @ 19:14)  Organism: Pseudomonas putida group (01-01-25 @ 19:14)    Sensitivities:      Method Type: VIVI      -  Amikacin: S <=16      -  Aztreonam: S 8      -  Cefepime: S <=2      -  Ceftriaxone: S 8      -  Ciprofloxacin: S <=0.25      -  Gentamicin: S <=2      -  Levofloxacin: S <=0.5      -  Meropenem: S <=1      -  Piperacillin/Tazobactam: S <=8      -  Tobramycin: S <=2      -  Trimethoprim/Sulfamethoxazole: S 2/38    Culture - Blood (collected 12-30-24 @ 16:55)  Source: .Blood BLOOD  Gram Stain (12-31-24 @ 09:21):    Growth in aerobic bottle: Gram Negative Rods  Final Report (01-01-25 @ 17:27):    Growth in aerobic bottle: Citrobacter freundii complex    Direct identification is available within approximately 3-5    hours either by Blood Panel Multiplexed PCR or Direct    MALDI-TOF. Details: [email or web address]  Organism: Blood Culture PCR  Citrobacter freundii complex (01-01-25 @ 17:27)  Organism: Citrobacter freundii complex (01-01-25 @ 17:27)    Sensitivities:      Method Type: VIVI      -  Ampicillin: R <=8 These ampicillin results predict results for amoxicillin      -  Ampicillin/Sulbactam: R <=4/2      -  Aztreonam: S <=4      -  Cefazolin: R >16      -  Cefepime: S <=2      -  Cefoxitin: R >16      -  Ceftriaxone: S <=1 Enterobacter cloacae, Klebsiella aerogenes, and Citrobacter freundii may develop resistance during prolonged therapy.      -  Ciprofloxacin: S <=0.25      -  Ertapenem: S <=0.5      -  Gentamicin: S <=2      -  Imipenem: S <=1      -  Levofloxacin: S <=0.5      -  Meropenem: S <=1      -  Piperacillin/Tazobactam: S <=8 Treatment with Pipercillin/Tazobactam is not recommended in severe infections casued by Klebsiella aerogenes, Enterobacter cloacae complex, and Citrobacter freundii complex.      -  Tobramycin: S <=2      -  Trimethoprim/Sulfamethoxazole: S <=0.5/9.5  Organism: Blood Culture PCR (01-01-25 @ 17:27)    Sensitivities:      Method Type: PCR      -  Enterobacterales: Detec    Culture - Blood (collected 12-30-24 @ 16:45)  Source: .Blood BLOOD  Preliminary Report (01-01-25 @ 23:07):    No growth at 48 Hours    Culture - CSF with Gram Stain (collected 12-27-24 @ 13:50)  Source: .CSF CSF  Gram Stain (12-27-24 @ 22:52):    polymorphonuclear leukocytes seen    No organisms seen    by cytocentrifuge  Final Report (01-01-25 @ 15:49):    No growth at 5 days    Culture - Fungal, CSF (collected 12-27-24 @ 13:50)  Source: .CSF CSF  Preliminary Report (12-30-24 @ 11:01):    No growth    Culture - Acid Fast - CSF (collected 12-27-24 @ 13:50)  Source: .CSF CSF  Preliminary Report (12-28-24 @ 23:06):    Culture is being performed.    Culture - Blood (collected 12-27-24 @ 10:55)  Source: .Blood BLOOD  Final Report (01-01-25 @ 17:01):    No growth at 5 days    Culture - Blood (collected 12-27-24 @ 10:47)  Source: .Blood BLOOD  Final Report (01-01-25 @ 17:01):    No growth at 5 days      Creatinine: 0.73 mg/dL (01-02-25 @ 03:00)  Creatinine: 0.74 mg/dL (01-01-25 @ 04:20)  Creatinine: 0.82 mg/dL (12-31-24 @ 02:28)  Creatinine: 0.77 mg/dL (12-30-24 @ 02:30)  Creatinine: 0.71 mg/dL (12-29-24 @ 03:29)    Procalcitonin: 0.13 ng/mL (12-30-24 @ 16:50)  Procalcitonin: 0.17 ng/mL (12-27-24 @ 10:47)      Ferritin: 878 ng/mL (12-25-24 @ 14:50)    C-Reactive Protein: 140 mg/L (12-27-24 @ 10:47)  C-Reactive Protein: 104 mg/L (12-26-24 @ 02:30)    WBC Count: 11.57 K/uL (01-02-25 @ 03:00)  WBC Count: 15.52 K/uL (01-01-25 @ 04:20)  WBC Count: 16.74 K/uL (12-31-24 @ 02:28)  WBC Count: 14.59 K/uL (12-30-24 @ 16:50)  WBC Count: 14.47 K/uL (12-30-24 @ 02:30)  WBC Count: 11.35 K/uL (12-29-24 @ 03:29)    SARS-CoV-2: NotDetec (12-30-24 @ 16:55)  SARS-CoV-2: NotDetec (12-27-24 @ 10:47)

## 2025-01-03 NOTE — DIETITIAN NUTRITION RISK NOTIFICATION - TREATMENT: THE FOLLOWING DIET HAS BEEN RECOMMENDED
Diet, NPO with Tube Feed:   Tube Feeding Modality: Orogastric  Jevity 1.5 Farrukh (JEVITY1.5)  Continuous  Starting Tube Feed Rate {mL per Hour}: 10  Increase Tube Feed Rate by (mL): 10     Every 4 hours  Until Goal Tube Feed Rate (mL per Hour): 30  Tube Feed Duration (in Hours): 24  Tube Feed Start Time: 14:00 (01-02-25 @ 09:51) [Active]

## 2025-01-03 NOTE — CHART NOTE - NSCHARTNOTEFT_GEN_A_CORE
Source: Patient [ ]  Family [ ]   other [x ]  EMR and staff     Current Diet: Diet, NPO with Tube Feed:   Tube Feeding Modality: Orogastric  Jevity 1.5 Farrukh (JEVITY1.5)  Continuous  Starting Tube Feed Rate {mL per Hour}: 10  Increase Tube Feed Rate by (mL): 10     Every 4 hours  Until Goal Tube Feed Rate (mL per Hour): 30  Tube Feed Duration (in Hours): 24  Tube Feed Start Time: 14:00 (01-02-25 @ 09:51)    Enteral /Parenteral Nutrition: Jevity 1.5 @ 30ml/hr (x24 hours) 720ml/day; 1080kcal, 46gm protein, 547ml free water     Current Weight:   1/3: 67.9 kg   12/1: 72.5 kg   12/27: 60.9 kg   (2+ edema B/L arms, 3+ edema B/L hands)     % Weight Change: Unsure of accuracy of weights; however trending up may be secondary to edema; will continue to monitor weights for trends.     Pertinent Medications: MEDICATIONS  (STANDING):  aspirin  chewable 81 milliGRAM(s) Oral daily  atorvastatin 10 milliGRAM(s) Oral at bedtime  brivaracetam  Injectable 100 milliGRAM(s) IV Push two times a day  calcium carbonate 1250 mG  + Vitamin D (OsCal 500 + D) 1 Tablet(s) Oral daily  carvedilol 25 milliGRAM(s) Oral every 12 hours  cefepime  Injectable. 2000 milliGRAM(s) IV Push every 12 hours  chlorhexidine 0.12% Liquid 15 milliLiter(s) Oral Mucosa every 12 hours  cyanocobalamin 1000 MICROGram(s) Oral daily  dexMEDEtomidine Infusion 0.2 MICROgram(s)/kG/Hr (2.81 mL/Hr) IV Continuous <Continuous>  enoxaparin Injectable 40 milliGRAM(s) SubCutaneous <User Schedule>  insulin lispro (ADMELOG) corrective regimen sliding scale   SubCutaneous every 6 hours  lacosamide Solution 150 milliGRAM(s) Oral <User Schedule>  methylPREDNISolone sodium succinate IVPB 1000 milliGRAM(s) IV Intermittent daily  pantoprazole  Injectable 40 milliGRAM(s) IV Push daily  perampanel 8 milliGRAM(s) Oral at bedtime  polyethylene glycol 3350 17 Gram(s) Oral daily  senna 2 Tablet(s) Oral at bedtime  sildenafil (REVATIO) 20 milliGRAM(s) Oral three times a day    MEDICATIONS  (PRN):  acetaminophen   Oral Liquid .. 650 milliGRAM(s) Oral every 6 hours PRN Temp greater or equal to 38C (100.4F), Mild Pain (1 - 3)  albuterol/ipratropium for Nebulization 3 milliLiter(s) Nebulizer every 6 hours PRN Shortness of Breath and/or Wheezing  fentaNYL    Injectable 25 MICROGram(s) IV Push every 2 hours PRN vent dyssychrony  hydrALAZINE Injectable 10 milliGRAM(s) IV Push every 3 hours PRN SBP>180  labetalol Injectable 10 milliGRAM(s) IV Push every 3 hours PRN SBP>180    Pertinent Labs: CBC Full  -  ( 03 Jan 2025 03:50 )  WBC Count : 13.58 K/uL  RBC Count : 3.45 M/uL  Hemoglobin : 9.8 g/dL  Hematocrit : 30.7 %  Platelet Count - Automated : 246 K/uL  Mean Cell Volume : 89.0 fl  Mean Cell Hemoglobin : 28.4 pg  Mean Cell Hemoglobin Concentration : 31.9 g/dL    Skin: Stage 1 coccyx, scab to L ear    Nutrition focused physical exam conducted - found signs of malnutrition [ ]absent [x ]present    Subcutaneous fat loss: [x ] Orbital fat pads region, [x ]Buccal fat region, [ ]Triceps region,  [ ]Ribs region    Muscle wasting: [ ]Temples region, [x ]Clavicle region, [x ]Shoulder region, [ ]Scapula region, [ ]Interosseous region,  [ ]thigh region, [ ]Calf region    Estimated Needs:   [ x] no change since previous assessment  [ ] recalculated:     Hospital course:   Pt is a 83yo F with PMHx HTN, HLD, osteoporosis, GERD, h/o breast cancer s/p left mastectomy, left hysterectomy BIBEMs for left-sided weakness and LUE rhythmic shaking; admitted with seizures, severe R ICA stenosis.     Current Nutrition Diagnosis:  Pt remains at high nutrition risk secondary to Moderate (acute) protein calorie malnutrition related to inability to meet sufficient protein energy needs in setting of recent R ICA stenosis; requiring vent support as evidenced by meeting < 75% estimated energy needs > 7days, physical signs of mild muscle/fat loss, + edema. Pt receiving enteral feeds of Jevity 1.5 @ 30ml/hr x 24 hours; tolerating well. Pt not yet meeting estimated nutrition needs. LBM 1/3 noted. Recommendations below:     Recommendations:   1. RX: MVI and Vit. C daily (500mg).   2. Check weight daily to monitor trends   3. Change enteral regime per ICU protocol to Jevity 1.5 @ 50ml/hr (x18 hours) 900ml/day; 1350kcal, 58gm protein, 684ml free water; additional free water per MD discretion.   4. Monitor H/H, BGM     Monitoring and Evaluation:   [ ] PO intake [x ] Tolerance to diet prescription [X] Weights  [X] Follow up per protocol [X] Labs:

## 2025-01-03 NOTE — CONSULT NOTE ADULT - ATTENDING COMMENTS
Respiratory failure and dysphagia due to underlying neurologic condition. Primary team called due to these factors requesting the above procedures.    Schedule for trach and PEG next week  Con't care per primary team  I do not need DVT prophylaxis held   Risks and benefits and consent to be discussed on day of surgery, family updated by primary team.    Wei Garrido MD, FACS  Acute and Critical Care Surgery  Director of Emergency General Surgery @ [facility]  Associate  - General Surgery @ [facility]

## 2025-01-03 NOTE — PROGRESS NOTE ADULT - ASSESSMENT
84F new onset status epilepticus (clinically - with L UE and LLE twitching). h/o breast cancer (2019)    Patient is critically ill with high probability of imminent neurologic or life-threatening deterioration due to the following diagnoses:  1) Status Epilepticus  - For which we are monitoring neurologically and electrographically on EEG and providing continous sedating antiepileptic medications  2) Respiratory Failure  - For which we are treating with antibiotics and weaning ventilator settings with spontaneous breathing trials as tolerated    Plan:  Neuro  - neurochecks q4h  - cont cEEG- IIC, Relative decrease in frequency of discharges between 1400 (12/31) and 0030 (01/01)   - Briviact and Vimpat    - Fycompa 4mg qhs per epilepsy  - hold Aricept for now   - 1g Solumedrol for 7 days (-1/5)  for empiric autoimmune encephalitis  - precedex for vent dyssynchrony    CV  - -180, avoid significant fluctuations; cont home Coreg  - sildenafil  - ASA  - cont home statin    Pulm  - Osats>92%, vent support, SBT as tolerated  - duoNebs q6h prn    GI  - cont TF, BM regimen; PPI for ulcer ppx  - recheck LFTs in AM  - LBM 1/1      - monitor e-lytes, I/Os; 300 cc q6 free water   - voiding    Endo  - Goal euglycemia (-180), ISS    Heme  - VTE prophylaxis: SCDs, SQL    ID  - followup cultures  - appreciate ID recs- GNRs in one tube blood culture (Citrobacter), pseudomonas  - empiric zosyn  - CT ab pelvis- Small bilateral pleural effusion/atelectasis, mild ascites, and anasarca. 84F new onset status epilepticus (clinically - with L UE and LLE twitching). h/o breast cancer (2019)    Patient is critically ill with high probability of imminent neurologic or life-threatening deterioration due to the following diagnoses:  1) Status Epilepticus  - For which we are monitoring neurologically and electrographically on EEG and providing continous sedating antiepileptic medications  2) Respiratory Failure  - For which we are treating with antibiotics and weaning ventilator settings with spontaneous breathing trials as tolerated    Plan:  Neuro  - neurochecks q4h  - DC cEEG- IIC, likely reconnect Sunday  - Briviact and Vimpat    - Fycompa 8mg qhs per epilepsy  - hold Aricept for now   - 1g Solumedrol for 7 days (-1/5)  for empiric autoimmune encephalitis- then Najjar taper over 8 weeks  - IvIG x5 days for empiric autoimmune encephalitis  - precedex for vent dyssynchrony    CV  - -180, avoid significant fluctuations; cont home Coreg  - sildenafil  - ASA  - cont home statin    Pulm   - mech vent intubation- day 11  - Osats>92%, vent support, SBT as tolerated  - duoNebs q6h prn  - consult gen surg for trach/PEG    GI  - cont TF, BM regimen; PPI for ulcer ppx  - LBM 1/3      - monitor e-lytes, I/Os; 300 cc q6 free water   - voiding    Endo  - Goal euglycemia (-180), ISS    Heme  - VTE prophylaxis: SCDs, SQL    ID  - followup cultures  - leukocytosis- steroids?  - appreciate ID recs- GNRs in one tube blood culture (Citrobacter), pseudomonas  - empiric cefepime  - CT ab pelvis- Small bilateral pleural effusion/atelectasis, mild ascites, and anasarca.

## 2025-01-04 NOTE — PROGRESS NOTE ADULT - SUBJECTIVE AND OBJECTIVE BOX
Chief complaint:   Patient is a 84y old  Female who presents with a chief complaint of seizures, severe R ICA stenosis (03 Jan 2025 16:18)    HPI:  85yo F with PMHx HTN, HLD, osteoporosis, GERD, h/o breast cancer s/p left mastectomy, left hysterectomy BIBEMs for left-sided weakness and LUE rhythmic shaking. Per family, patient was last known well at 1:30am. When EMS arrived patient was alert but sloughing over the left side. Patient's left arm was noted to be weak and shortly started twitching, was given versed by EMS. By the time patient arrived to ER, Pt was intubated for airway protection. 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 CTA without LVO however with findings of high grade stenosis 75% right  ICA and 30% stenosis L ICA. NeuroICU consulted for further intervention. (24 Dec 2024 20:57)        24hr EVENTS:      ROS: [ ]  Unable to assess due to mental status   All other systems negative    -----------------------------------------------------------------------------------------------------------------------------------------------------------------------------------  ICU Vital Signs Last 24 Hrs  T(C): 37.4 (04 Jan 2025 09:00), Max: 37.5 (03 Jan 2025 11:00)  T(F): 99.3 (04 Jan 2025 09:00), Max: 99.5 (03 Jan 2025 11:00)  HR: 55 (04 Jan 2025 09:00) (50 - 87)  BP: 139/59 (04 Jan 2025 09:00) (125/55 - 193/104)  BP(mean): 82 (04 Jan 2025 09:00) (73 - 127)  ABP: --  ABP(mean): --  RR: 12 (04 Jan 2025 09:00) (12 - 25)  SpO2: 100% (04 Jan 2025 09:00) (100% - 100%)    O2 Parameters below as of 04 Jan 2025 08:00  Patient On (Oxygen Delivery Method): ventilator    O2 Concentration (%): 40        I&O's Summary    03 Jan 2025 07:01  -  04 Jan 2025 07:00  --------------------------------------------------------  IN: 1919.6 mL / OUT: 1050 mL / NET: 869.6 mL    04 Jan 2025 07:01  -  04 Jan 2025 09:42  --------------------------------------------------------  IN: 42.8 mL / OUT: 0 mL / NET: 42.8 mL        MEDICATIONS  (STANDING):  acetaminophen   IVPB .. 1000 milliGRAM(s) IV Intermittent once  ascorbic acid 500 milliGRAM(s) Oral daily  aspirin  chewable 81 milliGRAM(s) Oral daily  atorvastatin 10 milliGRAM(s) Oral at bedtime  brivaracetam  Injectable 100 milliGRAM(s) IV Push two times a day  calcium carbonate 1250 mG  + Vitamin D (OsCal 500 + D) 1 Tablet(s) Oral daily  carvedilol 25 milliGRAM(s) Oral every 12 hours  cefepime  Injectable. 2000 milliGRAM(s) IV Push every 12 hours  chlorhexidine 0.12% Liquid 15 milliLiter(s) Oral Mucosa every 12 hours  cyanocobalamin 1000 MICROGram(s) Oral daily  dexMEDEtomidine Infusion 0.2 MICROgram(s)/kG/Hr (2.81 mL/Hr) IV Continuous <Continuous>  enoxaparin Injectable 40 milliGRAM(s) SubCutaneous <User Schedule>  immune   globulin 10% (GAMMAGARD) IVPB 20 Gram(s) IV Intermittent daily  insulin lispro (ADMELOG) corrective regimen sliding scale   SubCutaneous every 6 hours  lacosamide Solution 150 milliGRAM(s) Oral <User Schedule>  methylPREDNISolone sodium succinate IVPB 1000 milliGRAM(s) IV Intermittent daily  multivitamin 1 Tablet(s) Oral daily  pantoprazole  Injectable 40 milliGRAM(s) IV Push daily  perampanel 8 milliGRAM(s) Oral at bedtime  polyethylene glycol 3350 17 Gram(s) Oral daily  senna 2 Tablet(s) Oral at bedtime  sildenafil (REVATIO) 20 milliGRAM(s) Oral three times a day      RESPIRATORY:  Mode: AC/ CMV (Assist Control/ Continuous Mandatory Ventilation)  RR (machine): 12  TV (machine): 400  FiO2: 40  PEEP: 6  ITime: 0.9  MAP: 10  PIP: 21        IMAGING:   Recent imaging studies were reviewed.    LAB RESULTS:                          10.1   17.39 )-----------( 300      ( 04 Jan 2025 04:45 )             32.1           01-04    140  |  106  |  27.2[H]  ----------------------------<  111[H]  4.2   |  27.0  |  0.60    Ca    8.1[L]      04 Jan 2025 04:45  Phos  1.7     01-04  Mg     2.2     01-04    TPro  5.8[L]  /  Alb  2.6[L]  /  TBili  0.2[L]  /  DBili  0.1  /  AST  29  /  ALT  32  /  AlkPhos  70  01-03                -----------------------------------------------------------------------------------------------------------------------------------------------------------------------------------    PHYSICAL EXAM:  General: Calm, intubated  HEENT: MMM  Neuro:  -Mental status- No acute distress  -CN- PERRL 3mm, EOMI, tongue midline, face symmetric    CV: RRR  Pulm: clear to auscultation  Abd: Soft, nontender, nondistended  Ext: no noted edema in lower ext  Skin: warm, dry       Chief complaint:   Patient is a 84y old  Female who presents with a chief complaint of seizures, severe R ICA stenosis (03 Jan 2025 16:18)    HPI:  85yo F with PMHx HTN, HLD, osteoporosis, GERD, h/o breast cancer s/p left mastectomy, left hysterectomy BIBEMs for left-sided weakness and LUE rhythmic shaking. Per family, patient was last known well at 1:30am. When EMS arrived patient was alert but sloughing over the left side. Patient's left arm was noted to be weak and shortly started twitching, was given versed by EMS. By the time patient arrived to ER, Pt was intubated for airway protection. 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 CTA without LVO however with findings of high grade stenosis 75% right  ICA and 30% stenosis L ICA. NeuroICU consulted for further intervention. (24 Dec 2024 20:57)    24hr EVENTS:  clogged ETT- exchanged by anesthesia overnight    ROS: [x ]  Unable to assess due to mental status   All other systems negative    -----------------------------------------------------------------------------------------------------------------------------------------------------------------------------------  ICU Vital Signs Last 24 Hrs  T(C): 37.4 (04 Jan 2025 09:00), Max: 37.5 (03 Jan 2025 11:00)  T(F): 99.3 (04 Jan 2025 09:00), Max: 99.5 (03 Jan 2025 11:00)  HR: 55 (04 Jan 2025 09:00) (50 - 87)  BP: 139/59 (04 Jan 2025 09:00) (125/55 - 193/104)  BP(mean): 82 (04 Jan 2025 09:00) (73 - 127)  ABP: --  ABP(mean): --  RR: 12 (04 Jan 2025 09:00) (12 - 25)  SpO2: 100% (04 Jan 2025 09:00) (100% - 100%)    O2 Parameters below as of 04 Jan 2025 08:00  Patient On (Oxygen Delivery Method): ventilator    O2 Concentration (%): 40        I&O's Summary    03 Jan 2025 07:01  -  04 Jan 2025 07:00  --------------------------------------------------------  IN: 1919.6 mL / OUT: 1050 mL / NET: 869.6 mL    04 Jan 2025 07:01  -  04 Jan 2025 09:42  --------------------------------------------------------  IN: 42.8 mL / OUT: 0 mL / NET: 42.8 mL        MEDICATIONS  (STANDING):  acetaminophen   IVPB .. 1000 milliGRAM(s) IV Intermittent once  ascorbic acid 500 milliGRAM(s) Oral daily  aspirin  chewable 81 milliGRAM(s) Oral daily  atorvastatin 10 milliGRAM(s) Oral at bedtime  brivaracetam  Injectable 100 milliGRAM(s) IV Push two times a day  calcium carbonate 1250 mG  + Vitamin D (OsCal 500 + D) 1 Tablet(s) Oral daily  carvedilol 25 milliGRAM(s) Oral every 12 hours  cefepime  Injectable. 2000 milliGRAM(s) IV Push every 12 hours  chlorhexidine 0.12% Liquid 15 milliLiter(s) Oral Mucosa every 12 hours  cyanocobalamin 1000 MICROGram(s) Oral daily  dexMEDEtomidine Infusion 0.2 MICROgram(s)/kG/Hr (2.81 mL/Hr) IV Continuous <Continuous>  enoxaparin Injectable 40 milliGRAM(s) SubCutaneous <User Schedule>  immune   globulin 10% (GAMMAGARD) IVPB 20 Gram(s) IV Intermittent daily  insulin lispro (ADMELOG) corrective regimen sliding scale   SubCutaneous every 6 hours  lacosamide Solution 150 milliGRAM(s) Oral <User Schedule>  methylPREDNISolone sodium succinate IVPB 1000 milliGRAM(s) IV Intermittent daily  multivitamin 1 Tablet(s) Oral daily  pantoprazole  Injectable 40 milliGRAM(s) IV Push daily  perampanel 8 milliGRAM(s) Oral at bedtime  polyethylene glycol 3350 17 Gram(s) Oral daily  senna 2 Tablet(s) Oral at bedtime  sildenafil (REVATIO) 20 milliGRAM(s) Oral three times a day      RESPIRATORY:  Mode: AC/ CMV (Assist Control/ Continuous Mandatory Ventilation)  RR (machine): 12  TV (machine): 400  FiO2: 40  PEEP: 6  ITime: 0.9  MAP: 10  PIP: 21        IMAGING:   Recent imaging studies were reviewed.    LAB RESULTS:                          10.1   17.39 )-----------( 300      ( 04 Jan 2025 04:45 )             32.1           01-04    140  |  106  |  27.2[H]  ----------------------------<  111[H]  4.2   |  27.0  |  0.60    Ca    8.1[L]      04 Jan 2025 04:45  Phos  1.7     01-04  Mg     2.2     01-04    TPro  5.8[L]  /  Alb  2.6[L]  /  TBili  0.2[L]  /  DBili  0.1  /  AST  29  /  ALT  32  /  AlkPhos  70  01-03      -----------------------------------------------------------------------------------------------------------------------------------------------------------------------------------  PHYSICAL EXAM:  General: Calm, intubated  HEENT: MMM  macroglossia  Neuro:  -Mental status- No acute distress, no EO, following simple commands on R  -CN- PERRL 3mm, EOMI, tongue midline, face symmetric  RUE 2/5  RLE 2/5  LUE WD  LLE 1/5    CV: RRR  Pulm: clear to auscultation  Abd: Soft, nontender, nondistended  Ext: no noted edema in lower ext  Skin: warm, dry

## 2025-01-04 NOTE — PROGRESS NOTE ADULT - ASSESSMENT
84F new onset status epilepticus (clinically - with L UE and LLE twitching). h/o breast cancer (2019)    Patient is critically ill with high probability of imminent neurologic or life-threatening deterioration due to the following diagnoses:  1) Status Epilepticus  - For which we are monitoring neurologically and electrographically on EEG and providing continous sedating antiepileptic medications  2) Respiratory Failure  - For which we are treating with antibiotics and weaning ventilator settings with spontaneous breathing trials as tolerated    Plan:  Neuro  - neurochecks q4h  - DC cEEG- IIC, likely reconnect Sunday  - Briviact and Vimpat    - Fycompa 8mg qhs per epilepsy  - hold Aricept for now   - 1g Solumedrol for 7 days (-1/5)  for empiric autoimmune encephalitis- then Najjar taper over 8 weeks  - IvIG x5 days for empiric autoimmune encephalitis  - precedex for vent dyssynchrony    CV  - -180, avoid significant fluctuations; cont home Coreg  - sildenafil  - ASA  - cont home statin    Pulm   - mech vent intubation- day 11  - Osats>92%, vent support, SBT as tolerated  - duoNebs q6h prn  - consult gen surg for trach/PEG    GI  - cont TF, BM regimen; PPI for ulcer ppx  - LBM 1/3      - monitor e-lytes, I/Os; 300 cc q6 free water   - voiding    Endo  - Goal euglycemia (-180), ISS    Heme  - VTE prophylaxis: SCDs, SQL    ID  - followup cultures  - leukocytosis- steroids?  - appreciate ID recs- GNRs in one tube blood culture (Citrobacter), pseudomonas  - empiric cefepime  - CT ab pelvis- Small bilateral pleural effusion/atelectasis, mild ascites, and anasarca. 84F new onset status epilepticus (clinically - with L UE and LLE twitching). h/o breast cancer (2019)    Patient is critically ill with high probability of imminent neurologic or life-threatening deterioration due to the following diagnoses:  1) Status Epilepticus  - For which we are monitoring neurologically and electrographically on EEG and providing continous sedating antiepileptic medications  2) Respiratory Failure  - For which we are treating with antibiotics and weaning ventilator settings with spontaneous breathing trials as tolerated    Plan:  Neuro  - neurochecks q4h  - DC cEEG- IIC, likely reconnect Sunday  - Briviact and Vimpat, Fycompa 8mg qhs per epilepsy  - hold Aricept for now   - 1g Solumedrol for 7 days (-1/5)  for empiric autoimmune encephalitis- then Najjar taper over 8 weeks  - IvIG x5 days for empiric autoimmune encephalitis  - precedex for vent dyssynchrony    CV  - -180, avoid significant fluctuations; cont home Coreg  - sildenafil  - ASA  - cont home statin    Pulm   - mech vent intubation- day 12  - ETT exchanged overnight  - Osats>92%, vent support, SBT as tolerated  - duoNebs q6h prn  - consult gen surg for trach/PEG    GI  - cont TF, BM regimen; PPI for ulcer ppx  - LBM 1/4, loose stools with dignicare      - monitor e-lytes, I/Os; 300 cc q6 free water   - voiding    Endo  - Goal euglycemia (-180), ISS    Heme  - VTE prophylaxis: SCDs, SQL    ID  - followup cultures  - leukocytosis- steroids?  - appreciate ID recs- GNRs in one tube blood culture (Citrobacter), pseudomonas  - empiric cefepime  - CT ab pelvis- Small bilateral pleural effusion/atelectasis, mild ascites, and anasarca.

## 2025-01-04 NOTE — CHART NOTE - NSCHARTNOTEFT_GEN_A_CORE
RT called down to Neuro ICU by RN. Pt was not getting her volumes. Suctioned, and bagged on 100%. Anesthesia Rohith Celaya called stat. Had to do a tube exchange. After tube exchange pt successfully getting her volumes.

## 2025-01-05 NOTE — PROGRESS NOTE ADULT - ASSESSMENT
84F new onset status epilepticus (clinically - with L UE and LLE twitching). h/o breast cancer (2019)    Patient is critically ill with high probability of imminent neurologic or life-threatening deterioration due to the following diagnoses:  1) Status Epilepticus  - For which we are monitoring neurologically and electrographically on EEG and providing continous sedating antiepileptic medications  2) Respiratory Failure  - For which we are treating with antibiotics and weaning ventilator settings with spontaneous breathing trials as tolerated    Plan:  Neuro  - neurochecks q4h  - reconnect EEG today  - Briviact and Vimpat, Fycompa 8mg qhs per epilepsy  - hold Aricept for now   - 1g Solumedrol for 7 days (-1/5)  for empiric autoimmune encephalitis- then "Najjar taper" over 8 weeks  - IvIG x5 days for empiric autoimmune encephalitis, discussed with Dr. Molina  - precedex for vent dyssynchrony    CV  - -180, avoid significant fluctuations; cont home Coreg  - sildenafil  - ASA  - cont home statin    Pulm   - mech vent intubation- day 13  - Osats>92%, vent support, SBT as tolerated  - duoNebs q6h prn  - consult gen surg for trach/PEG    GI  - cont TF, BM regimen; PPI for ulcer ppx  - LBM 1/4, loose stools with dignicare  - banatrol, hold BR      - monitor e-lytes, I/Os; 300 cc q6 free water   - voiding    Endo  - Goal euglycemia (-180), ISS    Heme  - VTE prophylaxis: SCDs, SQL    ID  - followup cultures  - leukocytosis- steroids?  - appreciate ID recs- GNRs in one tube blood culture (Citrobacter), pseudomonas  - empiric cefepime  - CT ab pelvis- Small bilateral pleural effusion/atelectasis, mild ascites, and anasarca. 84F new onset status epilepticus (clinically - with L UE and LLE twitching). h/o breast cancer (2019)    Patient is critically ill with high probability of imminent neurologic or life-threatening deterioration due to the following diagnoses:  1) Status Epilepticus  - For which we are monitoring neurologically and electrographically on EEG and providing continous sedating antiepileptic medications  2) Respiratory Failure  - For which we are treating with antibiotics and weaning ventilator settings with spontaneous breathing trials as tolerated    Plan:  Neuro  - neurochecks q4h  - reconnect EEG today  - Briviact and Vimpat, Fycompa 8mg qhs per epilepsy  - hold Aricept for now   - 1g Solumedrol for 7 days (-1/5)  for empiric autoimmune encephalitis- then "Najjar taper" over 8 weeks  - IvIG x5 days for empiric autoimmune encephalitis, discussed with Dr. Molina  (day 3 of 5)  - precedex for vent dyssynchrony    CV  - -180, avoid significant fluctuations; cont home Coreg  - sildenafil  - ASA  - cont home statin    Pulm   - mech vent intubation- day 13  - Osats>92%, vent support, SBT as tolerated  - duoNebs q6h prn  - consult gen surg for trach/PEG    GI  - cont TF, BM regimen; PPI for ulcer ppx  - loose stools with dignicare  - banatrol, hold BR      - monitor e-lytes, I/Os; 300 cc q6 free water   - voiding    Endo  - Goal euglycemia (-180), ISS    Heme  - VTE prophylaxis: SCDs, SQL  - check dopplers with fever    ID  - followup cultures  - leukocytosis- steroids?  - appreciate ID recs- GNRs in one tube blood culture (Citrobacter), pseudomonas  - empiric cefepime  - CT ab pelvis- Small bilateral pleural effusion/atelectasis, mild ascites, and anasarca. 84F new onset status epilepticus (clinically - with L UE and LLE twitching). h/o breast cancer (2019)    Patient is critically ill with high probability of imminent neurologic or life-threatening deterioration due to the following diagnoses:  1) Status Epilepticus  - For which we are monitoring neurologically and electrographically on EEG and providing continous sedating antiepileptic medications  2) Respiratory Failure  - For which we are treating with antibiotics and weaning ventilator settings with spontaneous breathing trials as tolerated    Plan:  Neuro  - neurochecks q4h  - reconnect EEG today  - Briviact and Vimpat, Fycompa 8mg qhs per epilepsy  - hold Aricept for now   - 1g Solumedrol for 7 days (-1/5)  for empiric autoimmune encephalitis- then "Najjar taper" over 8 weeks  - IvIG x5 days for empiric autoimmune encephalitis, discussed with Dr. Molina  (day 3 of 5)  - precedex for vent dyssynchrony    CV  - -180, avoid significant fluctuations; cont home Coreg  - sildenafil  - ASA  - cont home statin    Pulm   - mech vent intubation- day 13  - Osats>92%, vent support, SBT as tolerated  - duoNebs q6h prn  - consult gen surg for trach/PEG- likely Tues OR    GI  - cont TF, BM regimen; PPI for ulcer ppx  - loose stools with dignicare  - banatrol, hold BR      - monitor e-lytes, I/Os; 300 cc q6 free water   - voiding    Endo  - Goal euglycemia (-180), ISS    ID  - followup cultures  - leukocytosis- steroids?  - appreciate ID recs- GNRs in one tube blood culture (Citrobacter), pseudomonas  - empiric cefepime  - CT ab pelvis- Small bilateral pleural effusion/atelectasis, mild ascites, and anasarca.    Heme  - VTE prophylaxis: SCDs, SQL  - check dopplers with fever

## 2025-01-05 NOTE — PROGRESS NOTE ADULT - SUBJECTIVE AND OBJECTIVE BOX
Chief complaint:   Patient is a 84y old  Female who presents with a chief complaint of seizures, severe R ICA stenosis (04 Jan 2025 09:41)    HPI:  85yo F with PMHx HTN, HLD, osteoporosis, GERD, h/o breast cancer s/p left mastectomy, left hysterectomy BIBEMs for left-sided weakness and LUE rhythmic shaking. Per family, patient was last known well at 1:30am. When EMS arrived patient was alert but sloughing over the left side. Patient's left arm was noted to be weak and shortly started twitching, was given versed by EMS. By the time patient arrived to ER, Pt was intubated for airway protection. 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 CTA without LVO however with findings of high grade stenosis 75% right  ICA and 30% stenosis L ICA. NeuroICU consulted for further intervention. (24 Dec 2024 20:57)        24hr EVENTS:      ROS: [ ]  Unable to assess due to mental status   All other systems negative    -----------------------------------------------------------------------------------------------------------------------------------------------------------------------------------  ICU Vital Signs Last 24 Hrs  T(C): 37.6 (05 Jan 2025 09:30), Max: 38.2 (04 Jan 2025 13:30)  T(F): 99.7 (05 Jan 2025 09:30), Max: 100.8 (04 Jan 2025 13:30)  HR: 69 (05 Jan 2025 09:34) (49 - 105)  BP: 146/65 (05 Jan 2025 09:30) (101/45 - 201/83)  BP(mean): 84 (05 Jan 2025 09:30) (60 - 118)  ABP: --  ABP(mean): --  RR: 22 (05 Jan 2025 09:30) (12 - 30)  SpO2: 100% (05 Jan 2025 09:34) (92% - 100%)    O2 Parameters below as of 05 Jan 2025 08:00  Patient On (Oxygen Delivery Method): ventilator    O2 Concentration (%): 40        I&O's Summary    04 Jan 2025 07:01  -  05 Jan 2025 07:00  --------------------------------------------------------  IN: 3006.6 mL / OUT: 900 mL / NET: 2106.6 mL    05 Jan 2025 07:01  -  05 Jan 2025 10:16  --------------------------------------------------------  IN: 102.8 mL / OUT: 0 mL / NET: 102.8 mL        MEDICATIONS  (STANDING):  acetaminophen   IVPB .. 1000 milliGRAM(s) IV Intermittent once  ascorbic acid 500 milliGRAM(s) Oral daily  aspirin  chewable 81 milliGRAM(s) Oral daily  atorvastatin 10 milliGRAM(s) Oral at bedtime  brivaracetam  Injectable 100 milliGRAM(s) IV Push two times a day  calcium carbonate 1250 mG  + Vitamin D (OsCal 500 + D) 1 Tablet(s) Oral daily  carvedilol 25 milliGRAM(s) Oral every 12 hours  cefepime  Injectable. 2000 milliGRAM(s) IV Push every 12 hours  chlorhexidine 0.12% Liquid 15 milliLiter(s) Oral Mucosa every 12 hours  cyanocobalamin 1000 MICROGram(s) Oral daily  dexMEDEtomidine Infusion 0.2 MICROgram(s)/kG/Hr (2.81 mL/Hr) IV Continuous <Continuous>  enoxaparin Injectable 40 milliGRAM(s) SubCutaneous <User Schedule>  immune   globulin 10% (GAMMAGARD) IVPB 20 Gram(s) IV Intermittent daily  insulin lispro (ADMELOG) corrective regimen sliding scale   SubCutaneous every 6 hours  lacosamide Solution 150 milliGRAM(s) Oral <User Schedule>  melatonin 5 milliGRAM(s) Oral at bedtime  multivitamin 1 Tablet(s) Oral daily  pantoprazole  Injectable 40 milliGRAM(s) IV Push daily  perampanel 8 milliGRAM(s) Oral at bedtime  polyethylene glycol 3350 17 Gram(s) Oral daily  senna 2 Tablet(s) Oral at bedtime  sildenafil (REVATIO) 20 milliGRAM(s) Oral three times a day      RESPIRATORY:  Mode: CPAP with PS  FiO2: 40  PEEP: 6  PS: 10  MAP: 11  PIP: 21        IMAGING:   Recent imaging studies were reviewed.    LAB RESULTS:                          9.2    18.38 )-----------( 280      ( 05 Jan 2025 02:25 )             28.6           01-05    138  |  103  |  25.0[H]  ----------------------------<  135[H]  4.0   |  27.0  |  0.63    Ca    7.5[L]      05 Jan 2025 02:25  Phos  1.8     01-05  Mg     2.2     01-05                  -----------------------------------------------------------------------------------------------------------------------------------------------------------------------------------    PHYSICAL EXAM:  General: Calm, intubated  HEENT: [place]  Neuro:  -Mental status- No acute distress  -CN- PERRL 3mm, EOMI, tongue midline, face symmetric    CV: RRR  Pulm: clear to auscultation  Abd: Soft, nontender, nondistended  Ext: no noted edema in lower ext  Skin: warm, dry       Chief complaint:   Patient is a 84y old  Female who presents with a chief complaint of seizures, severe R ICA stenosis (04 Jan 2025 09:41)    HPI:  83yo F with PMHx HTN, HLD, osteoporosis, GERD, h/o breast cancer s/p left mastectomy, left hysterectomy BIBEMs for left-sided weakness and LUE rhythmic shaking. Per family, patient was last known well at 1:30am. When EMS arrived patient was alert but sloughing over the left side. Patient's left arm was noted to be weak and shortly started twitching, was given versed by EMS. By the time patient arrived to ER, Pt was intubated for airway protection. 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 CTA without LVO however with findings of high grade stenosis 75% right  ICA and 30% stenosis L ICA. NeuroICU consulted for further intervention. (24 Dec 2024 20:57)    24hr EVENTS:  no acute issues      ROS: [x ]  Unable to assess due to mental status   All other systems negative    -----------------------------------------------------------------------------------------------------------------------------------------------------------------------------------  ICU Vital Signs Last 24 Hrs  T(C): 37.6 (05 Jan 2025 09:30), Max: 38.2 (04 Jan 2025 13:30)  T(F): 99.7 (05 Jan 2025 09:30), Max: 100.8 (04 Jan 2025 13:30)  HR: 69 (05 Jan 2025 09:34) (49 - 105)  BP: 146/65 (05 Jan 2025 09:30) (101/45 - 201/83)  BP(mean): 84 (05 Jan 2025 09:30) (60 - 118)  ABP: --  ABP(mean): --  RR: 22 (05 Jan 2025 09:30) (12 - 30)  SpO2: 100% (05 Jan 2025 09:34) (92% - 100%)    O2 Parameters below as of 05 Jan 2025 08:00  Patient On (Oxygen Delivery Method): ventilator    O2 Concentration (%): 40        I&O's Summary    04 Jan 2025 07:01  -  05 Jan 2025 07:00  --------------------------------------------------------  IN: 3006.6 mL / OUT: 900 mL / NET: 2106.6 mL    05 Jan 2025 07:01  -  05 Jan 2025 10:16  --------------------------------------------------------  IN: 102.8 mL / OUT: 0 mL / NET: 102.8 mL        MEDICATIONS  (STANDING):  acetaminophen   IVPB .. 1000 milliGRAM(s) IV Intermittent once  ascorbic acid 500 milliGRAM(s) Oral daily  aspirin  chewable 81 milliGRAM(s) Oral daily  atorvastatin 10 milliGRAM(s) Oral at bedtime  brivaracetam  Injectable 100 milliGRAM(s) IV Push two times a day  calcium carbonate 1250 mG  + Vitamin D (OsCal 500 + D) 1 Tablet(s) Oral daily  carvedilol 25 milliGRAM(s) Oral every 12 hours  cefepime  Injectable. 2000 milliGRAM(s) IV Push every 12 hours  chlorhexidine 0.12% Liquid 15 milliLiter(s) Oral Mucosa every 12 hours  cyanocobalamin 1000 MICROGram(s) Oral daily  dexMEDEtomidine Infusion 0.2 MICROgram(s)/kG/Hr (2.81 mL/Hr) IV Continuous <Continuous>  enoxaparin Injectable 40 milliGRAM(s) SubCutaneous <User Schedule>  immune   globulin 10% (GAMMAGARD) IVPB 20 Gram(s) IV Intermittent daily  insulin lispro (ADMELOG) corrective regimen sliding scale   SubCutaneous every 6 hours  lacosamide Solution 150 milliGRAM(s) Oral <User Schedule>  melatonin 5 milliGRAM(s) Oral at bedtime  multivitamin 1 Tablet(s) Oral daily  pantoprazole  Injectable 40 milliGRAM(s) IV Push daily  perampanel 8 milliGRAM(s) Oral at bedtime  polyethylene glycol 3350 17 Gram(s) Oral daily  senna 2 Tablet(s) Oral at bedtime  sildenafil (REVATIO) 20 milliGRAM(s) Oral three times a day      RESPIRATORY:  Mode: CPAP with PS  FiO2: 40  PEEP: 6  PS: 10  MAP: 11  PIP: 21        IMAGING:   Recent imaging studies were reviewed.    LAB RESULTS:                          9.2    18.38 )-----------( 280      ( 05 Jan 2025 02:25 )             28.6       01-05    138  |  103  |  25.0[H]  ----------------------------<  135[H]  4.0   |  27.0  |  0.63    Ca    7.5[L]      05 Jan 2025 02:25  Phos  1.8     01-05  Mg     2.2     01-05      -----------------------------------------------------------------------------------------------------------------------------------------------------------------------------------  PHYSICAL EXAM:  General: Calm, intubated  HEENT: MMM  macroglossia  Neuro:  -Mental status- No acute distress, no EO, following simple commands on R  -CN- PERRL 3mm, EOMI, tongue midline, face symmetric  RUE 2/5  RLE 2/5  LUE WD  LLE 1/5    CV: RRR  Pulm: clear to auscultation  Abd: Soft, nontender, nondistended  Ext: no noted edema in lower ext  Skin: warm, dry

## 2025-01-05 NOTE — PROGRESS NOTE ADULT - CRITICAL CARE ATTENDING COMMENT
I spent 65 minutes of critical care time examining patient, reviewing vitals, labs, medications, imaging and discussing with the team goals of care to prevent life-threatening in this patient who is at high risk for deterioration or death due to:  seizure

## 2025-01-06 NOTE — PROGRESS NOTE ADULT - SUBJECTIVE AND OBJECTIVE BOX
Chief complaint:   Patient is a 84y old  Female who presents with a chief complaint of seizures, severe R ICA stenosis (04 Jan 2025 09:41)    HPI:  83yo F with PMHx HTN, HLD, osteoporosis, GERD, h/o breast cancer s/p left mastectomy, left hysterectomy BIBEMs for left-sided weakness and LUE rhythmic shaking. Per family, patient was last known well at 1:30am. When EMS arrived patient was alert but sloughing over the left side. Patient's left arm was noted to be weak and shortly started twitching, was given versed by EMS. By the time patient arrived to ER, Pt was intubated for airway protection. 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 CTA without LVO however with findings of high grade stenosis 75% right  ICA and 30% stenosis L ICA. NeuroICU consulted for further intervention. (24 Dec 2024 20:57)    24hr EVENTS:  no acute issues      ROS: [x ]  Unable to assess due to mental status   All other systems negative    -----------------------------------------------------------------------------------------------------------------------------------------------------------------------------------  PHYSICAL EXAM:  General: Calm, intubated  HEENT: MMM  macroglossia  Neuro:  -Mental status- No acute distress, no EO, following simple commands on R  -CN- PERRL 3mm, EOMI, tongue midline, face symmetric  RUE 2/5  RLE 2/5  LUE WD  LLE 1/5    CV: RRR  Pulm: clear to auscultation  Abd: Soft, nontender, nondistended  Ext: no noted edema in lower ext  Skin: warm, dry    -----------------------------------------------------------------------------------------------------  ICU Vital Signs Last 24 Hrs  T(C): 37.4 (06 Jan 2025 08:00), Max: 38 (05 Jan 2025 13:30)  T(F): 99.3 (06 Jan 2025 08:00), Max: 100.4 (05 Jan 2025 13:30)  HR: 57 (06 Jan 2025 08:00) (52 - 89)  BP: 131/54 (06 Jan 2025 08:00) (118/89 - 180/96)  BP(mean): 75 (06 Jan 2025 08:00) (70 - 124)  ABP: --  ABP(mean): --  RR: 16 (06 Jan 2025 08:00) (14 - 26)  SpO2: 99% (06 Jan 2025 08:00) (92% - 100%)    O2 Parameters below as of 06 Jan 2025 08:00  Patient On (Oxygen Delivery Method): ventilator, CPAP    O2 Concentration (%): 40        I&O's Summary    05 Jan 2025 07:01  -  06 Jan 2025 07:00  --------------------------------------------------------  IN: 2517.2 mL / OUT: 900 mL / NET: 1617.2 mL    06 Jan 2025 07:01  -  06 Jan 2025 09:06  --------------------------------------------------------  IN: 52.8 mL / OUT: 250 mL / NET: -197.2 mL        MEDICATIONS  (STANDING):  acetaminophen   IVPB .. 1000 milliGRAM(s) IV Intermittent once  ascorbic acid 500 milliGRAM(s) Oral daily  aspirin  chewable 81 milliGRAM(s) Oral daily  atorvastatin 10 milliGRAM(s) Oral at bedtime  brivaracetam  Injectable 100 milliGRAM(s) IV Push two times a day  calcium carbonate 1250 mG  + Vitamin D (OsCal 500 + D) 1 Tablet(s) Oral daily  carvedilol 25 milliGRAM(s) Oral every 12 hours  cefepime  Injectable. 2000 milliGRAM(s) IV Push every 12 hours  chlorhexidine 0.12% Liquid 15 milliLiter(s) Oral Mucosa every 12 hours  cyanocobalamin 1000 MICROGram(s) Oral daily  dexMEDEtomidine Infusion 0.2 MICROgram(s)/kG/Hr (2.81 mL/Hr) IV Continuous <Continuous>  enoxaparin Injectable 40 milliGRAM(s) SubCutaneous <User Schedule>  immune   globulin 10% (GAMMAGARD) IVPB 20 Gram(s) IV Intermittent daily  insulin lispro (ADMELOG) corrective regimen sliding scale   SubCutaneous every 6 hours  lacosamide Solution 150 milliGRAM(s) Oral <User Schedule>  melatonin 5 milliGRAM(s) Oral at bedtime  methylPREDNISolone sodium succinate IVPB 1000 milliGRAM(s) IV Intermittent <User Schedule>  multivitamin 1 Tablet(s) Oral daily  pantoprazole  Injectable 40 milliGRAM(s) IV Push daily  perampanel 8 milliGRAM(s) Oral at bedtime  potassium phosphate / sodium phosphate Powder (PHOS-NaK) 2 Packet(s) Oral every 6 hours  sildenafil (REVATIO) 20 milliGRAM(s) Oral three times a day      RESPIRATORY:  Mode: CPAP with PS  FiO2: 40  PEEP: 6  PS: 10  MAP: 10        IMAGING:   Recent imaging studies were reviewed.    LAB RESULTS:                          9.8    20.60 )-----------( 309      ( 06 Jan 2025 04:45 )             30.0           01-06    138  |  104  |  24.1[H]  ----------------------------<  128[H]  3.9   |  28.0  |  0.64    Ca    7.5[L]      06 Jan 2025 04:45  Phos  1.5     01-06  Mg     2.1     01-06    -----------------------------------------------------------------------------------------------------------------------------------------------------------------------------------                 Chief complaint:   Patient is a 84y old Female who presents with a chief complaint of seizures, severe R ICA stenosis (04 Jan 2025 09:41)    HPI:  83yo F with PMHx HTN, HLD, osteoporosis, GERD, h/o breast cancer s/p left mastectomy, left hysterectomy BIBEMs for left-sided weakness and LUE rhythmic shaking. Per family, patient was last known well at 1:30am. When EMS arrived patient was alert but sloughing over the left side. Patient's left arm was noted to be weak and shortly started twitching, was given versed by EMS. By the time patient arrived to ER, Pt was intubated for airway protection. 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 CTA without LVO however with findings of high grade stenosis 75% right  ICA and 30% stenosis L ICA. NeuroICU consulted for further intervention. (24 Dec 2024 20:57)    24hr EVENTS:  1/6 - planned for day 4 IVIG    ROS: [x]  Unable to assess due to mental status     -----------------------------------------------------------------------------------------------------------------------------------------------------------------------------------  PHYSICAL EXAM:  precedex @0.2    General: Calm, intubated  HEENT: MMM, macroglossia  Neuro:  -Mental status- No acute distress, no EO, following simple commands on R  -CN- PERRL 3mm, EOMI, tongue midline, face symmetric  RUE 2/5, RLE 2/5; LUE WD, LLE 1/5    CV: regular rhythm, bradycardia  Pulm: +rhonchi, minimal - moderate secretions  Abd: Soft, nontender, nondistended, rectal tube  : external catheter with incontinence  Ext: no noted edema in lower ext  Skin: warm, dry    PIVs,     -----------------------------------------------------------------------------------------------------  ICU Vital Signs Last 24 Hrs  T(C): 37.4 (06 Jan 2025 08:00), Max: 38 (05 Jan 2025 13:30)  T(F): 99.3 (06 Jan 2025 08:00), Max: 100.4 (05 Jan 2025 13:30)  HR: 57 (06 Jan 2025 08:00) (52 - 89)  BP: 131/54 (06 Jan 2025 08:00) (118/89 - 180/96)  BP(mean): 75 (06 Jan 2025 08:00) (70 - 124)  RR: 16 (06 Jan 2025 08:00) (14 - 26)  SpO2: 99% (06 Jan 2025 08:00) (92% - 100%)    O2 Parameters below as of 06 Jan 2025 08:00  Patient On (Oxygen Delivery Method): ventilator, CPAP    O2 Concentration (%): 40        I&O's Summary    05 Jan 2025 07:01  -  06 Jan 2025 07:00  --------------------------------------------------------  IN: 2517.2 mL / OUT: 900 mL / NET: 1617.2 mL    06 Jan 2025 07:01  -  06 Jan 2025 09:06  --------------------------------------------------------  IN: 52.8 mL / OUT: 250 mL / NET: -197.2 mL        MEDICATIONS  (STANDING):  acetaminophen   IVPB .. 1000 milliGRAM(s) IV Intermittent once  ascorbic acid 500 milliGRAM(s) Oral daily  aspirin  chewable 81 milliGRAM(s) Oral daily  atorvastatin 10 milliGRAM(s) Oral at bedtime  brivaracetam  Injectable 100 milliGRAM(s) IV Push two times a day  calcium carbonate 1250 mG  + Vitamin D (OsCal 500 + D) 1 Tablet(s) Oral daily  carvedilol 25 milliGRAM(s) Oral every 12 hours  cefepime  Injectable. 2000 milliGRAM(s) IV Push every 12 hours  chlorhexidine 0.12% Liquid 15 milliLiter(s) Oral Mucosa every 12 hours  cyanocobalamin 1000 MICROGram(s) Oral daily  dexMEDEtomidine Infusion 0.2 MICROgram(s)/kG/Hr (2.81 mL/Hr) IV Continuous <Continuous>  enoxaparin Injectable 40 milliGRAM(s) SubCutaneous <User Schedule>  immune   globulin 10% (GAMMAGARD) IVPB 20 Gram(s) IV Intermittent daily  insulin lispro (ADMELOG) corrective regimen sliding scale   SubCutaneous every 6 hours  lacosamide Solution 150 milliGRAM(s) Oral <User Schedule>  melatonin 5 milliGRAM(s) Oral at bedtime  methylPREDNISolone sodium succinate IVPB 1000 milliGRAM(s) IV Intermittent <User Schedule>  multivitamin 1 Tablet(s) Oral daily  pantoprazole  Injectable 40 milliGRAM(s) IV Push daily  perampanel 8 milliGRAM(s) Oral at bedtime  potassium phosphate / sodium phosphate Powder (PHOS-NaK) 2 Packet(s) Oral every 6 hours  sildenafil (REVATIO) 20 milliGRAM(s) Oral three times a day      RESPIRATORY:  Mode: CPAP with PS  FiO2: 40  PEEP: 6  PS: 10  MAP: 10    IMAGING:   no new imaging    LAB RESULTS:               9.8    20.60 )-----------( 309      ( 06 Jan 2025 04:45 )             30.0     01-06    138  |  104  |  24.1[H]  ----------------------------<  128[H]  3.9   |  28.0  |  0.64    Ca    7.5[L]      06 Jan 2025 04:45  Phos  1.5     01-06  Mg     2.1     01-06    -----------------------------------------------------------------------------------------------------------------------------------------------------------------------------------

## 2025-01-06 NOTE — PROGRESS NOTE ADULT - CRITICAL CARE ATTENDING COMMENT
I spent 65 minutes of critical care time examining patient, reviewing vitals, labs, medications, imaging and discussing with the team goals of care to prevent life-threatening in this patient who is at high risk for deterioration or death due to:  seizure I spent above minutes of critical care time examining patient, reviewing vitals, labs, medications, imaging and discussing with the team goals of care to prevent life-threatening in this patient who is at high risk for deterioration or death due to:  seizure, respiratory failure, diarrhea, HTN, pulmonary HTN, infection.

## 2025-01-06 NOTE — PROGRESS NOTE ADULT - ASSESSMENT
84F new onset status epilepticus (clinically - with L UE and LLE twitching). h/o breast cancer (2019)    Patient is critically ill with high probability of imminent neurologic or life-threatening deterioration due to the following diagnoses:  1) Status Epilepticus  - For which we are monitoring neurologically and electrographically on EEG and providing continous sedating antiepileptic medications  2) Respiratory Failure  - For which we are treating with antibiotics and weaning ventilator settings with spontaneous breathing trials as tolerated    Plan:  Neuro  - neurochecks q4h  - reconnect EEG today  - Briviact and Vimpat, Fycompa 8mg qhs per epilepsy  - hold Aricept for now   - 1g Solumedrol for 7 days (-1/5)  for empiric autoimmune encephalitis- then "Najjar taper" over 8 weeks  - IvIG x5 days for empiric autoimmune encephalitis, discussed with Dr. Molina  (day 3 of 5)  - precedex for vent dyssynchrony    CV  - -180, avoid significant fluctuations; cont home Coreg  - sildenafil  - ASA  - cont home statin    Pulm   - mech vent intubation- day 13  - Osats>92%, vent support, SBT as tolerated  - duoNebs q6h prn  - consult gen surg for trach/PEG- likely Tues OR    GI  - cont TF, BM regimen; PPI for ulcer ppx  - loose stools with dignicare  - banatrol, hold BR      - monitor e-lytes, I/Os; 300 cc q6 free water   - voiding    Endo  - Goal euglycemia (-180), ISS    ID  - followup cultures  - leukocytosis- steroids?  - appreciate ID recs- GNRs in one tube blood culture (Citrobacter), pseudomonas  - empiric cefepime  - CT ab pelvis- Small bilateral pleural effusion/atelectasis, mild ascites, and anasarca.    Heme  - VTE prophylaxis: SCDs, SQL  - check dopplers with fever 84F new onset status epilepticus (clinically - with L UE and LLE twitching). 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 Molina  (1/3 - 1/8)  - precedex for vent dyssynchrony, start clonidine 0.1mg BID  Mobility: ROM in bed    CV:  - -180, avoid significant fluctuations;   - Coreg 12.5 BID, sildenafil 20mg 18h  - home ASA, statin    Pulm:  - consult gen surg for trach/PEG- pre-op for Tuesday 1/7  - Osats>92%, vent support, SBT as tolerated  - duoNebs q6h prn    GI: #diarrhea  - cont TF, BM regimen; PPI for ulcer ppx  - NPO @[place] for trach/PEG  - loose stools with dignicare  - banatrol, add metamucil BID, hold BR    Renal/:  - monitor e-lytes, I/Os  - weaning FWF - 200 cc q8 free water     Endo: A1c 5.2  - Goal euglycemia (-180), ISS while on steroids    ID: #cirtrobacter in BCx, pseudomonas in sputum, #leukocytosis - likely steroid related  - ID following, appreciate recs   - empiric cefepime - d/w ID re: narrowing abx    Heme:  - VTE prophylaxis: SCDs, SQL  - pending UE and LE dopplers for fever    Dispo: pending GOC

## 2025-01-06 NOTE — EEG REPORT - NS EEG TEXT BOX
RAÚL JASMINE [id number]     Study Date: 01-05-25 09:12 to 01-06-25 08:00  Duration: 22 hr 11 min    --------------------------------------------------------------------------------------------------  History:  CC/ HPI Patient is a 84y old  Female who presents with a chief complaint of seizures, severe R ICA stenosis (06 Jan 2025 09:05)    MEDICATIONS  (STANDING):  acetaminophen   IVPB .. 1000 milliGRAM(s) IV Intermittent once  ascorbic acid 500 milliGRAM(s) Oral daily  aspirin  chewable 81 milliGRAM(s) Oral daily  atorvastatin 10 milliGRAM(s) Oral at bedtime  brivaracetam  Injectable 100 milliGRAM(s) IV Push two times a day  calcium carbonate 1250 mG  + Vitamin D (OsCal 500 + D) 1 Tablet(s) Oral daily  carvedilol 12.5 milliGRAM(s) Oral every 12 hours  cefepime  Injectable. 2000 milliGRAM(s) IV Push every 12 hours  chlorhexidine 0.12% Liquid 15 milliLiter(s) Oral Mucosa every 12 hours  cloNIDine 0.1 milliGRAM(s) Oral every 12 hours  cyanocobalamin 1000 MICROGram(s) Oral daily  dexMEDEtomidine Infusion 0.2 MICROgram(s)/kG/Hr (2.81 mL/Hr) IV Continuous <Continuous>  enoxaparin Injectable 40 milliGRAM(s) SubCutaneous <User Schedule>  immune   globulin 10% (GAMMAGARD) IVPB 20 Gram(s) IV Intermittent daily  insulin lispro (ADMELOG) corrective regimen sliding scale   SubCutaneous every 6 hours  lacosamide Solution 150 milliGRAM(s) Oral <User Schedule>  melatonin 5 milliGRAM(s) Oral at bedtime  methylPREDNISolone sodium succinate IVPB 1000 milliGRAM(s) IV Intermittent <User Schedule>  multivitamin 1 Tablet(s) Oral daily  pantoprazole  Injectable 40 milliGRAM(s) IV Push daily  perampanel 8 milliGRAM(s) Oral at bedtime  psyllium Powder 1 Packet(s) Oral two times a day  sildenafil (REVATIO) 20 milliGRAM(s) Oral three times a day    --------------------------------------------------------------------------------------------------  Study Interpretation:    [[[Abbreviation Key:  PDR=alpha rhythm/posterior dominant rhythm. A-P=anterior posterior gradient.  Amplitude: ‘very low’:<20; ‘low’:20-50; ‘medium’:; ‘high’:>200uV.  Persistence for periodic/rhythmic patterns (% of epoch) ‘rare’:<1%; ‘occasional’:1-10%; ‘frequent’:10-50%; ‘abundant’:50-90%; ‘continuous’:>90%.  Persistence for sporadic discharges: ‘rare’:<1/hr; ‘occasional’:1/min-1/hr; ‘frequent’:>1/min; ‘abundant’:>1/10 sec.  GRDA=generalized rhythmic delta activity; FIRDA=frontal intermittent GRDA; LRDA=lateralized rhythmic delta activity; TIRDA=temporal intermittent rhythmic delta activity;  LPD=PLED=lateralized periodic discharges; GPD=generalized periodic discharges; BiPDs=BiPLEDs=bilateral independent periodic epileptiform discharges; SIRPID=stimulus induced rhythmic, periodic, or ictal appearing discharges; BIRDs=brief potentially ictal rhythmic discharges >4 Hz, lasting .5-10s; PFA=paroxysmal bursts of beta/gamma; LVFA=low voltage fast activity.  Modifiers: +F=with fast component; +S=with spike component; +R=with rhythmic component.  S-B=burst suppression pattern.  Max=maximal. N1-drowsy; N2-stage II sleep; N3-slow wave sleep. SSS/BETS=small sharp spikes/benign epileptiform transients of sleep. HV=hyperventilation; PS=photic stimulation]]]    FINDINGS:      Background:  Continuity: Continuous  Symmetry: Symmetric  PDR: No clear PDR  Reactivity: Present  Voltage: Normal (between 20-150uV)  Anterior Posterior Gradient: Absent  Other background findings: None  Breach: Absent    Background Slowing:  Generalized slowing: Diffuse irregular delta and theta activity.  Focal slowing: Continuous right frontotemporal polymorphic delta and theta activity was present.    State Changes:   -Drowsiness noted with increased slowing, attenuation of fast activity, vertex transients.  -N2 sleep transients were not recorded.    Sporadic Epileptiform Discharges:    None    Rhythmic and Periodic Patterns (RPPs):  Abundant to nearly continuous lateralized periodic discharges, fluctuating in frequency between 1 to 2 Hz, maximal over P4>C4, Pz>Cz, which appears to be state dependent (slowing to 1 Hz in a more sedated state, and increasing to up to 2 Hz during periods of stimulation). Frequency and voltage of discharges are lower compared to days prior.    Electrographic and Electroclinical seizures:  None    Other Clinical Events:  None    Activation Procedures:   -Hyperventilation was not performed.    -Photic stimulation was not performed.  -Hyperventilation was performed and did not elicit any abnormalities.     There was mild accentuation of fast activity, and an increase in diffuse polymorphic slowing.    -Photic stimulation was performed and did not elicit any abnormalities.       Artifacts:  Intermittent myogenic and movement artifacts were noted.    ECG:  The heart rate on single channel ECG was predominantly between 50 - 70 BPM.    EEG Classification / Summary:  Abnormal EEG study in an encephalopathic / lethargic patient  - LPDs, right centroparietal region, 1 - 2 Hz (increased with stimulation/arousal), improved from prior days  - Focal slowing, right hemisphere, continuous  - Generalized background slowing, moderate    -----------------------------------------------------------------------------------------------------    Clinical Impression:  Risk of focal onset seizures from the right centroparietal region.  Focal cerebral dysfunction due to structural abnormality in the right hemisphere.  Moderate diffuse/multi-focal cerebral dysfunction, not specific as to etiology.  There were no seizures recorded.      This is a preliminary impression still pending attendings attestation.     -------------------------------------------------------------------------------------------------------  EEG reading room: [phone]    After-hours epilepsy service: [phone]      Omar Gastelum DO  Epilepsy Fellow   RAÚL JASMINE [id number]     Study Date: 01-05-25 09:12 to 01-06-25 08:00  Duration: 22 hr 11 min    --------------------------------------------------------------------------------------------------  History:  CC/ HPI Patient is a 84y old  Female who presents with a chief complaint of seizures, severe R ICA stenosis (06 Jan 2025 09:05)    MEDICATIONS  (STANDING):  acetaminophen   IVPB .. 1000 milliGRAM(s) IV Intermittent once  ascorbic acid 500 milliGRAM(s) Oral daily  aspirin  chewable 81 milliGRAM(s) Oral daily  atorvastatin 10 milliGRAM(s) Oral at bedtime  brivaracetam  Injectable 100 milliGRAM(s) IV Push two times a day  calcium carbonate 1250 mG  + Vitamin D (OsCal 500 + D) 1 Tablet(s) Oral daily  carvedilol 12.5 milliGRAM(s) Oral every 12 hours  cefepime  Injectable. 2000 milliGRAM(s) IV Push every 12 hours  chlorhexidine 0.12% Liquid 15 milliLiter(s) Oral Mucosa every 12 hours  cloNIDine 0.1 milliGRAM(s) Oral every 12 hours  cyanocobalamin 1000 MICROGram(s) Oral daily  dexMEDEtomidine Infusion 0.2 MICROgram(s)/kG/Hr (2.81 mL/Hr) IV Continuous <Continuous>  enoxaparin Injectable 40 milliGRAM(s) SubCutaneous <User Schedule>  immune   globulin 10% (GAMMAGARD) IVPB 20 Gram(s) IV Intermittent daily  insulin lispro (ADMELOG) corrective regimen sliding scale   SubCutaneous every 6 hours  lacosamide Solution 150 milliGRAM(s) Oral <User Schedule>  melatonin 5 milliGRAM(s) Oral at bedtime  methylPREDNISolone sodium succinate IVPB 1000 milliGRAM(s) IV Intermittent <User Schedule>  multivitamin 1 Tablet(s) Oral daily  pantoprazole  Injectable 40 milliGRAM(s) IV Push daily  perampanel 8 milliGRAM(s) Oral at bedtime  psyllium Powder 1 Packet(s) Oral two times a day  sildenafil (REVATIO) 20 milliGRAM(s) Oral three times a day    --------------------------------------------------------------------------------------------------  Study Interpretation:    [[[Abbreviation Key:  PDR=alpha rhythm/posterior dominant rhythm. A-P=anterior posterior gradient.  Amplitude: ‘very low’:<20; ‘low’:20-50; ‘medium’:; ‘high’:>200uV.  Persistence for periodic/rhythmic patterns (% of epoch) ‘rare’:<1%; ‘occasional’:1-10%; ‘frequent’:10-50%; ‘abundant’:50-90%; ‘continuous’:>90%.  Persistence for sporadic discharges: ‘rare’:<1/hr; ‘occasional’:1/min-1/hr; ‘frequent’:>1/min; ‘abundant’:>1/10 sec.  GRDA=generalized rhythmic delta activity; FIRDA=frontal intermittent GRDA; LRDA=lateralized rhythmic delta activity; TIRDA=temporal intermittent rhythmic delta activity;  LPD=PLED=lateralized periodic discharges; GPD=generalized periodic discharges; BiPDs=BiPLEDs=bilateral independent periodic epileptiform discharges; SIRPID=stimulus induced rhythmic, periodic, or ictal appearing discharges; BIRDs=brief potentially ictal rhythmic discharges >4 Hz, lasting .5-10s; PFA=paroxysmal bursts of beta/gamma; LVFA=low voltage fast activity.  Modifiers: +F=with fast component; +S=with spike component; +R=with rhythmic component.  S-B=burst suppression pattern.  Max=maximal. N1-drowsy; N2-stage II sleep; N3-slow wave sleep. SSS/BETS=small sharp spikes/benign epileptiform transients of sleep. HV=hyperventilation; PS=photic stimulation]]]    FINDINGS:      Background:  Continuity: Continuous  Symmetry: Symmetric  PDR: No clear PDR  Reactivity: Present  Voltage: Normal (between 20-150uV)  Anterior Posterior Gradient: Absent  Other background findings: None  Breach: Absent    Background Slowing:  Generalized slowing: Diffuse irregular delta and theta activity.  Focal slowing: Continuous right frontotemporal polymorphic delta and theta activity was present.    State Changes:   -Drowsiness noted with increased slowing, attenuation of fast activity, vertex transients.  -N2 sleep transients were not recorded.    Sporadic Epileptiform Discharges:    None    Rhythmic and Periodic Patterns (RPPs):  Abundant to nearly continuous lateralized periodic discharges, fluctuating in frequency between 1 to 2 Hz, maximal over P4>C4, Pz>Cz, which appears to be state dependent (slowing to 1 Hz in a more sedated state, and increasing to up to 2 Hz during periods of stimulation). Frequency and voltage of discharges are lower compared to days prior.    Electrographic and Electroclinical seizures:  None    Other Clinical Events:  None    Activation Procedures:   -Hyperventilation was not performed.    -Photic stimulation was performed and did not elicit any abnormalities.       Artifacts:  Intermittent myogenic and movement artifacts were noted.    ECG:  The heart rate on single channel ECG was predominantly between 50 - 70 BPM.    EEG Classification / Summary:  Abnormal EEG study in an encephalopathic / lethargic patient  - LPDs, right centroparietal region, 1 - 2 Hz (increased with stimulation/arousal), improved from prior days  - Focal slowing, right hemisphere, continuous  - Generalized background slowing, moderate    -----------------------------------------------------------------------------------------------------    Clinical Impression:  Risk of focal onset seizures from the right centroparietal region.  Focal cerebral dysfunction due to structural abnormality in the right hemisphere.  Moderate diffuse/multi-focal cerebral dysfunction, not specific as to etiology.  There were no seizures recorded.      This is a preliminary impression still pending attendings attestation.     -------------------------------------------------------------------------------------------------------  EEG reading room: [phone]    After-hours epilepsy service: [phone]    Omar Gastelum DO  Epilepsy Fellow   RAÚL JASMINE [id number]     Study Date: 01-05-25 09:12 to 01-06-25 08:00  Duration: 22 hr 11 min    --------------------------------------------------------------------------------------------------  History:  CC/ HPI Patient is a 84y old  Female who presents with a chief complaint of seizures, severe R ICA stenosis (06 Jan 2025 09:05)    MEDICATIONS  (STANDING):  acetaminophen   IVPB .. 1000 milliGRAM(s) IV Intermittent once  ascorbic acid 500 milliGRAM(s) Oral daily  aspirin  chewable 81 milliGRAM(s) Oral daily  atorvastatin 10 milliGRAM(s) Oral at bedtime  brivaracetam  Injectable 100 milliGRAM(s) IV Push two times a day  calcium carbonate 1250 mG  + Vitamin D (OsCal 500 + D) 1 Tablet(s) Oral daily  carvedilol 12.5 milliGRAM(s) Oral every 12 hours  cefepime  Injectable. 2000 milliGRAM(s) IV Push every 12 hours  chlorhexidine 0.12% Liquid 15 milliLiter(s) Oral Mucosa every 12 hours  cloNIDine 0.1 milliGRAM(s) Oral every 12 hours  cyanocobalamin 1000 MICROGram(s) Oral daily  dexMEDEtomidine Infusion 0.2 MICROgram(s)/kG/Hr (2.81 mL/Hr) IV Continuous <Continuous>  enoxaparin Injectable 40 milliGRAM(s) SubCutaneous <User Schedule>  immune   globulin 10% (GAMMAGARD) IVPB 20 Gram(s) IV Intermittent daily  insulin lispro (ADMELOG) corrective regimen sliding scale   SubCutaneous every 6 hours  lacosamide Solution 150 milliGRAM(s) Oral <User Schedule>  melatonin 5 milliGRAM(s) Oral at bedtime  methylPREDNISolone sodium succinate IVPB 1000 milliGRAM(s) IV Intermittent <User Schedule>  multivitamin 1 Tablet(s) Oral daily  pantoprazole  Injectable 40 milliGRAM(s) IV Push daily  perampanel 8 milliGRAM(s) Oral at bedtime  psyllium Powder 1 Packet(s) Oral two times a day  sildenafil (REVATIO) 20 milliGRAM(s) Oral three times a day    --------------------------------------------------------------------------------------------------  Study Interpretation:    [[[Abbreviation Key:  PDR=alpha rhythm/posterior dominant rhythm. A-P=anterior posterior gradient.  Amplitude: ‘very low’:<20; ‘low’:20-50; ‘medium’:; ‘high’:>200uV.  Persistence for periodic/rhythmic patterns (% of epoch) ‘rare’:<1%; ‘occasional’:1-10%; ‘frequent’:10-50%; ‘abundant’:50-90%; ‘continuous’:>90%.  Persistence for sporadic discharges: ‘rare’:<1/hr; ‘occasional’:1/min-1/hr; ‘frequent’:>1/min; ‘abundant’:>1/10 sec.  GRDA=generalized rhythmic delta activity; FIRDA=frontal intermittent GRDA; LRDA=lateralized rhythmic delta activity; TIRDA=temporal intermittent rhythmic delta activity;  LPD=PLED=lateralized periodic discharges; GPD=generalized periodic discharges; BiPDs=BiPLEDs=bilateral independent periodic epileptiform discharges; SIRPID=stimulus induced rhythmic, periodic, or ictal appearing discharges; BIRDs=brief potentially ictal rhythmic discharges >4 Hz, lasting .5-10s; PFA=paroxysmal bursts of beta/gamma; LVFA=low voltage fast activity.  Modifiers: +F=with fast component; +S=with spike component; +R=with rhythmic component.  S-B=burst suppression pattern.  Max=maximal. N1-drowsy; N2-stage II sleep; N3-slow wave sleep. SSS/BETS=small sharp spikes/benign epileptiform transients of sleep. HV=hyperventilation; PS=photic stimulation]]]    FINDINGS:      Background:  Continuity: Continuous  Symmetry: Symmetric  PDR: No clear PDR  Reactivity: Present  Voltage: Normal (between 20-150uV)  Anterior Posterior Gradient: Absent  Other background findings: None  Breach: Absent    Background Slowing:  Generalized slowing: Diffuse irregular delta and theta activity.  Focal slowing: Continuous right frontotemporal polymorphic delta and theta activity was present.    State Changes:   -Drowsiness noted with increased slowing, attenuation of fast activity, vertex transients.  -N2 sleep transients were not recorded.    Sporadic Epileptiform Discharges:    None    Rhythmic and Periodic Patterns (RPPs):  Abundant to nearly continuous lateralized periodic discharges, fluctuating in frequency between 1 to 2 Hz, maximal over P4>C4, Pz>Cz, which appears to be state dependent (slowing to 1 Hz in a more sedated state, and increasing to up to 2 Hz during periods of stimulation). Frequency and voltage of discharges are lower compared to days prior, particularly at rest/in clinical sleep    Electrographic and Electroclinical seizures:  None    Other Clinical Events:  None    Activation Procedures:   -Hyperventilation was not performed.    -Photic stimulation was performed and did not elicit any abnormalities.       Artifacts:  Intermittent myogenic and movement artifacts were noted.    ECG:  The heart rate on single channel ECG was predominantly between 50 - 70 BPM.    EEG Classification / Summary:  Abnormal EEG study in an encephalopathic / lethargic patient  - LPDs, right centroparietal region, 1 - 2 Hz (increased with stimulation/arousal), improved at times from prior days in regards to frequency  - Focal slowing, right hemisphere, continuous  - Generalized background slowing, moderate    -----------------------------------------------------------------------------------------------------    Clinical Impression:  Risk of focal onset seizures from the right centroparietal region.  Focal cerebral dysfunction due to structural abnormality in the right hemisphere.  Moderate diffuse/multi-focal cerebral dysfunction, not specific as to etiology.  There were no seizures recorded.        -------------------------------------------------------------------------------------------------------  EEG reading room: [phone]    After-hours epilepsy service: [phone]    Omar Gastelum DO  Epilepsy Fellow

## 2025-01-07 NOTE — PROGRESS NOTE ADULT - ASSESSMENT
84F new onset status epilepticus (clinically - with L UE and LLE twitching). 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 Molina  (1/3 - 1/7)  - precedex for vent dyssynchrony, start clonidine 0.1mg BID  Mobility: ROM in bed    CV:  - -180, avoid significant fluctuations;   - Coreg 6.25 BID, sildenafil 20mg q8h  - home ASA, statin    Pulm:  - consult gen surg for trach/PEG- pre-op for Wed 1/8  - Osats>92%, vent support, SBT as tolerated  - duoNebs q6h prn    GI: #diarrhea  - cont TF, BM regimen; PPI for ulcer ppx  - NPO @[facility] for trach/PEG  - loose stools with dignicare  - banatrol, add metamucil BID, hold BR    Renal/:  - monitor e-lytes, I/Os  - stop FWFs    Endo: A1c 5.2  - Goal euglycemia (-180), ISS while on steroids    ID: #cirtrobacter in BCx, pseudomonas in sputum, #leukocytosis - likely steroid related  - ID following, appreciate recs   - empiric cefepime (10d total - 1/9)    Heme: 1/6 no DVTs  - VTE prophylaxis: SCDs, SQL  - pending UE and LE dopplers for fever    Dispo: pending GOC

## 2025-01-07 NOTE — PROGRESS NOTE ADULT - SUBJECTIVE AND OBJECTIVE BOX
INFECTIOUS DISEASES AND INTERNAL MEDICINE at [place]  =======================================================  Meek Mckee MD  Diplomates American Board of Internal Medicine and Infectious Diseases  Telephone [phone]  Fax            [phone]  =======================================================    RAÚL JASMINE [id number]    Follow up:  CITROBACTER BACTEREMIA    Allergies:  No Known Allergies      Medications:  acetaminophen   IVPB .. 1000 milliGRAM(s) IV Intermittent once  acetaminophen   Oral Liquid .. 650 milliGRAM(s) Oral every 6 hours PRN  albuterol/ipratropium for Nebulization 3 milliLiter(s) Nebulizer every 6 hours PRN  aspirin  chewable 81 milliGRAM(s) Oral daily  atorvastatin 10 milliGRAM(s) Oral at bedtime  brivaracetam  Injectable 100 milliGRAM(s) IV Push two times a day  calcium carbonate 1250 mG  + Vitamin D (OsCal 500 + D) 1 Tablet(s) Oral daily  carvedilol 25 milliGRAM(s) Oral every 12 hours  cefepime  Injectable. 2000 milliGRAM(s) IV Push every 12 hours  chlorhexidine 0.12% Liquid 15 milliLiter(s) Oral Mucosa every 12 hours  cyanocobalamin 1000 MICROGram(s) Oral daily  dexMEDEtomidine Infusion 0.2 MICROgram(s)/kG/Hr IV Continuous <Continuous>  enoxaparin Injectable 40 milliGRAM(s) SubCutaneous <User Schedule>  fentaNYL    Injectable 25 MICROGram(s) IV Push every 2 hours PRN  hydrALAZINE Injectable 10 milliGRAM(s) IV Push every 3 hours PRN  insulin lispro (ADMELOG) corrective regimen sliding scale   SubCutaneous every 6 hours  labetalol Injectable 10 milliGRAM(s) IV Push every 3 hours PRN  lacosamide Solution 150 milliGRAM(s) Oral <User Schedule>  methylPREDNISolone sodium succinate IVPB 1000 milliGRAM(s) IV Intermittent daily  pantoprazole  Injectable 40 milliGRAM(s) IV Push daily  perampanel 8 milliGRAM(s) Oral at bedtime  polyethylene glycol 3350 17 Gram(s) Oral daily  senna 2 Tablet(s) Oral at bedtime  sildenafil (REVATIO) 20 milliGRAM(s) Oral three times a day    SOCIAL       FAMILY   FAMILY HISTORY:    REVIEW OF SYSTEMS:  CONSTITUTIONAL:  No Fever or chills  UNABLE TO OBTAIN       Physical Exam:   Vital Signs Last 24 Hrs  T(C): 37.3 (07 Jan 2025 10:00), Max: 37.8 (06 Jan 2025 13:00)  T(F): 99.1 (07 Jan 2025 10:00), Max: 100 (06 Jan 2025 13:00)  HR: 61 (07 Jan 2025 10:00) (53 - 81)  BP: 174/77 (07 Jan 2025 10:00) (103/56 - 185/70)  BP(mean): 103 (07 Jan 2025 10:00) (68 - 112)  RR: 17 (07 Jan 2025 10:00) (12 - 26)  SpO2: 100% (07 Jan 2025 10:00) (93% - 100%)    Parameters below as of 06 Jan 2025 16:00  Patient On (Oxygen Delivery Method): ventilator, CPAP    O2 Concentration (%): 40          GEN:   UNRESPONSIVE ON VENT   HEENT: normocephalic and atraumatic. EOMI. JARED.    NECK: Supple. No carotid bruits.  No lymphadenopathy or thyromegaly.  LUNGS: Clear to auscultation.  HEART: Regular rate and rhythm without murmur.  ABDOMEN: Soft, nontender, and nondistended.  Positive bowel sounds.    : No CVA tenderness  EXTREMITIES: Without any cyanosis, clubbing, rash, lesions or edema.  MSK: no joint swelling  NEUROLOGIC:  UNRESPONSIVE ON VENT          Labs:  Vitals:  ============       =======================================================  Current Antibiotics:  cefepime  Injectable. 2000 milliGRAM(s) IV Push every 12 hours    Other medications:  acetaminophen   IVPB .. 1000 milliGRAM(s) IV Intermittent once  aspirin  chewable 81 milliGRAM(s) Oral daily  atorvastatin 10 milliGRAM(s) Oral at bedtime  brivaracetam  Injectable 100 milliGRAM(s) IV Push two times a day  calcium carbonate 1250 mG  + Vitamin D (OsCal 500 + D) 1 Tablet(s) Oral daily  carvedilol 25 milliGRAM(s) Oral every 12 hours  chlorhexidine 0.12% Liquid 15 milliLiter(s) Oral Mucosa every 12 hours  cyanocobalamin 1000 MICROGram(s) Oral daily  dexMEDEtomidine Infusion 0.2 MICROgram(s)/kG/Hr IV Continuous <Continuous>  enoxaparin Injectable 40 milliGRAM(s) SubCutaneous <User Schedule>  insulin lispro (ADMELOG) corrective regimen sliding scale   SubCutaneous every 6 hours  lacosamide Solution 150 milliGRAM(s) Oral <User Schedule>  methylPREDNISolone sodium succinate IVPB 1000 milliGRAM(s) IV Intermittent daily  pantoprazole  Injectable 40 milliGRAM(s) IV Push daily  perampanel 8 milliGRAM(s) Oral at bedtime  polyethylene glycol 3350 17 Gram(s) Oral daily  senna 2 Tablet(s) Oral at bedtime  sildenafil (REVATIO) 20 milliGRAM(s) Oral three times a day      =======================================================  Labs:                                                   9.3    18.33 )-----------( 293      ( 07 Jan 2025 04:54 )             29.2   01-07    137  |  102  |  24.6[H]  ----------------------------<  189[H]  4.3   |  27.0  |  0.61    Ca    7.2[L]      07 Jan 2025 04:54  Phos  1.4     01-07  Mg     2.2     01-07          Culture - Urine (collected 01-01-25 @ 01:20)  Source: Clean Catch Clean Catch (Midstream)  Final Report (01-02-25 @ 06:24):    No growth    Culture - Blood (collected 12-31-24 @ 12:15)  Source: .Blood BLOOD  Preliminary Report (01-01-25 @ 23:07):    No growth at 24 hours    Culture - Blood (collected 12-31-24 @ 12:05)  Source: .Blood BLOOD  Preliminary Report (01-01-25 @ 23:07):    No growth at 24 hours    Culture - Sputum (collected 12-30-24 @ 20:05)  Source: ET Tube ET Tube  Gram Stain (12-31-24 @ 03:24):    Few polymorphonuclear leukocytes per low power field    Few Squamous epithelial cells per low power field    Moderate Gram Negative Rods seen per oil power field    Few Gram positive cocci in pairs seen per oil power field  Final Report (01-01-25 @ 19:14):    Rare Pseudomonas putida group    Commensal michelle consistent with body site  Organism: Pseudomonas putida group (01-01-25 @ 19:14)  Organism: Pseudomonas putida group (01-01-25 @ 19:14)    Sensitivities:      Method Type: VIVI      -  Amikacin: S <=16      -  Aztreonam: S 8      -  Cefepime: S <=2      -  Ceftriaxone: S 8      -  Ciprofloxacin: S <=0.25      -  Gentamicin: S <=2      -  Levofloxacin: S <=0.5      -  Meropenem: S <=1      -  Piperacillin/Tazobactam: S <=8      -  Tobramycin: S <=2      -  Trimethoprim/Sulfamethoxazole: S 2/38    Culture - Blood (collected 12-30-24 @ 16:55)  Source: .Blood BLOOD  Gram Stain (12-31-24 @ 09:21):    Growth in aerobic bottle: Gram Negative Rods  Final Report (01-01-25 @ 17:27):    Growth in aerobic bottle: Citrobacter freundii complex    Direct identification is available within approximately 3-5    hours either by Blood Panel Multiplexed PCR or Direct    MALDI-TOF. Details: [email or web address]  Organism: Blood Culture PCR  Citrobacter freundii complex (01-01-25 @ 17:27)  Organism: Citrobacter freundii complex (01-01-25 @ 17:27)    Sensitivities:      Method Type: VIVI      -  Ampicillin: R <=8 These ampicillin results predict results for amoxicillin      -  Ampicillin/Sulbactam: R <=4/2      -  Aztreonam: S <=4      -  Cefazolin: R >16      -  Cefepime: S <=2      -  Cefoxitin: R >16      -  Ceftriaxone: S <=1 Enterobacter cloacae, Klebsiella aerogenes, and Citrobacter freundii may develop resistance during prolonged therapy.      -  Ciprofloxacin: S <=0.25      -  Ertapenem: S <=0.5      -  Gentamicin: S <=2      -  Imipenem: S <=1      -  Levofloxacin: S <=0.5      -  Meropenem: S <=1      -  Piperacillin/Tazobactam: S <=8 Treatment with Pipercillin/Tazobactam is not recommended in severe infections casued by Klebsiella aerogenes, Enterobacter cloacae complex, and Citrobacter freundii complex.      -  Tobramycin: S <=2      -  Trimethoprim/Sulfamethoxazole: S <=0.5/9.5  Organism: Blood Culture PCR (01-01-25 @ 17:27)    Sensitivities:      Method Type: PCR      -  Enterobacterales: Detec    Culture - Blood (collected 12-30-24 @ 16:45)  Source: .Blood BLOOD  Preliminary Report (01-01-25 @ 23:07):    No growth at 48 Hours    Culture - CSF with Gram Stain (collected 12-27-24 @ 13:50)  Source: .CSF CSF  Gram Stain (12-27-24 @ 22:52):    polymorphonuclear leukocytes seen    No organisms seen    by cytocentrifuge  Final Report (01-01-25 @ 15:49):    No growth at 5 days    Culture - Fungal, CSF (collected 12-27-24 @ 13:50)  Source: .CSF CSF  Preliminary Report (12-30-24 @ 11:01):    No growth    Culture - Acid Fast - CSF (collected 12-27-24 @ 13:50)  Source: .CSF CSF  Preliminary Report (12-28-24 @ 23:06):    Culture is being performed.    Culture - Blood (collected 12-27-24 @ 10:55)  Source: .Blood BLOOD  Final Report (01-01-25 @ 17:01):    No growth at 5 days    Culture - Blood (collected 12-27-24 @ 10:47)  Source: .Blood BLOOD  Final Report (01-01-25 @ 17:01):    No growth at 5 days      Creatinine: 0.73 mg/dL (01-02-25 @ 03:00)  Creatinine: 0.74 mg/dL (01-01-25 @ 04:20)  Creatinine: 0.82 mg/dL (12-31-24 @ 02:28)  Creatinine: 0.77 mg/dL (12-30-24 @ 02:30)  Creatinine: 0.71 mg/dL (12-29-24 @ 03:29)    Procalcitonin: 0.13 ng/mL (12-30-24 @ 16:50)  Procalcitonin: 0.17 ng/mL (12-27-24 @ 10:47)      Ferritin: 878 ng/mL (12-25-24 @ 14:50)    C-Reactive Protein: 140 mg/L (12-27-24 @ 10:47)  C-Reactive Protein: 104 mg/L (12-26-24 @ 02:30)    WBC Count: 11.57 K/uL (01-02-25 @ 03:00)  WBC Count: 15.52 K/uL (01-01-25 @ 04:20)  WBC Count: 16.74 K/uL (12-31-24 @ 02:28)  WBC Count: 14.59 K/uL (12-30-24 @ 16:50)  WBC Count: 14.47 K/uL (12-30-24 @ 02:30)  WBC Count: 11.35 K/uL (12-29-24 @ 03:29)    SARS-CoV-2: NotDetec (12-30-24 @ 16:55)  SARS-CoV-2: NotDetec (12-27-24 @ 10:47)

## 2025-01-07 NOTE — PROGRESS NOTE ADULT - SUBJECTIVE AND OBJECTIVE BOX
NIGHTTIME ROUNDS    HPI:  85yo F with PMHx HTN, HLD, osteoporosis, GERD, h/o breast cancer s/p left mastectomy, left hysterectomy BIBEMs for left-sided weakness and LUE rhythmic shaking. Per family, patient was last known well at 1:30am. When EMS arrived patient was alert but sloughing over the left side. Patient's left arm was noted to be weak and shortly started twitching, was given versed by EMS. By the time patient arrived to ER, Pt was intubated for airway protection. 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 CTA without LVO however with findings of high grade stenosis 75% right  ICA and 30% stenosis L ICA. NeuroICU consulted for further intervention. (24 Dec 2024 20:57)    RECENT EVENTS: still with poor neuro status;  this am cuff blown on ETT, tube exchanged with anesthesia, O2 and Vt remain stable.    ROS: [x]  Unable to assess due to mental status     -----------------------------------------------------------------------------------------------------------------------------------------------------------------------------------  PHYSICAL EXAM:  precedex gtt - low rate.    General: Calm, intubated  Neuro:  -Mental status- No acute distress, intermittent EO, not FC  -CN- PERRL 3mm, corneals present, o/b the vent, good cough;  motor - RUE 2/5, RLE 2/5 to noxious; LUE trace WD, LLE 1/5    CV: S1S2 present  Pulm: CTAB  Abd: Soft, nontender, nondistended.  Peripheral edema noted.      -----------------------------------------------------------------------------------------------------    ICU Vital Signs Last 24 Hrs  T(C): 37.5 (08 Jan 2025 01:00), Max: 37.9 (07 Jan 2025 16:00)  T(F): 99.5 (08 Jan 2025 01:00), Max: 100.2 (07 Jan 2025 16:00)  HR: 64 (08 Jan 2025 01:00) (50 - 76)  BP: 110/69 (08 Jan 2025 01:00) (100/50 - 185/70)  BP(mean): 83 (08 Jan 2025 01:00) (66 - 142)  ABP: --  ABP(mean): --  RR: 13 (08 Jan 2025 01:00) (10 - 23)  SpO2: 100% (08 Jan 2025 01:00) (96% - 100%)    O2 Parameters below as of 08 Jan 2025 00:00  Patient On (Oxygen Delivery Method): ventilator    O2 Concentration (%): 40

## 2025-01-07 NOTE — PROGRESS NOTE ADULT - ASSESSMENT
85yo F with PMHx HTN, HLD, osteoporosis, GERD, h/o breast cancer s/p left mastectomy, left hysterectomy BIBEMs for left-sided weakness and LUE rhythmic shaking.   . Patient's left arm was noted to be weak and shortly started twitching, was given versed by EMS. By the time patient arrived to ER, Pt was intubated for airway protection. 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 CTA without LVO however with findings of high grade stenosis 75% right  ICA and 30% stenosis L ICA. NeuroICU consulted for further intervention.   AS ABOVE ADMITTED 12/24  WITH WEAKNESS AND   WAS  INTUBATED  AND FOUND  TO HAVE SEIZURES  NOW WITH BLOOD CX  GM NEG RODS ENTEROBACTERALES  IDENTIFIES AS CITROBACTER FREUNDII   T CT ABD PELVIS  PLEURAL EFFUSION OBVIOUS SOURCE OF BACTEREMIA   SPUTUM WITH PSEUDOOMONAS PUTIDA    CHANGED  TO  CEFEPIME  IVIG AS PER  NEUROSURGERY  PT IS COMPLETING 7 DAYS CITROBACTER  WOULD TREAT TOTAL 10 DAYS ABX FOR  BACTEREMIA IN THIS IMMUNOCOMPROMISED PATIENT  ABX TO COMPLETE ON 1/9  SPOEK TO CLINICAL PHARAMCIST  WILL FOLLOWUP AS NEEDED PLEASE CALL IF QUESTIONS

## 2025-01-07 NOTE — PROGRESS NOTE ADULT - CRITICAL CARE ATTENDING COMMENT
Pt is critically ill and at high risk of rapid deterioration/death due to abovementioned conditions.   Still requires critical care interventions - additional time spent for ongoing frequent evaluations, interventions and management adjustment by the Attending and ICU team, - and included review of relevant history, clinical examination, review of data and images, discussion of treatment with the multidisciplinary team and any consultants involved in this patient’s care.

## 2025-01-07 NOTE — PROGRESS NOTE ADULT - ASSESSMENT
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  Acute resp failure due ot neurologic injury.  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  PMHx of remote CVA, mild dementia, HTN, HLD, osteoporosis, GERD, h/o breast cancer s/p left mastectomy, left hysterectomy, MARLINE.  BCx with citrobacter, pseudomonas in sputum.    Plan:  - neurochecks  - cont Briviact 100 BID, Vimpat 150 q8h, Fycompa 8mg qhs  - on Solumedrol tapering over 7 weeks, cont  - sedation with Precedex gtt  - -180, avoid significant fluctuations; cont Coreg 6.25 BID, sildenafil 20mg q8h  - home ASA, statin  - Osats>92%, vent support, SBT as tolerated; plan for trach/PEG later this week;   - cont TF, off BM regimen; PPI for ulcer ppx; banatrol, metamucil BID - cont  - monitor e-lytes, I/Os  - Goal euglycemia (-180), ISS while on steroids  - cont Cefepime  - VTE prophylaxis: SCDs, SQL

## 2025-01-07 NOTE — PROGRESS NOTE ADULT - SUBJECTIVE AND OBJECTIVE BOX
Called by ICU to evaluation for endotracheal tube leak. 400ml tidal volume delivered 330-350ml returning with each tidal volume, ett cuff slowly deflating, ETT @ 22cm. Fiberoptic bronchoscopy performed ETT noted to be just above the martin, oral fiberoptic performed, ett noted to be entering the glottic opening with no cuff visible. 50mg of propfol/30mg rocuronium admintered by team. OG tube and oropharynx suctioned, tube exchanger placed under glidescope guidance, old ett removed, reintubated with new 7.5 ett positve bl breath sounds and ETCO2 noted, VSS, O2 sat 100%. To get better and follow your care plan as instructed.

## 2025-01-07 NOTE — EEG REPORT - THIS IS A
Continuous Video EEG
Continuous Video EEG with CSA
Continuous Video EEG

## 2025-01-07 NOTE — PROGRESS NOTE ADULT - SUBJECTIVE AND OBJECTIVE BOX
Chief complaint:   Patient is a 84y old Female who presents with a chief complaint of seizures, severe R ICA stenosis (04 Jan 2025 09:41)    HPI:  85yo F with PMHx HTN, HLD, osteoporosis, GERD, h/o breast cancer s/p left mastectomy, left hysterectomy BIBEMs for left-sided weakness and LUE rhythmic shaking. Per family, patient was last known well at 1:30am. When EMS arrived patient was alert but sloughing over the left side. Patient's left arm was noted to be weak and shortly started twitching, was given versed by EMS. By the time patient arrived to ER, Pt was intubated for airway protection. 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 CTA without LVO however with findings of high grade stenosis 75% right  ICA and 30% stenosis L ICA. NeuroICU consulted for further intervention. (24 Dec 2024 20:57)    24hr EVENTS:  1/6 - planned for day 4 IVIG, more awake overnight than prior  1/7 - cuff blown on ETT, tube exchanged with anesthesia, O2 stable throughout    CXR - ETT in place below clavicle, enteric tube below diaphragm, clear lung fields    ROS: [x]  Unable to assess due to mental status     -----------------------------------------------------------------------------------------------------------------------------------------------------------------------------------  PHYSICAL EXAM:  precedex @0.3    General: Calm, intubated  HEENT: MMM, macroglossia  Neuro:  -Mental status- No acute distress, +EO, intermittent FC  -CN- PERRL 3mm, EOMI, tongue midline, face symmetric  RUE 2/5, RLE 2/5; LUE WD, LLE 1/5    CV: regular rhythm, bradycardia  Pulm: +rhonchi, minimal - moderate secretions  Abd: Soft, nontender, nondistended, rectal tube  : external catheter with incontinence  Skin: warm, dry, anasarca    PIVs, midline     -----------------------------------------------------------------------------------------------------  ICU Vital Signs Last 24 Hrs  T(C): 37.3 (07 Jan 2025 10:30), Max: 37.8 (06 Jan 2025 13:00)  T(F): 99.1 (07 Jan 2025 10:30), Max: 100 (06 Jan 2025 13:00)  HR: 52 (07 Jan 2025 10:30) (52 - 81)  BP: 152/66 (07 Jan 2025 10:30) (103/56 - 185/70)  BP(mean): 91 (07 Jan 2025 10:30) (68 - 112)  RR: 12 (07 Jan 2025 10:30) (12 - 26)  SpO2: 100% (07 Jan 2025 10:30) (93% - 100%)    O2 Parameters below as of 06 Jan 2025 16:00  Patient On (Oxygen Delivery Method): ventilator, CPAP    O2 Concentration (%): 40        I&O's Summary    06 Jan 2025 07:01  -  07 Jan 2025 07:00  --------------------------------------------------------  IN: 2128.8 mL / OUT: 1300 mL / NET: 828.8 mL        MEDICATIONS  (STANDING):  acetaminophen   IVPB .. 1000 milliGRAM(s) IV Intermittent once  ascorbic acid 500 milliGRAM(s) Oral daily  aspirin  chewable 81 milliGRAM(s) Oral daily  atorvastatin 10 milliGRAM(s) Oral at bedtime  brivaracetam  Injectable 100 milliGRAM(s) IV Push two times a day  calcium carbonate 1250 mG  + Vitamin D (OsCal 500 + D) 1 Tablet(s) Oral daily  carvedilol 12.5 milliGRAM(s) Oral every 12 hours  cefepime  Injectable. 2000 milliGRAM(s) IV Push every 12 hours  chlorhexidine 0.12% Liquid 15 milliLiter(s) Oral Mucosa every 12 hours  cloNIDine 0.1 milliGRAM(s) Oral every 12 hours  cyanocobalamin 1000 MICROGram(s) Oral daily  dexMEDEtomidine Infusion 0.2 MICROgram(s)/kG/Hr (2.81 mL/Hr) IV Continuous <Continuous>  enoxaparin Injectable 40 milliGRAM(s) SubCutaneous <User Schedule>  immune   globulin 10% (GAMMAGARD) IVPB 20 Gram(s) IV Intermittent daily  insulin lispro (ADMELOG) corrective regimen sliding scale   SubCutaneous every 6 hours  lacosamide 150 milliGRAM(s) Oral <User Schedule>  melatonin 5 milliGRAM(s) Oral at bedtime  methylPREDNISolone sodium succinate IVPB 1000 milliGRAM(s) IV Intermittent <User Schedule>  multivitamin 1 Tablet(s) Oral daily  pantoprazole  Injectable 40 milliGRAM(s) IV Push daily  perampanel 8 milliGRAM(s) Oral at bedtime  psyllium Powder 1 Packet(s) Oral two times a day  sildenafil (REVATIO) 20 milliGRAM(s) Oral three times a day  sodium phosphate 30 milliMole(s)/500 mL IVPB 30 milliMole(s) IV Intermittent every 6 hours      RESPIRATORY:  Mode: AC/ CMV (Assist Control/ Continuous Mandatory Ventilation)  RR (machine): 12  TV (machine): 400  FiO2: 40  PEEP: 6    IMAGING:   Recent imaging studies were reviewed.    LAB RESULTS:               9.3    18.33 )-----------( 293      ( 07 Jan 2025 04:54 )             29.2     01-07    137  |  102  |  24.6[H]  ----------------------------<  189[H]  4.3   |  27.0  |  0.61    Ca    7.2[L]      07 Jan 2025 04:54  Phos  1.4     01-07  Mg     2.2     01-07    -----------------------------------------------------------------------------------------------------------------------------------------------------------------------------------

## 2025-01-07 NOTE — CHART NOTE - NSCHARTNOTEFT_GEN_A_CORE
Patient followed by ACS service  Plan for Trach and PEG tomorrow  Please hold tube feeds at midnight and pre-op accordingly.  Thank you.    Please call [phone] if any questions.

## 2025-01-07 NOTE — EEG REPORT - NS EEG TEXT BOX
RAÚL JASMINE [id number]     Study Date: 01-06-25 08:00 to 01-07-25 08:00  Duration: 23 hr 59 min    --------------------------------------------------------------------------------------------------  History:  CC/ HPI Patient is a 84y old  Female who presents with a chief complaint of seizures, severe R ICA stenosis (07 Jan 2025 09:21)    MEDICATIONS  (STANDING):  acetaminophen   IVPB .. 1000 milliGRAM(s) IV Intermittent once  ascorbic acid 500 milliGRAM(s) Oral daily  aspirin  chewable 81 milliGRAM(s) Oral daily  atorvastatin 10 milliGRAM(s) Oral at bedtime  brivaracetam  Injectable 100 milliGRAM(s) IV Push two times a day  calcium carbonate 1250 mG  + Vitamin D (OsCal 500 + D) 1 Tablet(s) Oral daily  carvedilol 12.5 milliGRAM(s) Oral every 12 hours  cefepime  Injectable. 2000 milliGRAM(s) IV Push every 12 hours  chlorhexidine 0.12% Liquid 15 milliLiter(s) Oral Mucosa every 12 hours  cloNIDine 0.1 milliGRAM(s) Oral every 12 hours  cyanocobalamin 1000 MICROGram(s) Oral daily  dexMEDEtomidine Infusion 0.2 MICROgram(s)/kG/Hr (2.81 mL/Hr) IV Continuous <Continuous>  enoxaparin Injectable 40 milliGRAM(s) SubCutaneous <User Schedule>  immune   globulin 10% (GAMMAGARD) IVPB 20 Gram(s) IV Intermittent daily  insulin lispro (ADMELOG) corrective regimen sliding scale   SubCutaneous every 6 hours  lacosamide 150 milliGRAM(s) Oral <User Schedule>  melatonin 5 milliGRAM(s) Oral at bedtime  methylPREDNISolone sodium succinate IVPB 1000 milliGRAM(s) IV Intermittent <User Schedule>  multivitamin 1 Tablet(s) Oral daily  pantoprazole  Injectable 40 milliGRAM(s) IV Push daily  perampanel 8 milliGRAM(s) Oral at bedtime  propofol Infusion 5 MICROgram(s)/kG/Min (1.68 mL/Hr) IV Continuous <Continuous>  psyllium Powder 1 Packet(s) Oral two times a day  sildenafil (REVATIO) 20 milliGRAM(s) Oral three times a day  sodium phosphate 30 milliMole(s)/500 mL IVPB 30 milliMole(s) IV Intermittent every 6 hours    --------------------------------------------------------------------------------------------------  Study Interpretation:    [[[Abbreviation Key:  PDR=alpha rhythm/posterior dominant rhythm. A-P=anterior posterior gradient.  Amplitude: ‘very low’:<20; ‘low’:20-50; ‘medium’:; ‘high’:>200uV.  Persistence for periodic/rhythmic patterns (% of epoch) ‘rare’:<1%; ‘occasional’:1-10%; ‘frequent’:10-50%; ‘abundant’:50-90%; ‘continuous’:>90%.  Persistence for sporadic discharges: ‘rare’:<1/hr; ‘occasional’:1/min-1/hr; ‘frequent’:>1/min; ‘abundant’:>1/10 sec.  GRDA=generalized rhythmic delta activity; FIRDA=frontal intermittent GRDA; LRDA=lateralized rhythmic delta activity; TIRDA=temporal intermittent rhythmic delta activity;  LPD=PLED=lateralized periodic discharges; GPD=generalized periodic discharges; BiPDs=BiPLEDs=bilateral independent periodic epileptiform discharges; SIRPID=stimulus induced rhythmic, periodic, or ictal appearing discharges; BIRDs=brief potentially ictal rhythmic discharges >4 Hz, lasting .5-10s; PFA=paroxysmal bursts of beta/gamma; LVFA=low voltage fast activity.  Modifiers: +F=with fast component; +S=with spike component; +R=with rhythmic component.  S-B=burst suppression pattern.  Max=maximal. N1-drowsy; N2-stage II sleep; N3-slow wave sleep. SSS/BETS=small sharp spikes/benign epileptiform transients of sleep. HV=hyperventilation; PS=photic stimulation]]]    FINDINGS:      Background:  Continuity: Continuous  Symmetry: symmetric  PDR: No clear PDR  Reactivity: Present  Voltage: Normal (between 20-150uV)  Anterior Posterior Gradient: Absent  Other background findings: None  Breach: Absent    Background Slowing:  Generalized slowing: Diffuse irregular delta and theta activity.  Focal slowing: Continuous right frontotemporally predominant polymorphic delta and theta activity was present.    State Changes:   -Drowsiness noted with increased slowing, attenuation of fast activity, vertex transients.  -N2 sleep transients were not recorded.    Sporadic Epileptiform Discharges:    None    Rhythmic and Periodic Patterns (RPPs):  Abundant lateralized periodic discharges, fluctuating in frequency between 1 to 2 Hz, maximal over P4>C4, Pz>Cz, which appears to be state dependent (slowing to 1 Hz in a more sedated state, and increasing to up to 2 Hz during periods of stimulation/arousal). Frequency and voltage of discharges are lower compared to days prior, particularly at rest/in clinical sleep.    Electrographic and Electroclinical seizures:  None    Other Clinical Events:  None    Activation Procedures:   -Hyperventilation was not performed.    -Photic stimulation was not performed.     Artifacts:  Intermittent myogenic and movement artifacts were noted.    ECG:  The heart rate on single channel ECG was predominantly between 50 - 60 BPM.    EEG Classification / Summary:  Abnormal EEG study in an encephalopathic / lethargic patient  - LPDs, right centroparietal region, 1 - 2 Hz (increased with stimulation/arousal), improved from prior days in regards to frequency/voltage  - Focal slowing, right hemisphere, continuous  - Generalized background slowing, moderate    -----------------------------------------------------------------------------------------------------    Clinical Impression:  Risk of focal onset seizures from the right centroparietal region.  Focal cerebral dysfunction due to structural abnormality in the right hemisphere.  Moderate diffuse/multi-focal cerebral dysfunction, not specific as to etiology.  There were no seizures recorded during the study.      This is a preliminary impression still pending attendings attestation.     -------------------------------------------------------------------------------------------------------  EEG reading room: [phone]    After-hours epilepsy service: [phone]    Omar Gastelum DO  Epilepsy Fellow   RAÚL JASMINE [id number]     Study Date: 01-06-25 08:00 to 01-07-25 08:00  Duration: 23 hr 59 min    --------------------------------------------------------------------------------------------------  History:  CC/ HPI Patient is a 84y old  Female who presents with a chief complaint of seizures, severe R ICA stenosis (07 Jan 2025 09:21)    MEDICATIONS  (STANDING):  acetaminophen   IVPB .. 1000 milliGRAM(s) IV Intermittent once  ascorbic acid 500 milliGRAM(s) Oral daily  aspirin  chewable 81 milliGRAM(s) Oral daily  atorvastatin 10 milliGRAM(s) Oral at bedtime  brivaracetam  Injectable 100 milliGRAM(s) IV Push two times a day  calcium carbonate 1250 mG  + Vitamin D (OsCal 500 + D) 1 Tablet(s) Oral daily  carvedilol 12.5 milliGRAM(s) Oral every 12 hours  cefepime  Injectable. 2000 milliGRAM(s) IV Push every 12 hours  chlorhexidine 0.12% Liquid 15 milliLiter(s) Oral Mucosa every 12 hours  cloNIDine 0.1 milliGRAM(s) Oral every 12 hours  cyanocobalamin 1000 MICROGram(s) Oral daily  dexMEDEtomidine Infusion 0.2 MICROgram(s)/kG/Hr (2.81 mL/Hr) IV Continuous <Continuous>  enoxaparin Injectable 40 milliGRAM(s) SubCutaneous <User Schedule>  immune   globulin 10% (GAMMAGARD) IVPB 20 Gram(s) IV Intermittent daily  insulin lispro (ADMELOG) corrective regimen sliding scale   SubCutaneous every 6 hours  lacosamide 150 milliGRAM(s) Oral <User Schedule>  melatonin 5 milliGRAM(s) Oral at bedtime  methylPREDNISolone sodium succinate IVPB 1000 milliGRAM(s) IV Intermittent <User Schedule>  multivitamin 1 Tablet(s) Oral daily  pantoprazole  Injectable 40 milliGRAM(s) IV Push daily  perampanel 8 milliGRAM(s) Oral at bedtime  propofol Infusion 5 MICROgram(s)/kG/Min (1.68 mL/Hr) IV Continuous <Continuous>  psyllium Powder 1 Packet(s) Oral two times a day  sildenafil (REVATIO) 20 milliGRAM(s) Oral three times a day  sodium phosphate 30 milliMole(s)/500 mL IVPB 30 milliMole(s) IV Intermittent every 6 hours    --------------------------------------------------------------------------------------------------  Study Interpretation:    [[[Abbreviation Key:  PDR=alpha rhythm/posterior dominant rhythm. A-P=anterior posterior gradient.  Amplitude: ‘very low’:<20; ‘low’:20-50; ‘medium’:; ‘high’:>200uV.  Persistence for periodic/rhythmic patterns (% of epoch) ‘rare’:<1%; ‘occasional’:1-10%; ‘frequent’:10-50%; ‘abundant’:50-90%; ‘continuous’:>90%.  Persistence for sporadic discharges: ‘rare’:<1/hr; ‘occasional’:1/min-1/hr; ‘frequent’:>1/min; ‘abundant’:>1/10 sec.  GRDA=generalized rhythmic delta activity; FIRDA=frontal intermittent GRDA; LRDA=lateralized rhythmic delta activity; TIRDA=temporal intermittent rhythmic delta activity;  LPD=PLED=lateralized periodic discharges; GPD=generalized periodic discharges; BiPDs=BiPLEDs=bilateral independent periodic epileptiform discharges; SIRPID=stimulus induced rhythmic, periodic, or ictal appearing discharges; BIRDs=brief potentially ictal rhythmic discharges >4 Hz, lasting .5-10s; PFA=paroxysmal bursts of beta/gamma; LVFA=low voltage fast activity.  Modifiers: +F=with fast component; +S=with spike component; +R=with rhythmic component.  S-B=burst suppression pattern.  Max=maximal. N1-drowsy; N2-stage II sleep; N3-slow wave sleep. SSS/BETS=small sharp spikes/benign epileptiform transients of sleep. HV=hyperventilation; PS=photic stimulation]]]    FINDINGS:      Background:  Continuity: Continuous  Symmetry: symmetric  PDR: No clear PDR  Reactivity: Present  Voltage: Normal (between 20-150uV)  Anterior Posterior Gradient: Absent  Other background findings: None  Breach: Absent    Background Slowing:  Generalized slowing: Diffuse irregular delta and theta activity.  Focal slowing: Continuous right frontotemporally predominant polymorphic delta and theta activity was present.    State Changes:   -Drowsiness noted with increased slowing, attenuation of fast activity, vertex transients.  -N2 sleep transients were not recorded.    Sporadic Epileptiform Discharges:    None    Rhythmic and Periodic Patterns (RPPs):  Abundant lateralized periodic discharges, fluctuating in frequency between 1 to 2 Hz, maximal over P4>C4, Pz>Cz, which appears to be state dependent (slowing to 1 Hz in a more sedated state, and increasing to up to 2 Hz during periods of stimulation/arousal). Frequency and voltage of discharges are lower compared to days prior, particularly at rest/in clinical sleep.    Electrographic and Electroclinical seizures:  None    Other Clinical Events:  None    Activation Procedures:   -Hyperventilation was not performed.    -Photic stimulation was not performed.     Artifacts:  Intermittent myogenic and movement artifacts were noted.    ECG:  The heart rate on single channel ECG was predominantly between 50 - 60 BPM.    EEG Classification / Summary:  Abnormal EEG study in an encephalopathic / lethargic patient  - LPDs, right centroparietal region, 1 - 2 Hz (increased with stimulation/arousal), unchanged  - Focal slowing, right hemisphere, continuous  - Generalized background slowing, moderate    -----------------------------------------------------------------------------------------------------    Clinical Impression:  Risk of focal onset seizures from the right centroparietal region.  Focal cerebral dysfunction due to structural abnormality in the right hemisphere.  Moderate diffuse/multi-focal cerebral dysfunction, not specific as to etiology.  There were no seizures recorded during the study.      -------------------------------------------------------------------------------------------------------  EEG reading room: [phone]    After-hours epilepsy service: [phone]    Omar Gastelum DO  Epilepsy Fellow    MD ALFREDO Daugherty  Director, Continuous EEG Monitoring Program, [facility]   and Epilepsy Fellowship ,   Department of Neurology, [employer] [facility] [employer]    [facility] EEG Reading Room Ph#: [phone]  Epilepsy Answering Service after 5PM and before 8:30AM: Ph#: [phone]   RAÚL JASMINE [id number]     Study Date: 01-06-25 08:00 to 01-07-25 15:00  Duration: 31 hr    --------------------------------------------------------------------------------------------------  History:  CC/ HPI Patient is a 84y old  Female who presents with a chief complaint of seizures, severe R ICA stenosis (07 Jan 2025 09:21)    MEDICATIONS  (STANDING):  acetaminophen   IVPB .. 1000 milliGRAM(s) IV Intermittent once  ascorbic acid 500 milliGRAM(s) Oral daily  aspirin  chewable 81 milliGRAM(s) Oral daily  atorvastatin 10 milliGRAM(s) Oral at bedtime  brivaracetam  Injectable 100 milliGRAM(s) IV Push two times a day  calcium carbonate 1250 mG  + Vitamin D (OsCal 500 + D) 1 Tablet(s) Oral daily  carvedilol 12.5 milliGRAM(s) Oral every 12 hours  cefepime  Injectable. 2000 milliGRAM(s) IV Push every 12 hours  chlorhexidine 0.12% Liquid 15 milliLiter(s) Oral Mucosa every 12 hours  cloNIDine 0.1 milliGRAM(s) Oral every 12 hours  cyanocobalamin 1000 MICROGram(s) Oral daily  dexMEDEtomidine Infusion 0.2 MICROgram(s)/kG/Hr (2.81 mL/Hr) IV Continuous <Continuous>  enoxaparin Injectable 40 milliGRAM(s) SubCutaneous <User Schedule>  immune   globulin 10% (GAMMAGARD) IVPB 20 Gram(s) IV Intermittent daily  insulin lispro (ADMELOG) corrective regimen sliding scale   SubCutaneous every 6 hours  lacosamide 150 milliGRAM(s) Oral <User Schedule>  melatonin 5 milliGRAM(s) Oral at bedtime  methylPREDNISolone sodium succinate IVPB 1000 milliGRAM(s) IV Intermittent <User Schedule>  multivitamin 1 Tablet(s) Oral daily  pantoprazole  Injectable 40 milliGRAM(s) IV Push daily  perampanel 8 milliGRAM(s) Oral at bedtime  propofol Infusion 5 MICROgram(s)/kG/Min (1.68 mL/Hr) IV Continuous <Continuous>  psyllium Powder 1 Packet(s) Oral two times a day  sildenafil (REVATIO) 20 milliGRAM(s) Oral three times a day  sodium phosphate 30 milliMole(s)/500 mL IVPB 30 milliMole(s) IV Intermittent every 6 hours    --------------------------------------------------------------------------------------------------  Study Interpretation:    [[[Abbreviation Key:  PDR=alpha rhythm/posterior dominant rhythm. A-P=anterior posterior gradient.  Amplitude: ‘very low’:<20; ‘low’:20-50; ‘medium’:; ‘high’:>200uV.  Persistence for periodic/rhythmic patterns (% of epoch) ‘rare’:<1%; ‘occasional’:1-10%; ‘frequent’:10-50%; ‘abundant’:50-90%; ‘continuous’:>90%.  Persistence for sporadic discharges: ‘rare’:<1/hr; ‘occasional’:1/min-1/hr; ‘frequent’:>1/min; ‘abundant’:>1/10 sec.  GRDA=generalized rhythmic delta activity; FIRDA=frontal intermittent GRDA; LRDA=lateralized rhythmic delta activity; TIRDA=temporal intermittent rhythmic delta activity;  LPD=PLED=lateralized periodic discharges; GPD=generalized periodic discharges; BiPDs=BiPLEDs=bilateral independent periodic epileptiform discharges; SIRPID=stimulus induced rhythmic, periodic, or ictal appearing discharges; BIRDs=brief potentially ictal rhythmic discharges >4 Hz, lasting .5-10s; PFA=paroxysmal bursts of beta/gamma; LVFA=low voltage fast activity.  Modifiers: +F=with fast component; +S=with spike component; +R=with rhythmic component.  S-B=burst suppression pattern.  Max=maximal. N1-drowsy; N2-stage II sleep; N3-slow wave sleep. SSS/BETS=small sharp spikes/benign epileptiform transients of sleep. HV=hyperventilation; PS=photic stimulation]]]    FINDINGS:      Background:  Continuity: Continuous  Symmetry: symmetric  PDR: No clear PDR  Reactivity: Present  Voltage: Normal (between 20-150uV)  Anterior Posterior Gradient: Absent  Other background findings: None  Breach: Absent    Background Slowing:  Generalized slowing: Diffuse irregular delta and theta activity.  Focal slowing: Continuous right frontotemporally predominant polymorphic delta and theta activity was present.    State Changes:   -Drowsiness noted with increased slowing, attenuation of fast activity, vertex transients.  -N2 sleep transients were not recorded.    Sporadic Epileptiform Discharges:    None    Rhythmic and Periodic Patterns (RPPs):  Abundant lateralized periodic discharges, fluctuating in frequency mainly between 1 to 2 Hz, maximal over P4>C4, Pz>Cz, which appears to be state dependent (slowing to 1 Hz in a more sedated state, and increasing to up to 2 Hz during periods of stimulation/arousal with some intermixed theta).     Electrographic and Electroclinical seizures:  None    Other Clinical Events:  None    Activation Procedures:   -Hyperventilation was not performed.    -Photic stimulation was not performed.     Artifacts:  Intermittent myogenic and movement artifacts were noted.    ECG:  The heart rate on single channel ECG was predominantly between 50 - 60 BPM.    EEG Classification / Summary:  Abnormal EEG study in an encephalopathic / lethargic patient  - LPDs, right centroparietal region, 1 - 2 Hz (increased with stimulation/arousal), unchanged  - Focal slowing, right hemisphere, continuous  - Generalized background slowing, moderate    -----------------------------------------------------------------------------------------------------    Clinical Impression:  Risk of focal onset seizures from the right centroparietal region.  Focal cerebral dysfunction due to structural abnormality in the right hemisphere.  Moderate diffuse/multi-focal cerebral dysfunction, not specific as to etiology.  There were no seizures recorded during the study.      -------------------------------------------------------------------------------------------------------  EEG reading room: [phone]    After-hours epilepsy service: [phone]    Omar Gastelum DO  Epilepsy Fellow    MD ALFREDO Daugherty  Director, Continuous EEG Monitoring Program, [facility]   and Epilepsy Fellowship ,   Department of Neurology, [facility] [employer]    [facility] EEG Reading Room Ph#: [phone]  Epilepsy Answering Service after 5PM and before 8:30AM: Ph#: [phone]   RAÚL JASMINE [id number]     Study Date: 01-06-25 08:00 to 01-07-25 15:00  Duration: 31 hr    --------------------------------------------------------------------------------------------------  History:  CC/ HPI Patient is a 84y old  Female who presents with a chief complaint of seizures, severe R ICA stenosis (07 Jan 2025 09:21)    MEDICATIONS  (STANDING):  acetaminophen   IVPB .. 1000 milliGRAM(s) IV Intermittent once  ascorbic acid 500 milliGRAM(s) Oral daily  aspirin  chewable 81 milliGRAM(s) Oral daily  atorvastatin 10 milliGRAM(s) Oral at bedtime  brivaracetam  Injectable 100 milliGRAM(s) IV Push two times a day  calcium carbonate 1250 mG  + Vitamin D (OsCal 500 + D) 1 Tablet(s) Oral daily  carvedilol 12.5 milliGRAM(s) Oral every 12 hours  cefepime  Injectable. 2000 milliGRAM(s) IV Push every 12 hours  chlorhexidine 0.12% Liquid 15 milliLiter(s) Oral Mucosa every 12 hours  cloNIDine 0.1 milliGRAM(s) Oral every 12 hours  cyanocobalamin 1000 MICROGram(s) Oral daily  dexMEDEtomidine Infusion 0.2 MICROgram(s)/kG/Hr (2.81 mL/Hr) IV Continuous <Continuous>  enoxaparin Injectable 40 milliGRAM(s) SubCutaneous <User Schedule>  immune   globulin 10% (GAMMAGARD) IVPB 20 Gram(s) IV Intermittent daily  insulin lispro (ADMELOG) corrective regimen sliding scale   SubCutaneous every 6 hours  lacosamide 150 milliGRAM(s) Oral <User Schedule>  melatonin 5 milliGRAM(s) Oral at bedtime  methylPREDNISolone sodium succinate IVPB 1000 milliGRAM(s) IV Intermittent <User Schedule>  multivitamin 1 Tablet(s) Oral daily  pantoprazole  Injectable 40 milliGRAM(s) IV Push daily  perampanel 8 milliGRAM(s) Oral at bedtime  propofol Infusion 5 MICROgram(s)/kG/Min (1.68 mL/Hr) IV Continuous <Continuous>  psyllium Powder 1 Packet(s) Oral two times a day  sildenafil (REVATIO) 20 milliGRAM(s) Oral three times a day  sodium phosphate 30 milliMole(s)/500 mL IVPB 30 milliMole(s) IV Intermittent every 6 hours    --------------------------------------------------------------------------------------------------  Study Interpretation:    [[[Abbreviation Key:  PDR=alpha rhythm/posterior dominant rhythm. A-P=anterior posterior gradient.  Amplitude: ‘very low’:<20; ‘low’:20-50; ‘medium’:; ‘high’:>200uV.  Persistence for periodic/rhythmic patterns (% of epoch) ‘rare’:<1%; ‘occasional’:1-10%; ‘frequent’:10-50%; ‘abundant’:50-90%; ‘continuous’:>90%.  Persistence for sporadic discharges: ‘rare’:<1/hr; ‘occasional’:1/min-1/hr; ‘frequent’:>1/min; ‘abundant’:>1/10 sec.  GRDA=generalized rhythmic delta activity; FIRDA=frontal intermittent GRDA; LRDA=lateralized rhythmic delta activity; TIRDA=temporal intermittent rhythmic delta activity;  LPD=PLED=lateralized periodic discharges; GPD=generalized periodic discharges; BiPDs=BiPLEDs=bilateral independent periodic epileptiform discharges; SIRPID=stimulus induced rhythmic, periodic, or ictal appearing discharges; BIRDs=brief potentially ictal rhythmic discharges >4 Hz, lasting .5-10s; PFA=paroxysmal bursts of beta/gamma; LVFA=low voltage fast activity.  Modifiers: +F=with fast component; +S=with spike component; +R=with rhythmic component.  S-B=burst suppression pattern.  Max=maximal. N1-drowsy; N2-stage II sleep; N3-slow wave sleep. SSS/BETS=small sharp spikes/benign epileptiform transients of sleep. HV=hyperventilation; PS=photic stimulation]]]    FINDINGS:      Background:  Continuity: Continuous  Symmetry: symmetric  PDR: No clear PDR  Reactivity: Present  Voltage: Normal (between 20-150uV)  Anterior Posterior Gradient: Absent  Other background findings: None  Breach: Absent    Background Slowing:  Generalized slowing: Diffuse irregular delta and theta activity.  Focal slowing: Continuous right frontotemporally predominant polymorphic delta and theta activity was present.    State Changes:   -Drowsiness noted with increased slowing, attenuation of fast activity, vertex transients.  -N2 sleep transients were not recorded.    Sporadic Epileptiform Discharges:    None    Rhythmic and Periodic Patterns (RPPs):  Abundant lateralized periodic discharges, fluctuating in frequency mainly between 1 to 2 Hz, maximal over P4>C4, Pz>Cz, which appears to be state dependent (slowing to 1 Hz in a more sedated state, and increasing to up to 2 Hz during periods of stimulation/arousal with some intermixed theta).     Brief attenuation of spiking from 9:12-9:40 likely due to sedation at that time.    Electrographic and Electroclinical seizures:  None    Other Clinical Events:  None    Activation Procedures:   -Hyperventilation was not performed.    -Photic stimulation was not performed.     Artifacts:  Intermittent myogenic and movement artifacts were noted.    ECG:  The heart rate on single channel ECG was predominantly between 50 - 60 BPM.    EEG Classification / Summary:  Abnormal EEG study in an encephalopathic / lethargic patient  - LPDs, right centroparietal region, 1 - 2 Hz (increased with stimulation/arousal), unchanged  - Focal slowing, right hemisphere, continuous  - Generalized background slowing, moderate    -----------------------------------------------------------------------------------------------------    Clinical Impression:  Risk of focal onset seizures from the right centroparietal region.  Focal cerebral dysfunction due to structural abnormality in the right hemisphere.  Moderate diffuse/multi-focal cerebral dysfunction, not specific as to etiology.  There were no seizures recorded during the study.      -------------------------------------------------------------------------------------------------------  EEG reading room: [phone]    After-hours epilepsy service: [phone]    Omar Gastelum DO  Epilepsy Fellow    MD ALFREDO Daugherty  Director, Continuous EEG Monitoring Program, [facility]   and Epilepsy Fellowship ,   Department of Neurology, [facility] [employer]    [facility] EEG Reading Room Ph#: [phone]  Epilepsy Answering Service after 5PM and before 8:30AM: Ph#: [phone]

## 2025-01-07 NOTE — PROGRESS NOTE ADULT - CRITICAL CARE ATTENDING COMMENT
I spent above minutes of critical care time examining patient, reviewing vitals, labs, medications, imaging and discussing with the team goals of care to prevent life-threatening in this patient who is at high risk for deterioration or death due to:  seizure, respiratory failure, ETT replaced, diarrhea, HTN, pulmonary HTN, infection.

## 2025-01-08 ENCOUNTER — TRANSCRIPTION ENCOUNTER (OUTPATIENT)
Age: 85
End: 2025-01-08

## 2025-01-08 NOTE — PROGRESS NOTE ADULT - ASSESSMENT
84F new onset status epilepticus (clinically - with L UE and LLE twitching). 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 Molina  (1/3 - 1/7)  - precedex for vent dyssynchrony, start clonidine 0.1mg BID  Mobility: ROM in bed    CV:  - -180, avoid significant fluctuations  - Coreg 6.25 BID, sildenafil 20mg q8h  - home ASA, statin    Pulm:  - consult gen surg for trach/PEG- pre-op for Wed 1/8  - Osats>92%, vent support, SBT as tolerated  - duoNebs q6h prn    GI: #diarrhea  - cont TF, BM regimen; PPI for ulcer ppx  - NPO @[place] for trach/PEG  - loose stools with dignicare  - banatrol, add metamucil TID, hold BR    Renal/:  - monitor e-lytes, I/Os  - stop FWFs    Endo: A1c 5.2  - Goal euglycemia (-180), ISS while on steroids    ID: #cirtrobacter in BCx, pseudomonas in sputum, #leukocytosis - likely steroid related  - ID following, appreciate recs   - empiric cefepime (10d total - 1/9)    Heme: 1/6 no DVTs  - VTE prophylaxis: SCDs, SQL    Dispo: pending [facility]

## 2025-01-08 NOTE — BRIEF OPERATIVE NOTE - NSICDXBRIEFPROCEDURE_GEN_ALL_CORE_FT
PROCEDURES:  Open tracheostomy 08-Jan-2025 15:06:50  Jamie Desouza  EGD, with PEG 08-Jan-2025 15:06:55  Jamie Desouza

## 2025-01-08 NOTE — PROGRESS NOTE ADULT - SUBJECTIVE AND OBJECTIVE BOX
NIGHTTIME ROUNDS    HPI:  83yo F with PMHx HTN, HLD, osteoporosis, GERD, h/o breast cancer s/p left mastectomy, left hysterectomy BIBEMs for left-sided weakness and LUE rhythmic shaking. Per family, patient was last known well at 1:30am. When EMS arrived patient was alert but sloughing over the left side. Patient's left arm was noted to be weak and shortly started twitching, was given versed by EMS. By the time patient arrived to ER, Pt was intubated for airway protection. 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 CTA without LVO however with findings of high grade stenosis 75% right  ICA and 30% stenosis L ICA. NeuroICU consulted for further intervention. (24 Dec 2024 20:57)    RECENT EVENTS: still with poor neuro status;  this am cuff blown on ETT, tube exchanged with anesthesia, O2 and Vt remain stable.    ROS: [x]  Unable to assess due to mental status     -----------------------------------------------------------------------------------------------------------------------------------------------------------------------------------  PHYSICAL EXAM:  precedex gtt - low rate.    General: Calm, intubated  Neuro:  -Mental status- No acute distress, intermittent EO, not FC  -CN- PERRL 3mm, corneals present, o/b the vent, good cough;  motor - RUE 2/5, RLE 2/5 to noxious; LUE trace WD, LLE 1/5    CV: S1S2 present  Pulm: CTAB  Abd: Soft, nontender, nondistended.  Peripheral edema noted.      -----------------------------------------------------------------------------------------------------    ICU Vital Signs Last 24 Hrs  T(C): 37.5 (08 Jan 2025 01:00), Max: 37.9 (07 Jan 2025 16:00)  T(F): 99.5 (08 Jan 2025 01:00), Max: 100.2 (07 Jan 2025 16:00)  HR: 64 (08 Jan 2025 01:00) (50 - 76)  BP: 110/69 (08 Jan 2025 01:00) (100/50 - 185/70)  BP(mean): 83 (08 Jan 2025 01:00) (66 - 142)  ABP: --  ABP(mean): --  RR: 13 (08 Jan 2025 01:00) (10 - 23)  SpO2: 100% (08 Jan 2025 01:00) (96% - 100%)    O2 Parameters below as of 08 Jan 2025 00:00  Patient On (Oxygen Delivery Method): ventilator    O2 Concentration (%): 40

## 2025-01-08 NOTE — PROGRESS NOTE ADULT - ASSESSMENT
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  Acute resp failure due ot neurologic injury; s/p trach 01/08.  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  PMHx of remote CVA, mild dementia, HTN, HLD, osteoporosis, GERD, h/o breast cancer s/p left mastectomy, left hysterectomy, MARLINE.  BCx with citrobacter, pseudomonas in sputum.  PEG in place.    Plan:  - neurochecks  - cont Briviact 100 BID, Vimpat 150 q8h, Fycompa 8mg qhs  - on Solumedrol tapering over 7 weeks, cont  - switched to Clonidine, cont  - -180, avoid significant fluctuations; cont Coreg 6.25 BID, sildenafil 20mg q8h  - home ASA, statin  - Osats>92%, vent support, SBT as tolerated  - cont TF, off BM regimen; PPI for ulcer ppx; banatrol, metamucil BID - cont  - monitor e-lytes, I/Os  - Goal euglycemia (-180), ISS while on steroids  - cont Cefepime for 10 d in total  - VTE prophylaxis: SCDs, SQL

## 2025-01-08 NOTE — PROGRESS NOTE ADULT - SUBJECTIVE AND OBJECTIVE BOX
NIGHTTIME ROUNDS    HPI:  83yo F with PMHx HTN, HLD, osteoporosis, GERD, h/o breast cancer s/p left mastectomy, left hysterectomy BIBEMs for left-sided weakness and LUE rhythmic shaking. Per family, patient was last known well at 1:30am. When EMS arrived patient was alert but sloughing over the left side. Patient's left arm was noted to be weak and shortly started twitching, was given versed by EMS. By the time patient arrived to ER, Pt was intubated for airway protection. 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 CTA without LVO however with findings of high grade stenosis 75% right  ICA and 30% stenosis L ICA. NeuroICU consulted for further intervention. (24 Dec 2024 20:57)    RECENT EVENTS: underwent trach/PEG; tolerates being off sedation.    ROS: [x]  Unable to assess due to mental status     -----------------------------------------------------------------------------------------------------------------------------------------------------------------------------------  PHYSICAL EXAM:      General: Calm, intubated  Neuro:  -Mental status- No acute distress, intermittent EO, not FC  -CN- PERRL 3mm, corneals present, o/b the vent, good cough;  motor - RUE 2/5, RLE 2/5 to noxious; LUE trace WD, LLE 1/5    CV: S1S2 present  Pulm: CTAB  Abd: Soft, nontender, nondistended.  Peripheral edema noted.    Trach site dry and clean , no hematoma; PEG site dry, clean.      -----------------------------------------------------------------------------------------------------      ICU Vital Signs Last 24 Hrs  T(C): 37.5 (09 Jan 2025 02:00), Max: 37.5 (09 Jan 2025 01:00)  T(F): 99.5 (09 Jan 2025 02:00), Max: 99.5 (09 Jan 2025 01:00)  HR: 70 (09 Jan 2025 02:00) (48 - 81)  BP: 169/72 (09 Jan 2025 02:00) (106/52 - 178/97)  BP(mean): 102 (09 Jan 2025 02:00) (69 - 124)  ABP: --  ABP(mean): --  RR: 18 (09 Jan 2025 02:00) (10 - 22)  SpO2: 100% (09 Jan 2025 02:00) (98% - 100%)    O2 Parameters below as of 09 Jan 2025 00:00  Patient On (Oxygen Delivery Method): ventilator    O2 Concentration (%): 40

## 2025-01-08 NOTE — PROGRESS NOTE ADULT - SUBJECTIVE AND OBJECTIVE BOX
HPI/OVERNIGHT EVENTS:  No acute overnight events. Vital signs stable. Only on dexmatomdine this AM. Labs stable. TF held overnight.   Plan for OR today, trach and PEG      MEDICATIONS  (STANDING):  ascorbic acid 500 milliGRAM(s) Oral daily  aspirin  chewable 81 milliGRAM(s) Oral daily  atorvastatin 10 milliGRAM(s) Oral at bedtime  brivaracetam  Injectable 100 milliGRAM(s) IV Push two times a day  calcium carbonate 1250 mG  + Vitamin D (OsCal 500 + D) 1 Tablet(s) Oral daily  carvedilol 6.25 milliGRAM(s) Oral every 12 hours  cefepime  Injectable. 2000 milliGRAM(s) IV Push every 12 hours  chlorhexidine 0.12% Liquid 15 milliLiter(s) Oral Mucosa every 12 hours  cloNIDine 0.1 milliGRAM(s) Oral every 12 hours  cyanocobalamin 1000 MICROGram(s) Oral daily  dexMEDEtomidine Infusion 0.2 MICROgram(s)/kG/Hr (2.81 mL/Hr) IV Continuous <Continuous>  enoxaparin Injectable 40 milliGRAM(s) SubCutaneous <User Schedule>  insulin lispro (ADMELOG) corrective regimen sliding scale   SubCutaneous every 6 hours  lacosamide 150 milliGRAM(s) Oral <User Schedule>  melatonin 5 milliGRAM(s) Oral at bedtime  methylPREDNISolone sodium succinate IVPB 1000 milliGRAM(s) IV Intermittent <User Schedule>  multivitamin 1 Tablet(s) Oral daily  pantoprazole  Injectable 40 milliGRAM(s) IV Push daily  perampanel 8 milliGRAM(s) Oral at bedtime  psyllium Powder 1 Packet(s) Oral three times a day  sildenafil (REVATIO) 20 milliGRAM(s) Oral three times a day  sodium phosphate 30 milliMole(s)/500 mL IVPB 30 milliMole(s) IV Intermittent every 6 hours    MEDICATIONS  (PRN):  acetaminophen   Oral Liquid .. 650 milliGRAM(s) Oral every 6 hours PRN Temp greater or equal to 38C (100.4F), Mild Pain (1 - 3)  albuterol/ipratropium for Nebulization 3 milliLiter(s) Nebulizer every 6 hours PRN Shortness of Breath and/or Wheezing  fentaNYL    Injectable 25 MICROGram(s) IV Push every 2 hours PRN vent dyssychrony  hydrALAZINE Injectable 10 milliGRAM(s) IV Push every 3 hours PRN SBP>180  labetalol Injectable 10 milliGRAM(s) IV Push every 3 hours PRN SBP>180      Vital Signs Last 24 Hrs  T(C): 37.1 (08 Jan 2025 11:00), Max: 37.9 (07 Jan 2025 16:00)  T(F): 98.8 (08 Jan 2025 11:00), Max: 100.2 (07 Jan 2025 16:00)  HR: 49 (08 Jan 2025 11:00) (49 - 76)  BP: 121/61 (08 Jan 2025 11:00) (97/52 - 173/79)  BP(mean): 79 (08 Jan 2025 11:00) (66 - 142)  RR: 14 (08 Jan 2025 11:00) (10 - 22)  SpO2: 100% (08 Jan 2025 11:00) (98% - 100%)    Parameters below as of 08 Jan 2025 04:00  Patient On (Oxygen Delivery Method): ventilator    O2 Concentration (%): 40    Constitutional: Intubated, no interaction   HEENT: ET tube in place  Neck:  Short neck  Respiratory: Vent  Cardiovascular: Regular rate  Gastrointestinal: Abdomen soft, possible edematous        I&O's Detail    07 Jan 2025 07:01  -  08 Jan 2025 07:00  --------------------------------------------------------  IN:    Dexmedetomidine: 61.6 mL    Enteral Tube Flush: 270 mL    Free Water: 200 mL    IV PiggyBack: 200 mL    IV PiggyBack: 499.8 mL    Jevity 1.5: 550 mL  Total IN: 1781.4 mL    OUT:    Rectal Tube (mL): 50 mL    Voided (mL): 1040 mL  Total OUT: 1090 mL    Total NET: 691.4 mL      08 Jan 2025 07:01  -  08 Jan 2025 11:48  --------------------------------------------------------  IN:    Dexmedetomidine: 11.2 mL  Total IN: 11.2 mL    OUT:    Voided (mL): 200 mL  Total OUT: 200 mL    Total NET: -188.8 mL          LABS:                        9.6    15.67 )-----------( 272      ( 08 Jan 2025 03:50 )             30.3     01-08    136  |  102  |  20.7[H]  ----------------------------<  103[H]  4.0   |  29.0  |  0.52    Ca    7.6[L]      08 Jan 2025 03:50  Phos  1.7     01-08  Mg     2.0     01-08      PT/INR - ( 08 Jan 2025 03:50 )   PT: 16.2 sec;   INR: 1.44 ratio         PTT - ( 08 Jan 2025 03:50 )  PTT:27.0 sec  Urinalysis Basic - ( 08 Jan 2025 03:50 )    Color: x / Appearance: x / SG: x / pH: x  Gluc: 103 mg/dL / Ketone: x  / Bili: x / Urobili: x   Blood: x / Protein: x / Nitrite: x   Leuk Esterase: x / RBC: x / WBC x   Sq Epi: x / Non Sq Epi: x / Bacteria: x

## 2025-01-08 NOTE — PROGRESS NOTE ADULT - CRITICAL CARE ATTENDING COMMENT
I spent above minutes of critical care time examining patient, reviewing vitals, labs, medications, imaging and discussing with the team goals of care to prevent life-threatening in this patient who is at high risk for deterioration or death due to:  seizure, respiratory failure, diarrhea, HTN, infection, dysphagia.

## 2025-01-08 NOTE — PROGRESS NOTE ADULT - ASSESSMENT
85yo F w seizures and severe ICA stenosis, currently dependant on vent and in need of enteral access. Plan for tracheostomy and PEG tube placement.    - On OR for Trach and PEG today   - TF held  - Additional plans may follow after procedure.

## 2025-01-08 NOTE — PROGRESS NOTE ADULT - SUBJECTIVE AND OBJECTIVE BOX
Chief complaint:   Patient is a 84y old Female who presents with a chief complaint of seizures, severe R ICA stenosis (04 Jan 2025 09:41)    HPI:  85yo F with PMHx HTN, HLD, osteoporosis, GERD, h/o breast cancer s/p left mastectomy, left hysterectomy BIBEMs for left-sided weakness and LUE rhythmic shaking. Per family, patient was last known well at 1:30am. When EMS arrived patient was alert but sloughing over the left side. Patient's left arm was noted to be weak and shortly started twitching, was given versed by EMS. By the time patient arrived to ER, Pt was intubated for airway protection. 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 CTA without LVO however with findings of high grade stenosis 75% right  ICA and 30% stenosis L ICA. NeuroICU consulted for further intervention. (24 Dec 2024 20:57)    24hr EVENTS:  1/6 - planned for day 4 IVIG, more awake overnight than prior  1/7 - cuff blown on ETT, tube exchanged with anesthesia, O2 stable throughout  1/8 - planning for bedside trach, pending surgery decision    ROS: [x]  Unable to assess due to mental status     -----------------------------------------------------------------------------------------------------------------------------------------------------------------------------------  PHYSICAL EXAM:  precedex @0.2    General: Calm, intubated  HEENT: MMM, macroglossia  Neuro:  -Mental status- No acute distress, +EO, intermittent FC to wiggle toes on R  -CN- PERRL 3mm, EOMI, tongue midline, face symmetric  RUE 2/5, RLE 2/5; LUE WD, LLE minimal mvmt    CV: regular rhythm, bradycardia  Pulm: +rhonchi, moderate tan in-line secretions  Abd: Soft, nontender, nondistended, TF held, rectal tube with liquid output  : external catheter with incontinence  Skin: warm, dry, anasarca    PIVs    -----------------------------------------------------------------------------------------------------  ICU Vital Signs Last 24 Hrs  T(C): 37.1 (08 Jan 2025 11:00), Max: 37.9 (07 Jan 2025 16:00)  T(F): 98.8 (08 Jan 2025 11:00), Max: 100.2 (07 Jan 2025 16:00)  HR: 49 (08 Jan 2025 11:00) (49 - 76)  BP: 121/61 (08 Jan 2025 11:00) (97/52 - 173/79)  BP(mean): 79 (08 Jan 2025 11:00) (66 - 142)  RR: 14 (08 Jan 2025 11:00) (10 - 22)  SpO2: 100% (08 Jan 2025 11:00) (98% - 100%)    O2 Parameters below as of 08 Jan 2025 04:00  Patient On (Oxygen Delivery Method): ventilator    O2 Concentration (%): 40        I&O's Summary    07 Jan 2025 07:01  -  08 Jan 2025 07:00  --------------------------------------------------------  IN: 1781.4 mL / OUT: 1090 mL / NET: 691.4 mL    08 Jan 2025 07:01  -  08 Jan 2025 11:39  --------------------------------------------------------  IN: 11.2 mL / OUT: 200 mL / NET: -188.8 mL        MEDICATIONS  (STANDING):  ascorbic acid 500 milliGRAM(s) Oral daily  aspirin  chewable 81 milliGRAM(s) Oral daily  atorvastatin 10 milliGRAM(s) Oral at bedtime  brivaracetam  Injectable 100 milliGRAM(s) IV Push two times a day  calcium carbonate 1250 mG  + Vitamin D (OsCal 500 + D) 1 Tablet(s) Oral daily  carvedilol 6.25 milliGRAM(s) Oral every 12 hours  cefepime  Injectable. 2000 milliGRAM(s) IV Push every 12 hours  chlorhexidine 0.12% Liquid 15 milliLiter(s) Oral Mucosa every 12 hours  cloNIDine 0.1 milliGRAM(s) Oral every 12 hours  cyanocobalamin 1000 MICROGram(s) Oral daily  dexMEDEtomidine Infusion 0.2 MICROgram(s)/kG/Hr (2.81 mL/Hr) IV Continuous <Continuous>  enoxaparin Injectable 40 milliGRAM(s) SubCutaneous <User Schedule>  insulin lispro (ADMELOG) corrective regimen sliding scale   SubCutaneous every 6 hours  lacosamide 150 milliGRAM(s) Oral <User Schedule>  melatonin 5 milliGRAM(s) Oral at bedtime  methylPREDNISolone sodium succinate IVPB 1000 milliGRAM(s) IV Intermittent <User Schedule>  multivitamin 1 Tablet(s) Oral daily  pantoprazole  Injectable 40 milliGRAM(s) IV Push daily  perampanel 8 milliGRAM(s) Oral at bedtime  psyllium Powder 1 Packet(s) Oral two times a day  sildenafil (REVATIO) 20 milliGRAM(s) Oral three times a day  sodium phosphate 30 milliMole(s)/500 mL IVPB 30 milliMole(s) IV Intermittent every 6 hours      RESPIRATORY:  Mode: AC/ CMV (Assist Control/ Continuous Mandatory Ventilation)  RR (machine): 12  TV (machine): 400  FiO2: 40  PEEP: 6    IMAGING:   no new imaging    LAB RESULTS:               9.6    15.67 )-----------( 272      ( 08 Jan 2025 03:50 )             30.3     PT/INR - ( 08 Jan 2025 03:50 )   PT: 16.2 sec;   INR: 1.44 ratio      PTT - ( 08 Jan 2025 03:50 )  PTT:27.0 sec    01-08    136  |  102  |  20.7[H]  ----------------------------<  103[H]  4.0   |  29.0  |  0.52    Ca    7.6[L]      08 Jan 2025 03:50  Phos  1.7     01-08  Mg     2.0     01-08    -----------------------------------------------------------------------------------------------------------------------------------------------------------------------------------

## 2025-01-09 NOTE — CHART NOTE - NSCHARTNOTEFT_GEN_A_CORE
NSCU Transfer Note    Transfer from: NSCU    Transfer to: ( X ) Medicine    (  ) Telemetry    (  ) RCU   ( ) Neurosurgery                               (  ) Palliative    (  ) Stroke Unit    (  ) MICU    (  ) __________________    Accepting Physician:    Signout given to:     HPI / NSCU COURSE:    12/25: LUE and LLE rhythmic twitching/head bobbing, concern for seizure,given Ativan and loaded with Keppra, Seizure confirmed on EEG.     12/26: Wean sedation    12/27: EEG neg in AM. Keppra decr from 1.5g to 750 BID d/t renal function. Started Valproic acid 250 q6, loaded with 2g. Infectious w/u sent. Serum encephalopathy panel sent. Venofer started. LP,     12/28: EEG negative. CSF studies negative so far. EEG w/ concern for status, pt switched to Vimpat 100 mg TID and brivaracetam 100 mg TID. L sided rhythmic twitching, given 2 mg IV Ativan. Ketamine gtt.     12/29: Failed CPAP trial, went apneic. Ketamine To 0.5. Fycompa 2mg TID for 3 days, followed by 4mg nightly. Solumedrol 1g x 1w    12/30: weaning Ketamine, 500 bolus, fever->pan cultured. TOV.    12/31: precedex started for agitation /biting ETT. Repeat BC.    1/1: Resent BC again today. UCx sent. Restarted home sildenafil. Inc FW to 300q6. trial Benadryl for tongue swelling    1/2: EEG d/c. Inc Fycompa. Changed abx to cefepime per ID     1/3: Started IVIG. 7wk solumedrol taper, ON ETT/OT exchanged d/t clogged tubing    1/5: Restarting EEG. Decreased free water to 200 q6. CPAPing. Banatrol started for loose stools.    1/6: adjusted BP meds, Decreased FW to 200 q8. EEG pre-gonzalez neg.    1/7: ETT exchanged poss blown cuff. Coreg dec 6.25. D/c FW. D/c EEG.     1/8: Trach/peg    1/9: Increase TF. D/c rectal tube. SBP < 160    HPI:  85yo F with PMHx HTN, HLD, osteoporosis, GERD, h/o breast cancer s/p left mastectomy, left hysterectomy BIBEMs for left-sided weakness and LUE rhythmic shaking. Per family, patient was last known well at 1:30am. When EMS arrived patient was alert but sloughing over the left side. Patient's left arm was noted to be weak and shortly started twitching, was given versed by EMS. By the time patient arrived to ER, Pt was intubated for airway protection. 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 CTA without LVO however with findings of high grade stenosis 75% right  ICA and 30% stenosis L ICA. NeuroICU consulted for further intervention. (24 Dec 2024 20:57)    RECENT EVENTS: underwent trach/PEG; tolerates being off sedation. ROS: [x]  Unable to assess due to mental status         -----------------------------------------------------------------------------------------------------------------------------------------------------------------------------------  PHYSICAL EXAM:      General: Calm, intubated  Neuro:  -Mental status- No acute distress, intermittent EO, not FC  -CN- PERRL 3mm, corneals present, o/b the vent, good cough;  motor - RUE 2/5, RLE 2/5 to noxious; LUE trace WD, LLE 1/5    CV: S1S2 present  Pulm: CTAB  Abd: Soft, nontender, nondistended.  Peripheral edema noted.    Trach site dry and clean , no hematoma; PEG site dry, clean.      -----------------------------------------------------------------------------------------------------    ICU Vital Signs Last 24 Hrs  T(C): 37.5 (09 Jan 2025 02:00), Max: 37.5 (09 Jan 2025 01:00)  T(F): 99.5 (09 Jan 2025 02:00), Max: 99.5 (09 Jan 2025 01:00)  HR: 70 (09 Jan 2025 02:00) (48 - 81)  BP: 169/72 (09 Jan 2025 02:00) (106/52 - 178/97)  BP(mean): 102 (09 Jan 2025 02:00) (69 - 124)  ABP: --  ABP(mean): --  RR: 18 (09 Jan 2025 02:00) (10 - 22)  SpO2: 100% (09 Jan 2025 02:00) (98% - 100%)    O2 Parameters below as of 09 Jan 2025 00:00  Patient On (Oxygen Delivery Method): ventilator    O2 Concentration (%): 40      --------------------------------------------------------------------------------------------------------    Assessment and Plan:   · Assessment    84F with new onset status epilepticus (clinically - with L UE and LLE twitching), resolved now; 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  Acute resp failure due ot neurologic injury; s/p trach 01/08.  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  PMHx of remote CVA, mild dementia, HTN, HLD, osteoporosis, GERD, h/o breast cancer s/p left mastectomy, left hysterectomy, MARLINE.  BCx with citrobacter, pseudomonas in sputum.  PEG in place.    Plan:  - neurochecks q4  - cont Briviact 100 BID, Vimpat 150 q8h, Fycompa 8mg qhs  - on Solumedrol tapering over 7 weeks, cont  - switched to Clonidine, cont  - -180, avoid significant fluctuations; cont Coreg 6.25 BID, sildenafil 20mg q8h  - home ASA, statin  - Osats>92%, vent support, SBT as tolerated  - cont TF, off BM regimen; PPI for ulcer ppx; banatrol, metamucil BID - cont  - monitor e-lytes, I/Os  - Goal euglycemia (-180), ISS while on steroids  - cont Cefepime for 10 d in total (1/2-1/9)  - VTE prophylaxis: SCDs, SQL    Dispo: Patient hemodynamically stable and go to telemetry    Case D/w Dr. Moyer [facility]    [facility]: [facility]    Transfer to: ( X ) Medicine    (  ) Telemetry    (  ) RCU   ( ) Neurosurgery                               (  ) Palliative    (  ) Stroke Unit    (  ) MICU    (  ) __________________    Accepting Physician: Rebecca     Signout given to: Dr Fernandez     HPI / NSCU COURSE:     HPI:  85yo F with PMHx HTN, HLD, osteoporosis, GERD, h/o breast cancer s/p left mastectomy, left hysterectomy BIBEMs for left-sided weakness and LUE rhythmic shaking. Per family, patient was last known well at 1:30am. When EMS arrived patient was alert but sloughing over the left side. Patient's left arm was noted to be weak and shortly started twitching, was given versed by EMS. By the time patient arrived to ER, Pt was intubated for airway protection. 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 CTA without LVO however with findings of high grade stenosis 75% right  ICA and 30% stenosis L ICA. NeuroICU consulted for further intervention. (24 Dec 2024 20:57)    Hospital course complicated by Seizures, confirmed on EEG, patient s/p Multiple AE and ketamine gtt, S/p LP, dx ad tx for autoimmune enecephalitis with IVIG 1/3-1/7and solumedrol taper per Dr. Ramirez Protocol. Patient hospital course further complicated with bacteremia 2/2 citobacter and pseudomans in PNA. Patient seen by neurology for sever Rt ICA stenosis, recommending DAPT when tolerated now s/p peg/trach.    RECENT EVENTS: underwent trach/PEG; tolerates being off sedation. ROS: [x]  Unable to assess due to mental status    12/25: LUE and LLE rhythmic twitching/head bobbing, concern for seizure,given Ativan and loaded with Keppra, Seizure confirmed on EEG.     12/26: Wean sedation    12/27: EEG neg in AM. Keppra decr from 1.5g to 750 BID d/t renal function. Started Valproic acid 250 q6, loaded with 2g. Infectious w/u sent. Serum encephalopathy panel sent. Venofer started. LP,     12/28: EEG negative. CSF studies negative so far. EEG w/ concern for status, pt switched to Vimpat 100 mg TID and brivaracetam 100 mg TID. L sided rhythmic twitching, given 2 mg IV Ativan. Ketamine gtt.     12/29: Failed CPAP trial, went apneic. Ketamine To 0.5. Fycompa 2mg TID for 3 days, followed by 4mg nightly. Solumedrol 1g x 1w    12/30: weaning Ketamine, 500 bolus, fever->pan cultured. TOV.    12/31: precedex started for agitation /biting ETT. Repeat BC.    1/1: Resent BC again today. UCx sent. Restarted home sildenafil. Inc FW to 300q6. trial Benadryl for tongue swelling    1/2: EEG d/c. Inc Fycompa. Changed abx to cefepime per ID     1/3: Started IVIG. 7wk solumedrol taper, ON ETT/OT exchanged d/t clogged tubing    1/5: Restarting EEG. Decreased free water to 200 q6. CPAPing. Banatrol started for loose stools.    1/6: adjusted BP meds, Decreased FW to 200 q8. EEG pre-gonzalez neg.    1/7: ETT exchanged poss blown cuff. Coreg dec 6.25. D/c FW. D/c EEG.     1/8: Trach/peg    1/9: Increase TF. D/c rectal tube. SBP < 160           -----------------------------------------------------------------------------------------------------------------------------------------------------------------------------------  PHYSICAL EXAM:      General: Calm, intubated  Neuro:  -Mental status- No acute distress, intermittent EO, not FC  -CN- PERRL 3mm, corneals present, o/b the vent, good cough;  motor - RUE 2/5, RLE 2/5 to noxious; LUE trace WD, LLE 1/5    CV: S1S2 present  Pulm: CTAB  Abd: Soft, nontender, nondistended.  Peripheral edema noted.    Trach site dry and clean , no hematoma; PEG site dry, clean.      -----------------------------------------------------------------------------------------------------    ICU Vital Signs Last 24 Hrs  T(C): 37.5 (09 Jan 2025 02:00), Max: 37.5 (09 Jan 2025 01:00)  T(F): 99.5 (09 Jan 2025 02:00), Max: 99.5 (09 Jan 2025 01:00)  HR: 70 (09 Jan 2025 02:00) (48 - 81)  BP: 169/72 (09 Jan 2025 02:00) (106/52 - 178/97)  BP(mean): 102 (09 Jan 2025 02:00) (69 - 124)  ABP: --  ABP(mean): --  RR: 18 (09 Jan 2025 02:00) (10 - 22)  SpO2: 100% (09 Jan 2025 02:00) (98% - 100%)    O2 Parameters below as of 09 Jan 2025 00:00  Patient On (Oxygen Delivery Method): ventilator    O2 Concentration (%): 40      --------------------------------------------------------------------------------------------------------    Assessment and Plan:   · Assessment    84F with new onset status epilepticus (clinically - with L UE and LLE twitching), resolved now; 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  Acute resp failure due ot neurologic injury; s/p trach 01/08.  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  PMHx of remote CVA, mild dementia, HTN, HLD, osteoporosis, GERD, h/o breast cancer s/p left mastectomy, left hysterectomy, MARLINE.  BCx with citrobacter, pseudomonas in sputum.  PEG in place.    Plan:  - neurochecks q4  - cont Briviact 100 BID, Vimpat 150 q8h, Fycompa 8mg qhs  - on Solumedrol tapering over 7 weeks, cont  - switched to Clonidine, cont  - -180, avoid significant fluctuations; cont Coreg 6.25 BID, sildenafil 20mg q8h  - home ASA, statin start Plavix 75 mg Tomorrow and neurology f/u out pt   - f/u CT Chest w/wo IV cx for mets W/u - CT A/P neg   - Osats>92%, vent support, SBT as tolerated  - cont TF, off BM regimen; PPI for ulcer ppx; banatrol, metamucil BID - cont  - monitor e-lytes, I/Os  - Goal euglycemia (-180), ISS while on steroids  - cont Cefepime for 10 d in total (1/2-1/9)  -   - VTE prophylaxis: SCDs, SQL    Dispo: Patient hemodynamically stable and go to telemetry    Case D/w Dr. Moyer

## 2025-01-09 NOTE — PROGRESS NOTE ADULT - ASSESSMENT
A: 85yo F w seizures and severe ICA stenosis, currently dependant on vent with trach and PEG. Patient tolerating TFs.     - *** A: 85yo F w seizures and severe ICA stenosis, currently dependant on vent with trach and PEG. Patient tolerating TFs.     Plan:   - Tube feeds  - rest of care per primary

## 2025-01-09 NOTE — CHART NOTE - NSCHARTNOTEFT_GEN_A_CORE
Source: Patient [ ]  Family [ ]   other [x ]  EMR and RN at bedside.     Current Diet: Diet, NPO with Tube Feed:   Tube Feeding Modality: Gastrostomy  Jevity 1.5 Farrukh (JEVITY1.5)  Total Volume for 24 Hours (mL): 240  Continuous  Starting Tube Feed Rate {mL per Hour}: 10  Until Goal Tube Feed Rate (mL per Hour): 10  Tube Feed Duration (in Hours): 24  Tube Feed Start Time: 01:00 (01-09-25 @ 00:56)    Current Weight:   1/4: 72.2 kg   1/3: 67.9 kg   12/1: 72.5 kg   12/27: 60.9 kg (  2+ Generalized edema, 3+ edema B/L arms)     % Weight Change: Unsure of accuracy of weights; will continue to monitor weights for trends.     Pertinent Medications: MEDICATIONS  (STANDING):  ascorbic acid 500 milliGRAM(s) Oral daily  aspirin  chewable 81 milliGRAM(s) Oral daily  atorvastatin 10 milliGRAM(s) Oral at bedtime  calcium carbonate 1250 mG  + Vitamin D (OsCal 500 + D) 1 Tablet(s) Oral daily  carvedilol 6.25 milliGRAM(s) Oral every 12 hours  cefepime  Injectable. 2000 milliGRAM(s) IV Push every 12 hours  cloNIDine 0.1 milliGRAM(s) Oral every 12 hours  cyanocobalamin 1000 MICROGram(s) Oral daily  insulin lispro (ADMELOG) corrective regimen sliding scale   SubCutaneous every 6 hours  melatonin 5 milliGRAM(s) Oral at bedtime  methylPREDNISolone sodium succinate IVPB 1000 milliGRAM(s) IV Intermittent <User Schedule>  multivitamin 1 Tablet(s) Oral daily  pantoprazole  Injectable 40 milliGRAM(s) IV Push daily  perampanel 8 milliGRAM(s) Oral at bedtime  psyllium Powder 1 Packet(s) Oral three times a day  sildenafil (REVATIO) 20 milliGRAM(s) Oral three times a day    MEDICATIONS  (PRN):  acetaminophen   Oral Liquid .. 650 milliGRAM(s) Oral every 6 hours PRN Temp greater or equal to 38C (100.4F), Mild Pain (1 - 3)  albuterol/ipratropium for Nebulization 3 milliLiter(s) Nebulizer every 6 hours PRN Shortness of Breath and/or Wheezing  fentaNYL    Injectable 25 MICROGram(s) IV Push every 2 hours PRN vent dyssychrony  hydrALAZINE Injectable 10 milliGRAM(s) IV Push every 3 hours PRN SBP>180  labetalol Injectable 10 milliGRAM(s) IV Push every 3 hours PRN SBP>180    Pertinent Labs: CBC Full  -  ( 09 Jan 2025 03:44 )  WBC Count : 20.78 K/uL  RBC Count : 3.52 M/uL  Hemoglobin : 10.0 g/dL  Hematocrit : 30.4 %  Platelet Count - Automated : 308 K/uL  Mean Cell Volume : 86.4 fl  Mean Cell Hemoglobin : 28.4 pg  Mean Cell Hemoglobin Concentration : 32.9 g/dL    Skin: L ear scab, Coccyx stage 1    Nutrition focused physical exam-previously conducted - found signs of malnutrition [ ]absent [x ]present    Subcutaneous fat loss: [x ] Orbital fat pads region, [x ]Buccal fat region, [ ]Triceps region,  [ ]Ribs region    Muscle wasting: [ ]Temples region, [x ]Clavicle region, [x ]Shoulder region, [ ]Scapula region, [ ]Interosseous region,  [ ]thigh region, [ ]Calf region    Estimated Needs:   [x ] no change since previous assessment  [ ] recalculated:     Brief Hospital Course:   Pt is a 84 year old female PMHx HTN, HLD, osteoporosis, GERD, h/o breast cancer s/p left mastectomy, left hysterectomy BIBEMs for left-sided weakness and LUE rhythmic shaking admitted with seizures, severe R ICA stenosis    Current Nutrition Diagnosis:  Pt remains at high nutrition risk secondary to Moderate (acute) protein calorie malnutrition related to inability to meet sufficient protein energy needs in setting of recent R ICA stenosis; requiring vent support as evidenced by meeting < 75% estimated energy needs > 7days, physical signs of mild muscle/fat loss, + edema. Pt is now s/p Trach and PEG placement (1/8). Trickle feeds of Jevity 1.5 initiated. Rectal tube inserted for loose stool noted; metamucil TID ordered. Recommendations below:     Recommendations:   1. Continue with MVI and Vit. C daily   2. Check weight daily to monitor trends   3. Increase feeds per ICU protocol; Jevity 1.5 increase by 10ml every 6 hours as tolerated to goal rate = 50ml/hr (x18 hours) 900ml/day; 1350kcal, 58gm protein, 684ml free water; additional free water per MD discretion.   4. Monitor H/H, BGM   5. If loose stool persists; consider adding Banatrol BID       Monitoring and Evaluation:   [ ] PO intake [x ] Tolerance to diet prescription [X] Weights  [X] Follow up per protocol [X] Labs:

## 2025-01-09 NOTE — PROGRESS NOTE ADULT - ASSESSMENT
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   -CSF encephalitis panel reviewed, ?Ab negative autoimmune encephalitis  -MRI brain 12/26 reviewed, encephalomalacia, gliosis noted  -Vimpat, Fycompa, Briviact as AED regimen  -s/p IVIG 1/3-1/7  -On solumedrol 1g taper over 7 weeks, dosing ordered by NSICU team  -Neurology consulted for AM    Ventilator dependent respiratory failure, s/p PEG  -Etiology likely related to neurologic injury as above  -On AC/CMV settings  -On tube feeds  -Pulmonary consulted for AM    Citrobacter bacteremia, Pseudomonas PNA  -Initial blood cx positive on 12/30, cleared cx on 12/31  -Sputum cx w/ Pseudomonas, CXR on 12/27 w/ LLL airspace opacity   -ID following, advised cefepime x 10 day course (ended on 1/9/25)    Diarrhea  -Possibly antibiotic associated, now improving per RN  -s/p rectal tube placement, now removed  -CT A/P removed, mostly chronic incidental findings, no colitis noted    Carotid stenosis  -CTA w/ high grade stenosis 75% on R-ICA  -On ASA, was previously ordered for Plavix but d/c’ed, unclear why  -Neurology consulted, signed off on 12/27/24, per note resume DAPT and outpatient neuro IR when able    Hx HTN, HLD  -Clonidine, carvedilol, atorvastatin     Hx dementia  -Donepezil has been on hold    Prophylactic measure  -Lovenox 40mg daily     Dispo: medically active, pending neuro re-eval, pulm consult.  Estimated LOS: unclear.  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   -Status now resolved, Vimpat, Fycompa, Briviact as AED regimen  -CSF encephalitis panel reviewed, ?Ab negative autoimmune encephalitis  -MRI brain 12/26 reviewed, encephalomalacia, gliosis noted  -s/p IVIG 1/3-1/7  -On solumedrol 1g taper over 7 weeks, dosing ordered by NSICU team  -Neurology consulted for AM    Ventilator dependent respiratory failure, s/p PEG  -Etiology likely related to neurologic injury as above  -On AC/CMV settings  -On tube feeds  -Pulmonary consulted for AM    Citrobacter bacteremia, Pseudomonas PNA  -Initial blood cx positive on 12/30, cleared cx on 12/31  -Sputum cx w/ Pseudomonas, CXR on 12/27 w/ LLL airspace opacity   -ID following, advised cefepime x 10 day course (ended on 1/9/25)    Diarrhea  -Possibly antibiotic associated, now improving per RN  -s/p rectal tube placement, now removed  -CT A/P removed, mostly chronic incidental findings, no colitis noted    Carotid stenosis  -CTA w/ high grade stenosis 75% on R-ICA  -On ASA, was previously ordered for Plavix but d/c’ed, unclear why  -Neurology consulted, signed off on 12/27/24, per note resume DAPT and outpatient neuro IR when able    Hx HTN, HLD  -Clonidine, carvedilol, atorvastatin     Hx dementia  -Donepezil has been on hold    Prophylactic measure  -Lovenox 40mg daily     Dispo: medically active, pending neuro re-eval, pulm consult.  Estimated LOS: unclear.

## 2025-01-09 NOTE — PROGRESS NOTE ADULT - NSPROGADDITIONALINFOA_GEN_ALL_CORE
Ethics note reviewed- decision maker is Norberto Castillo (hamilton) as pt is , has no biological children and her 3 living siblings do not live in [place].  Hamilton consults with Ms. Sonam Winchester for health care decision making as she is former RN.

## 2025-01-09 NOTE — PROGRESS NOTE ADULT - THIS PATIENT HAS THE FOLLOWING CONDITION(S)/DIAGNOSES ON THIS ADMISSION:
Encephalopathy/Acute Respiratory Failure
Encephalopathy/Cerebral Edema
Encephalopathy
Encephalopathy/Acute Respiratory Failure
None
Encephalopathy
Encephalopathy/Acute Respiratory Failure
None
Encephalopathy/Acute Respiratory Failure
Encephalopathy
Encephalopathy/Acute Respiratory Failure
Encephalopathy
Encephalopathy/Acute Respiratory Failure
Encephalopathy
Encephalopathy/Acute Respiratory Failure

## 2025-01-09 NOTE — PROGRESS NOTE ADULT - SUBJECTIVE AND OBJECTIVE BOX
Chief complaint:   Patient is a 84y old Female who presents with a chief complaint of seizures, severe R ICA stenosis (04 Jan 2025 09:41)    HPI:  83yo F with PMHx HTN, HLD, osteoporosis, GERD, h/o breast cancer s/p left mastectomy, left hysterectomy BIBEMs for left-sided weakness and LUE rhythmic shaking. Per family, patient was last known well at 1:30am. When EMS arrived patient was alert but sloughing over the left side. Patient's left arm was noted to be weak and shortly started twitching, was given versed by EMS. By the time patient arrived to ER, Pt was intubated for airway protection. 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 CTA without LVO however with findings of high grade stenosis 75% right  ICA and 30% stenosis L ICA. NeuroICU consulted for further intervention. (24 Dec 2024 20:57)    24hr EVENTS:  1/6 - planned for day 4 IVIG, more awake overnight than prior  1/7 - cuff blown on ETT, tube exchanged with anesthesia, O2 stable throughout  1/8 - planning for bedside trach, pending surgery decision    ROS: [x]  Unable to assess due to mental status     -----------------------------------------------------------------------------------------------------------------------------------------------------------------------------------  PHYSICAL EXAM:  precedex @0.2    General: Calm, intubated  HEENT: MMM, macroglossia  Neuro:  -Mental status- No acute distress, +EO, intermittent FC to wiggle toes on R  -CN- PERRL 3mm, EOMI, tongue midline, face symmetric  RUE 2/5, RLE 2/5; LUE WD, LLE minimal mvmt    CV: regular rhythm, bradycardia  Pulm: +rhonchi, moderate tan in-line secretions  Abd: Soft, nontender, nondistended, TF held, rectal tube with liquid output  : external catheter with incontinence  Skin: warm, dry, anasarca    PIVs    -----------------------------------------------------------------------------------------------------  ICU Vital Signs Last 24 Hrs  T(C): 37.6 (09 Jan 2025 07:00), Max: 37.8 (09 Jan 2025 05:00)  T(F): 99.7 (09 Jan 2025 07:00), Max: 100 (09 Jan 2025 05:00)  HR: 74 (09 Jan 2025 08:45) (48 - 81)  BP: 154/68 (09 Jan 2025 07:00) (118/55 - 178/97)  BP(mean): 93 (09 Jan 2025 07:00) (74 - 124)  ABP: --  ABP(mean): --  RR: 12 (09 Jan 2025 07:00) (12 - 21)  SpO2: 99% (09 Jan 2025 08:45) (98% - 100%)    O2 Parameters below as of 09 Jan 2025 04:00  Patient On (Oxygen Delivery Method): ventilator    O2 Concentration (%): 40        I&O's Summary    08 Jan 2025 07:01  -  09 Jan 2025 07:00  --------------------------------------------------------  IN: 1547.4 mL / OUT: 2100 mL / NET: -552.6 mL        MEDICATIONS  (STANDING):  ascorbic acid 500 milliGRAM(s) Oral daily  aspirin  chewable 81 milliGRAM(s) Oral daily  atorvastatin 10 milliGRAM(s) Oral at bedtime  brivaracetam  Injectable 100 milliGRAM(s) IV Push two times a day  calcium carbonate 1250 mG  + Vitamin D (OsCal 500 + D) 1 Tablet(s) Oral daily  carvedilol 6.25 milliGRAM(s) Oral every 12 hours  cefepime  Injectable. 2000 milliGRAM(s) IV Push every 12 hours  chlorhexidine 0.12% Liquid 15 milliLiter(s) Oral Mucosa every 12 hours  cloNIDine 0.1 milliGRAM(s) Oral every 12 hours  cyanocobalamin 1000 MICROGram(s) Oral daily  enoxaparin Injectable 40 milliGRAM(s) SubCutaneous <User Schedule>  insulin lispro (ADMELOG) corrective regimen sliding scale   SubCutaneous every 6 hours  lacosamide 150 milliGRAM(s) Oral <User Schedule>  melatonin 5 milliGRAM(s) Oral at bedtime  methylPREDNISolone sodium succinate IVPB 1000 milliGRAM(s) IV Intermittent <User Schedule>  multivitamin 1 Tablet(s) Oral daily  pantoprazole  Injectable 40 milliGRAM(s) IV Push daily  perampanel 8 milliGRAM(s) Oral at bedtime  psyllium Powder 1 Packet(s) Oral three times a day  sildenafil (REVATIO) 20 milliGRAM(s) Oral three times a day      RESPIRATORY:  Mode: CPAP with PS  FiO2: 30  PEEP: 6  PS: 12  MAP: 10        IMAGING:   Recent imaging studies were reviewed.    LAB RESULTS:                          10.0   20.78 )-----------( 308      ( 09 Jan 2025 03:44 )             30.4       PT/INR - ( 08 Jan 2025 03:50 )   PT: 16.2 sec;   INR: 1.44 ratio         PTT - ( 08 Jan 2025 03:50 )  PTT:27.0 sec    01-09    137  |  99  |  19.7  ----------------------------<  136[H]  4.0   |  28.0  |  0.64    Ca    7.6[L]      09 Jan 2025 03:44  Phos  3.7     01-09  Mg     2.0     01-09                  -----------------------------------------------------------------------------------------------------------------------------------------------------------------------------------             Chief complaint:   Patient is a 84y old Female who presents with a chief complaint of seizures, severe R ICA stenosis (04 Jan 2025 09:41)    HPI:  83yo F with PMHx HTN, HLD, osteoporosis, GERD, h/o breast cancer s/p left mastectomy, left hysterectomy BIBEMs for left-sided weakness and LUE rhythmic shaking. Per family, patient was last known well at 1:30am. When EMS arrived patient was alert but sloughing over the left side. Patient's left arm was noted to be weak and shortly started twitching, was given versed by EMS. By the time patient arrived to ER, Pt was intubated for airway protection. 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 CTA without LVO however with findings of high grade stenosis 75% right  ICA and 30% stenosis L ICA. NeuroICU consulted for further intervention. (24 Dec 2024 20:57)    24hr EVENTS:  1/6 - planned for day 4 IVIG, more awake overnight than prior  1/7 - cuff blown on ETT, tube exchanged with anesthesia, O2 stable throughout  1/8 - planning for bedside trach, pending surgery decision; trach/PEG done, no complications, started trickle feeds, CT AP with contrast -> no e/o maliganancy; precedex off    ROS: [x]  Unable to assess due to mental status     -----------------------------------------------------------------------------------------------------------------------------------------------------------------------------------  PHYSICAL EXAM:    General: Calm, trach in place  HEENT: MMM, macroglossia  Neuro:  -Mental status- No acute distress, +EO, intermittent FC to wiggle toes and squeezes  -CN- PERRL 3mm, EOMI, tongue midline, face symmetric  RUE 2/5, RLE 2/5; LUE WD, LLE minimal mvmt    CV: regular rhythm and rate  Pulm: +rhonchi, in-line secretions improving  Abd: Soft, nontender, nondistended, minimal output to rectal tube  : external catheter with incontinence  Skin: warm, dry, anasarca    RUE midline, PIVs    -----------------------------------------------------------------------------------------------------  ICU Vital Signs Last 24 Hrs  T(C): 37.6 (09 Jan 2025 07:00), Max: 37.8 (09 Jan 2025 05:00)  T(F): 99.7 (09 Jan 2025 07:00), Max: 100 (09 Jan 2025 05:00)  HR: 74 (09 Jan 2025 08:45) (48 - 81)  BP: 154/68 (09 Jan 2025 07:00) (118/55 - 178/97)  BP(mean): 93 (09 Jan 2025 07:00) (74 - 124)  RR: 12 (09 Jan 2025 07:00) (12 - 21)  SpO2: 99% (09 Jan 2025 08:45) (98% - 100%)    O2 Parameters below as of 09 Jan 2025 04:00  Patient On (Oxygen Delivery Method): ventilator    O2 Concentration (%): 40        I&O's Summary    08 Jan 2025 07:01  -  09 Jan 2025 07:00  --------------------------------------------------------  IN: 1547.4 mL / OUT: 2100 mL / NET: -552.6 mL        MEDICATIONS  (STANDING):  ascorbic acid 500 milliGRAM(s) Oral daily  aspirin  chewable 81 milliGRAM(s) Oral daily  atorvastatin 10 milliGRAM(s) Oral at bedtime  brivaracetam  Injectable 100 milliGRAM(s) IV Push two times a day  calcium carbonate 1250 mG  + Vitamin D (OsCal 500 + D) 1 Tablet(s) Oral daily  carvedilol 6.25 milliGRAM(s) Oral every 12 hours  cefepime  Injectable. 2000 milliGRAM(s) IV Push every 12 hours  chlorhexidine 0.12% Liquid 15 milliLiter(s) Oral Mucosa every 12 hours  cloNIDine 0.1 milliGRAM(s) Oral every 12 hours  cyanocobalamin 1000 MICROGram(s) Oral daily  enoxaparin Injectable 40 milliGRAM(s) SubCutaneous <User Schedule>  insulin lispro (ADMELOG) corrective regimen sliding scale   SubCutaneous every 6 hours  lacosamide 150 milliGRAM(s) Oral <User Schedule>  melatonin 5 milliGRAM(s) Oral at bedtime  methylPREDNISolone sodium succinate IVPB 1000 milliGRAM(s) IV Intermittent <User Schedule>  multivitamin 1 Tablet(s) Oral daily  pantoprazole  Injectable 40 milliGRAM(s) IV Push daily  perampanel 8 milliGRAM(s) Oral at bedtime  psyllium Powder 1 Packet(s) Oral three times a day  sildenafil (REVATIO) 20 milliGRAM(s) Oral three times a day      RESPIRATORY:  Mode: CPAP with PS  FiO2: 30  PEEP: 6  PS: 12  MAP: 10    IMAGING:   CT AP as above    LAB RESULTS:               10.0   20.78 )-----------( 308      ( 09 Jan 2025 03:44 )             30.4     01-09    137  |  99  |  19.7  ----------------------------<  136[H]  4.0   |  28.0  |  0.64    Ca    7.6[L]      09 Jan 2025 03:44  Phos  3.7     01-09  Mg     2.0     01-09    -----------------------------------------------------------------------------------------------------------------------------------------------------------------------------------

## 2025-01-09 NOTE — PROGRESS NOTE ADULT - SUBJECTIVE AND OBJECTIVE BOX
Subjective: Patient seen and examined at bedside, no overnight events. TFs running, patient tolerating      MEDICATIONS  (STANDING):  ascorbic acid 500 milliGRAM(s) Oral daily  aspirin  chewable 81 milliGRAM(s) Oral daily  atorvastatin 10 milliGRAM(s) Oral at bedtime  brivaracetam  Injectable 100 milliGRAM(s) IV Push two times a day  calcium carbonate 1250 mG  + Vitamin D (OsCal 500 + D) 1 Tablet(s) Oral daily  carvedilol 6.25 milliGRAM(s) Oral every 12 hours  cefepime  Injectable. 2000 milliGRAM(s) IV Push every 12 hours  chlorhexidine 0.12% Liquid 15 milliLiter(s) Oral Mucosa every 12 hours  cloNIDine 0.1 milliGRAM(s) Oral every 12 hours  cyanocobalamin 1000 MICROGram(s) Oral daily  enoxaparin Injectable 40 milliGRAM(s) SubCutaneous <User Schedule>  insulin lispro (ADMELOG) corrective regimen sliding scale   SubCutaneous every 6 hours  lacosamide 150 milliGRAM(s) Oral <User Schedule>  melatonin 5 milliGRAM(s) Oral at bedtime  methylPREDNISolone sodium succinate IVPB 1000 milliGRAM(s) IV Intermittent <User Schedule>  multivitamin 1 Tablet(s) Oral daily  pantoprazole  Injectable 40 milliGRAM(s) IV Push daily  perampanel 8 milliGRAM(s) Oral at bedtime  psyllium Powder 1 Packet(s) Oral three times a day  sildenafil (REVATIO) 20 milliGRAM(s) Oral three times a day    MEDICATIONS  (PRN):  acetaminophen   Oral Liquid .. 650 milliGRAM(s) Oral every 6 hours PRN Temp greater or equal to 38C (100.4F), Mild Pain (1 - 3)  albuterol/ipratropium for Nebulization 3 milliLiter(s) Nebulizer every 6 hours PRN Shortness of Breath and/or Wheezing  fentaNYL    Injectable 25 MICROGram(s) IV Push every 2 hours PRN vent dyssychrony  hydrALAZINE Injectable 10 milliGRAM(s) IV Push every 3 hours PRN SBP>180  labetalol Injectable 10 milliGRAM(s) IV Push every 3 hours PRN SBP>180      Vital Signs Last 24 Hrs  T(C): 37.5 (09 Jan 2025 02:00), Max: 37.5 (09 Jan 2025 01:00)  T(F): 99.5 (09 Jan 2025 02:00), Max: 99.5 (09 Jan 2025 01:00)  HR: 72 (09 Jan 2025 04:04) (48 - 81)  BP: 169/72 (09 Jan 2025 02:00) (107/55 - 178/97)  BP(mean): 102 (09 Jan 2025 02:00) (70 - 124)  RR: 18 (09 Jan 2025 02:00) (12 - 21)  SpO2: 100% (09 Jan 2025 04:04) (98% - 100%)    Parameters below as of 09 Jan 2025 00:00  Patient On (Oxygen Delivery Method): ventilator    O2 Concentration (%): 40    Physical Exam:    Constitutional: NAD  HEENT: EOMI  Cardiovascular: regular rate  Respiratory: trach in place on vent with minimal blood around guaze, no active bleeding, Respirations non-labored, no accessory muscle use  Gastrointestinal: Soft, non-distended, PEG in place with TFs running, minimal blood on guaze, no active bleeding.       LABS:                        10.0   20.78 )-----------( 308      ( 09 Jan 2025 03:44 )             30.4     01-09    137  |  99  |  19.7  ----------------------------<  136[H]  4.0   |  28.0  |  0.64    Ca    7.6[L]      09 Jan 2025 03:44  Phos  3.7     01-09  Mg     2.0     01-09      PT/INR - ( 08 Jan 2025 03:50 )   PT: 16.2 sec;   INR: 1.44 ratio         PTT - ( 08 Jan 2025 03:50 )  PTT:27.0 sec  Urinalysis Basic - ( 09 Jan 2025 03:44 )    Color: x / Appearance: x / SG: x / pH: x  Gluc: 136 mg/dL / Ketone: x  / Bili: x / Urobili: x   Blood: x / Protein: x / Nitrite: x   Leuk Esterase: x / RBC: x / WBC x   Sq Epi: x / Non Sq Epi: x / Bacteria: x        A: 83yo F w seizures and severe ICA stenosis, currently dependant on vent with trach and PEG. Patient tolerating TFs.     - ***       Subjective: Patient seen and examined at bedside, no overnight events. TFs running, patient tolerating      MEDICATIONS  (STANDING):  ascorbic acid 500 milliGRAM(s) Oral daily  aspirin  chewable 81 milliGRAM(s) Oral daily  atorvastatin 10 milliGRAM(s) Oral at bedtime  brivaracetam  Injectable 100 milliGRAM(s) IV Push two times a day  calcium carbonate 1250 mG  + Vitamin D (OsCal 500 + D) 1 Tablet(s) Oral daily  carvedilol 6.25 milliGRAM(s) Oral every 12 hours  cefepime  Injectable. 2000 milliGRAM(s) IV Push every 12 hours  chlorhexidine 0.12% Liquid 15 milliLiter(s) Oral Mucosa every 12 hours  cloNIDine 0.1 milliGRAM(s) Oral every 12 hours  cyanocobalamin 1000 MICROGram(s) Oral daily  enoxaparin Injectable 40 milliGRAM(s) SubCutaneous <User Schedule>  insulin lispro (ADMELOG) corrective regimen sliding scale   SubCutaneous every 6 hours  lacosamide 150 milliGRAM(s) Oral <User Schedule>  melatonin 5 milliGRAM(s) Oral at bedtime  methylPREDNISolone sodium succinate IVPB 1000 milliGRAM(s) IV Intermittent <User Schedule>  multivitamin 1 Tablet(s) Oral daily  pantoprazole  Injectable 40 milliGRAM(s) IV Push daily  perampanel 8 milliGRAM(s) Oral at bedtime  psyllium Powder 1 Packet(s) Oral three times a day  sildenafil (REVATIO) 20 milliGRAM(s) Oral three times a day    MEDICATIONS  (PRN):  acetaminophen   Oral Liquid .. 650 milliGRAM(s) Oral every 6 hours PRN Temp greater or equal to 38C (100.4F), Mild Pain (1 - 3)  albuterol/ipratropium for Nebulization 3 milliLiter(s) Nebulizer every 6 hours PRN Shortness of Breath and/or Wheezing  fentaNYL    Injectable 25 MICROGram(s) IV Push every 2 hours PRN vent dyssychrony  hydrALAZINE Injectable 10 milliGRAM(s) IV Push every 3 hours PRN SBP>180  labetalol Injectable 10 milliGRAM(s) IV Push every 3 hours PRN SBP>180      Vital Signs Last 24 Hrs  T(C): 37.5 (09 Jan 2025 02:00), Max: 37.5 (09 Jan 2025 01:00)  T(F): 99.5 (09 Jan 2025 02:00), Max: 99.5 (09 Jan 2025 01:00)  HR: 72 (09 Jan 2025 04:04) (48 - 81)  BP: 169/72 (09 Jan 2025 02:00) (107/55 - 178/97)  BP(mean): 102 (09 Jan 2025 02:00) (70 - 124)  RR: 18 (09 Jan 2025 02:00) (12 - 21)  SpO2: 100% (09 Jan 2025 04:04) (98% - 100%)    Parameters below as of 09 Jan 2025 00:00  Patient On (Oxygen Delivery Method): ventilator    O2 Concentration (%): 40    Physical Exam:    Constitutional: NAD  HEENT: EOMI  Cardiovascular: regular rate  Respiratory: trach in place on vent with minimal blood around guaze, no active bleeding, Respirations non-labored, no accessory muscle use  Gastrointestinal: Soft, non-distended, PEG in place with TFs running, minimal blood on guaze, no active bleeding.       LABS:                        10.0   20.78 )-----------( 308      ( 09 Jan 2025 03:44 )             30.4     01-09    137  |  99  |  19.7  ----------------------------<  136[H]  4.0   |  28.0  |  0.64    Ca    7.6[L]      09 Jan 2025 03:44  Phos  3.7     01-09  Mg     2.0     01-09      PT/INR - ( 08 Jan 2025 03:50 )   PT: 16.2 sec;   INR: 1.44 ratio         PTT - ( 08 Jan 2025 03:50 )  PTT:27.0 sec  Urinalysis Basic - ( 09 Jan 2025 03:44 )    Color: x / Appearance: x / SG: x / pH: x  Gluc: 136 mg/dL / Ketone: x  / Bili: x / Urobili: x   Blood: x / Protein: x / Nitrite: x   Leuk Esterase: x / RBC: x / WBC x   Sq Epi: x / Non Sq Epi: x / Bacteria: x

## 2025-01-09 NOTE — PROGRESS NOTE ADULT - ASSESSMENT
84F new onset status epilepticus (clinically - with L UE and LLE twitching). 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 Molina  (1/3 - 1/7)  - precedex for vent dyssynchrony, start clonidine 0.1mg BID  Mobility: ROM in bed    CV:  - -180, avoid significant fluctuations  - Coreg 6.25 BID, sildenafil 20mg q8h  - home ASA, statin    Pulm:  - consult gen surg for trach/PEG- pre-op for Wed 1/8  - Osats>92%, vent support, SBT as tolerated  - duoNebs q6h prn    GI: #diarrhea  - cont TF, BM regimen; PPI for ulcer ppx  - NPO @[facility] for trach/PEG  - loose stools with dignicare  - banatrol, add metamucil TID, hold BR    Renal/:  - monitor e-lytes, I/Os  - stop FWFs    Endo: A1c 5.2  - Goal euglycemia (-180), ISS while on steroids    ID: #cirtrobacter in BCx, pseudomonas in sputum, #leukocytosis - likely steroid related  - ID following, appreciate recs   - empiric cefepime (10d total - 1/9)    Heme: 1/6 no DVTs  - VTE prophylaxis: SCDs, SQL    Dispo: pending [facility]   84F new onset status epilepticus (clinically - with L UE and LLE twitching). 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 s/p IVIG (1/3-1/7)  - neurochecks q4h  - d/w epilepsy whether to attempt to wean ASMs vs disconnect from EEG  - Briviact 100 BID, Vimpat 150 q8h, Fycompa 8mg qhs  - 1g Solumedrol tapering over 7 weeks  - clonidine 0.1mg BID for sedation  Mobility: PT/OT    CV:  - -160  - Coreg 6.25 BID, sildenafil 20mg q8h  - home ASA, lipitor 10mg daily    Pulm:  - Osats>92%, vent support, SBT as tolerated  - duoNebs q6h prn    GI: #diarrhea - improving  - cont TF, BM regimen; PPI home med  - NPO @[facility] for trach/PEG  - remove rectal tube  - banatrol, add metamucil TID, hold BR    Renal/:  - monitor e-lytes, I/Os  - stop FWFs    Endo: A1c 5.2  - Goal euglycemia (-180), ISS while on steroids    ID: #cirtrobacter in BCx, pseudomonas in sputum, #leukocytosis - likely steroid related  - ID following - d/w ID re: hepB core Ab positive  - empiric cefepime (10d total - 1/9)    Heme: 1/6 no DVTs  - VTE prophylaxis: SCDs, SQL    Misc: #anasarca  - elevate arms    Dispo: pending [facility]

## 2025-01-09 NOTE — PROGRESS NOTE ADULT - ATTENDING COMMENTS
OR today for percutaneous tracheostomy and PEG tube placement  NPO  IV fluids  reassess post-op
84-year-old female s/p trach and PEG   - advance tube feeds as tolerated   - trach sutures to be removed in 7 days. Maintain soft trach collar

## 2025-01-09 NOTE — PROGRESS NOTE ADULT - SUBJECTIVE AND OBJECTIVE BOX
*Pt seen and examined prior to midnight*    Patient is a 84y old  Female who presents with a chief complaint of seizures, severe R ICA stenosis (09 Jan 2025 22:48)    HOSPITAL COURSE: 84yoF hx mild dementia, remote  CVA, HTN, HLD, breast CA s/p L-mastectomy, chemoradiation,  who presented to [facility] on 12/24 for altered mental status, left-sided weakness and seizure activity, who was intubated in ED for airway protection.  . Pt was admitted to neuro ICU where she had EEG that was concerning for status epilepticus.  Pt initially started on Keppra, Valproic acid,  but had persistent seizure activity, then changed to Vimpat, Briviact, Fycompa and on Ketamine gtt. 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  She is s/p IVIG and currently on high dose steroid taper.  Pt failed CPAP trials. underwent trach/PEG on 1/8 and remains on ventilator.     Of note, pt also w/ high grade carotid stenosis w/out LVO seen on CT and was seen by stroke neurology who advised DAPT. Pt started on ASA, plavix, but plavix had been d/c’ed by NSICU team for unclear reasons.     Pt seen and examined at bedside.  Spoke with NSICU RN Eunice who has had pt for past few nights- states pt mental status has been non-verbal, sometimes withdraws to pain on extremities and may occasionally squeeze someone’s hand but generally does not follow any commands.         MEDICATIONS  (STANDING):  ascorbic acid 500 milliGRAM(s) Oral daily  aspirin  chewable 81 milliGRAM(s) Oral daily  atorvastatin 10 milliGRAM(s) Oral at bedtime  brivaracetam  Injectable 100 milliGRAM(s) IV Push two times a day  calcium carbonate 1250 mG  + Vitamin D (OsCal 500 + D) 1 Tablet(s) Oral daily  carvedilol 6.25 milliGRAM(s) Oral every 12 hours  chlorhexidine 0.12% Liquid 15 milliLiter(s) Oral Mucosa every 12 hours  cloNIDine 0.1 milliGRAM(s) Oral every 12 hours  cyanocobalamin 1000 MICROGram(s) Oral daily  enoxaparin Injectable 40 milliGRAM(s) SubCutaneous <User Schedule>  insulin lispro (ADMELOG) corrective regimen sliding scale   SubCutaneous every 6 hours  lacosamide 150 milliGRAM(s) Oral <User Schedule>  melatonin 5 milliGRAM(s) Oral at bedtime  methylPREDNISolone sodium succinate IVPB 1000 milliGRAM(s) IV Intermittent <User Schedule>  multivitamin 1 Tablet(s) Oral daily  pantoprazole  Injectable 40 milliGRAM(s) IV Push daily  perampanel 8 milliGRAM(s) Oral at bedtime  psyllium Powder 1 Packet(s) Oral three times a day  sildenafil (REVATIO) 20 milliGRAM(s) Oral three times a day    MEDICATIONS  (PRN):  acetaminophen   Oral Liquid .. 650 milliGRAM(s) Oral every 6 hours PRN Temp greater or equal to 38C (100.4F), Mild Pain (1 - 3)  albuterol/ipratropium for Nebulization 3 milliLiter(s) Nebulizer every 6 hours PRN Shortness of Breath and/or Wheezing  fentaNYL    Injectable 25 MICROGram(s) IV Push every 2 hours PRN vent dyssychrony      Allergies: Allergies  No Known Allergies or Intolerance      REVIEW OF SYSTEMS:  CONSTITUTIONAL: No fever, weight loss, or fatigue  EYES: No eye pain, visual disturbances, or discharge  ENMT:  No difficulty hearing, tinnitus, vertigo; No sinus or throat pain  NECK: No pain or stiffness  BREASTS: No pain, masses, or nipple discharge  RESPIRATORY: No cough, wheezing, chills or hemoptysis; No shortness of breath  CARDIOVASCULAR: No chest pain, palpitations, dizziness, or leg swelling  GASTROINTESTINAL: No abdominal or epigastric pain. No nausea, vomiting, or hematemesis; No diarrhea or constipation. No melena or hematochezia.  GENITOURINARY: No dysuria, frequency, hematuria, or incontinence  NEUROLOGICAL: No headaches, memory loss, loss of strength, numbness, or tremors  SKIN: No itching, burning, rashes, or lesions   LYMPH NODES: No enlarged glands  ENDOCRINE: No heat or cold intolerance; No hair loss  MUSCULOSKELETAL: No joint pain or swelling; No muscle, back, or extremity pain  PSYCHIATRIC: No depression, anxiety, mood swings, or difficulty sleeping  HEME/LYMPH: No easy bruising, or bleeding gums      T(C): 37.5 (01-10-25 @ 03:00), Max: 37.9 (01-09-25 @ 18:00)  HR: 78 (01-10-25 @ 03:00) (59 - 90)  BP: 162/78 (01-10-25 @ 03:00) (132/58 - 163/68)  RR: 25 (01-10-25 @ 03:00) (12 - 25)  SpO2: 95% (01-10-25 @ 03:00) (93% - 100%)  Wt(kg): --Vital Signs Last 24 Hrs  T(C): 37.5 (10 Kam 2025 03:00), Max: 37.9 (09 Jan 2025 18:00)  T(F): 99.5 (10 Kam 2025 03:00), Max: 100.2 (09 Jan 2025 18:00)  HR: 78 (10 Kam 2025 03:00) (59 - 90)  BP: 162/78 (10 Kam 2025 03:00) (132/58 - 163/68)  BP(mean): 103 (10 Kam 2025 03:00) (80 - 111)  RR: 25 (10 Kam 2025 03:00) (12 - 25)  SpO2: 95% (10 Kam 2025 03:00) (93% - 100%)    Parameters below as of 10 Kam 2025 00:00  Patient On (Oxygen Delivery Method): ventilator    O2 Concentration (%): 40  I&O's Summary    08 Jan 2025 07:01  -  09 Jan 2025 07:00  --------------------------------------------------------  IN: 1547.4 mL / OUT: 2400 mL / NET: -852.6 mL    09 Jan 2025 07:01  -  10 Kam 2025 03:13  --------------------------------------------------------  IN: 705 mL / OUT: 1350 mL / NET: -645 mL      PHYSICAL EXAM:  GENERAL:  Elderly woman on vent, currently non-verbal   EYES:  Eyes closed, when opened, clear conjunctiva   ENT: Moist mucous membranes, trach present with some mild bloody secretion noted  RESP:  Non-labored breathing pattern, lungs clear to ausculation in anterior fields  CV: Regular rate and rhythm, no murmurs appreciated, no lower extremity edema  GI: Soft, non-tender, non-distended, +PEG present   NEURO: Lethargic, difficulty to arouse, non-verbal, doesn't follow commands, intermittently withdraws withdraws to pain   PSYCH: Calm, cooperative  SKIN: No rash or lesions, warm and dry    Consultant(s) Notes Reviewed:  [x ] YES  [ ] NO      LABS:                        10.0   20.78 )-----------( 308      ( 09 Jan 2025 03:44 )             30.4     01-09    137  |  99  |  19.7  ----------------------------<  136[H]  4.0   |  28.0  |  0.64    Ca    7.6[L]      09 Jan 2025 03:44  Phos  3.7     01-09  Mg     2.0     01-09      PT/INR - ( 08 Jan 2025 03:50 )   PT: 16.2 sec;   INR: 1.44 ratio         PTT - ( 08 Jan 2025 03:50 )  PTT:27.0 sec  Urinalysis Basic - ( 09 Jan 2025 03:44 )    Color: x / Appearance: x / SG: x / pH: x  Gluc: 136 mg/dL / Ketone: x  / Bili: x / Urobili: x   Blood: x / Protein: x / Nitrite: x   Leuk Esterase: x / RBC: x / WBC x   Sq Epi: x / Non Sq Epi: x / Bacteria: x      CAPILLARY BLOOD GLUCOSE  POCT Blood Glucose.: 118 mg/dL (09 Jan 2025 23:33)  POCT Blood Glucose.: 115 mg/dL (09 Jan 2025 17:58)  POCT Blood Glucose.: 122 mg/dL (09 Jan 2025 12:13)  POCT Blood Glucose.: 129 mg/dL (09 Jan 2025 06:04)      Urinalysis Basic - ( 09 Jan 2025 03:44 )  Color: x /Appearance: x / SG: x / pH: x  Gluc: 136 mg/dL / Ketone: x  / Bili: x / Urobili: x   Blood: x / Protein: x / Nitrite: x   Leuk Esterase: x / RBC: x / WBC x   Sq Epi: x / Non Sq Epi: x / Bacteria: x        RADIOLOGY & ADDITIONAL TESTS:  Reviewed.  NSICU DOWNGRADE NOTE    *Pt seen and examined prior to midnight*    Patient is a 84y old  Female who presents with a chief complaint of seizures, severe R ICA stenosis (09 Jan 2025 22:48)    HOSPITAL COURSE: 84yoF hx mild dementia, remote  CVA, HTN, HLD, breast CA s/p L-mastectomy, chemoradiation,  who presented to [facility] on 12/24 for altered mental status, left-sided weakness and seizure activity, who was intubated in ED for airway protection.  . Pt was admitted to neuro ICU where she had EEG that was concerning for status epilepticus.  Pt initially started on Keppra, Valproic acid,  but had persistent seizure activity, then changed to Vimpat, Briviact, Fycompa and on Ketamine gtt. 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  She is s/p IVIG and currently on high dose steroid taper.  Pt failed CPAP trials. underwent trach/PEG on 1/8 and remains on ventilator.     Of note, pt also w/ high grade carotid stenosis w/out LVO seen on CT and was seen by stroke neurology who advised DAPT. Pt started on ASA, plavix, but plavix had been d/c’ed by NSICU team for unclear reasons.     Pt seen and examined at bedside.  Spoke with NSICU RN Eunice who has had pt for past few nights- states pt mental status has been non-verbal, sometimes withdraws to pain on extremities and may occasionally squeeze someone’s hand but generally does not follow any commands.         MEDICATIONS  (STANDING):  ascorbic acid 500 milliGRAM(s) Oral daily  aspirin  chewable 81 milliGRAM(s) Oral daily  atorvastatin 10 milliGRAM(s) Oral at bedtime  brivaracetam  Injectable 100 milliGRAM(s) IV Push two times a day  calcium carbonate 1250 mG  + Vitamin D (OsCal 500 + D) 1 Tablet(s) Oral daily  carvedilol 6.25 milliGRAM(s) Oral every 12 hours  chlorhexidine 0.12% Liquid 15 milliLiter(s) Oral Mucosa every 12 hours  cloNIDine 0.1 milliGRAM(s) Oral every 12 hours  cyanocobalamin 1000 MICROGram(s) Oral daily  enoxaparin Injectable 40 milliGRAM(s) SubCutaneous <User Schedule>  insulin lispro (ADMELOG) corrective regimen sliding scale   SubCutaneous every 6 hours  lacosamide 150 milliGRAM(s) Oral <User Schedule>  melatonin 5 milliGRAM(s) Oral at bedtime  methylPREDNISolone sodium succinate IVPB 1000 milliGRAM(s) IV Intermittent <User Schedule>  multivitamin 1 Tablet(s) Oral daily  pantoprazole  Injectable 40 milliGRAM(s) IV Push daily  perampanel 8 milliGRAM(s) Oral at bedtime  psyllium Powder 1 Packet(s) Oral three times a day  sildenafil (REVATIO) 20 milliGRAM(s) Oral three times a day    MEDICATIONS  (PRN):  acetaminophen   Oral Liquid .. 650 milliGRAM(s) Oral every 6 hours PRN Temp greater or equal to 38C (100.4F), Mild Pain (1 - 3)  albuterol/ipratropium for Nebulization 3 milliLiter(s) Nebulizer every 6 hours PRN Shortness of Breath and/or Wheezing  fentaNYL    Injectable 25 MICROGram(s) IV Push every 2 hours PRN vent dyssychrony      Allergies: Allergies  No Known Allergies or Intolerance      REVIEW OF SYSTEMS:  CONSTITUTIONAL: No fever, weight loss, or fatigue  EYES: No eye pain, visual disturbances, or discharge  ENMT:  No difficulty hearing, tinnitus, vertigo; No sinus or throat pain  NECK: No pain or stiffness  BREASTS: No pain, masses, or nipple discharge  RESPIRATORY: No cough, wheezing, chills or hemoptysis; No shortness of breath  CARDIOVASCULAR: No chest pain, palpitations, dizziness, or leg swelling  GASTROINTESTINAL: No abdominal or epigastric pain. No nausea, vomiting, or hematemesis; No diarrhea or constipation. No melena or hematochezia.  GENITOURINARY: No dysuria, frequency, hematuria, or incontinence  NEUROLOGICAL: No headaches, memory loss, loss of strength, numbness, or tremors  SKIN: No itching, burning, rashes, or lesions   LYMPH NODES: No enlarged glands  ENDOCRINE: No heat or cold intolerance; No hair loss  MUSCULOSKELETAL: No joint pain or swelling; No muscle, back, or extremity pain  PSYCHIATRIC: No depression, anxiety, mood swings, or difficulty sleeping  HEME/LYMPH: No easy bruising, or bleeding gums      T(C): 37.5 (01-10-25 @ 03:00), Max: 37.9 (01-09-25 @ 18:00)  HR: 78 (01-10-25 @ 03:00) (59 - 90)  BP: 162/78 (01-10-25 @ 03:00) (132/58 - 163/68)  RR: 25 (01-10-25 @ 03:00) (12 - 25)  SpO2: 95% (01-10-25 @ 03:00) (93% - 100%)  Wt(kg): --Vital Signs Last 24 Hrs  T(C): 37.5 (10 Kam 2025 03:00), Max: 37.9 (09 Jan 2025 18:00)  T(F): 99.5 (10 Kam 2025 03:00), Max: 100.2 (09 Jan 2025 18:00)  HR: 78 (10 Kam 2025 03:00) (59 - 90)  BP: 162/78 (10 Kam 2025 03:00) (132/58 - 163/68)  BP(mean): 103 (10 Kam 2025 03:00) (80 - 111)  RR: 25 (10 Kam 2025 03:00) (12 - 25)  SpO2: 95% (10 Kam 2025 03:00) (93% - 100%)    Parameters below as of 10 Kam 2025 00:00  Patient On (Oxygen Delivery Method): ventilator    O2 Concentration (%): 40  I&O's Summary    08 Jan 2025 07:01  -  09 Jan 2025 07:00  --------------------------------------------------------  IN: 1547.4 mL / OUT: 2400 mL / NET: -852.6 mL    09 Jan 2025 07:01  -  10 Kam 2025 03:13  --------------------------------------------------------  IN: 705 mL / OUT: 1350 mL / NET: -645 mL      PHYSICAL EXAM:  GENERAL:  Elderly woman on vent, currently non-verbal   EYES:  Eyes closed, when opened, clear conjunctiva   ENT: Moist mucous membranes, trach present with some mild bloody secretion noted  RESP:  Non-labored breathing pattern, lungs clear to ausculation in anterior fields  CV: Regular rate and rhythm, no murmurs appreciated, no lower extremity edema  GI: Soft, non-tender, non-distended, +PEG present   NEURO: Lethargic, difficulty to arouse, non-verbal, doesn't follow commands, intermittently withdraws withdraws to pain   PSYCH: Calm, cooperative  SKIN: No rash or lesions, warm and dry    Consultant(s) Notes Reviewed:  [x ] YES  [ ] NO      LABS:                        10.0   20.78 )-----------( 308      ( 09 Jan 2025 03:44 )             30.4     01-09    137  |  99  |  19.7  ----------------------------<  136[H]  4.0   |  28.0  |  0.64    Ca    7.6[L]      09 Jan 2025 03:44  Phos  3.7     01-09  Mg     2.0     01-09      PT/INR - ( 08 Jan 2025 03:50 )   PT: 16.2 sec;   INR: 1.44 ratio         PTT - ( 08 Jan 2025 03:50 )  PTT:27.0 sec  Urinalysis Basic - ( 09 Jan 2025 03:44 )    Color: x / Appearance: x / SG: x / pH: x  Gluc: 136 mg/dL / Ketone: x  / Bili: x / Urobili: x   Blood: x / Protein: x / Nitrite: x   Leuk Esterase: x / RBC: x / WBC x   Sq Epi: x / Non Sq Epi: x / Bacteria: x      CAPILLARY BLOOD GLUCOSE  POCT Blood Glucose.: 118 mg/dL (09 Jan 2025 23:33)  POCT Blood Glucose.: 115 mg/dL (09 Jan 2025 17:58)  POCT Blood Glucose.: 122 mg/dL (09 Jan 2025 12:13)  POCT Blood Glucose.: 129 mg/dL (09 Jan 2025 06:04)      Urinalysis Basic - ( 09 Jan 2025 03:44 )  Color: x /Appearance: x / SG: x / pH: x  Gluc: 136 mg/dL / Ketone: x  / Bili: x / Urobili: x   Blood: x / Protein: x / Nitrite: x   Leuk Esterase: x / RBC: x / WBC x   Sq Epi: x / Non Sq Epi: x / Bacteria: x        RADIOLOGY & ADDITIONAL TESTS:  Reviewed.

## 2025-01-09 NOTE — PROGRESS NOTE ADULT - CRITICAL CARE ATTENDING COMMENT
I spent above minutes of critical care time examining patient, reviewing vitals, labs, medications, imaging and discussing with the team goals of care to prevent life-threatening in this patient who is at high risk for deterioration or death due to:  seizure, respiratory failure, diarrhea, HTN, infection, dysphagia. I spent above minutes of critical care time examining patient, reviewing vitals, labs, medications, imaging, titrating ventilator, and discussing with the team goals of care to prevent life-threatening in this patient who is at high risk for deterioration or death due to:  seizure, respiratory failure, diarrhea, HTN, infection, dysphagia.

## 2025-01-10 NOTE — PROGRESS NOTE ADULT - SUBJECTIVE AND OBJECTIVE BOX
INFECTIOUS DISEASES AND INTERNAL MEDICINE at [place]  =======================================================  Meek Mckee MD  Diplomates American Board of Internal Medicine and Infectious Diseases  Telephone [phone]  Fax            [phone]  =======================================================    RAÚL JASMINE [id number]    Follow up:  [facility] BACTEREMIA    Allergies:  No Known Allergies      Medications:  acetaminophen   IVPB .. 1000 milliGRAM(s) IV Intermittent once  acetaminophen   Oral Liquid .. 650 milliGRAM(s) Oral every 6 hours PRN  albuterol/ipratropium for Nebulization 3 milliLiter(s) Nebulizer every 6 hours PRN  aspirin  chewable 81 milliGRAM(s) Oral daily  atorvastatin 10 milliGRAM(s) Oral at bedtime  brivaracetam  Injectable 100 milliGRAM(s) IV Push two times a day  calcium carbonate 1250 mG  + Vitamin D (OsCal 500 + D) 1 Tablet(s) Oral daily  carvedilol 25 milliGRAM(s) Oral every 12 hours  cefepime  Injectable. 2000 milliGRAM(s) IV Push every 12 hours  chlorhexidine 0.12% Liquid 15 milliLiter(s) Oral Mucosa every 12 hours  cyanocobalamin 1000 MICROGram(s) Oral daily  dexMEDEtomidine Infusion 0.2 MICROgram(s)/kG/Hr IV Continuous <Continuous>  enoxaparin Injectable 40 milliGRAM(s) SubCutaneous <User Schedule>  fentaNYL    Injectable 25 MICROGram(s) IV Push every 2 hours PRN  hydrALAZINE Injectable 10 milliGRAM(s) IV Push every 3 hours PRN  insulin lispro (ADMELOG) corrective regimen sliding scale   SubCutaneous every 6 hours  labetalol Injectable 10 milliGRAM(s) IV Push every 3 hours PRN  lacosamide Solution 150 milliGRAM(s) Oral <User Schedule>  methylPREDNISolone sodium succinate IVPB 1000 milliGRAM(s) IV Intermittent daily  pantoprazole  Injectable 40 milliGRAM(s) IV Push daily  perampanel 8 milliGRAM(s) Oral at bedtime  polyethylene glycol 3350 17 Gram(s) Oral daily  senna 2 Tablet(s) Oral at bedtime  sildenafil (REVATIO) 20 milliGRAM(s) Oral three times a day    SOCIAL       FAMILY   FAMILY HISTORY:    REVIEW OF SYSTEMS:  CONSTITUTIONAL:  No Fever or chills  UNABLE TO OBTAIN       Physical Exam:    Vital Signs Last 24 Hrs  T(C): 37.5 (10 Kam 2025 09:00), Max: 37.9 (09 Jan 2025 18:00)  T(F): 99.5 (10 Kam 2025 09:00), Max: 100.2 (09 Jan 2025 18:00)  HR: 72 (10 Kam 2025 09:00) (59 - 90)  BP: 158/66 (10 Kam 2025 09:00) (132/58 - 163/66)  BP(mean): 92 (10 Kam 2025 09:00) (80 - 108)  RR: 26 (10 Kam 2025 09:00) (15 - 26)  SpO2: 98% (10 Kam 2025 09:00) (93% - 100%)    Parameters below as of 10 Kam 2025 08:00  Patient On (Oxygen Delivery Method): ventilator    O2 Concentration (%): 21          GEN:   UNRESPONSIVE ON VENT S/P TRACH   HEENT: normocephalic and atraumatic. EOMI. JARED.    NECK: Supple. No carotid bruits.  No lymphadenopathy or thyromegaly.  LUNGS: Clear to auscultation.  HEART: Regular rate and rhythm without murmur.  ABDOMEN: Soft, nontender, and nondistended.  Positive bowel sounds.    : No CVA tenderness  EXTREMITIES: Without any cyanosis, clubbing, rash, lesions or edema.  MSK: no joint swelling  NEUROLOGIC:  UNRESPONSIVE ON VENT          Labs:  Vitals:  ============       =======================================================  Current Antibiotics:  cefepime  Injectable. 2000 milliGRAM(s) IV Push every 12 hours    Other medications:  acetaminophen   IVPB .. 1000 milliGRAM(s) IV Intermittent once  aspirin  chewable 81 milliGRAM(s) Oral daily  atorvastatin 10 milliGRAM(s) Oral at bedtime  brivaracetam  Injectable 100 milliGRAM(s) IV Push two times a day  calcium carbonate 1250 mG  + Vitamin D (OsCal 500 + D) 1 Tablet(s) Oral daily  carvedilol 25 milliGRAM(s) Oral every 12 hours  chlorhexidine 0.12% Liquid 15 milliLiter(s) Oral Mucosa every 12 hours  cyanocobalamin 1000 MICROGram(s) Oral daily  dexMEDEtomidine Infusion 0.2 MICROgram(s)/kG/Hr IV Continuous <Continuous>  enoxaparin Injectable 40 milliGRAM(s) SubCutaneous <User Schedule>  insulin lispro (ADMELOG) corrective regimen sliding scale   SubCutaneous every 6 hours  lacosamide Solution 150 milliGRAM(s) Oral <User Schedule>  methylPREDNISolone sodium succinate IVPB 1000 milliGRAM(s) IV Intermittent daily  pantoprazole  Injectable 40 milliGRAM(s) IV Push daily  perampanel 8 milliGRAM(s) Oral at bedtime  polyethylene glycol 3350 17 Gram(s) Oral daily  senna 2 Tablet(s) Oral at bedtime  sildenafil (REVATIO) 20 milliGRAM(s) Oral three times a day      =======================================================  Labs:                                                                       9.7    22.39 )-----------( 288      ( 10 Kam 2025 03:55 )             30.0   01-10    138  |  102  |  16.3  ----------------------------<  126[H]  3.5   |  31.0[H]  |  0.63    Ca    8.0[L]      10 Kam 2025 03:55  Phos  1.9     01-10  Mg     1.9     01-10        Culture - Urine (collected 01-01-25 @ 01:20)  Source: Clean Catch Clean Catch (Midstream)  Final Report (01-02-25 @ 06:24):    No growth    Culture - Blood (collected 12-31-24 @ 12:15)  Source: .Blood BLOOD  Preliminary Report (01-01-25 @ 23:07):    No growth at 24 hours    Culture - Blood (collected 12-31-24 @ 12:05)  Source: .Blood BLOOD  Preliminary Report (01-01-25 @ 23:07):    No growth at 24 hours    Culture - Sputum (collected 12-30-24 @ 20:05)  Source: ET Tube ET Tube  Gram Stain (12-31-24 @ 03:24):    Few polymorphonuclear leukocytes per low power field    Few Squamous epithelial cells per low power field    Moderate Gram Negative Rods seen per oil power field    Few Gram positive cocci in pairs seen per oil power field  Final Report (01-01-25 @ 19:14):    Rare Pseudomonas putida group    Commensal michelle consistent with body site  Organism: Pseudomonas putida group (01-01-25 @ 19:14)  Organism: Pseudomonas putida group (01-01-25 @ 19:14)    Sensitivities:      Method Type: VIVI      -  Amikacin: S <=16      -  Aztreonam: S 8      -  Cefepime: S <=2      -  Ceftriaxone: S 8      -  Ciprofloxacin: S <=0.25      -  Gentamicin: S <=2      -  Levofloxacin: S <=0.5      -  Meropenem: S <=1      -  Piperacillin/Tazobactam: S <=8      -  Tobramycin: S <=2      -  Trimethoprim/Sulfamethoxazole: S 2/38    Culture - Blood (collected 12-30-24 @ 16:55)  Source: .Blood BLOOD  Gram Stain (12-31-24 @ 09:21):    Growth in aerobic bottle: Gram Negative Rods  Final Report (01-01-25 @ 17:27):    Growth in aerobic bottle: Citrobacter freundii complex    Direct identification is available within approximately 3-5    hours either by Blood Panel Multiplexed PCR or Direct    MALDI-TOF. Details: [email or web address]  Organism: Blood Culture PCR  Citrobacter freundii complex (01-01-25 @ 17:27)  Organism: Citrobacter freundii complex (01-01-25 @ 17:27)    Sensitivities:      Method Type: VIVI      -  Ampicillin: R <=8 These ampicillin results predict results for amoxicillin      -  Ampicillin/Sulbactam: R <=4/2      -  Aztreonam: S <=4      -  Cefazolin: R >16      -  Cefepime: S <=2      -  Cefoxitin: R >16      -  Ceftriaxone: S <=1 Enterobacter cloacae, Klebsiella aerogenes, and Citrobacter freundii may develop resistance during prolonged therapy.      -  Ciprofloxacin: S <=0.25      -  Ertapenem: S <=0.5      -  Gentamicin: S <=2      -  Imipenem: S <=1      -  Levofloxacin: S <=0.5      -  Meropenem: S <=1      -  Piperacillin/Tazobactam: S <=8 Treatment with Pipercillin/Tazobactam is not recommended in severe infections casued by Klebsiella aerogenes, Enterobacter cloacae complex, and Citrobacter freundii complex.      -  Tobramycin: S <=2      -  Trimethoprim/Sulfamethoxazole: S <=0.5/9.5  Organism: Blood Culture PCR (01-01-25 @ 17:27)    Sensitivities:      Method Type: PCR      -  Enterobacterales: Detec    Culture - Blood (collected 12-30-24 @ 16:45)  Source: .Blood BLOOD  Preliminary Report (01-01-25 @ 23:07):    No growth at 48 Hours    Culture - CSF with Gram Stain (collected 12-27-24 @ 13:50)  Source: .CSF CSF  Gram Stain (12-27-24 @ 22:52):    polymorphonuclear leukocytes seen    No organisms seen    by cytocentrifuge  Final Report (01-01-25 @ 15:49):    No growth at 5 days    Culture - Fungal, CSF (collected 12-27-24 @ 13:50)  Source: .CSF CSF  Preliminary Report (12-30-24 @ 11:01):    No growth    Culture - Acid Fast - CSF (collected 12-27-24 @ 13:50)  Source: .CSF CSF  Preliminary Report (12-28-24 @ 23:06):    Culture is being performed.    Culture - Blood (collected 12-27-24 @ 10:55)  Source: .Blood BLOOD  Final Report (01-01-25 @ 17:01):    No growth at 5 days    Culture - Blood (collected 12-27-24 @ 10:47)  Source: .Blood BLOOD  Final Report (01-01-25 @ 17:01):    No growth at 5 days      Creatinine: 0.73 mg/dL (01-02-25 @ 03:00)  Creatinine: 0.74 mg/dL (01-01-25 @ 04:20)  Creatinine: 0.82 mg/dL (12-31-24 @ 02:28)  Creatinine: 0.77 mg/dL (12-30-24 @ 02:30)  Creatinine: 0.71 mg/dL (12-29-24 @ 03:29)    Procalcitonin: 0.13 ng/mL (12-30-24 @ 16:50)  Procalcitonin: 0.17 ng/mL (12-27-24 @ 10:47)      Ferritin: 878 ng/mL (12-25-24 @ 14:50)    C-Reactive Protein: 140 mg/L (12-27-24 @ 10:47)  C-Reactive Protein: 104 mg/L (12-26-24 @ 02:30)    WBC Count: 11.57 K/uL (01-02-25 @ 03:00)  WBC Count: 15.52 K/uL (01-01-25 @ 04:20)  WBC Count: 16.74 K/uL (12-31-24 @ 02:28)  WBC Count: 14.59 K/uL (12-30-24 @ 16:50)  WBC Count: 14.47 K/uL (12-30-24 @ 02:30)  WBC Count: 11.35 K/uL (12-29-24 @ 03:29)    SARS-CoV-2: NotDetec (12-30-24 @ 16:55)  SARS-CoV-2: NotDetec (12-27-24 @ 10:47)

## 2025-01-10 NOTE — PROGRESS NOTE ADULT - SUBJECTIVE AND OBJECTIVE BOX
seen in iCU    nonverbal  ros unable to obtain     MEDICATIONS  (STANDING):  ascorbic acid 500 milliGRAM(s) Oral daily  aspirin  chewable 81 milliGRAM(s) Oral daily  atorvastatin 10 milliGRAM(s) Oral at bedtime  brivaracetam  Injectable 100 milliGRAM(s) IV Push two times a day  calcium carbonate 1250 mG  + Vitamin D (OsCal 500 + D) 1 Tablet(s) Oral daily  carvedilol 6.25 milliGRAM(s) Oral every 12 hours  chlorhexidine 0.12% Liquid 15 milliLiter(s) Oral Mucosa every 12 hours  cloNIDine 0.1 milliGRAM(s) Oral every 12 hours  cyanocobalamin 1000 MICROGram(s) Oral daily  enoxaparin Injectable 40 milliGRAM(s) SubCutaneous <User Schedule>  insulin lispro (ADMELOG) corrective regimen sliding scale   SubCutaneous every 6 hours  lacosamide 150 milliGRAM(s) Oral <User Schedule>  melatonin 5 milliGRAM(s) Oral at bedtime  multivitamin 1 Tablet(s) Oral daily  pantoprazole  Injectable 40 milliGRAM(s) IV Push daily  perampanel 8 milliGRAM(s) Oral at bedtime  psyllium Powder 1 Packet(s) Oral three times a day  sildenafil (REVATIO) 20 milliGRAM(s) Oral three times a day    MEDICATIONS  (PRN):  acetaminophen   Oral Liquid .. 650 milliGRAM(s) Oral every 6 hours PRN Temp greater or equal to 38C (100.4F), Mild Pain (1 - 3)  albuterol/ipratropium for Nebulization 3 milliLiter(s) Nebulizer every 6 hours PRN Shortness of Breath and/or Wheezing  fentaNYL    Injectable 25 MICROGram(s) IV Push every 2 hours PRN vent dyssychrony      Allergies    No Known Allergies     Vital Signs Last 24 Hrs  T(C): 37.6 (10 Kam 2025 11:00), Max: 37.9 (09 Jan 2025 18:00)  T(F): 99.7 (10 Kam 2025 11:00), Max: 100.2 (09 Jan 2025 18:00)  HR: 60 (10 Kam 2025 11:51) (59 - 90)  BP: 155/79 (10 Kam 2025 11:00) (132/58 - 163/66)  BP(mean): 101 (10 Kam 2025 11:00) (80 - 104)  RR: 20 (10 Kam 2025 11:00) (16 - 26)  SpO2: 95% (10 Kam 2025 11:51) (93% - 100%)    Parameters below as of 10 Kam 2025 08:00  Patient On (Oxygen Delivery Method): ventilator    O2 Concentration (%): 21    PHYSICAL EXAM:    GENERAL: NAD   heent: + trach  CHEST/LUNG coarse mechanical bs   HEART: Regular rate and rhythm; S1 S2  ABDOMEN: Soft, ; Bowel sounds present  EXTREMITIES: edematous x4 ext   NERVOUS SYSTEM:  nonverbal  LABS:                        9.7    22.39 )-----------( 288      ( 10 Kam 2025 03:55 )             30.0     01-10    138  |  102  |  16.3  ----------------------------<  126[H]  3.5   |  31.0[H]  |  0.63    Ca    8.0[L]      10 Kam 2025 03:55  Phos  1.9     01-10  Mg     1.9     01-10      PT/INR - ( 10 Kam 2025 03:55 )   PT: 18.7 sec;   INR: 1.66 ratio         PTT - ( 10 Kam 2025 03:55 )  PTT:28.6 sec  Urinalysis Basic - ( 10 Kam 2025 03:55 )    Color: x / Appearance: x / SG: x / pH: x  Gluc: 126 mg/dL / Ketone: x  / Bili: x / Urobili: x   Blood: x / Protein: x / Nitrite: x   Leuk Esterase: x / RBC: x / WBC x   Sq Epi: x / Non Sq Epi: x / Bacteria: x        CAPILLARY BLOOD GLUCOSE      POCT Blood Glucose.: 147 mg/dL (10 Kam 2025 12:45)  POCT Blood Glucose.: 137 mg/dL (10 Kam 2025 06:07)  POCT Blood Glucose.: 118 mg/dL (09 Jan 2025 23:33)  POCT Blood Glucose.: 115 mg/dL (09 Jan 2025 17:58)        RADIOLOGY & ADDITIONAL TESTS:

## 2025-01-10 NOTE — PROGRESS NOTE ADULT - ASSESSMENT
The patient is a 84y Female with status epilepticus now unresponsive on mechanical ventilator via tracheostomy.    Seizures  Continue   Lacosamide 150 TID  Fycompa 8 mg daily (via PEG)  Briviact 100 IV BID    Ventilator management per pulmonary.    Case discussed with Dr Peterson.

## 2025-01-10 NOTE — PROGRESS NOTE ADULT - ASSESSMENT
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   -Status now resolved, Vimpat, Fycompa, Briviact as AED regimen  -CSF encephalitis panel reviewed, ?Ab negative autoimmune encephalitis  -MRI brain 12/26 reviewed, encephalomalacia, gliosis noted  -s/p IVIG 1/3-1/7  -On solumedrol 1g taper over 7 weeks, dosing ordered by NSICU team  -Neurology following     Ventilator dependent respiratory failure, s/p PEG  -Etiology likely related to neurologic injury as above  -On AC/CMV settings  -On tube feeds  -Pulmonary consulted     Citrobacter bacteremia, Pseudomonas PNA  -Initial blood cx positive on 12/30, cleared cx on 12/31  -Sputum cx w/ Pseudomonas, CXR on 12/27 w/ LLL airspace opacity   -ID following, advised cefepime x 10 day course (ended on 1/9/25)  monitor off abx     Diarrhea  -Possibly antibiotic associated, now improving per RN  -s/p rectal tube placement, now removed  -CT A/P removed, mostly chronic incidental findings, no colitis noted    Carotid stenosis  -CTA w/ high grade stenosis 75% on R-ICA  -On ASA, was previously ordered for Plavix but d/c’ed, unclear why  -Neurology consulted, signed off on 12/27/24, per note resume DAPT and outpatient neuro IR when able    Hx HTN, HLD  -Clonidine, carvedilol, atorvastatin     Hx dementia  -Donepezil has been on hold    Prophylactic measure  -Lovenox 40mg daily     Dispo: medically active, pending neuro re-eval, pulm consult.  Estimated LOS: unclear.

## 2025-01-10 NOTE — PROGRESS NOTE ADULT - ASSESSMENT
85yo F with PMHx HTN, HLD, osteoporosis, GERD, h/o breast cancer s/p left mastectomy, left hysterectomy BIBEMs for left-sided weakness and LUE rhythmic shaking.   . Patient's left arm was noted to be weak and shortly started twitching, was given versed by EMS. By the time patient arrived to ER, Pt was intubated for airway protection. 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 CTA without LVO however with findings of high grade stenosis 75% right  ICA and 30% stenosis L ICA. NeuroICU consulted for further intervention.   AS ABOVE ADMITTED 12/24  WITH WEAKNESS AND   WAS  INTUBATED  AND FOUND  TO HAVE SEIZURES  NOW WITH BLOOD CX  GM NEG RODS ENTEROBACTERALES  IDENTIFIES AS CITROBACTER FREUNDII   T CT ABD PELVIS  PLEURAL EFFUSION OBVIOUS SOURCE OF BACTEREMIA   SPUTUM WITH PSEUDOOMONAS PUTIDA    CHANGED  TO  CEFEPIME  IVIG AS PER  NEUROSURGERY    PT COMPLETED TOTAL 10 DAYS ABX FOR  BACTEREMIA IN THIS IMMUNOCOMPROMISED PATIENT  REMAINS AFEBRILE  OBSERVE OFF ABX      WILL FOLLOWUP AS NEEDED PLEASE CALL IF QUESTIONS    Bill For Surgical Tray: no

## 2025-01-10 NOTE — CONSULT NOTE ADULT - SUBJECTIVE AND OBJECTIVE BOX
Patient is a 84y old  Female who presents with a chief complaint of seizures, severe R ICA stenosis (10 Kam 2025 13:20)      BRIEF HOSPITAL COURSE:   per HPI:  HPI:  85yo F with PMHx HTN, HLD, osteoporosis, GERD, h/o breast cancer s/p left mastectomy, left hysterectomy BIBEMs for left-sided weakness and LUE rhythmic shaking. Per family, patient was last known well at 1:30am. When EMS arrived patient was alert but sloughing over the left side. Patient's left arm was noted to be weak and shortly started twitching, was given versed by EMS. By the time patient arrived to ER, Pt was intubated for airway protection. 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 CTA without LVO however with findings of high grade stenosis 75% right  ICA and 30% stenosis L ICA. NeuroICU consulted for further intervention. (24 Dec 2024 20:57)    Hospital course complicated by Seizures, confirmed on EEG, patient s/p Multiple AE and ketamine gtt, S/p LP, dx ad tx for autoimmune enecephalitis with IVIG 1/3-1/7and solumedrol taper per Dr. Ramirez Protocol. Patient hospital course further complicated with bacteremia 2/2 citobacter and pseudomans in PNA. Patient seen by neurology for sever Rt ICA stenosis, recommending DAPT when tolerated now s/p peg/trach - 1/8/25    downgraded to medicine on 1/9     Pulm consulted for trach management,   non verbal, not following commands, grimace to pain.         PAST MEDICAL & SURGICAL HISTORY:  Hypertension      Osteoporosis      Dyslipidemia      GERD (gastroesophageal reflux disease)      Breast cancer      History of hysterectomy  total abdominal      S/P mastectomy, left        Allergies    No Known Allergies    Intolerances      FAMILY HISTORY:      Family history otherwise noncontributory.    Social History: ***    Review of Systems:  unable to obtain due to AMS       Medications:    carvedilol 6.25 milliGRAM(s) Oral every 12 hours  cloNIDine 0.1 milliGRAM(s) Oral every 12 hours  sildenafil (REVATIO) 20 milliGRAM(s) Oral three times a day    albuterol/ipratropium for Nebulization 3 milliLiter(s) Nebulizer every 6 hours PRN    acetaminophen   Oral Liquid .. 650 milliGRAM(s) Oral every 6 hours PRN  brivaracetam  Injectable 100 milliGRAM(s) IV Push two times a day  fentaNYL    Injectable 25 MICROGram(s) IV Push every 2 hours PRN  lacosamide 150 milliGRAM(s) Oral <User Schedule>  melatonin 5 milliGRAM(s) Oral at bedtime  perampanel 8 milliGRAM(s) Oral at bedtime      aspirin  chewable 81 milliGRAM(s) Oral daily  enoxaparin Injectable 40 milliGRAM(s) SubCutaneous <User Schedule>    pantoprazole  Injectable 40 milliGRAM(s) IV Push daily  psyllium Powder 1 Packet(s) Oral three times a day      atorvastatin 10 milliGRAM(s) Oral at bedtime  insulin lispro (ADMELOG) corrective regimen sliding scale   SubCutaneous every 6 hours    ascorbic acid 500 milliGRAM(s) Oral daily  calcium carbonate 1250 mG  + Vitamin D (OsCal 500 + D) 1 Tablet(s) Oral daily  cyanocobalamin 1000 MICROGram(s) Oral daily  multivitamin 1 Tablet(s) Oral daily      chlorhexidine 0.12% Liquid 15 milliLiter(s) Oral Mucosa every 12 hours        Mode: CPAP with PS  FiO2: 21  PEEP: 6  PS: 8  MAP: 10  PIP: 18        Vital Signs Last 24 Hrs  T(C): 37.9 (10 Kam 2025 13:00), Max: 37.9 (09 Jan 2025 18:00)  T(F): 100.2 (10 Kam 2025 13:00), Max: 100.2 (09 Jan 2025 18:00)  HR: 77 (10 Kam 2025 15:57) (59 - 90)  BP: 163/95 (10 Kam 2025 13:00) (132/58 - 163/95)  BP(mean): 109 (10 Kam 2025 13:00) (80 - 109)  RR: 20 (10 Kam 2025 13:00) (16 - 26)  SpO2: 93% (10 Kam 2025 15:57) (93% - 100%)    Parameters below as of 10 Kam 2025 08:00  Patient On (Oxygen Delivery Method): ventilator    O2 Concentration (%): 21        I&O's Detail    09 Jan 2025 07:01  -  10 Kam 2025 07:00  --------------------------------------------------------  IN:    Enteral Tube Flush: 470 mL    IV PiggyBack: 249.9 mL    Jevity 1.5: 675 mL  Total IN: 1394.9 mL    OUT:    Voided (mL): 1750 mL  Total OUT: 1750 mL    Total NET: -355.1 mL      10 Kam 2025 07:01  -  10 Kam 2025 16:55  --------------------------------------------------------  IN:    IV PiggyBack: 166.6 mL    IV PiggyBack: 50 mL    Jevity 1.5: 330 mL  Total IN: 546.6 mL    OUT:    Voided (mL): 300 mL  Total OUT: 300 mL    Total NET: 246.6 mL            LABS:                        9.7    22.39 )-----------( 288      ( 10 Kam 2025 03:55 )             30.0     01-10    138  |  102  |  16.3  ----------------------------<  126[H]  3.5   |  31.0[H]  |  0.63    Ca    8.0[L]      10 Kam 2025 03:55  Phos  1.9     01-10  Mg     1.9     01-10            CAPILLARY BLOOD GLUCOSE      POCT Blood Glucose.: 147 mg/dL (10 Kam 2025 12:45)    PT/INR - ( 10 Kam 2025 03:55 )   PT: 18.7 sec;   INR: 1.66 ratio         PTT - ( 10 Kam 2025 03:55 )  PTT:28.6 sec  Urinalysis Basic - ( 10 Kam 2025 03:55 )    Color: x / Appearance: x / SG: x / pH: x  Gluc: 126 mg/dL / Ketone: x  / Bili: x / Urobili: x   Blood: x / Protein: x / Nitrite: x   Leuk Esterase: x / RBC: x / WBC x   Sq Epi: x / Non Sq Epi: x / Bacteria: x      CULTURES:  Culture Results:   Commensal michelle consistent with body site (01-08 @ 13:49)      Physical Examination:  GENERAL: trach and peg, not in distress   HEENT: NC/AT  NECK: Supple, trachea midline, trach in place  PULM: CTA b/l  CVS: +S1, S2, RRR  ABD: Soft, non-tender  EXTREMITIES: No pedal edema B/L  SKIN: No open wounds  neuro: AAOx0, only grimace to pain     DEVICES:     RADIOLOGY:reviewed

## 2025-01-10 NOTE — PROGRESS NOTE ADULT - SUBJECTIVE AND OBJECTIVE BOX
[facility]                                        Neurology at [place]                                 Viki Lyn, & Sp                                  [address]                                        [place], [place], [address]                                             [phone]        CC: altered mental status and seizure.    HPI:   The patient is an 84 year old woman who presented 12/24/24 with seizure/status epilepticus. She was admitted to neuro-ICU.  She had been intubated for mechanical ventilation.  She had some left sided weakness on arrival and was seen by the stroke team (who signed off as presentation was not suggestive of stroke).  She has had a long complicated course.   EEG showed epileptiform abnormalities including ictal-interictal continuum.  She has been on antiseizure drugs.   Currently on Lacosamide 150 TID, Fycompa 8 mg daily (via PEG), Briviact 100 IV BID.  LP done for CSF evaluation. She was treated empirically for autoimmune encephalitis (with IV Ig which is completed, and steroids which is being tapered) despite CSF antibodies being negative.  She is now status post tracheostomy and PEG, and is being downgraded to the medical service.     Interim history:  Remains in [address] [place] [address] medical bed (that can accommodate mechanical ventilator).    ROS:   Unobtainable due to patient's condition.     MEDICATIONS  (STANDING):  ascorbic acid 500 milliGRAM(s) Oral daily  aspirin  chewable 81 milliGRAM(s) Oral daily  atorvastatin 10 milliGRAM(s) Oral at bedtime  brivaracetam  Injectable 100 milliGRAM(s) IV Push two times a day  calcium carbonate 1250 mG  + Vitamin D (OsCal 500 + D) 1 Tablet(s) Oral daily  carvedilol 6.25 milliGRAM(s) Oral every 12 hours  chlorhexidine 0.12% Liquid 15 milliLiter(s) Oral Mucosa every 12 hours  cloNIDine 0.1 milliGRAM(s) Oral every 12 hours  cyanocobalamin 1000 MICROGram(s) Oral daily  enoxaparin Injectable 40 milliGRAM(s) SubCutaneous <User Schedule>  insulin lispro (ADMELOG) corrective regimen sliding scale   SubCutaneous every 6 hours  lacosamide 150 milliGRAM(s) Oral <User Schedule>  melatonin 5 milliGRAM(s) Oral at bedtime  multivitamin 1 Tablet(s) Oral daily  pantoprazole  Injectable 40 milliGRAM(s) IV Push daily  perampanel 8 milliGRAM(s) Oral at bedtime  psyllium Powder 1 Packet(s) Oral three times a day  sildenafil (REVATIO) 20 milliGRAM(s) Oral three times a day    Vital Signs Last 24 Hrs  T(C): 37.5 (10 Kam 2025 09:00), Max: 37.9 (09 Jan 2025 18:00)  T(F): 99.5 (10 Kam 2025 09:00), Max: 100.2 (09 Jan 2025 18:00)  HR: 60 (10 Kam 2025 11:51) (59 - 90)  BP: 158/66 (10 Kam 2025 09:00) (132/58 - 163/66)  BP(mean): 92 (10 Kam 2025 09:00) (80 - 106)  RR: 26 (10 Kam 2025 09:00) (16 - 26)  SpO2: 95% (10 Kam 2025 11:51) (93% - 100%)    Parameters below as of 10 Kam 2025 08:00  Patient On (Oxygen Delivery Method): ventilator    O2 Concentration (%): 21    Detailed Neurologic Exam:    Mental status: Unresponsive to voice. On mechanical ventilator via tracheostomy.    Cranial nerves: Pupils equal and react symmetrically to light. Slight blink to threat. Not tracking with gaze. Eyes move conjugately to oculocephalic maneuvers. Face grossly symmetric. Palate and tongue cannot be assessed.     Motor: There is normal bulk and tone.  There is no tremor.  Strength grossly 5/5 bilaterally.    Sensation: Grossly intact to light touch and pin.    Reflexes: 2+ throughout and plantar responses are flexor.    Cerebellar: No dysmetria on finger nose testing.    Labs:     01-10    138  |  102  |  16.3  ----------------------------<  126[H]  3.5   |  31.0[H]  |  0.63    Ca    8.0[L]      10 Kam 2025 03:55  Phos  1.9     01-10  Mg     1.9     01-10                              9.7    22.39 )-----------( 288      ( 10 Kam 2025 03:55 )             30.0     CSF  Protein: 37  Glucose: 74  WBC: 4  RBC: 198  Crypt Ag: negative   Gram stain: negative   Autoimmune antibodies negative     Rad:   MRI brain:   Encephalomalacia and gliosis within the left posterior temporal/occipital lobes which may be related to prior chronic infarction.                                   [facility]                                        Neurology at [place]                                 iVki Lyn, [facility] Sp                                  [address] [address]                                        [place], [address]                                             [phone]        CC: altered mental status and seizure.    HPI:   The patient is an 84 year old woman who presented 12/24/24 with seizure/status epilepticus. She was admitted to neuro-ICU.  She had been intubated for mechanical ventilation.  She had some left sided weakness on arrival and was seen by the stroke team (who signed off as presentation was not suggestive of stroke).  She has had a long complicated course.   EEG showed epileptiform abnormalities including ictal-interictal continuum.  She has been on antiseizure drugs.   Currently on Lacosamide 150 TID, Fycompa 8 mg daily (via PEG), Briviact 100 IV BID.  LP done for CSF evaluation. She was treated empirically for autoimmune encephalitis (with IV Ig which is completed, and steroids which is being tapered) despite CSF antibodies being negative.  She is now status post tracheostomy and PEG, and is being downgraded to the medical service.     Interim history:  Remains in [address] [place] [address] medical bed (that can accommodate mechanical ventilator).    ROS:   Unobtainable due to patient's condition.     MEDICATIONS  (STANDING):  ascorbic acid 500 milliGRAM(s) Oral daily  aspirin  chewable 81 milliGRAM(s) Oral daily  atorvastatin 10 milliGRAM(s) Oral at bedtime  brivaracetam  Injectable 100 milliGRAM(s) IV Push two times a day  calcium carbonate 1250 mG  + Vitamin D (OsCal 500 + D) 1 Tablet(s) Oral daily  carvedilol 6.25 milliGRAM(s) Oral every 12 hours  chlorhexidine 0.12% Liquid 15 milliLiter(s) Oral Mucosa every 12 hours  cloNIDine 0.1 milliGRAM(s) Oral every 12 hours  cyanocobalamin 1000 MICROGram(s) Oral daily  enoxaparin Injectable 40 milliGRAM(s) SubCutaneous <User Schedule>  insulin lispro (ADMELOG) corrective regimen sliding scale   SubCutaneous every 6 hours  lacosamide 150 milliGRAM(s) Oral <User Schedule>  melatonin 5 milliGRAM(s) Oral at bedtime  multivitamin 1 Tablet(s) Oral daily  pantoprazole  Injectable 40 milliGRAM(s) IV Push daily  perampanel 8 milliGRAM(s) Oral at bedtime  psyllium Powder 1 Packet(s) Oral three times a day  sildenafil (REVATIO) 20 milliGRAM(s) Oral three times a day    Vital Signs Last 24 Hrs  T(C): 37.5 (10 Kam 2025 09:00), Max: 37.9 (09 Jan 2025 18:00)  T(F): 99.5 (10 Kam 2025 09:00), Max: 100.2 (09 Jan 2025 18:00)  HR: 60 (10 Kam 2025 11:51) (59 - 90)  BP: 158/66 (10 Kam 2025 09:00) (132/58 - 163/66)  BP(mean): 92 (10 Kam 2025 09:00) (80 - 106)  RR: 26 (10 Kam 2025 09:00) (16 - 26)  SpO2: 95% (10 Kam 2025 11:51) (93% - 100%)    Parameters below as of 10 Kam 2025 08:00  Patient On (Oxygen Delivery Method): ventilator    O2 Concentration (%): 21    Detailed Neurologic Exam:    Mental status: Unresponsive to voice. On mechanical ventilator via tracheostomy.    Cranial nerves: Pupils equal and react symmetrically to light. Slight blink to threat. Not tracking with gaze. Eyes move conjugately to oculocephalic maneuvers. Face grossly symmetric. Palate and tongue cannot be assessed.     Motor/Sensory: There is normal bulk and tone.  There is no tremor.  No purposeful movement to stimuli.    Reflexes: Trace throughout and plantar responses are flexor.    Cerebellar: Cannot be tested.    Labs:     01-10    138  |  102  |  16.3  ----------------------------<  126[H]  3.5   |  31.0[H]  |  0.63    Ca    8.0[L]      10 Kam 2025 03:55  Phos  1.9     01-10  Mg     1.9     01-10                              9.7    22.39 )-----------( 288      ( 10 Kam 2025 03:55 )             30.0     CSF  Protein: 37  Glucose: 74  WBC: 4  RBC: 198  Crypt Ag: negative   Gram stain: negative   Autoimmune antibodies negative     Rad:   MRI brain:   Encephalomalacia and gliosis within the left posterior temporal/occipital lobes which may be related to prior chronic infarction.

## 2025-01-10 NOTE — CONSULT NOTE ADULT - ASSESSMENT
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

## 2025-01-10 NOTE — PROGRESS NOTE ADULT - SUBJECTIVE AND OBJECTIVE BOX
Subjective: Patient seen and examined at bedside, no overnight events. TFs running, patient tolerating. Patient downgraded to medicine out of neuro icu.     STATUS POST:  peg and trach   POST OPERATIVE DAY #: 2    MEDICATIONS  (STANDING):  ascorbic acid 500 milliGRAM(s) Oral daily  aspirin  chewable 81 milliGRAM(s) Oral daily  atorvastatin 10 milliGRAM(s) Oral at bedtime  brivaracetam  Injectable 100 milliGRAM(s) IV Push two times a day  calcium carbonate 1250 mG  + Vitamin D (OsCal 500 + D) 1 Tablet(s) Oral daily  carvedilol 6.25 milliGRAM(s) Oral every 12 hours  chlorhexidine 0.12% Liquid 15 milliLiter(s) Oral Mucosa every 12 hours  cloNIDine 0.1 milliGRAM(s) Oral every 12 hours  cyanocobalamin 1000 MICROGram(s) Oral daily  enoxaparin Injectable 40 milliGRAM(s) SubCutaneous <User Schedule>  insulin lispro (ADMELOG) corrective regimen sliding scale   SubCutaneous every 6 hours  lacosamide 150 milliGRAM(s) Oral <User Schedule>  melatonin 5 milliGRAM(s) Oral at bedtime  methylPREDNISolone sodium succinate IVPB 1000 milliGRAM(s) IV Intermittent <User Schedule>  multivitamin 1 Tablet(s) Oral daily  pantoprazole  Injectable 40 milliGRAM(s) IV Push daily  perampanel 8 milliGRAM(s) Oral at bedtime  psyllium Powder 1 Packet(s) Oral three times a day  sildenafil (REVATIO) 20 milliGRAM(s) Oral three times a day    MEDICATIONS  (PRN):  acetaminophen   Oral Liquid .. 650 milliGRAM(s) Oral every 6 hours PRN Temp greater or equal to 38C (100.4F), Mild Pain (1 - 3)  albuterol/ipratropium for Nebulization 3 milliLiter(s) Nebulizer every 6 hours PRN Shortness of Breath and/or Wheezing  fentaNYL    Injectable 25 MICROGram(s) IV Push every 2 hours PRN vent dyssychrony    Vital Signs Last 24 Hrs  T(C): 37.5 (10 Kam 2025 07:00), Max: 37.9 (09 Jan 2025 18:00)  T(F): 99.5 (10 Kam 2025 07:00), Max: 100.2 (09 Jan 2025 18:00)  HR: 72 (10 Kam 2025 07:00) (59 - 90)  BP: 148/64 (10 Kam 2025 07:00) (132/58 - 163/68)  BP(mean): 89 (10 Kam 2025 07:00) (80 - 111)  RR: 20 (10 Kam 2025 07:00) (15 - 25)  SpO2: 93% (10 Kam 2025 07:00) (93% - 100%)  Parameters below as of 10 Kam 2025 04:00  Patient On (Oxygen Delivery Method): ventilator  O2 Concentration (%): 40    Physical Exam:  Constitutional: NAD  HEENT: PERRL, EOMI  Neck: trach site c/d/i   Respiratory: Respirations non-labored, no accessory muscle use  Gastrointestinal: Soft, non-distended. PEG in place, c/d/i   Extremities: No peripheral edema, No cyanosis  Neurological: A&O x 3; without gross deficit  Musculoskeletal: No joint pain, swelling, deformity, or point tenderness; no limitation of movement    LABS:                     9.7    22.39 )-----------( 288      ( 10 Kam 2025 03:55 )             30.0   01-10  138  |  102  |  16.3  ----------------------------<  126[H]  3.5   |  31.0[H]  |  0.63  Ca    8.0[L]      10 Kam 2025 03:55  Phos  1.9     01-10  Mg     1.9     01-10    A: Patient is an 83 yo F with seizures and severe ICA stenosis, s/p trach and peg 1/8. Tolerating TFs.     Plan:   tube feeds, adv as tolerated   rest of care per primary team  surgery will sign off at this time, reconsult prn   trach sutures to be removed in 7 days. Maintain soft trach collar .

## 2025-01-11 NOTE — PROGRESS NOTE ADULT - SUBJECTIVE AND OBJECTIVE BOX
[facility] Division of Hospital Medicine    Chief Complaint:      SUBJECTIVE / OVERNIGHT EVENTS:    Patient seen and examined at bedside, no acute events overnight, patient remains nonverbal at this time on CPAP currently. DIscussed with Neuro / Neuro ICU, remains on High dose steroids at twice weekly and weekly dosing planned for 6 further weeks? will discuss with pharmacy and see if alternative PEG regiment can be started with plan for taper slowly.      MEDICATIONS  (STANDING):  ascorbic acid 500 milliGRAM(s) Oral daily  aspirin  chewable 81 milliGRAM(s) Oral daily  atorvastatin 10 milliGRAM(s) Oral at bedtime  brivaracetam  Injectable 100 milliGRAM(s) IV Push two times a day  calcium carbonate 1250 mG  + Vitamin D (OsCal 500 + D) 1 Tablet(s) Oral daily  carvedilol 6.25 milliGRAM(s) Oral every 12 hours  chlorhexidine 0.12% Liquid 15 milliLiter(s) Oral Mucosa every 12 hours  cloNIDine 0.2 milliGRAM(s) Oral three times a day  cyanocobalamin 1000 MICROGram(s) Oral daily  enoxaparin Injectable 40 milliGRAM(s) SubCutaneous <User Schedule>  insulin lispro (ADMELOG) corrective regimen sliding scale   SubCutaneous every 6 hours  lacosamide 150 milliGRAM(s) Oral <User Schedule>  melatonin 5 milliGRAM(s) Oral at bedtime  multivitamin 1 Tablet(s) Oral daily  pantoprazole  Injectable 40 milliGRAM(s) IV Push daily  perampanel 8 milliGRAM(s) Oral at bedtime  psyllium Powder 1 Packet(s) Oral three times a day  sildenafil (REVATIO) 20 milliGRAM(s) Oral three times a day    MEDICATIONS  (PRN):  acetaminophen   Oral Liquid .. 650 milliGRAM(s) Oral every 6 hours PRN Temp greater or equal to 38C (100.4F), Mild Pain (1 - 3)  albuterol/ipratropium for Nebulization 3 milliLiter(s) Nebulizer every 6 hours PRN Shortness of Breath and/or Wheezing        I&O's Summary    10 Kam 2025 07:01  -  11 Jan 2025 07:00  --------------------------------------------------------  IN: 1536.6 mL / OUT: 1550 mL / NET: -13.4 mL    11 Jan 2025 07:01  -  11 Jan 2025 14:04  --------------------------------------------------------  IN: 635 mL / OUT: 300 mL / NET: 335 mL        PHYSICAL EXAM:  Vital Signs Last 24 Hrs  T(C): 37 (11 Jan 2025 10:29), Max: 37.7 (10 Kam 2025 15:00)  T(F): 98.6 (11 Jan 2025 10:29), Max: 99.9 (10 Kam 2025 15:00)  HR: 71 (11 Jan 2025 10:29) (66 - 79)  BP: 175/78 (11 Jan 2025 10:29) (113/50 - 185/80)  BP(mean): 100 (10 Kam 2025 23:00) (69 - 120)  RR: 18 (11 Jan 2025 10:29) (16 - 24)  SpO2: 100% (11 Jan 2025 10:29) (93% - 100%)    Parameters below as of 11 Jan 2025 04:00  Patient On (Oxygen Delivery Method): ventilator      GENERAL: NAD   heent: + trach  CHEST/LUNG coarse mechanical bs   HEART: Regular rate and rhythm; S1 S2  ABDOMEN: Soft, ; Bowel sounds present  EXTREMITIES: edematous x4 ext   NERVOUS SYSTEM:  nonverbal    LABS:                        9.7    22.39 )-----------( 288      ( 10 Kam 2025 03:55 )             30.0     01-10    138  |  102  |  16.3  ----------------------------<  126[H]  3.5   |  31.0[H]  |  0.63    Ca    8.0[L]      10 Kam 2025 03:55  Phos  1.9     01-10  Mg     1.9     01-10      PT/INR - ( 10 Kam 2025 03:55 )   PT: 18.7 sec;   INR: 1.66 ratio         PTT - ( 10 Kam 2025 03:55 )  PTT:28.6 sec      Urinalysis Basic - ( 10 Kam 2025 03:55 )    Color: x / Appearance: x / SG: x / pH: x  Gluc: 126 mg/dL / Ketone: x  / Bili: x / Urobili: x   Blood: x / Protein: x / Nitrite: x   Leuk Esterase: x / RBC: x / WBC x   Sq Epi: x / Non Sq Epi: x / Bacteria: x        CAPILLARY BLOOD GLUCOSE      POCT Blood Glucose.: 148 mg/dL (11 Jan 2025 10:37)  POCT Blood Glucose.: 183 mg/dL (11 Jan 2025 06:00)  POCT Blood Glucose.: 146 mg/dL (11 Jan 2025 00:05)  POCT Blood Glucose.: 152 mg/dL (10 Kam 2025 18:15)        RADIOLOGY & ADDITIONAL TESTS:  Results Reviewed:   Imaging Personally Reviewed:  Electrocardiogram Personally Reviewed:

## 2025-01-11 NOTE — PROGRESS NOTE ADULT - ASSESSMENT
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 Downgraded 1/9 continues on IV steroids will ween as able. otherwise continues on AED guided by neurology, sutures to be removed with surgery team 1/15.     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   -Status now resolved, Vimpat, Fycompa, Briviact as AED regimen  can convert briviact to PEG/ PO   -CSF encephalitis panel reviewed, ?Ab negative autoimmune encephalitis  -MRI brain 12/26 reviewed, encephalomalacia, gliosis noted  -s/p IVIG 1/3-1/7  -On solumedrol 1g taper over 7 weeks, dosing ordered by NSICU team  Discussed with NSICU recommendation came from neurology at outside facility, discussed with house neurology, will contact pharmacy for potential PO dosing of current steroids with plan to ween   -Neurology following     Ventilator dependent respiratory failure, s/p PEG  -Etiology likely related to neurologic injury as above  -On AC/CMV settings  -On tube feeds  -Pulmonary consulted     Citrobacter bacteremia, Pseudomonas PNA  -Initial blood cx positive on 12/30, cleared cx on 12/31  -Sputum cx w/ Pseudomonas, CXR on 12/27 w/ LLL airspace opacity   -ID following, advised cefepime x 10 day course (ended on 1/9/25)  monitor off abx     Diarrhea  -Possibly antibiotic associated, now improving per RN  -s/p rectal tube placement, now removed  -CT A/P removed, mostly chronic incidental findings, no colitis noted    Carotid stenosis  -CTA w/ high grade stenosis 75% on R-ICA  -On ASA, was previously ordered for Plavix but d/c’ed, unclear why  -Neurology consulted, signed off on 12/27/24, per note resume DAPT and outpatient neuro IR when able    Hx HTN, HLD  -Clonidine, carvedilol, atorvastatin     Hx dementia  -Donepezil has been on hold    Prophylactic measure  -Lovenox 40mg daily     Dispo: medically active, pending weening of steroids / improvement in mentation / likely Long term care facility when steroids appropriately tapered

## 2025-01-11 NOTE — PROGRESS NOTE ADULT - ASSESSMENT
84y Female with status epilepticus now unresponsive on mechanical ventilator via tracheostomy.    Seizures  Continue   Lacosamide 150 TID  Fycompa 8 mg daily (via PEG)  Briviact 100 IV BID    Ventilator management per pulmonary.    On high dose steroids.   Taper slowly.

## 2025-01-11 NOTE — PROGRESS NOTE ADULT - SUBJECTIVE AND OBJECTIVE BOX
[facility]                                        Neurology at [place]                                 Viki Lyn, & Sp                                  [address]                                        [place], [place], [address]                                             [phone]        CC: altered mental status and seizure.    HPI:   The patient is an 84 year old woman who presented 12/24/24 with seizure/status epilepticus. She was admitted to neuro-ICU.  She had been intubated for mechanical ventilation.  She had some left sided weakness on arrival and was seen by the stroke team (who signed off as presentation was not suggestive of stroke).  She has had a long complicated course.   EEG showed epileptiform abnormalities including ictal-interictal continuum.  She has been on antiseizure drugs.   Currently on Lacosamide 150 TID, Fycompa 8 mg daily (via PEG), Briviact 100 IV BID.  LP done for CSF evaluation. She was treated empirically for autoimmune encephalitis (with IV Ig which is completed, and steroids which is being tapered) despite CSF antibodies being negative.  She is now status post tracheostomy and PEG, and is being downgraded to the medical service.     Interim history:  Now on 6 [place].   Remains on mechanical ventilator via tracheostomy.    ROS:   Unobtainable due to patient's condition.     MEDICATIONS  (STANDING):  ascorbic acid 500 milliGRAM(s) Oral daily  aspirin  chewable 81 milliGRAM(s) Oral daily  atorvastatin 10 milliGRAM(s) Oral at bedtime  brivaracetam  Injectable 100 milliGRAM(s) IV Push two times a day  calcium carbonate 1250 mG  + Vitamin D (OsCal 500 + D) 1 Tablet(s) Oral daily  carvedilol 6.25 milliGRAM(s) Oral every 12 hours  chlorhexidine 0.12% Liquid 15 milliLiter(s) Oral Mucosa every 12 hours  cloNIDine 0.2 milliGRAM(s) Oral three times a day  cyanocobalamin 1000 MICROGram(s) Oral daily  enoxaparin Injectable 40 milliGRAM(s) SubCutaneous <User Schedule>  insulin lispro (ADMELOG) corrective regimen sliding scale   SubCutaneous every 6 hours  lacosamide 150 milliGRAM(s) Oral <User Schedule>  melatonin 5 milliGRAM(s) Oral at bedtime  multivitamin 1 Tablet(s) Oral daily  pantoprazole  Injectable 40 milliGRAM(s) IV Push daily  perampanel 8 milliGRAM(s) Oral at bedtime  psyllium Powder 1 Packet(s) Oral three times a day  sildenafil (REVATIO) 20 milliGRAM(s) Oral three times a day    Vital Signs Last 24 Hrs  T(C): 37 (11 Jan 2025 10:29), Max: 37.9 (10 Kam 2025 13:00)  T(F): 98.6 (11 Jan 2025 10:29), Max: 100.2 (10 Kam 2025 13:00)  HR: 71 (11 Jan 2025 10:29) (66 - 79)  BP: 175/78 (11 Jan 2025 10:29) (113/50 - 185/80)  BP(mean): 100 (10 Kam 2025 23:00) (69 - 120)  RR: 18 (11 Jan 2025 10:29) (16 - 24)  SpO2: 100% (11 Jan 2025 10:29) (93% - 100%)    Parameters below as of 11 Jan 2025 04:00  Patient On (Oxygen Delivery Method): ventilator    Detailed Neurologic Exam:    Mental status: Unresponsive to voice. On mechanical ventilator via tracheostomy.    Cranial nerves: Pupils equal and react symmetrically to light. Slight blink to threat. Not tracking with gaze. Eyes move conjugately to oculocephalic maneuvers. Face grossly symmetric. Palate and tongue cannot be assessed.     Motor: There is normal bulk and tone.  There is no tremor.  Strength grossly 5/5 bilaterally.    Sensation: Grossly intact to light touch and pin.    Reflexes: 2+ throughout and plantar responses are flexor.    Cerebellar: No dysmetria on finger nose testing.    Labs:     01-10    138  |  102  |  16.3  ----------------------------<  126[H]  3.5   |  31.0[H]  |  0.63    Ca    8.0[L]      10 Kam 2025 03:55  Phos  1.9     01-10  Mg     1.9     01-10                              9.7    22.39 )-----------( 288      ( 10 Kam 2025 03:55 )             30.0       CSF  Protein: 37  Glucose: 74  WBC: 4  RBC: 198  Crypt Ag: negative   Gram stain: negative   Autoimmune antibodies negative     Rad:   MRI brain:   Encephalomalacia and gliosis within the left posterior temporal/occipital lobes which may be related to prior chronic infarction.                             [facility]                                        Neurology at [place]                                 Viki Lyn, & Sp                                  [address]                                        [place], [place], [address]                                             [phone]        CC: altered mental status and seizure.    HPI:   The patient is an 84 year old woman who presented 12/24/24 with seizure/status epilepticus. She was admitted to neuro-ICU.  She had been intubated for mechanical ventilation.  She had some left sided weakness on arrival and was seen by the stroke team (who signed off as presentation was not suggestive of stroke).  She has had a long complicated course.   EEG showed epileptiform abnormalities including ictal-interictal continuum.  She has been on antiseizure drugs.   Currently on Lacosamide 150 TID, Fycompa 8 mg daily (via PEG), Briviact 100 IV BID.  LP done for CSF evaluation. She was treated empirically for autoimmune encephalitis (with IV Ig which is completed, and steroids which is being tapered) despite CSF antibodies being negative.  She is now status post tracheostomy and PEG, and is being downgraded to the medical service.     Interim history:  Now on 6 [place].   Remains on mechanical ventilator via tracheostomy.    ROS:   Unobtainable due to patient's condition.     MEDICATIONS  (STANDING):  ascorbic acid 500 milliGRAM(s) Oral daily  aspirin  chewable 81 milliGRAM(s) Oral daily  atorvastatin 10 milliGRAM(s) Oral at bedtime  brivaracetam  Injectable 100 milliGRAM(s) IV Push two times a day  calcium carbonate 1250 mG  + Vitamin D (OsCal 500 + D) 1 Tablet(s) Oral daily  carvedilol 6.25 milliGRAM(s) Oral every 12 hours  chlorhexidine 0.12% Liquid 15 milliLiter(s) Oral Mucosa every 12 hours  cloNIDine 0.2 milliGRAM(s) Oral three times a day  cyanocobalamin 1000 MICROGram(s) Oral daily  enoxaparin Injectable 40 milliGRAM(s) SubCutaneous <User Schedule>  insulin lispro (ADMELOG) corrective regimen sliding scale   SubCutaneous every 6 hours  lacosamide 150 milliGRAM(s) Oral <User Schedule>  melatonin 5 milliGRAM(s) Oral at bedtime  multivitamin 1 Tablet(s) Oral daily  pantoprazole  Injectable 40 milliGRAM(s) IV Push daily  perampanel 8 milliGRAM(s) Oral at bedtime  psyllium Powder 1 Packet(s) Oral three times a day  sildenafil (REVATIO) 20 milliGRAM(s) Oral three times a day    Vital Signs Last 24 Hrs  T(C): 37 (11 Jan 2025 10:29), Max: 37.9 (10 Kam 2025 13:00)  T(F): 98.6 (11 Jan 2025 10:29), Max: 100.2 (10 Kam 2025 13:00)  HR: 71 (11 Jan 2025 10:29) (66 - 79)  BP: 175/78 (11 Jan 2025 10:29) (113/50 - 185/80)  BP(mean): 100 (10 Kam 2025 23:00) (69 - 120)  RR: 18 (11 Jan 2025 10:29) (16 - 24)  SpO2: 100% (11 Jan 2025 10:29) (93% - 100%)    Parameters below as of 11 Jan 2025 04:00  Patient On (Oxygen Delivery Method): ventilator    Detailed Neurologic Exam:    Mental status: Unresponsive to voice. On mechanical ventilator via tracheostomy.    Cranial nerves: Pupils equal and react symmetrically to light. Slight blink to threat. Not tracking with gaze. Eyes move conjugately to oculocephalic maneuvers. Face grossly symmetric. Palate and tongue cannot be assessed.     Motor/Sensory: There is normal bulk and tone.  There is no tremor.  No purposeful movement to stimuli.    Reflexes: Trace throughout and plantar responses are flexor.    Cerebellar: Cannot be tested.    Labs:     01-10    138  |  102  |  16.3  ----------------------------<  126[H]  3.5   |  31.0[H]  |  0.63    Ca    8.0[L]      10 Kam 2025 03:55  Phos  1.9     01-10  Mg     1.9     01-10                              9.7    22.39 )-----------( 288      ( 10 Kam 2025 03:55 )             30.0       CSF  Protein: 37  Glucose: 74  WBC: 4  RBC: 198  Crypt Ag: negative   Gram stain: negative   Autoimmune antibodies negative     Rad:   MRI brain:   Encephalomalacia and gliosis within the left posterior temporal/occipital lobes which may be related to prior chronic infarction.

## 2025-01-12 NOTE — PROGRESS NOTE ADULT - ASSESSMENT
84y Female with status epilepticus now unresponsive on mechanical ventilator via tracheostomy.    Seizures  Continue   Lacosamide 150 TID  Fycompa 8 mg daily (via PEG)  Briviact 100 IV BID    Ventilator management per pulmonary.  Taper steroids slowly.    Case discussed with Dr Ribeiro

## 2025-01-12 NOTE — PROGRESS NOTE ADULT - SUBJECTIVE AND OBJECTIVE BOX
[facility] Division of Hospital Medicine    Chief Complaint:      SUBJECTIVE / OVERNIGHT EVENTS:    Patient seen and examined at bedside, no acute events overnight, patient remains nonverbal, discussed case with clinical pharmacy and neurology as well as NSICU, neurology ok with weening steroids at this time, given patient currently on 2 g weekly solumedrol will start at Decadron 40 mg PO (via PEG) with taper q3 days, will continue to monitor.     MEDICATIONS  (STANDING):  ascorbic acid 500 milliGRAM(s) Oral daily  aspirin  chewable 81 milliGRAM(s) Oral daily  atorvastatin 10 milliGRAM(s) Oral at bedtime  brivaracetam 100 milliGRAM(s) Oral two times a day  calcium carbonate 1250 mG  + Vitamin D (OsCal 500 + D) 1 Tablet(s) Oral daily  carvedilol 6.25 milliGRAM(s) Oral every 12 hours  chlorhexidine 0.12% Liquid 15 milliLiter(s) Oral Mucosa every 12 hours  cloNIDine 0.2 milliGRAM(s) Oral three times a day  cyanocobalamin 1000 MICROGram(s) Oral daily  dexAMETHasone   Concentrate 40 milliGRAM(s) Oral daily  dexAMETHasone   Concentrate   Oral   enoxaparin Injectable 40 milliGRAM(s) SubCutaneous <User Schedule>  insulin lispro (ADMELOG) corrective regimen sliding scale   SubCutaneous every 6 hours  lacosamide 150 milliGRAM(s) Oral <User Schedule>  lisinopril 20 milliGRAM(s) Oral daily  melatonin 5 milliGRAM(s) Oral at bedtime  multivitamin 1 Tablet(s) Oral daily  pantoprazole  Injectable 40 milliGRAM(s) IV Push daily  perampanel 8 milliGRAM(s) Oral at bedtime  psyllium Powder 1 Packet(s) Oral three times a day  sildenafil (REVATIO) 20 milliGRAM(s) Oral three times a day    MEDICATIONS  (PRN):  acetaminophen   Oral Liquid .. 650 milliGRAM(s) Oral every 6 hours PRN Temp greater or equal to 38C (100.4F), Mild Pain (1 - 3)  albuterol/ipratropium for Nebulization 3 milliLiter(s) Nebulizer every 6 hours PRN Shortness of Breath and/or Wheezing        I&O's Summary    11 Jan 2025 07:01  -  12 Jan 2025 07:00  --------------------------------------------------------  IN: 1885 mL / OUT: 1150 mL / NET: 735 mL    12 Jan 2025 07:01  -  12 Jan 2025 14:22  --------------------------------------------------------  IN: 580 mL / OUT: 250 mL / NET: 330 mL        PHYSICAL EXAM:  Vital Signs Last 24 Hrs  T(C): 36.8 (12 Jan 2025 11:00), Max: 37 (11 Jan 2025 22:00)  T(F): 98.3 (12 Jan 2025 11:00), Max: 98.6 (11 Jan 2025 22:00)  HR: 64 (12 Jan 2025 11:00) (62 - 88)  BP: 129/69 (12 Jan 2025 11:00) (129/69 - 179/99)  BP(mean): --  RR: 18 (12 Jan 2025 11:00) (16 - 19)  SpO2: 95% (12 Jan 2025 11:00) (95% - 99%)    Parameters below as of 12 Jan 2025 04:00  Patient On (Oxygen Delivery Method): ventilator      GENERAL: NAD   heent: + trach  CHEST/LUNG coarse mechanical bs   HEART: Regular rate and rhythm; S1 S2  ABDOMEN: Soft, ; Bowel sounds present  EXTREMITIES: edematous x4 ext   NERVOUS SYSTEM:  nonverbal    LABS:                        8.8    13.51 )-----------( 174      ( 12 Jan 2025 09:53 )             27.3     01-12    138  |  100  |  13.2  ----------------------------<  129[H]  3.8   |  32.0[H]  |  0.52    Ca    7.7[L]      12 Jan 2025 09:53            Urinalysis Basic - ( 12 Jan 2025 09:53 )    Color: x / Appearance: x / SG: x / pH: x  Gluc: 129 mg/dL / Ketone: x  / Bili: x / Urobili: x   Blood: x / Protein: x / Nitrite: x   Leuk Esterase: x / RBC: x / WBC x   Sq Epi: x / Non Sq Epi: x / Bacteria: x        CAPILLARY BLOOD GLUCOSE      POCT Blood Glucose.: 125 mg/dL (12 Jan 2025 10:26)  POCT Blood Glucose.: 134 mg/dL (12 Jan 2025 05:40)  POCT Blood Glucose.: 119 mg/dL (11 Jan 2025 23:54)  POCT Blood Glucose.: 121 mg/dL (11 Jan 2025 16:31)        RADIOLOGY & ADDITIONAL TESTS:  Results Reviewed:   Imaging Personally Reviewed:  Electrocardiogram Personally Reviewed:

## 2025-01-12 NOTE — PROGRESS NOTE ADULT - ASSESSMENT
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 Downgraded 1/9 continues on steroids now changed to PO with taper will ween as able. otherwise continues on AED guided by neurology, sutures to be removed with surgery team 1/15.     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   -Status now resolved, Vimpat, Fycompa, Briviact as AED regimen  can convert briviact to PEG/ PO   -CSF encephalitis panel reviewed, ?Ab negative autoimmune encephalitis  -MRI brain 12/26 reviewed, encephalomalacia, gliosis noted  -s/p IVIG 1/3-1/7  -DC'd Solumedrol   Discussed with NSICU / clinical pharmacy / Neurology in house, in regards to continued steroids, transitioned to Decadron taper PO via PEG   -Neurology following     Ventilator dependent respiratory failure, s/p PEG  -Etiology likely related to neurologic injury as above  -On AC/CMV settings  -On tube feeds  -Pulmonary consulted recs appreciated     Citrobacter bacteremia, Pseudomonas PNA  -Initial blood cx positive on 12/30, cleared cx on 12/31  -Sputum cx w/ Pseudomonas, CXR on 12/27 w/ LLL airspace opacity   -ID following, advised cefepime x 10 day course (ended on 1/9/25)  monitor off abx     Diarrhea  -Possibly antibiotic associated, now improving per RN  -s/p rectal tube placement, now removed  -CT A/P removed, mostly chronic incidental findings, no colitis noted    Carotid stenosis  -CTA w/ high grade stenosis 75% on R-ICA  -On ASA, was previously ordered for Plavix but d/c’ed, unclear why  -Neurology consulted, signed off on 12/27/24, per note resume DAPT and outpatient neuro IR when able    Hx HTN, HLD  -Clonidine, carvedilol, atorvastatin     Hx dementia  -Donepezil has been on hold    Prophylactic measure  -Lovenox 40mg daily     Dispo: medically active, pending weening of steroids / improvement in mentation / likely Long term care facility when steroids appropriately tapered

## 2025-01-12 NOTE — PROGRESS NOTE ADULT - SUBJECTIVE AND OBJECTIVE BOX
[facility]                                        Neurology at [place]                                 Viki Lyn, & Sp                                  [address]                                        [place], [place], [address]                                             [phone]        CC: altered mental status and seizure.    HPI:   The patient is an 84 year old woman who presented 12/24/24 with seizure/status epilepticus. She was admitted to neuro-ICU.  She had been intubated for mechanical ventilation.  She had some left sided weakness on arrival and was seen by the stroke team (who signed off as presentation was not suggestive of stroke).  She has had a long complicated course.   EEG showed epileptiform abnormalities including ictal-interictal continuum.  She has been on antiseizure drugs.   Currently on Lacosamide 150 TID, Fycompa 8 mg daily (via PEG), Briviact 100 IV BID.  LP done for CSF evaluation. She was treated empirically for autoimmune encephalitis (with IV Ig which is completed, and steroids which is being tapered) despite CSF antibodies being negative.  She is now status post tracheostomy and PEG, and is being downgraded to the medical service.     Interim history:  Now on 6 [place].   Remains on mechanical ventilator via tracheostomy.    ROS:   Unobtainable due to patient's condition.     MEDICATIONS  (STANDING):  ascorbic acid 500 milliGRAM(s) Oral daily  aspirin  chewable 81 milliGRAM(s) Oral daily  atorvastatin 10 milliGRAM(s) Oral at bedtime  brivaracetam 100 milliGRAM(s) Oral two times a day  calcium carbonate 1250 mG  + Vitamin D (OsCal 500 + D) 1 Tablet(s) Oral daily  carvedilol 6.25 milliGRAM(s) Oral every 12 hours  chlorhexidine 0.12% Liquid 15 milliLiter(s) Oral Mucosa every 12 hours  cloNIDine 0.2 milliGRAM(s) Oral three times a day  cyanocobalamin 1000 MICROGram(s) Oral daily  enoxaparin Injectable 40 milliGRAM(s) SubCutaneous <User Schedule>  insulin lispro (ADMELOG) corrective regimen sliding scale   SubCutaneous every 6 hours  lacosamide 150 milliGRAM(s) Oral <User Schedule>  lisinopril 20 milliGRAM(s) Oral daily  melatonin 5 milliGRAM(s) Oral at bedtime  multivitamin 1 Tablet(s) Oral daily  pantoprazole  Injectable 40 milliGRAM(s) IV Push daily  perampanel 8 milliGRAM(s) Oral at bedtime  psyllium Powder 1 Packet(s) Oral three times a day  sildenafil (REVATIO) 20 milliGRAM(s) Oral three times a day    Vital Signs Last 24 Hrs  T(C): 36.8 (12 Jan 2025 11:00), Max: 37 (11 Jan 2025 22:00)  T(F): 98.3 (12 Jan 2025 11:00), Max: 98.6 (11 Jan 2025 22:00)  HR: 64 (12 Jan 2025 11:00) (62 - 88)  BP: 129/69 (12 Jan 2025 11:00) (129/69 - 179/99)  RR: 18 (12 Jan 2025 11:00) (16 - 19)  SpO2: 95% (12 Jan 2025 11:00) (95% - 99%)    Parameters below as of 12 Jan 2025 04:00  Patient On (Oxygen Delivery Method): ventilator    Detailed Neurologic Exam:    Mental status: Unresponsive to voice. On mechanical ventilator via tracheostomy.    Cranial nerves: Pupils equal and react symmetrically to light. Slight blink to threat. Not tracking with gaze. Eyes move conjugately to oculocephalic maneuvers. Face grossly symmetric. Palate and tongue cannot be assessed.     Motor/Sensory: There is normal bulk and tone.  There is no tremor.  No purposeful movement to stimuli.    Reflexes: Trace throughout and plantar responses are flexor.    Cerebellar: Cannot be tested.    Labs:     01-12    138  |  100  |  13.2  ----------------------------<  129[H]  3.8   |  32.0[H]  |  0.52    Ca    7.7[L]      12 Jan 2025 09:53                              8.8    13.51 )-----------( 174      ( 12 Jan 2025 09:53 )             27.3

## 2025-01-13 NOTE — PROGRESS NOTE ADULT - SUBJECTIVE AND OBJECTIVE BOX
[facility] Division of Hospital Medicine    Chief Complaint:      SUBJECTIVE / OVERNIGHT EVENTS:    Patient seen and examined at bedside, no acute events overnight, patient remains nonverbal, continues on PO steroid taper via PEG as discussed with neurology, will continue to monitor and ween.     MEDICATIONS  (STANDING):  ascorbic acid 500 milliGRAM(s) Oral daily  aspirin  chewable 81 milliGRAM(s) Oral daily  atorvastatin 10 milliGRAM(s) Oral at bedtime  brivaracetam 100 milliGRAM(s) Oral two times a day  calcium carbonate 1250 mG  + Vitamin D (OsCal 500 + D) 1 Tablet(s) Oral daily  carvedilol 6.25 milliGRAM(s) Oral every 12 hours  chlorhexidine 0.12% Liquid 15 milliLiter(s) Oral Mucosa every 12 hours  cloNIDine 0.2 milliGRAM(s) Oral three times a day  cyanocobalamin 1000 MICROGram(s) Oral daily  dexAMETHasone   Concentrate 40 milliGRAM(s) Oral daily  dexAMETHasone   Concentrate   Oral   enoxaparin Injectable 40 milliGRAM(s) SubCutaneous <User Schedule>  insulin lispro (ADMELOG) corrective regimen sliding scale   SubCutaneous every 6 hours  lacosamide 150 milliGRAM(s) Oral <User Schedule>  lisinopril 20 milliGRAM(s) Oral daily  melatonin 5 milliGRAM(s) Oral at bedtime  multivitamin 1 Tablet(s) Oral daily  pantoprazole  Injectable 40 milliGRAM(s) IV Push daily  perampanel 8 milliGRAM(s) Oral at bedtime  psyllium Powder 1 Packet(s) Oral three times a day  sildenafil (REVATIO) 20 milliGRAM(s) Oral three times a day    MEDICATIONS  (PRN):  acetaminophen   Oral Liquid .. 650 milliGRAM(s) Oral every 6 hours PRN Temp greater or equal to 38C (100.4F), Mild Pain (1 - 3)  albuterol/ipratropium for Nebulization 3 milliLiter(s) Nebulizer every 6 hours PRN Shortness of Breath and/or Wheezing        I&O's Summary    12 Jan 2025 07:01  -  13 Jan 2025 07:00  --------------------------------------------------------  IN: 1160 mL / OUT: 750 mL / NET: 410 mL    13 Jan 2025 07:01  -  13 Jan 2025 15:30  --------------------------------------------------------  IN: 585 mL / OUT: 0 mL / NET: 585 mL        PHYSICAL EXAM:  Vital Signs Last 24 Hrs  T(C): 36.8 (13 Jan 2025 10:48), Max: 36.9 (12 Jan 2025 17:01)  T(F): 98.2 (13 Jan 2025 10:48), Max: 98.4 (12 Jan 2025 17:01)  HR: 70 (13 Jan 2025 10:48) (64 - 73)  BP: 133/76 (13 Jan 2025 10:48) (101/56 - 142/62)  BP(mean): --  RR: 18 (13 Jan 2025 10:48) (18 - 19)  SpO2: 100% (13 Jan 2025 10:48) (98% - 100%)    Parameters below as of 13 Jan 2025 10:48  Patient On (Oxygen Delivery Method): ventilator      GENERAL: NAD   heent: + trach  CHEST/LUNG coarse mechanical bs   HEART: Regular rate and rhythm; S1 S2  ABDOMEN: Soft, ; Bowel sounds present  EXTREMITIES: edematous x4 ext   NERVOUS SYSTEM:  nonverbal    LABS:                        9.9    18.00 )-----------( 145      ( 13 Jan 2025 10:30 )             31.2     01-13    133[L]  |  98  |  17.5  ----------------------------<  191[H]  4.1   |  25.0  |  0.51    Ca    7.8[L]      13 Jan 2025 07:36            Urinalysis Basic - ( 13 Jan 2025 07:36 )    Color: x / Appearance: x / SG: x / pH: x  Gluc: 191 mg/dL / Ketone: x  / Bili: x / Urobili: x   Blood: x / Protein: x / Nitrite: x   Leuk Esterase: x / RBC: x / WBC x   Sq Epi: x / Non Sq Epi: x / Bacteria: x        CAPILLARY BLOOD GLUCOSE      POCT Blood Glucose.: 217 mg/dL (13 Jan 2025 11:19)  POCT Blood Glucose.: 208 mg/dL (13 Jan 2025 05:19)  POCT Blood Glucose.: 222 mg/dL (12 Jan 2025 23:54)  POCT Blood Glucose.: 133 mg/dL (12 Jan 2025 16:35)        RADIOLOGY & ADDITIONAL TESTS:  Results Reviewed:   Imaging Personally Reviewed:  Electrocardiogram Personally Reviewed:

## 2025-01-13 NOTE — PROGRESS NOTE ADULT - ASSESSMENT
#status epilepticus  #possible autoimmune encephalitis s/p IVIG 1/3 to 1/7  #acute hypoxic respiratory failure  #s/p trach and peg 1/8/25  #severe R prox ICA  #citrobacter bactremeia  #psuedomonas pneumonia    - on steroid taper fo autoimmune encephalities per Neuro  - continue cpap daily rest AC  - routine trach care by nurse and RT  - elevated head of bed, aspiration precaution  - completed antibiotic course  - rest of management per primary team  - pulm will follow intermittently   #status epilepticus  #possible autoimmune encephalitis s/p IVIG 1/3 to 1/7  #acute hypoxic respiratory failure  #s/p trach and peg 1/8/25  #severe R prox ICA  #citrobacter bactremeia  #psuedomonas pneumonia    - on steroid taper fo autoimmune encephalities per Neuro  - continue cpap daily rest AC  - routine trach care by nurse and RT  - elevated head of bed, aspiration precaution  - completed antibiotic course  - on Sildenafil as out pt, echo with pul htn ( 50s with EDP elevated by echo 12/24)  - rest of management per primary team  - pulm will follow intermittently

## 2025-01-13 NOTE — PROGRESS NOTE ADULT - SUBJECTIVE AND OBJECTIVE BOX
PULMONARY PROGRESS NOTE      [id number]    Patient is a 84y old  Female who presents with a chief complaint of seizures, severe R ICA stenosis (13 Jan 2025 15:29)      INTERVAL HPI/OVERNIGHT EVENTS:  no overnight issues  MEDICATIONS  (STANDING):  ascorbic acid 500 milliGRAM(s) Oral daily  aspirin  chewable 81 milliGRAM(s) Oral daily  atorvastatin 10 milliGRAM(s) Oral at bedtime  brivaracetam 100 milliGRAM(s) Oral two times a day  calcium carbonate 1250 mG  + Vitamin D (OsCal 500 + D) 1 Tablet(s) Oral daily  carvedilol 6.25 milliGRAM(s) Oral every 12 hours  chlorhexidine 0.12% Liquid 15 milliLiter(s) Oral Mucosa every 12 hours  cloNIDine 0.2 milliGRAM(s) Oral three times a day  cyanocobalamin 1000 MICROGram(s) Oral daily  dexAMETHasone   Concentrate 40 milliGRAM(s) Oral daily  dexAMETHasone   Concentrate   Oral   enoxaparin Injectable 40 milliGRAM(s) SubCutaneous <User Schedule>  insulin lispro (ADMELOG) corrective regimen sliding scale   SubCutaneous every 6 hours  lacosamide 150 milliGRAM(s) Oral <User Schedule>  lisinopril 20 milliGRAM(s) Oral daily  melatonin 5 milliGRAM(s) Oral at bedtime  multivitamin 1 Tablet(s) Oral daily  pantoprazole  Injectable 40 milliGRAM(s) IV Push daily  perampanel 8 milliGRAM(s) Oral at bedtime  psyllium Powder 1 Packet(s) Oral three times a day  sildenafil (REVATIO) 20 milliGRAM(s) Oral three times a day      MEDICATIONS  (PRN):  acetaminophen   Oral Liquid .. 650 milliGRAM(s) Oral every 6 hours PRN Temp greater or equal to 38C (100.4F), Mild Pain (1 - 3)  albuterol/ipratropium for Nebulization 3 milliLiter(s) Nebulizer every 6 hours PRN Shortness of Breath and/or Wheezing      Allergies    No Known Allergies    Intolerances        PAST MEDICAL & SURGICAL HISTORY:  Hypertension      Osteoporosis      Dyslipidemia      GERD (gastroesophageal reflux disease)      Breast cancer      History of hysterectomy  total abdominal      S/P mastectomy, left          SOCIAL HISTORY  Smoking History:       REVIEW OF SYSTEMS:    CONSTITUTIONAL:  No distress      Vital Signs Last 24 Hrs  T(C): 36.8 (13 Jan 2025 10:48), Max: 36.9 (12 Jan 2025 17:01)  T(F): 98.2 (13 Jan 2025 10:48), Max: 98.4 (12 Jan 2025 17:01)  HR: 70 (13 Jan 2025 10:48) (64 - 73)  BP: 133/76 (13 Jan 2025 10:48) (101/56 - 142/62)  BP(mean): --  RR: 18 (13 Jan 2025 10:48) (18 - 19)  SpO2: 96% (13 Jan 2025 15:55) (96% - 100%)    Parameters below as of 13 Jan 2025 10:48  Patient On (Oxygen Delivery Method): ventilator        PHYSICAL EXAMINATION:    GENERAL: The patient is in no apparent distress.     HEENT: Head is normocephalic and atraumatic. Extraocular muscles are intact. Mucous membranes are moist.    NECK: Supple.    LUNGS: mod air entry with rhonchi; respirations unlabored    HEART: Regular rate and rhythm without murmur.    ABDOMEN: Soft, nontender, and nondistended.      EXTREMITIES: Without any cyanosis, clubbing, rash, lesions or edema.    NEUROLOGIC: N/A     LABS:                        9.9    18.00 )-----------( 145      ( 13 Jan 2025 10:30 )             31.2     01-13    133[L]  |  98  |  17.5  ----------------------------<  191[H]  4.1   |  25.0  |  0.51    Ca    7.8[L]      13 Jan 2025 07:36        Urinalysis Basic - ( 13 Jan 2025 07:36 )    Color: x / Appearance: x / SG: x / pH: x  Gluc: 191 mg/dL / Ketone: x  / Bili: x / Urobili: x   Blood: x / Protein: x / Nitrite: x   Leuk Esterase: x / RBC: x / WBC x   Sq Epi: x / Non Sq Epi: x / Bacteria: x                      MICROBIOLOGY:    RADIOLOGY & ADDITIONAL STUDIES:  CXRs reviewed

## 2025-01-13 NOTE — PROGRESS NOTE ADULT - ASSESSMENT
84y Female with status epilepticus now unresponsive on mechanical ventilator via tracheostomy.    Seizures  Continue   Lacosamide 150 TID  Fycompa 8 mg daily (via PEG)  Briviact 100 IV BID    Ventilator management per pulmonary.  Taper steroids slowly.    will follow with you    Jose Antonio Drew MD PhD   [phone]

## 2025-01-13 NOTE — PROGRESS NOTE ADULT - SUBJECTIVE AND OBJECTIVE BOX
[facility]                                        Neu[facility] at [place]                                 Viki Lyn, & Sp                                  [address]                                        [place], [place], [address]                                             [phone]        CC: altered mental status and seizure.    HPI:   The patient is an 84 year old woman who presented 12/24/24 with seizure/status epilepticus. She was admitted to neuro-ICU.  She had been intubated for mechanical ventilation.  She had some left sided weakness on arrival and was seen by the stroke team (who signed off as presentation was not suggestive of stroke).  She has had a long complicated course.   EEG showed epileptiform abnormalities including ictal-interictal continuum.  She has been on antiseizure drugs.   Currently on Lacosamide 150 TID, Fycompa 8 mg daily (via PEG), Briviact 100 IV BID.  LP done for CSF evaluation. She was treated empirically for autoimmune encephalitis (with IV Ig which is completed, and steroids which is being tapered) despite CSF antibodies being negative.  She is now status post tracheostomy and PEG, and is being downgraded to the medical service. (ELMA)    Interim history:  Now on 6 [place]. remains lethargic  Remains on mechanical ventilator via tracheostomy.    ROS:   Unobtainable due to patient's condition.     MEDICATIONS  (STANDING):  ascorbic acid 500 milliGRAM(s) Oral daily  aspirin  chewable 81 milliGRAM(s) Oral daily  atorvastatin 10 milliGRAM(s) Oral at bedtime  brivaracetam 100 milliGRAM(s) Oral two times a day  calcium carbonate 1250 mG  + Vitamin D (OsCal 500 + D) 1 Tablet(s) Oral daily  carvedilol 6.25 milliGRAM(s) Oral every 12 hours  chlorhexidine 0.12% Liquid 15 milliLiter(s) Oral Mucosa every 12 hours  cloNIDine 0.2 milliGRAM(s) Oral three times a day  cyanocobalamin 1000 MICROGram(s) Oral daily  dexAMETHasone   Concentrate 40 milliGRAM(s) Oral daily  dexAMETHasone   Concentrate   Oral   enoxaparin Injectable 40 milliGRAM(s) SubCutaneous <User Schedule>  insulin lispro (ADMELOG) corrective regimen sliding scale   SubCutaneous every 6 hours  lacosamide 150 milliGRAM(s) Oral <User Schedule>  lisinopril 20 milliGRAM(s) Oral daily  melatonin 5 milliGRAM(s) Oral at bedtime  multivitamin 1 Tablet(s) Oral daily  pantoprazole  Injectable 40 milliGRAM(s) IV Push daily  perampanel 8 milliGRAM(s) Oral at bedtime  psyllium Powder 1 Packet(s) Oral three times a day  sildenafil (REVATIO) 20 milliGRAM(s) Oral three times a day    MEDICATIONS  (PRN):  acetaminophen   Oral Liquid .. 650 milliGRAM(s) Oral every 6 hours PRN Temp greater or equal to 38C (100.4F), Mild Pain (1 - 3)  albuterol/ipratropium for Nebulization 3 milliLiter(s) Nebulizer every 6 hours PRN Shortness of Breath and/or Wheezing      Vital Signs Last 24 Hrs  T(C): 36.8 (13 Jan 2025 10:48), Max: 36.9 (12 Jan 2025 17:01)  T(F): 98.2 (13 Jan 2025 10:48), Max: 98.4 (12 Jan 2025 17:01)  HR: 70 (13 Jan 2025 10:48) (64 - 73)  BP: 133/76 (13 Jan 2025 10:48) (101/56 - 142/62)  BP(mean): --  RR: 18 (13 Jan 2025 10:48) (18 - 19)  SpO2: 100% (13 Jan 2025 10:48) (98% - 100%)    Parameters below as of 13 Jan 2025 10:48  Patient On (Oxygen Delivery Method): ventilator      Detailed Neurologic Exam:    Mental status: Unresponsive to voice. On mechanical ventilator via tracheostomy.    Cranial nerves: Pupils equal and react symmetrically to light. Slight blink to threat. Not tracking with gaze. Eyes move conjugately to oculocephalic maneuvers. Face grossly symmetric. Palate and tongue cannot be assessed.     Motor/Sensory: There is normal bulk and tone.  There is no tremor.  No purposeful movement to stimuli.    Reflexes: Trace throughout and plantar responses are flexor.    Cerebellar: Cannot be tested.    Labs:                           9.9    18.00 )-----------( 145      ( 13 Jan 2025 10:30 )             31.2     01-13    133[L]  |  98  |  17.5  ----------------------------<  191[H]  4.1   |  25.0  |  0.51    Ca    7.8[L]      13 Jan 2025 07:36

## 2025-01-13 NOTE — PROGRESS NOTE ADULT - ASSESSMENT
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 Downgraded 1/9 continues on steroids now changed to PO with taper will ween as able. otherwise continues on AED guided by neurology, sutures to be removed with surgery team 1/15.     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   -Status now resolved, Vimpat, Fycompa, Briviact as AED regimen  can convert briviact to PEG/ PO   -CSF encephalitis panel reviewed, ?Ab negative autoimmune encephalitis  -MRI brain 12/26 reviewed, encephalomalacia, gliosis noted  -s/p IVIG 1/3-1/7  -DC'd Solumedrol   Discussed with NSICU / clinical pharmacy / Neurology in house, in regards to continued steroids, transitioned to Decadron taper PO via PEG   -Neurology following     Ventilator dependent respiratory failure, s/p PEG  -Etiology likely related to neurologic injury as above  -On AC/CMV settings  -On tube feeds  -Pulmonary consulted recs appreciated     Citrobacter bacteremia, Pseudomonas PNA  -Initial blood cx positive on 12/30, cleared cx on 12/31  -Sputum cx w/ Pseudomonas, CXR on 12/27 w/ LLL airspace opacity   -ID following, advised cefepime x 10 day course (ended on 1/9/25)  monitor off abx     Diarrhea  -Possibly antibiotic associated, now improving per RN  -s/p rectal tube placement, now removed  -CT A/P removed, mostly chronic incidental findings, no colitis noted    Carotid stenosis  -CTA w/ high grade stenosis 75% on R-ICA  -On ASA, was previously ordered for Plavix but d/c’ed, unclear why  -Neurology consulted, signed off on 12/27/24, per note resume DAPT and outpatient neuro IR when able    Hx HTN, HLD  -Clonidine, carvedilol, atorvastatin     Hx dementia  -Donepezil has been on hold    Prophylactic measure  -Lovenox 40mg daily     Dispo: medically active, pending weening of steroids / improvement in mentation / likely Long term care facility when steroids appropriately tapered

## 2025-01-14 NOTE — PROGRESS NOTE ADULT - ASSESSMENT
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 Downgraded 1/9 continues on steroids now changed to PO with taper will ween as able. otherwise continues on AED guided by neurology, sutures to be removed with surgery team 1/15. Discussed with Neuro, repeat CTH ordered to r/o anoxic injury given prolonged lack of mentation.     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   -Status now resolved, Vimpat, Fycompa, Briviact as AED regimen  can convert briviact to PEG/ PO   -CSF encephalitis panel reviewed, ?Ab negative autoimmune encephalitis  -MRI brain 12/26 reviewed, encephalomalacia, gliosis noted  -s/p IVIG 1/3-1/7  -DC'd Solumedrol   Discussed with NSICU / clinical pharmacy / Neurology in house, in regards to continued steroids, transitioned to Decadron taper PO via PEG   -Neurology following discussed today, will repeat CTH to assess for anoxia given prolonged lack of mentation     Ventilator dependent respiratory failure, s/p PEG  -Etiology likely related to neurologic injury as above  -On AC/CMV settings  -On tube feeds  -Pulmonary consulted recs appreciated     Citrobacter bacteremia, Pseudomonas PNA  -Initial blood cx positive on 12/30, cleared cx on 12/31  -Sputum cx w/ Pseudomonas, CXR on 12/27 w/ LLL airspace opacity   -ID following, advised cefepime x 10 day course (ended on 1/9/25)  monitor off abx     Diarrhea  -Possibly antibiotic associated, continuing to improve   -s/p rectal tube placement, now removed  -CT A/P removed, mostly chronic incidental findings, no colitis noted    Carotid stenosis  -CTA w/ high grade stenosis 75% on R-ICA  -On ASA, was previously ordered for Plavix but d/c’ed, unclear why  -Neurology consulted, signed off on 12/27/24, per note resume DAPT and outpatient neuro IR when able    Hx HTN, HLD  -Clonidine, carvedilol, atorvastatin     Hx dementia  -Donepezil has been on hold    Prophylactic measure  -Lovenox 40mg daily     Dispo: medically active, pending weening of steroids / improvement in mentation / likely Long term care facility when steroids appropriately tapered. repeat CT head

## 2025-01-14 NOTE — PROGRESS NOTE ADULT - SUBJECTIVE AND OBJECTIVE BOX
[facility] Division of Hospital Medicine    Chief Complaint:      SUBJECTIVE / OVERNIGHT EVENTS:    Patient seen and examined at bedside, no acute events overnight, patient remains nonverbal, poorly responsive on vent. Discussed with neuro, continue Steroid taper as ordered, will repeat CT head to assess for potential anoxic injury.     MEDICATIONS  (STANDING):  ascorbic acid 500 milliGRAM(s) Oral daily  aspirin  chewable 81 milliGRAM(s) Oral daily  atorvastatin 10 milliGRAM(s) Oral at bedtime  brivaracetam 100 milliGRAM(s) Oral two times a day  calcium carbonate 1250 mG  + Vitamin D (OsCal 500 + D) 1 Tablet(s) Oral daily  carvedilol 6.25 milliGRAM(s) Oral every 12 hours  chlorhexidine 0.12% Liquid 15 milliLiter(s) Oral Mucosa every 12 hours  cloNIDine 0.2 milliGRAM(s) Oral three times a day  cyanocobalamin 1000 MICROGram(s) Oral daily  dexAMETHasone     Tablet   Oral   enoxaparin Injectable 40 milliGRAM(s) SubCutaneous <User Schedule>  insulin lispro (ADMELOG) corrective regimen sliding scale   SubCutaneous every 6 hours  lacosamide 150 milliGRAM(s) Oral <User Schedule>  lisinopril 20 milliGRAM(s) Oral daily  melatonin 5 milliGRAM(s) Oral at bedtime  multivitamin 1 Tablet(s) Oral daily  pantoprazole  Injectable 40 milliGRAM(s) IV Push daily  perampanel 8 milliGRAM(s) Oral at bedtime  psyllium Powder 1 Packet(s) Oral three times a day  sildenafil (REVATIO) 20 milliGRAM(s) Oral three times a day    MEDICATIONS  (PRN):  acetaminophen   Oral Liquid .. 650 milliGRAM(s) Oral every 6 hours PRN Temp greater or equal to 38C (100.4F), Mild Pain (1 - 3)  albuterol/ipratropium for Nebulization 3 milliLiter(s) Nebulizer every 6 hours PRN Shortness of Breath and/or Wheezing        I&O's Summary    13 Jan 2025 07:01  -  14 Jan 2025 07:00  --------------------------------------------------------  IN: 860 mL / OUT: 450 mL / NET: 410 mL        PHYSICAL EXAM:  Vital Signs Last 24 Hrs  T(C): 36.8 (14 Jan 2025 11:03), Max: 37.1 (13 Jan 2025 21:15)  T(F): 98.2 (14 Jan 2025 11:03), Max: 98.7 (13 Jan 2025 21:15)  HR: 95 (14 Jan 2025 11:03) (73 - 95)  BP: 160/79 (14 Jan 2025 11:03) (141/88 - 180/90)  BP(mean): --  RR: 18 (14 Jan 2025 11:03) (16 - 19)  SpO2: 99% (14 Jan 2025 11:03) (96% - 100%)    Parameters below as of 14 Jan 2025 11:03  Patient On (Oxygen Delivery Method): ventilator      GENERAL: NAD   heent: + trach  CHEST/LUNG coarse mechanical bs   HEART: Regular rate and rhythm; S1 S2  ABDOMEN: Soft, ; Bowel sounds present  EXTREMITIES: edematous x4 ext   NERVOUS SYSTEM:  nonverbal    LABS:                        9.9    18.00 )-----------( 145      ( 13 Jan 2025 10:30 )             31.2     01-14    137  |  100  |  20.5[H]  ----------------------------<  148[H]  3.5   |  29.0  |  0.58    Ca    8.0[L]      14 Jan 2025 05:36            Urinalysis Basic - ( 14 Jan 2025 05:36 )    Color: x / Appearance: x / SG: x / pH: x  Gluc: 148 mg/dL / Ketone: x  / Bili: x / Urobili: x   Blood: x / Protein: x / Nitrite: x   Leuk Esterase: x / RBC: x / WBC x   Sq Epi: x / Non Sq Epi: x / Bacteria: x        CAPILLARY BLOOD GLUCOSE      POCT Blood Glucose.: 138 mg/dL (14 Jan 2025 13:51)  POCT Blood Glucose.: 150 mg/dL (14 Jan 2025 05:28)  POCT Blood Glucose.: 160 mg/dL (14 Jan 2025 00:15)  POCT Blood Glucose.: 192 mg/dL (13 Jan 2025 17:06)        RADIOLOGY & ADDITIONAL TESTS:  Results Reviewed:   Imaging Personally Reviewed:  Electrocardiogram Personally Reviewed:

## 2025-01-14 NOTE — PROGRESS NOTE ADULT - SUBJECTIVE AND OBJECTIVE BOX
[facility]                                        Neurology at [place]                                 Viki Lyn, & Sp                                  [address]                                        [place], [place], [address]                                             [phone]        CC: altered mental status and seizure.    HPI:   The patient is an 84 year old woman who presented 12/24/24 with seizure/status epilepticus. She was admitted to neuro-ICU.  She had been intubated for mechanical ventilation.  She had some left sided weakness on arrival and was seen by the stroke team (who signed off as presentation was not suggestive of stroke).  She has had a long complicated course.   EEG showed epileptiform abnormalities including ictal-interictal continuum.  She has been on antiseizure drugs.   Currently on Lacosamide 150 TID, Fycompa 8 mg daily (via PEG), Briviact 100 IV BID.  LP done for CSF evaluation. She was treated empirically for autoimmune encephalitis (with IV Ig which is completed, and steroids which is being tapered) despite CSF antibodies being negative.  She is now status post tracheostomy and PEG, and is being downgraded to the medical service. (ELMA)    Interim history:  Remains lethargic  Remains on mechanical ventilator via tracheostomy.    ROS:   Unobtainable due to patient's condition.         MEDICATIONS  (PRN):  acetaminophen   Oral Liquid .. 650 milliGRAM(s) Oral every 6 hours PRN Temp greater or equal to 38C (100.4F), Mild Pain (1 - 3)  albuterol/ipratropium for Nebulization 3 milliLiter(s) Nebulizer every 6 hours PRN Shortness of Breath and/or Wheezing      Vital Signs Last 24 Hrs  T(C): 36.8 (13 Jan 2025 10:48), Max: 36.9 (12 Jan 2025 17:01)  T(F): 98.2 (13 Jan 2025 10:48), Max: 98.4 (12 Jan 2025 17:01)  HR: 70 (13 Jan 2025 10:48) (64 - 73)  BP: 133/76 (13 Jan 2025 10:48) (101/56 - 142/62)  BP(mean): --  RR: 18 (13 Jan 2025 10:48) (18 - 19)  SpO2: 100% (13 Jan 2025 10:48) (98% - 100%)    Parameters below as of 13 Jan 2025 10:48  Patient On (Oxygen Delivery Method): ventilator      Detailed Neurologic Exam:    Mental status: Unresponsive to voice. On mechanical ventilator via tracheostomy.    Cranial nerves: Pupils equal and react symmetrically to light. no blink to threat. Not tracking with gaze. Eyes move conjugately to oculocephalic maneuvers. Face grossly symmetric. Palate and tongue cannot be assessed.     Motor/Sensory: There is normal bulk and tone.  There is no tremor.  No purposeful movement to stimuli.    Cerebellar: Cannot be tested.    Labs:                           9.9    18.00 )-----------( 145      ( 13 Jan 2025 10:30 )             31.2     01-14    137  |  100  |  20.5[H]  ----------------------------<  148[H]  3.5   |  29.0  |  0.58    Ca    8.0[L]      14 Jan 2025 05:36

## 2025-01-14 NOTE — PROGRESS NOTE ADULT - ASSESSMENT
84y Female with status epilepticus now unresponsive on mechanical ventilator via tracheostomy.    Seizures  Continue   Lacosamide 150 TID  Fycompa 8 mg daily (via PEG)  Briviact 100 IV BID    Altered mental status  Would consider checking head CT    Ventilator management per pulmonary.  Taper steroids slowly.    discussed with Dr Ribeiro  will follow with you    Jose Antonio Drew MD PhD   [phone]

## 2025-01-15 NOTE — RAPID RESPONSE TEAM SUMMARY - NSADDTLFINDINGSRRT_GEN_ALL_CORE
pt lying in bed, eyes open  VSS B/P 195/84 P 76 R 18 Po 100%  accucheck 179  Pt is on Full Vent Support  Pt was weened down to CPAP  Pt immediately desat  Respiratory Therapist switched pt back to full Vent support  % immediately

## 2025-01-15 NOTE — PROGRESS NOTE ADULT - SUBJECTIVE AND OBJECTIVE BOX
[facility]                                        Neurology at [place]                                 [facility]                                  [address]                                        [place], [place], [address]                                             [phone]        CC: altered mental status and seizure.    HPI:   The patient is an 84 year old woman who presented 12/24/24 with seizure/status epilepticus. She was admitted to neuro-ICU.  She had been intubated for mechanical ventilation.  She had some left sided weakness on arrival and was seen by the stroke team (who signed off as presentation was not suggestive of stroke).  She has had a long complicated course.   EEG showed epileptiform abnormalities including ictal-interictal continuum.  She has been on antiseizure drugs.   Currently on Lacosamide 150 TID, Fycompa 8 mg daily (via PEG), Briviact 100 IV BID.  LP done for CSF evaluation. She was treated empirically for autoimmune encephalitis (with IV Ig which is completed, and steroids which is being tapered) despite CSF antibodies being negative.  She is now status post tracheostomy and PEG, and is being downgraded to the medical service. (ELMA)    Interim history:  Remains lethargic/unresponsive  Remains on mechanical ventilator via tracheostomy.    ROS:   Unobtainable due to patient's condition.     MEDICATIONS  (STANDING):  ascorbic acid 500 milliGRAM(s) Oral daily  aspirin  chewable 81 milliGRAM(s) Oral daily  atorvastatin 10 milliGRAM(s) Oral at bedtime  brivaracetam 100 milliGRAM(s) Oral two times a day  calcium carbonate 1250 mG  + Vitamin D (OsCal 500 + D) 1 Tablet(s) Oral daily  carvedilol 6.25 milliGRAM(s) Oral every 12 hours  chlorhexidine 0.12% Liquid 15 milliLiter(s) Oral Mucosa every 12 hours  cloNIDine 0.2 milliGRAM(s) Oral three times a day  cyanocobalamin 1000 MICROGram(s) Oral daily  dexAMETHasone     Tablet 35 milliGRAM(s) Oral daily  dexAMETHasone     Tablet   Oral   enoxaparin Injectable 40 milliGRAM(s) SubCutaneous <User Schedule>  insulin lispro (ADMELOG) corrective regimen sliding scale   SubCutaneous every 6 hours  lacosamide 150 milliGRAM(s) Oral <User Schedule>  lisinopril 20 milliGRAM(s) Oral daily  melatonin 5 milliGRAM(s) Oral at bedtime  multivitamin 1 Tablet(s) Oral daily  pantoprazole  Injectable 40 milliGRAM(s) IV Push daily  perampanel 8 milliGRAM(s) Oral at bedtime  psyllium Powder 1 Packet(s) Oral three times a day  sildenafil (REVATIO) 20 milliGRAM(s) Oral three times a day    MEDICATIONS  (PRN):  acetaminophen   Oral Liquid .. 650 milliGRAM(s) Oral every 6 hours PRN Temp greater or equal to 38C (100.4F), Mild Pain (1 - 3)  albuterol/ipratropium for Nebulization 3 milliLiter(s) Nebulizer every 6 hours PRN Shortness of Breath and/or Wheezing      Vital Signs Last 24 Hrs  T(C): 36.8 (15 Kam 2025 08:29), Max: 37.2 (14 Jan 2025 16:46)  T(F): 98.2 (15 Kam 2025 08:29), Max: 99 (14 Jan 2025 16:46)  HR: 76 (15 Kam 2025 13:00) (60 - 96)  BP: 174/82 (15 Kam 2025 13:00) (80/50 - 174/82)  BP(mean): --  RR: 18 (15 Kam 2025 08:29) (12 - 19)  SpO2: 97% (15 Kam 2025 08:29) (95% - 100%)    Parameters below as of 15 Kam 2025 08:29  Patient On (Oxygen Delivery Method): ventilator      Detailed Neurologic Exam:    Mental status: Unresponsive to voice. On mechanical ventilator via tracheostomy.    Cranial nerves: Pupils equal and react symmetrically to light. no blink to threat. Not tracking with gaze. Eyes move conjugately to oculocephalic maneuvers. Face grossly symmetric. Palate and tongue cannot be assessed.     Motor/Sensory: There is normal bulk and tone.  There is no tremor.  No purposeful movement to noxious stimuli.    Cerebellar: Cannot be tested.    Labs:                           9.3    23.33 )-----------( 163      ( 15 Kam 2025 08:40 )             30.0     01-15    138  |  103  |  23.1[H]  ----------------------------<  162[H]  4.0   |  27.0  |  0.63    Ca    7.1[L]      15 Kam 2025 07:03  Phos  2.0     01-15  Mg     1.7     01-15    TPro  4.6[L]  /  Alb  1.8[L]  /  TBili  <0.2[L]  /  DBili  x   /  AST  28  /  ALT  33[H]  /  AlkPhos  66  01-15    LIVER FUNCTIONS - ( 15 Kam 2025 01:37 )  Alb: 1.8 g/dL / Pro: 4.6 g/dL / ALK PHOS: 66 U/L / ALT: 33 U/L / AST: 28 U/L / GGT: x           Rad:  CT Head No Cont (01.14.25 @ 20:32)   IMPRESSION:  Evidence of a prior left parietal parasagittal region insult. This was   seen on the prior 12/24/2024 as well. No new pathology. No intracranial   hemorrhage. No major vessel distribution infarct

## 2025-01-15 NOTE — PROGRESS NOTE ADULT - ASSESSMENT
84y Female with status epilepticus now unresponsive on mechanical ventilator via tracheostomy.    Seizures  Continue   Lacosamide 150 TID  Fycompa 8 mg daily (via PEG)  Briviact 100 IV BID    Altered mental status  Repeat head CT stable with no new pathoilogy    Ventilator management per pulmonary.  Taper steroids slowly.    discussed with Dr Ribeiro  will follow with you    Jose Antonio Drew MD PhD   [id number]

## 2025-01-15 NOTE — PROGRESS NOTE ADULT - SUBJECTIVE AND OBJECTIVE BOX
[facility] Division of Hospital Medicine    Chief Complaint:      SUBJECTIVE / OVERNIGHT EVENTS:    Patient seen and examined at bedside, no acute events overnight, patient opening her eyes today, however not responding to commands / or tactile stimulation. Continues on Steroid taper, CT head stable without new findings / anoxic injury.     MEDICATIONS  (STANDING):  ascorbic acid 500 milliGRAM(s) Oral daily  aspirin  chewable 81 milliGRAM(s) Oral daily  atorvastatin 10 milliGRAM(s) Oral at bedtime  brivaracetam 100 milliGRAM(s) Oral two times a day  calcium carbonate 1250 mG  + Vitamin D (OsCal 500 + D) 1 Tablet(s) Oral daily  carvedilol 6.25 milliGRAM(s) Oral every 12 hours  chlorhexidine 0.12% Liquid 15 milliLiter(s) Oral Mucosa every 12 hours  cloNIDine 0.2 milliGRAM(s) Oral three times a day  cyanocobalamin 1000 MICROGram(s) Oral daily  dexAMETHasone     Tablet 35 milliGRAM(s) Oral daily  dexAMETHasone     Tablet   Oral   enoxaparin Injectable 40 milliGRAM(s) SubCutaneous <User Schedule>  insulin lispro (ADMELOG) corrective regimen sliding scale   SubCutaneous every 6 hours  lacosamide 150 milliGRAM(s) Oral <User Schedule>  lisinopril 20 milliGRAM(s) Oral daily  melatonin 5 milliGRAM(s) Oral at bedtime  multivitamin 1 Tablet(s) Oral daily  pantoprazole  Injectable 40 milliGRAM(s) IV Push daily  perampanel 8 milliGRAM(s) Oral at bedtime  psyllium Powder 1 Packet(s) Oral three times a day  sildenafil (REVATIO) 20 milliGRAM(s) Oral three times a day    MEDICATIONS  (PRN):  acetaminophen   Oral Liquid .. 650 milliGRAM(s) Oral every 6 hours PRN Temp greater or equal to 38C (100.4F), Mild Pain (1 - 3)  albuterol/ipratropium for Nebulization 3 milliLiter(s) Nebulizer every 6 hours PRN Shortness of Breath and/or Wheezing        I&O's Summary    14 Jan 2025 07:01  -  15 Kam 2025 07:00  --------------------------------------------------------  IN: 1290 mL / OUT: 1100 mL / NET: 190 mL        PHYSICAL EXAM:  Vital Signs Last 24 Hrs  T(C): 36.8 (15 Kam 2025 08:29), Max: 37.2 (14 Jan 2025 16:46)  T(F): 98.2 (15 Kam 2025 08:29), Max: 99 (14 Jan 2025 16:46)  HR: 80 (15 Kam 2025 14:51) (60 - 96)  BP: 174/82 (15 Kam 2025 13:00) (80/50 - 174/82)  BP(mean): --  RR: 18 (15 Kam 2025 08:29) (12 - 19)  SpO2: 100% (15 Kam 2025 14:51) (95% - 100%)    Parameters below as of 15 Kam 2025 08:29  Patient On (Oxygen Delivery Method): ventilator      GENERAL: NAD   heent: + trach  CHEST/LUNG coarse mechanical bs   HEART: Regular rate and rhythm; S1 S2  ABDOMEN: Soft, ; Bowel sounds present  EXTREMITIES: edematous x4 ext   NERVOUS SYSTEM:  nonverbal    LABS:                        9.3    23.33 )-----------( 163      ( 15 Kam 2025 08:40 )             30.0     01-15    138  |  103  |  23.1[H]  ----------------------------<  162[H]  4.0   |  27.0  |  0.63    Ca    7.1[L]      15 Kam 2025 07:03  Phos  2.0     01-15  Mg     1.7     01-15    TPro  4.6[L]  /  Alb  1.8[L]  /  TBili  <0.2[L]  /  DBili  x   /  AST  28  /  ALT  33[H]  /  AlkPhos  66  01-15          Urinalysis Basic - ( 15 Kam 2025 07:03 )    Color: x / Appearance: x / SG: x / pH: x  Gluc: 162 mg/dL / Ketone: x  / Bili: x / Urobili: x   Blood: x / Protein: x / Nitrite: x   Leuk Esterase: x / RBC: x / WBC x   Sq Epi: x / Non Sq Epi: x / Bacteria: x        CAPILLARY BLOOD GLUCOSE      POCT Blood Glucose.: 186 mg/dL (15 Kam 2025 11:39)  POCT Blood Glucose.: 165 mg/dL (15 Kam 2025 05:38)  POCT Blood Glucose.: 179 mg/dL (14 Jan 2025 23:05)  POCT Blood Glucose.: 198 mg/dL (14 Jan 2025 18:26)        RADIOLOGY & ADDITIONAL TESTS:  Results Reviewed:   Imaging Personally Reviewed:  Electrocardiogram Personally Reviewed:

## 2025-01-15 NOTE — PROGRESS NOTE ADULT - ASSESSMENT
84yoF hx mild dementia, remote  CVA, HTN, HLD, breast CA s/p L-mastectomy, chemoradiation,  who presented to [facility] on 12/24 for altered mental status, left sided weakness and seizure activity, who was intubated in ED for airway protection, admitted to NSICU for status epilepticus and being treated for 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 Downgraded 1/9 continues on steroids now changed to PO with taper will ween as able. otherwise continues on AED guided by neurology, sutures to be removed with surgery team 1/15. Discussed with Neuro, repeat CTH ordered and without change / anoxic injury. 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   -Status now resolved, Vimpat, Fycompa, Briviact as AED regimen  can convert briviact to PEG/ PO   -CSF encephalitis panel reviewed, ?Ab negative autoimmune encephalitis  -MRI brain 12/26 reviewed, encephalomalacia, gliosis noted  -s/p IVIG 1/3-1/7  -DC'd Solumedrol   Discussed with NSICU / clinical pharmacy / Neurology in house, in regards to continued steroids, transitioned to Decadron taper PO via PEG   -Neurology following repeated CTH 1/14 without changes/ anoxic injury continues on Steroids     Ventilator dependent respiratory failure, s/p PEG  -Etiology likely related to neurologic injury as above  -On AC/CMV settings  -On tube feeds  -Pulmonary consulted recs appreciated     Citrobacter bacteremia, Pseudomonas PNA  -Initial blood cx positive on 12/30, cleared cx on 12/31  -Sputum cx w/ Pseudomonas, CXR on 12/27 w/ LLL airspace opacity   -ID following, advised cefepime x 10 day course (ended on 1/9/25)  monitor off abx     Diarrhea  -Possibly antibiotic associated, continuing to improve   -s/p rectal tube placement, now removed  -CT A/P removed, mostly chronic incidental findings, no colitis noted    Carotid stenosis  -CTA w/ high grade stenosis 75% on R-ICA  -On ASA, was previously ordered for Plavix but d/c’ed, unclear why  -Neurology consulted, signed off on 12/27/24, per note resume DAPT and outpatient neuro IR when able    Hx HTN, HLD  -Clonidine, carvedilol, atorvastatin     Hx dementia  -Donepezil has been on hold    Prophylactic measure  -Lovenox 40mg daily     Dispo: medically active, pending weening of steroids / improvement in mentation / likely Long term care facility when steroids appropriately tapered.

## 2025-01-16 NOTE — CHART NOTE - NSCHARTNOTEFT_GEN_A_CORE
See bronchoscopy procedure note for more details. Previous shiley replaced with size 5 cuffed distal XLT. Tolerated well. See bronchoscopy procedure note for more details. Previous shiley replaced with size 5 cuffed distal XLT. Tolerated well.    ATTENDING ADDENDUM:   I agree with the above.  Tracheostomy replaced at bedside safely.

## 2025-01-16 NOTE — CONSULT NOTE ADULT - REASON FOR ADMISSION
seizures, severe R ICA stenosis
High grade carotid stenosis, L side weakness
seizures, severe R ICA stenosis

## 2025-01-16 NOTE — CHART NOTE - NSCHARTNOTEFT_GEN_A_CORE
Source: Patient [ ]  Family [ ]   other [x ] chart, staff/IDT rounds     Current Diet:   Diet, NPO with Tube Feed:   Tube Feeding Modality: Gastrostomy  Jevity 1.5 Farrukh (JEVITY1.5)  Total Volume for 24 Hours (mL): 1320  Continuous  Starting Tube Feed Rate {mL per Hour}: 10  Increase Tube Feed Rate by (mL): 10     Every 4 hours  Until Goal Tube Feed Rate (mL per Hour): 55  Tube Feed Duration (in Hours): 24  Tube Feed Start Time: 01:00 (01-09-25 @ 12:49) [Active]    Current Weight:   (1/16) 83 kg RD bed scale  (1/10) 74.4 kg   1/4: 72.2 kg   1/3: 67.9 kg   12/1: 72.5 kg   12/27: 60.9 kg   3+ mod edema to L and R arm  4+ sev edema to R and L foot     % Weight Change: Unsure of accuracy of weights; will continue to monitor weights for trends; fluid shifts noted may be contributing to weight trends     Pertinent Medications: MEDICATIONS  (STANDING):  MEDICATIONS  (STANDING):  ascorbic acid 500 milliGRAM(s) Oral daily  calcium carbonate 1250 mG  + Vitamin D (OsCal 500 + D) 1 Tablet(s) Oral daily  cefepime  Injectable. 2000 milliGRAM(s) IV Push every 12 hours  cyanocobalamin 1000 MICROGram(s) Oral daily  insulin lispro (ADMELOG) corrective regimen sliding scale   SubCutaneous every 6 hour  multivitamin 1 Tablet(s) Oral daily  psyllium Powder 1 Packet(s) Oral three times a day    Pertinent Labs: 01-16 Na137 mmol/L Glu 176 mg/dL[H] K+ 4.2 mmol/L Cr  0.60 mg/dL BUN 24.7 mg/dL[H] Phos n/a   Alb n/a   PAB n/a       Skin: L ear scab, Coccyx stage 1    Nutrition focused physical exam-previously conducted - found signs of malnutrition [ ]absent [x ]present    Subcutaneous fat loss: [x ] Orbital fat pads region, [x ]Buccal fat region, [ ]Triceps region,  [ ]Ribs region    Muscle wasting: [ ]Temples region, [x ]Clavicle region, [x ]Shoulder region, [ ]Scapula region, [ ]Interosseous region,  [ ]thigh region, [ ]Calf region    Estimated Needs:   [ ] no change since previous assessment  [ x] recalculated:   1500-1800kcals/day (60.4kg, 25-30kcal/kg)  85-95g protein/day (60.4kg, 1.2-1.4g/kg)     Brief Hospital Course:    Pt is a 85y/o Female admitted for AMS, status epilepticus, intubated in ED for airway protection, now cent dependent s/p peg, monitored off abx for Citrobacter bacteremia, Pseudomonas PNA. Rapid response 1/16 last night for desaturation failing CPAP trial. Placed back on full vent support. Per RT, Pt had given neb treatments/ Mucomyst. On pressure control. Pt in respiratory distress. TF is held at this time.     Current Nutrition Diagnosis:  Pt remains at high nutrition risk secondary to Moderate (acute) protein calorie malnutrition related to inability to meet sufficient protein energy needs in setting of recent R ICA stenosis; requiring vent support as evidenced by meeting < 75% estimated energy needs > 7days, physical signs of mild muscle/fat loss, + edema.     Pt seen at bedside this AM and discussed in IDT rounds. TF held for rapid response overnight, upgraded to 3twr, was previously on 6twr. Will monitor for resume of TF and recommendations below for when TF resumes. Last BM documented 1/13. RD to remain available.     Recommendations:   - As medically feasible, recommend Jevity 1.5 -- initiated at 20 ml/hr and advance 10 ml/hr q4 hrs until goal rate of 55 ml/hr (x20 hrs) to provide 1100 ml, 1650 kcal, 70g protein, 836 ml free water, and >100% of RDIs for vitamins/minerals. Additional free water per MD discretion.   - monitor lytes closely  - Continue with MVI, vitamin b12, and Vit. C daily   - Monitor weights daily for trend/accuracy     Monitoring and Evaluation:   [ ] PO intake [ ] Tolerance to diet prescription [X] Weights  [X] Follow up per protocol [X] Labs: chem 8, mg, phos, H/H, BGM

## 2025-01-16 NOTE — CHART NOTE - NSCHARTNOTEFT_GEN_A_CORE
RAPID RESPONSE FOLLOW UP NOTE    Patient seen and examined at bedside around 0830 in follow up for RRT called hypoxia s/p bedside trach change to 5 XLT richardley. Patient tachypnea improved, appears more comfortable, eyes are open. Unable to communicate ROS due to mental status. Pt now hypotensive 88/52. No alarms or desats on telemetry     Vital Signs Last 24 Hrs  T(C): 36.7 (16 Jan 2025 08:00), Max: 37.2 (15 Kam 2025 20:13)  T(F): 98 (16 Jan 2025 08:00), Max: 98.9 (15 Kam 2025 20:13)  HR: 67  BP: 88/52  RR: 16  SpO2: 100% vent    PHYSICAL EXAM:  GENERAL: Pt lying in bed comfortably in NAD, eyes open   ENT: Moist mucous membranes  CHEST/LUNG: Coarse breath sounds, No wheezing. Unlabored respirations  HEART: S1, S2   ABDOMEN: Bowel sounds present; Nontender, No guarding or rigidity . + PEG in place, appears clean   EXTREMITIES: non pitting edema. RUE midline catheter in place, c/d/i  NERVOUS SYSTEM:  Does not answer questions or follow commands     LABS:                        10.8   22.16 )-----------( 293      ( 16 Jan 2025 06:50 )             34.5     01-16  137  |  98  |  24.7[H]  ----------------------------<  176[H]  4.2   |  28.0  |  0.60    Ca    7.8[L]      16 Jan 2025 06:50  Phos  2.0     01-15  Mg     1.7     01-15    TPro  4.6[L]  /  Alb  1.8[L]  /  TBili  <0.2[L]  /  DBili  x   /  AST  28  /  ALT  33[H]  /  AlkPhos  66  01-15  LIVER FUNCTIONS - ( 15 Kam 2025 01:37 )  Alb: 1.8 g/dL / Pro: 4.6 g/dL / ALK PHOS: 66 U/L / ALT: 33 U/L / AST: 28 U/L / GGT: x           CAPILLARY BLOOD GLUCOSE  POCT Blood Glucose.: 185 mg/dL (16 Jan 2025 06:37)  POCT Blood Glucose.: 144 mg/dL (16 Jan 2025 05:30)  POCT Blood Glucose.: 155 mg/dL (15 Kam 2025 23:17)  POCT Blood Glucose.: 179 mg/dL (15 Kam 2025 20:07)  POCT Blood Glucose.: 166 mg/dL (15 Kam 2025 18:13)  POCT Blood Glucose.: 186 mg/dL (15 Kam 2025 11:39)    Urinalysis Basic - ( 16 Jan 2025 06:50 )  Color: x / Appearance: x / SG: x / pH: x  Gluc: 176 mg/dL / Ketone: x  / Bili: x / Urobili: x   Blood: x / Protein: x / Nitrite: x   Leuk Esterase: x / RBC: x / WBC x   Sq Epi: x / Non Sq Epi: x / Bacteria: x    Culture - Blood (collected 15 Kam 2025 01:37)  Source: .Blood BLOOD  Preliminary Report (16 Jan 2025 07:02):    No growth at 24 hours    ABG - ( 16 Jan 2025 07:03 )  pH, Arterial: 7.430 pH, Blood: x     /  pCO2: 49    /  pO2: 166   / HCO3: 32    / Base Excess: 8.2   /  SaO2: 100.0     I&O's Summary    15 Kam 2025 07:01  -  16 Jan 2025 07:00  --------------------------------------------------------  IN: 990 mL / OUT: 1100 mL / NET: -110 mL      IMAGING: CXR appears clear, follow up official reading     ASSESSMENT/ PLAN: Pt is a 84y old Female s/p Rapid Response for hypoxia on vent, now resolved. Respiratory status improved however now hypotensive. Possibly medication induced, given morphine/dilaudid earlier in RRT and getting many antiHTN meds via PEG tube.   - pulm consulted for follow up  - ethics reached out to, palliative care consulted  - antiHTN medications to be adjusted/dc'd  - NS 500cc bolus     DISPOSITION:  - Maintain current level of care  - Continue to observe

## 2025-01-16 NOTE — CONSULT NOTE ADULT - CONSULT REQUESTED DATE/TIME
24-Dec-2024 19:00
25-Dec-2024 11:53
03-Jan-2025 16:18
16-Jan-2025 14:18
31-Dec-2024
26-Dec-2024 14:10
10-Kam-2025 16:54
25-Dec-2024 09:00

## 2025-01-16 NOTE — CHART NOTE - NSCHARTNOTEFT_GEN_A_CORE
Ethics continues to follow. See consult 12/26/24. Case discussed with Dr. Elliott (Medicine Attending). [place] is surrogate who includes patient's sisters who live out of state.     Antonieta Giles MD  Ethics Attending  [phone] Ethics continues to follow. See consult 12/26/24. Case discussed with Dr. Elliott (Medicine Attending). Clarified surrogacy. Hamilton is surrogate who includes patient's sisters who live out of state.     Antonieta Giles MD  Ethics Attending  [phone]

## 2025-01-16 NOTE — CONSULT NOTE ADULT - ASSESSMENT
84F with HTN, HLD, osteoporosis, GERD, metastatic breast cancer s/p mastectomy, radiation, with prolonged hospitalization, admitted 12/24 with altered mental status, left sided weakness and seizure activity. 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 During course in NSICU patient was on propofol gtt, Ketamine gtt, high dose steroids for empiric autoimmune encephalitis, drips weaned off. Fever on 12/30/24 found to have Citrobacter bacteremia, pseudomonas pneumonia and was seen by infectious diseases, was on VEEG and epilepsy medications adjusted by epilepsy team, also treated with IVIG, had endotracheal tube leak so was reintubated, s/p trach and PEG 1/8/25. Downgraded to medicine 1/9 continues on steroids now changed to PO with taper will ween as able. otherwise continues on AED guided by neurology, Per Neuro, repeat CTH ordered and without change / anoxic injury. No meaningful recovery. RRT on 1/15 for hypoxia and was placed back on full vent support. Another rapid response on 1/16 for hypoxia, s/p emergent bronch by MICU team showed that the trach was partially occluding with granulation tissue and with only a small amount of purchase in the airway, surgery called and repeat bronch done with placement of a distal XLT.  Palliative consulted for complex medical decision making in the setting of likely life-limiting illness.      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

## 2025-01-16 NOTE — PROCEDURE NOTE - NSBRONCHPROCDETAILS_GEN_A_CORE_FT
Patient with 100% oxygen saturation and hemodynamically stable. Bronchoscopy through the tracheostomy reviewed similar findings to previously reported as the size shiley was only partially in the airway, however was noted to be flush with the skin. Decision at this time was to placed a distal XLT. A size 5 cuffed was available at the bedside. After confirmation in the airway the previous trach was removed and the new size 5 distal XLT was placed over the bronchoscope. Peak pressures reduced to 30, satting 100% and pulling TV's of 300 post replacement. The balloon was inflated and the trach secured with ties. Completion bronchoscopy conducted and the martin was visualized. Upper airways appeared normal. Patient tolerated well.

## 2025-01-16 NOTE — RAPID RESPONSE TEAM SUMMARY - NSMEDICATIONSRRT_GEN_ALL_CORE
Duoneb  Morphine 1mg IVP  Solumed 125mg IVP  Dilaudid 0.5mg IVP post bedside bronch  to restart abx Duoneb  Morphine 1mg IVP  Solumed 125mg IVP  Dilaudid 0.5mg IVP post bedside bronch  to restart abx  pulm to follow up

## 2025-01-16 NOTE — RAPID RESPONSE TEAM SUMMARY - NSOTHERINTERVENTIONSRRT_GEN_ALL_CORE
Bedside bronch, trach is in place, but short and causing granulation on posterior trachea. Surgery called for trach change to XLT

## 2025-01-16 NOTE — PROCEDURE NOTE - NSBRONCHHISTORY_GEN_A_CORE_FT
Patient s/p open tracheostomy on 1/8. RRT called this morning Patient s/p open tracheostomy on 1/8. RRT called this morning for significantly elevated peak pressures and difficulty ventilating patient. Initial bronchoscopy by MICU team showed that the trach was partially occluding with granulation tissue and with only a small amount of purchase in the airway.

## 2025-01-16 NOTE — RAPID RESPONSE TEAM SUMMARY - NSADDTLFINDINGSRRT_GEN_ALL_CORE
VS:  Temp: 97.9F , BP: 216/94, HR: 100, RR: 26, O2 sat: 91%  Blood sugar:  PHYSICAL EXAM:  GENERAL: Pt lying in bed, eyes closed  ENT: Moist mucous membranes  CHEST/LUNG: Right wheezing, Left diminished, tachypnea  HEART: S1, S2   ABDOMEN: Bowel sounds present, Nontender. No guarding or rigidity    EXTREMITIES:  2+ Peripheral Pulses, brisk capillary refill <2 seconds. No clubbing, cyanosis, or edema   NERVOUS SYSTEM:  does not follow commands   SKIN: +edema, non pitting     CXR: Compared to previous CXR, no acute changes  VS:  Temp: 97.9F , BP: 216/94, HR: 100, RR: 26, O2 sat: 91%  Blood sugar: 185  PHYSICAL EXAM:  GENERAL: Pt lying in bed, eyes closed  ENT: Moist mucous membranes  CHEST/LUNG: Right wheezing, Left diminished, tachypnea  HEART: S1, S2   ABDOMEN: Bowel sounds present, Nontender. No guarding or rigidity    EXTREMITIES:  2+ Peripheral Pulses, brisk capillary refill <2 seconds. No clubbing, cyanosis, or edema   NERVOUS SYSTEM:  does not follow commands   SKIN: +edema, non pitting     CXR: Compared to previous CXR, no acute changes

## 2025-01-16 NOTE — PROGRESS NOTE ADULT - ASSESSMENT
84yoF hx mild dementia, remote  CVA, HTN, HLD, breast CA s/p L-mastectomy, chemoradiation,  who presented to [facility] on 12/24 for altered mental status, left sided weakness and seizure activity, who was intubated in ED for airway protection, admitted to NSICU for status epilepticus and being treated for 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 Downgraded 1/9 continues on steroids now changed to PO with taper will ween as able. otherwise continues on AED guided by neurology, sutures to be removed with surgery team 1/15. Discussed with Neuro, repeat CTH ordered and without change / anoxic injury. Continues on Steroid taper, will continue to discuss GOC with family / plan for facility placement when steroid dosage weened appropriately. Rapid response called on 1/16 for hypoxia, bronchoscopy done at bedside followed by trach exchange.     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   -Status now resolved, Vimpat, Fycompa, Briviact as AED regimen  can convert briviact to PEG/ PO   -CSF encephalitis panel reviewed, ?Ab negative autoimmune encephalitis  -MRI brain 12/26 reviewed, encephalomalacia, gliosis noted  -s/p IVIG 1/3-1/7  Discussed with NSICU / clinical pharmacy / Neurology in house, in regards to continued steroids, transitioned to Decadron taper PO via PEG   -Neurology following repeated CTH 1/14 without changes/ anoxic injury continues on Steroids     Acute on chronic hypoxic respiratory failure, Ventilator dependent respiratory failure, s/p PEG  -Etiology likely related to neurologic injury as above  -On AC/CMV settings  -On tube feeds  -Pulmonary consulted recs appreciated   - Hypoxia on 1/16  - trach exchange on 1/16 with improvement   - follow up repeat cxr  - Epimeric abx started, cefepime  - follow cxs    Citrobacter bacteremia, Pseudomonas PNA  -Initial blood cx positive on 12/30, cleared cx on 12/31  -Sputum cx w/ Pseudomonas, CXR on 12/27 w/ LLL airspace opacity   -ID following, advised cefepime x 10 day course (ended on 1/9/25)  - cefepime restarted during rapid respones    Diarrhea  -Possibly antibiotic associated, continuing to improve   -s/p rectal tube placement, now removed  -CT A/P removed, mostly chronic incidental findings, no colitis noted    Carotid stenosis  -CTA w/ high grade stenosis 75% on R-ICA  -On ASA, was previously ordered for Plavix but d/c’ed, unclear why  -Neurology consulted, signed off on 12/27/24, per note resume DAPT and outpatient neuro IR when able    Hx HTN, HLD  -Clonidine, carvedilol, atorvastatin     Hx dementia  -Donepezil has been on hold    Prophylactic measure  -Lovenox 40mg daily     Dispo: medically active       84yoF hx mild dementia, remote  CVA, HTN, HLD, breast CA s/p L-mastectomy, chemoradiation,  who presented to [facility] on 12/24 for altered mental status, left sided weakness and seizure activity, who was intubated in ED for airway protection, admitted to NSICU for status epilepticus and being treated for 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 Downgraded 1/9 continues on steroids now changed to PO with taper will ween as able. otherwise continues on AED guided by neurology, sutures to be removed with surgery team 1/15. Discussed with Neuro, repeat CTH ordered and without change / anoxic injury. Continues on Steroid taper, will continue to discuss GOC with family / plan for facility placement when steroid dosage weened appropriately. Rapid response called on 1/16 for hypoxia, bronchoscopy done at bedside followed by trach exchange.     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   -Status now resolved, Vimpat, Fycompa, Briviact as AED regimen  can convert briviact to PEG/ PO   -CSF encephalitis panel reviewed, ?Ab negative autoimmune encephalitis  -MRI brain 12/26 reviewed, encephalomalacia, gliosis noted  -s/p IVIG 1/3-1/7  Discussed with NSICU / clinical pharmacy / Neurology in house, in regards to continued steroids, transitioned to Decadron taper PO via PEG   -Neurology following repeated CTH 1/14 without changes/ anoxic injury continues on Steroids     Acute on chronic hypoxic respiratory failure, Ventilator dependent respiratory failure, s/p PEG  -Etiology likely related to neurologic injury as above  -On AC/CMV settings  -On tube feeds  -Pulmonary consulted recs appreciated   - Hypoxia on 1/16  - trach exchange on 1/16 with improvement   - follow up repeat cxr  - Epimeric abx started, cefepime  - follow cxs    Citrobacter bacteremia, Pseudomonas PNA  -Initial blood cx positive on 12/30, cleared cx on 12/31  -Sputum cx w/ Pseudomonas, CXR on 12/27 w/ LLL airspace opacity   -ID following, advised cefepime x 10 day course (ended on 1/9/25)  - cefepime restarted during rapid response    Diarrhea  -Possibly antibiotic associated, continuing to improve   -s/p rectal tube placement, now removed  -CT A/P removed, mostly chronic incidental findings, no colitis noted    Carotid stenosis  -CTA w/ high grade stenosis 75% on R-ICA  -On ASA, was previously ordered for Plavix but d/c’ed, unclear why  -Neurology consulted, signed off on 12/27/24, per note resume DAPT and outpatient neuro IR when able    Hx HTN, HLD  -Clonidine, carvedilol, atorvastatin     Hx dementia  -Donepezil has been on hold    Prophylactic measure  -Lovenox 40mg daily     Dispo: medically active       84yoF hx mild dementia, remote  CVA, HTN, HLD, breast CA s/p L-mastectomy, chemoradiation,  who presented to [facility] on 12/24 for altered mental status, left sided weakness and seizure activity, who was intubated in ED for airway protection, admitted to NSICU for status epilepticus and being treated for 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 Downgraded 1/9 continues on steroids now changed to PO with taper will ween as able. otherwise continues on AED guided by neurology, sutures to be removed with surgery team 1/15. Discussed with Neuro, repeat CTH ordered and without change / anoxic injury. Continues on Steroid taper, will continue to discuss GOC with family / plan for facility placement when steroid dosage weened appropriately. Rapid response called on 1/16 for hypoxia, bronchoscopy done at bedside followed by trach exchange.     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   -Status now resolved, Vimpat, Fycompa, Briviact as AED regimen  can convert briviact to PEG/ PO   -CSF encephalitis panel reviewed, ?Ab negative autoimmune encephalitis  -MRI brain 12/26 reviewed, encephalomalacia, gliosis noted  -s/p IVIG 1/3-1/7  Discussed with NSICU / clinical pharmacy / Neurology in house, in regards to continued steroids, transitioned to Decadron taper PO via PEG   -Neurology following repeated CTH 1/14 without changes/ anoxic injury continues on Steroids     Acute on chronic hypoxic respiratory failure, Ventilator dependent respiratory failure, s/p PEG  -Etiology likely related to neurologic injury as above  -On AC/CMV settings  -On tube feeds  -Pulmonary consulted recs appreciated   - Hypoxia on 1/16  - trach exchange on 1/16 with improvement   - follow up repeat cxr  - Epimeric abx started, cefepime  - follow cxs    Citrobacter bacteremia, Pseudomonas PNA  -Initial blood cx positive on 12/30, cleared cx on 12/31  -Sputum cx w/ Pseudomonas, CXR on 12/27 w/ LLL airspace opacity   -ID following, advised cefepime x 10 day course (ended on 1/9/25)  - cefepime restarted during rapid response    Diarrhea  -Possibly antibiotic associated, continuing to improve   -s/p rectal tube placement, now removed  -CT A/P removed, mostly chronic incidental findings, no colitis noted    Carotid stenosis  -CTA w/ high grade stenosis 75% on R-ICA  -On ASA, was previously ordered for Plavix but d/c’ed, unclear why  -Neurology consulted, signed off on 12/27/24, per note resume DAPT and outpatient neuro IR when able    Hx HTN, HLD  - bp low today, hold ace  - will reduce clonidine  - cont coreg with hold parameters  - cont atorvastatin   - monitor bp and adjust as needed    Hx dementia  -Donepezil has been on hold    Prophylactic measure  -Lovenox 40mg daily     Dispo: medically active       84yoF hx mild dementia, remote  CVA, HTN, HLD, breast CA s/p L-mastectomy, chemoradiation,  who presented to [facility] on 12/24 for altered mental status, left sided weakness and seizure activity, who was intubated in ED for airway protection, admitted to NSICU for status epilepticus and being treated for 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 Downgraded 1/9 continues on steroids now changed to PO with taper will ween as able. otherwise continues on AED guided by neurology, sutures to be removed with surgery team 1/15. Discussed with Neuro, repeat CTH ordered and without change / anoxic injury. Continues on Steroid taper, will continue to discuss GOC with family / plan for facility placement when steroid dosage weened appropriately. Rapid response called on 1/16 for hypoxia, bronchoscopy done at bedside followed by trach exchange.     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   -Status now resolved, Vimpat, Fycompa, Briviact as AED regimen  can convert briviact to PEG/ PO   -CSF encephalitis panel reviewed, ?Ab negative autoimmune encephalitis  -MRI brain 12/26 reviewed, encephalomalacia, gliosis noted  -s/p IVIG 1/3-1/7  Discussed with NSICU / clinical pharmacy / Neurology in house, in regards to continued steroids, transitioned to Decadron taper PO via PEG   -Neurology following repeated CTH 1/14 without changes/ anoxic injury continues on Steroids     Acute on chronic hypoxic respiratory failure, Ventilator dependent respiratory failure, s/p PEG  -Etiology likely related to neurologic injury as above  -On AC/CMV settings  -On tube feeds  -Pulmonary consulted recs appreciated   - Hypoxia on 1/16  - trach exchange on 1/16 with improvement   - follow up repeat cxr  - Epimeric abx started, cefepime  - follow cxs    Citrobacter bacteremia, Pseudomonas PNA  -Initial blood cx positive on 12/30, cleared cx on 12/31  -Sputum cx w/ Pseudomonas, CXR on 12/27 w/ LLL airspace opacity   -ID following, advised cefepime x 10 day course (ended on 1/9/25)  - cefepime restarted during rapid response    Diarrhea  -Possibly antibiotic associated, continuing to improve   -s/p rectal tube placement, now removed  -CT A/P removed, mostly chronic incidental findings, no colitis noted    Carotid stenosis  -CTA w/ high grade stenosis 75% on R-ICA  -On ASA, was previously ordered for Plavix but d/c’ed, unclear why  -Neurology consulted, signed off on 12/27/24, per note resume DAPT and outpatient neuro IR when able    Hx HTN, HLD  - bp low today, hold ace  - will reduce clonidine  - cont coreg with hold parameters  - cont atorvastatin   - monitor bp and adjust as needed    Hx dementia  -Donepezil has been on hold    Prophylactic measure  -Lovenox 40mg daily     Dispo: medically active  Spoke to Hamilton Castillo [phone], with a clinical updated, He has never had a conversation with his Godmother about advance directives.

## 2025-01-16 NOTE — CHART NOTE - NSCHARTNOTEFT_GEN_A_CORE
Called to pt's bedside by RN stating that pt SpO2 was dropping into the low teens. Pt was placed on 100% O2 and given manual breaths/ SpO2 improved. BRADEN Mejia was called and informed regarding pt's deteriorating condition. An ABG/CXR was ordered. Rapid response was called shortly after. Dr. Jordan came to the bedside and evaluated pt.   Bronchoscopy  was ordered and trach change at the bedside.  Trach change was successful; ventilator settings adjusted. Pt resting comfortably; NARD noted at this time

## 2025-01-16 NOTE — CHART NOTE - NSCHARTNOTEFT_GEN_A_CORE
Contacted by RN overnight as respirtaory therapist with concerns on high peak pressure. Patient was a rapid response earlier today for hypoxia and respiratory distress. She is an 85yo F with possible autoimmune encephalitis s/p trach and peg. MICU consulted and emergent bronch performed. General surgery consulted and completed a trach exchange which improved patient respiratory distress and peak pressure.    Now overnight peak pressure into the 70's. Respiratory at bedside for over an hour. Patient's O2 sat remains at 100% and overall looks very comfortable, no distress. Breathing treatment given with no relief. RT has concerns for air trapping.  Hospitalist called and made aware, recommend having MICU review patient and settings. MICU stopped bye, trach positional, gauze placed underneath to help aide in position, patient sat up more straight and peak pressure holding. Recommend reconsult Pulm in the AM.     As per chart pulm following intermittently  Will sign out to day team to follow up Contacted by RN overnight as respirtaory therapist with concerns on high peak pressure. Patient was a rapid response earlier today for hypoxia and respiratory distress. She is an 83yo F with possible autoimmune encephalitis s/p trach and peg. MICU consulted and emergent bronch performed. General surgery consulted and completed a trach exchange which improved patient respiratory distress and peak pressure.    Now overnight peak pressure into the 70's. Respiratory at bedside for over an hour. Patient's O2 sat remains at 100% and overall looks very comfortable, no distress. Breathing treatment given with no relief. RT has concerns for air trapping.  Hospitalist called and made aware, recommend having MICU review patient and settings. MICU stopped bye, trach positional, gauze placed underneath to help aide in position, patient sat up more straight and peak pressure holding. Recommend reconsult Pulm in the AM.     As per chart pulm following intermittently  Will sign out to day team to follow up        Addendum 0119: Rapid response for increased peak pressure.   Patient with increased airway resistance noted with increasing peak inspiratory pressure. RN and RT wedging multiple gauzes under the inferior phalange of tracheostomy to help with position of trach to control peak pressure. Patient currently ventilating within acceptable range. Nocturnist at bedside, patient looks comfortable overall, no intervention to be done, rapid resolved.

## 2025-01-16 NOTE — CONSULT NOTE ADULT - CONSULT REASON
GM NEG BACTEREMIA
Hypertensive urgency, L side weakness, high grade carotid stenosis
PEG and trach
R ICA stenosis
To assist with the ethical dilemma posed by an 84-year-old female admitted for seizures and severe right internal carotid artery stenosis, regarding surrogacy.
trach management
Left sided weakness
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 Downgraded 1/9 continues on steroids now changed to PO with taper will ween as able. otherwise continues on AED guided by neurology, sutures to be removed with surgery team 1/15. Discussed with Neuro, repeat CTH ordered and without change / anoxic injury. No meaningful recovery"

## 2025-01-16 NOTE — CHART NOTE - NSCHARTNOTEFT_GEN_A_CORE
Pt had an episode of desaturation this am  Respiratory adjusted Vent settings to Pressure support  %  NAD  ABG and CXR ordered  Continue to monitor

## 2025-01-16 NOTE — CONSULT NOTE ADULT - SUBJECTIVE AND OBJECTIVE BOX
HPI:  83yo F with PMHx HTN, HLD, osteoporosis, GERD, h/o breast cancer s/p left mastectomy, left hysterectomy BIBEMs for left-sided weakness and LUE rhythmic shaking. Per family, patient was last known well at 1:30am. When EMS arrived patient was alert but sloughing over the left side. Patient's left arm was noted to be weak and shortly started twitching, was given versed by EMS. By the time patient arrived to ER, Pt was intubated for airway protection. 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 CTA without LVO however with findings of high grade stenosis 75% right  ICA and 30% stenosis L ICA. NeuroICU consulted for further intervention. (24 Dec 2024 20:57)      84yoF hx mild dementia, remote  CVA, HTN, HLD, breast CA s/p L-mastectomy, chemoradiation. Patient presented to [facility] on 12/24 for altered mental status, left sided weakness and seizure activity. 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 During his course in NSICU patient was on propofol gtt, Ketamine gtt, high dose steroids for empiric autoimmune encephalitis, drips weaned off. Fever on 12/30/24 found to have Citrobacter bacteremia, pseudomonas pneumonia and was seen by infectious diseases, was on VEEG and epilepsy medications adjusted by epilepsy team, also treated with IVIG, had endotracheal tube leak so was reintubated, s/p trach and PEG 1/8/25. Downgraded to medicine 1/9 continues on steroids now changed to PO with taper will ween as able. otherwise continues on AED guided by neurology, Per Neuro, repeat CTH ordered and without change / anoxic injury. No meaningful recovery. RRT on 1/15 for hypoxia and was placed back on full vent support. RRT on 1/16 for hypoxia, s/p emergent bronch by MICU team showed that the trach was partially occluding with granulation tissue and with only a small amount of purchase in the airway, surgery called and repeat bronch done with placement of a distal XLT.      PERTINENT PMH REVIEWED: Yes No    PAST MEDICAL & SURGICAL HISTORY:  Hypertension  Osteoporosis  Dyslipidemia  GERD (gastroesophageal reflux disease)  Breast cancer  History of hysterectomy total abdominal  S/P mastectomy, left      SOCIAL HISTORY:                      Substance history:                    Admitted from:  home  [facility]                     Sikhism/spirituality:                    Cultural concerns:                      Surrogate/HCP/Guardian: Phone#:    FAMILY HISTORY:      Allergies  No Known Allergies      ADVANCE DIRECTIVES/TREATMENT PREFERENCES:  Full code, all aggressive measures desired   DNR/DNI - MOLST, continue all other medical treatments  DNR/DNI - MOLST, comfort measures only     Baseline ADLs (prior to admission):  Independent/ Dependent      Karnofsky/Palliative Performance Status Version 2:  %  http://npcrc.org/files/news/palliative_performance_scale_ppsv2.pdf    Present Symptoms:     Dyspnea: Mild Moderate Severe  Nausea/Vomiting: Yes No  Anxiety:  Yes No  Depression: Yes No  Fatigue: Yes No  Loss of appetite: Yes No  Constipation:     Pain: Yes No            Character-            Duration-            Effect-            Factors-            Frequency-            Location-            Severity-    Pain AD Score:  [email or web address] (press ctrl + left click to view)    Review of Systems: Reviewed                     Negative:                     Positive:  Unable to obtain due to poor mentation   All others negative    MEDICATIONS  (STANDING):  ascorbic acid 500 milliGRAM(s) Oral daily  aspirin  chewable 81 milliGRAM(s) Oral daily  atorvastatin 10 milliGRAM(s) Oral at bedtime  brivaracetam 100 milliGRAM(s) Oral two times a day  calcium carbonate 1250 mG  + Vitamin D (OsCal 500 + D) 1 Tablet(s) Oral daily  carvedilol 6.25 milliGRAM(s) Oral every 12 hours  cefepime  Injectable.      cefepime  Injectable. 2000 milliGRAM(s) IV Push every 12 hours  chlorhexidine 0.12% Liquid 15 milliLiter(s) Oral Mucosa every 12 hours  chlorhexidine 2% Cloths 1 Application(s) Topical daily  cloNIDine 0.2 milliGRAM(s) Oral three times a day  cyanocobalamin 1000 MICROGram(s) Oral daily  dexAMETHasone     Tablet 35 milliGRAM(s) Oral daily  dexAMETHasone     Tablet   Oral   enoxaparin Injectable 40 milliGRAM(s) SubCutaneous <User Schedule>  insulin lispro (ADMELOG) corrective regimen sliding scale   SubCutaneous every 6 hours  lacosamide 150 milliGRAM(s) Oral <User Schedule>  melatonin 5 milliGRAM(s) Oral at bedtime  multivitamin 1 Tablet(s) Oral daily  pantoprazole  Injectable 40 milliGRAM(s) IV Push daily  perampanel 8 milliGRAM(s) Oral at bedtime  psyllium Powder 1 Packet(s) Oral three times a day  sildenafil (REVATIO) 20 milliGRAM(s) Oral three times a day    MEDICATIONS  (PRN):  acetaminophen   Oral Liquid .. 650 milliGRAM(s) Oral every 6 hours PRN Temp greater or equal to 38C (100.4F), Mild Pain (1 - 3)  albuterol/ipratropium for Nebulization 3 milliLiter(s) Nebulizer every 6 hours PRN Shortness of Breath and/or Wheezing      PHYSICAL EXAM:    Vital Signs Last 24 Hrs  T(C): 37 (16 Jan 2025 14:00), Max: 37.2 (15 Kam 2025 20:13)  T(F): 98.6 (16 Jan 2025 14:00), Max: 98.9 (15 Kam 2025 20:13)  HR: 70 (16 Jan 2025 14:00) (64 - 95)  BP: 135/69 (16 Jan 2025 14:00) (89/27 - 159/74)  BP(mean): 85 (16 Jan 2025 14:00) (45 - 85)  RR: 14 (16 Jan 2025 14:00) (12 - 20)  SpO2: 100% (16 Jan 2025 14:00) (94% - 100%)    Parameters below as of 16 Jan 2025 14:00  Patient On (Oxygen Delivery Method): ventilator        General: alert  oriented x ____ lethargic agitated delirious                  cachexia  nonverbal  coma    HEENT: normal  dry mouth  ET tube/trach    Lungs: comfortable tachypnea/labored breathing  excessive secretions    CV: normal  tachycardia    GI: normal  distended  tender  no BS               PEG/NG/OG tube  constipation  last BM:     : normal  incontinent  oliguria/anuria  fuller    MSK: normal  weakness  edema             ambulatory  bedbound/wheelchair bound    Neuro: no focal deficits cognitive impairment dysphagia dysarthria hemiplegia     Skin: normal  pressure ulcers- Stage_____  no rash    LABS:                        10.8   22.16 )-----------( 293      ( 16 Jan 2025 06:50 )             34.5     01-16    137  |  98  |  24.7[H]  ----------------------------<  176[H]  4.2   |  28.0  |  0.60    Ca    7.8[L]      16 Jan 2025 06:50  Phos  2.0     01-15  Mg     1.7     01-15    TPro  4.6[L]  /  Alb  1.8[L]  /  TBili  <0.2[L]  /  DBili  x   /  AST  28  /  ALT  33[H]  /  AlkPhos  66  01-15      Urinalysis Basic - ( 16 Jan 2025 06:50 )    Color: x / Appearance: x / SG: x / pH: x  Gluc: 176 mg/dL / Ketone: x  / Bili: x / Urobili: x   Blood: x / Protein: x / Nitrite: x   Leuk Esterase: x / RBC: x / WBC x   Sq Epi: x / Non Sq Epi: x / Bacteria: x      I&O's Summary    15 Kam 2025 07:01  -  16 Jan 2025 07:00  --------------------------------------------------------  IN: 990 mL / OUT: 1100 mL / NET: -110 mL    16 Jan 2025 07:01  -  16 Jan 2025 14:20  --------------------------------------------------------  IN: 500 mL / OUT: 0 mL / NET: 500 mL        RADIOLOGY & ADDITIONAL STUDIES:       HPI:  85yo F with PMHx HTN, HLD, osteoporosis, GERD, h/o breast cancer s/p left mastectomy, left hysterectomy BIBEMs for left-sided weakness and LUE rhythmic shaking. Per family, patient was last known well at 1:30am. When EMS arrived patient was alert but sloughing over the left side. Patient's left arm was noted to be weak and shortly started twitching, was given versed by EMS. By the time patient arrived to ER, Pt was intubated for airway protection. 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 CTA without LVO however with findings of high grade stenosis 75% right  ICA and 30% stenosis L ICA. NeuroICU consulted for further intervention. (24 Dec 2024 20:57)      84F with past medical history as listed and mild dementia, remote  CVA, HTN, HLD, breast CA s/p L-mastectomy, chemoradiation admitted 12/24 with altered mental status, left sided weakness and seizure activity. 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 During course in NSICU patient was on propofol gtt, Ketamine gtt, high dose steroids for empiric autoimmune encephalitis, drips weaned off. Fever on 12/30/24 found to have Citrobacter bacteremia, pseudomonas pneumonia and was seen by infectious diseases, was on VEEG and epilepsy medications adjusted by epilepsy team, also treated with IVIG, had endotracheal tube leak so was reintubated, s/p trach and PEG 1/8/25. Downgraded to medicine 1/9 continues on steroids now changed to PO with taper will ween as able. otherwise continues on AED guided by neurology, Per Neuro, repeat CTH ordered and without change / anoxic injury. No meaningful recovery. RRT on 1/15 for hypoxia and was placed back on full vent support. Another rapid response on 1/16 for hypoxia, s/p emergent bronch by MICU team showed that the trach was partially occluding with granulation tissue and with only a small amount of purchase in the airway, surgery called and repeat bronch done with placement of a distal XLT.  Palliative consulted for complex medical decision making in the setting of likely life-limiting illness.     PERTINENT PMH REVIEWED: Yes     PAST MEDICAL & SURGICAL HISTORY:  Hypertension  Osteoporosis  Dyslipidemia  GERD (gastroesophageal reflux disease)  Breast cancer  History of hysterectomy total abdominal  S/P mastectomy, left      SOCIAL HISTORY:                      Substance history:                    Admitted from:  [facility]  [facility]                     Taoism/spirituality:                    Cultural concerns:                      Surrogate#:    FAMILY HISTORY: unable to obtain due to poor medical condition     Allergies  No Known Allergies      ADVANCE DIRECTIVES/TREATMENT PREFERENCES:  Full code, all aggressive measures desired   DNR/DNI - MOLST, continue all other medical treatments  DNR/DNI - MOLST, comfort measures only     Baseline ADLs (prior to admission):  Independent/ Dependent      Karnofsky/Palliative Performance Status Version 2:  %  http://npcrc.org/files/news/palliative_performance_scale_ppsv2.pdf    Present Symptoms:     Dyspnea: moderate   Nausea/Vomiting: No  Anxiety:  No  Depression: No  Fatigue: No  Loss of appetite: No  Constipation: none     Pain: No            Character-            Duration-            Effect-            Factors-            Frequency-            Location-            Severity-    Pain AD Score:  [email or web address] (press ctrl + left click to view)    Review of Systems: Reviewed                     Unable to obtain due to poor mentation   All others negative    MEDICATIONS  (STANDING):  ascorbic acid 500 milliGRAM(s) Oral daily  aspirin  chewable 81 milliGRAM(s) Oral daily  atorvastatin 10 milliGRAM(s) Oral at bedtime  brivaracetam 100 milliGRAM(s) Oral two times a day  calcium carbonate 1250 mG  + Vitamin D (OsCal 500 + D) 1 Tablet(s) Oral daily  carvedilol 6.25 milliGRAM(s) Oral every 12 hours  cefepime  Injectable.      cefepime  Injectable. 2000 milliGRAM(s) IV Push every 12 hours  chlorhexidine 0.12% Liquid 15 milliLiter(s) Oral Mucosa every 12 hours  chlorhexidine 2% Cloths 1 Application(s) Topical daily  cloNIDine 0.2 milliGRAM(s) Oral three times a day  cyanocobalamin 1000 MICROGram(s) Oral daily  dexAMETHasone     Tablet 35 milliGRAM(s) Oral daily  dexAMETHasone     Tablet   Oral   enoxaparin Injectable 40 milliGRAM(s) SubCutaneous <User Schedule>  insulin lispro (ADMELOG) corrective regimen sliding scale   SubCutaneous every 6 hours  lacosamide 150 milliGRAM(s) Oral <User Schedule>  melatonin 5 milliGRAM(s) Oral at bedtime  multivitamin 1 Tablet(s) Oral daily  pantoprazole  Injectable 40 milliGRAM(s) IV Push daily  perampanel 8 milliGRAM(s) Oral at bedtime  psyllium Powder 1 Packet(s) Oral three times a day  sildenafil (REVATIO) 20 milliGRAM(s) Oral three times a day    MEDICATIONS  (PRN):  acetaminophen   Oral Liquid .. 650 milliGRAM(s) Oral every 6 hours PRN Temp greater or equal to 38C (100.4F), Mild Pain (1 - 3)  albuterol/ipratropium for Nebulization 3 milliLiter(s) Nebulizer every 6 hours PRN Shortness of Breath and/or Wheezing      PHYSICAL EXAM:    Vital Signs Last 24 Hrs  T(C): 37 (16 Jan 2025 14:00), Max: 37.2 (15 Kam 2025 20:13)  T(F): 98.6 (16 Jan 2025 14:00), Max: 98.9 (15 Kam 2025 20:13)  HR: 70 (16 Jan 2025 14:00) (64 - 95)  BP: 135/69 (16 Jan 2025 14:00) (89/27 - 159/74)  BP(mean): 85 (16 Jan 2025 14:00) (45 - 85)  RR: 14 (16 Jan 2025 14:00) (12 - 20)  SpO2: 100% (16 Jan 2025 14:00) (94% - 100%)    Parameters below as of 16 Jan 2025 14:00  Patient On (Oxygen Delivery Method): ventilator    General: unresponsive trach     HEENT: trach     Lungs: tachypnea/labored breathing  excessive secretions    CV: normal      GI:     : normal  incontinent  oliguria/anuria  fuller    MSK: normal  weakness  edema             ambulatory  bedbound/wheelchair bound    Neuro: no focal deficits cognitive impairment dysphagia dysarthria hemiplegia     Skin: normal  pressure ulcers- Stage_____  no rash    LABS:                        10.8   22.16 )-----------( 293      ( 16 Jan 2025 06:50 )             34.5     01-16    137  |  98  |  24.7[H]  ----------------------------<  176[H]  4.2   |  28.0  |  0.60    Ca    7.8[L]      16 Jan 2025 06:50  Phos  2.0     01-15  Mg     1.7     01-15    TPro  4.6[L]  /  Alb  1.8[L]  /  TBili  <0.2[L]  /  DBili  x   /  AST  28  /  ALT  33[H]  /  AlkPhos  66  01-15      Urinalysis Basic - ( 16 Jan 2025 06:50 )    Color: x / Appearance: x / SG: x / pH: x  Gluc: 176 mg/dL / Ketone: x  / Bili: x / Urobili: x   Blood: x / Protein: x / Nitrite: x   Leuk Esterase: x / RBC: x / WBC x   Sq Epi: x / Non Sq Epi: x / Bacteria: x      I&O's Summary    15 Kam 2025 07:01  -  16 Jan 2025 07:00  --------------------------------------------------------  IN: 990 mL / OUT: 1100 mL / NET: -110 mL    16 Jan 2025 07:01  -  16 Jan 2025 14:20  --------------------------------------------------------  IN: 500 mL / OUT: 0 mL / NET: 500 mL    RADIOLOGY & ADDITIONAL STUDIES:    < from: Xray Chest 1 View- PORTABLE-Urgent (Xray Chest 1 View- PORTABLE-Urgent .) (01.16.25 @ 10:03) >    IMPRESSION: Small left-sided effusion/atelectasis.    --- End of Report ---      < end of copied text >       HPI:  85yo F with PMHx HTN, HLD, osteoporosis, GERD, h/o breast cancer s/p left mastectomy, left hysterectomy BIBEMs for left-sided weakness and LUE rhythmic shaking. Per family, patient was last known well at 1:30am. When EMS arrived patient was alert but sloughing over the left side. Patient's left arm was noted to be weak and shortly started twitching, was given versed by EMS. By the time patient arrived to ER, Pt was intubated for airway protection. 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 CTA without LVO however with findings of high grade stenosis 75% right  ICA and 30% stenosis L ICA. NeuroICU consulted for further intervention. (24 Dec 2024 20:57)    84F with past medical history as listed and mild dementia, remote CVA, HTN, HLD, breast CA s/p L-mastectomy, chemoradiation admitted 12/24 with altered mental status, left sided weakness and seizure activity. 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 During course in NSICU patient was on propofol gtt, Ketamine gtt, high dose steroids for empiric autoimmune encephalitis, drips weaned off. Fever on 12/30/24 found to have Citrobacter bacteremia, pseudomonas pneumonia and was seen by infectious diseases, was on VEEG and epilepsy medications adjusted by epilepsy team, also treated with IVIG, had endotracheal tube leak so was reintubated, s/p trach and PEG 1/8/25. Downgraded to medicine 1/9 continues on steroids now changed to PO with taper will ween as able. otherwise continues on AED guided by neurology, Per Neuro, repeat CTH ordered and without change / anoxic injury. No meaningful recovery. RRT on 1/15 for hypoxia and was placed back on full vent support. Another rapid response on 1/16 for hypoxia, s/p emergent bronch by MICU team showed that the trach was partially occluding with granulation tissue and with only a small amount of purchase in the airway, surgery called and repeat bronch done with placement of a distal XLT.  Palliative consulted for complex medical decision making in the setting of likely life-limiting illness.      PERTINENT PMH REVIEWED: Yes     PAST MEDICAL & SURGICAL HISTORY:  Hypertension  Osteoporosis  Dyslipidemia  GERD (gastroesophageal reflux disease)  Breast cancer  History of hysterectomy total abdominal  S/P mastectomy, left      SOCIAL HISTORY:                      Substance history: unable to obtain due to poor medical condition                     Admitted from:  home                     Advent/spirituality:                    Cultural concerns: none                       Surrogate#:  Hamilton Castillo [phone]    FAMILY HISTORY: unable to obtain due to poor medical condition     Allergies  No Known Allergies    ADVANCE DIRECTIVES/TREATMENT PREFERENCES:  Full code, all aggressive measures desired     Baseline ADLs (prior to admission):  Dependent      Karnofsky/Palliative Performance Status Version 2:  %  http://npcrc.org/files/news/palliative_performance_scale_ppsv2.pdf    Present Symptoms:     Dyspnea: moderate   Nausea/Vomiting: No  Anxiety:  No  Depression: No  Fatigue: No  Loss of appetite: No  Constipation: none     Pain: No            Character-            Duration-            Effect-            Factors-            Frequency-            Location-            Severity-    Pain AD Score:  [email or web address] (press ctrl + left click to view)    Review of Systems: Reviewed                     Unable to obtain due to poor mentation   All others negative    MEDICATIONS  (STANDING):  ascorbic acid 500 milliGRAM(s) Oral daily  aspirin  chewable 81 milliGRAM(s) Oral daily  atorvastatin 10 milliGRAM(s) Oral at bedtime  brivaracetam 100 milliGRAM(s) Oral two times a day  calcium carbonate 1250 mG  + Vitamin D (OsCal 500 + D) 1 Tablet(s) Oral daily  carvedilol 6.25 milliGRAM(s) Oral every 12 hours  cefepime  Injectable.      cefepime  Injectable. 2000 milliGRAM(s) IV Push every 12 hours  chlorhexidine 0.12% Liquid 15 milliLiter(s) Oral Mucosa every 12 hours  chlorhexidine 2% Cloths 1 Application(s) Topical daily  cloNIDine 0.2 milliGRAM(s) Oral three times a day  cyanocobalamin 1000 MICROGram(s) Oral daily  dexAMETHasone     Tablet 35 milliGRAM(s) Oral daily  dexAMETHasone     Tablet   Oral   enoxaparin Injectable 40 milliGRAM(s) SubCutaneous <User Schedule>  insulin lispro (ADMELOG) corrective regimen sliding scale   SubCutaneous every 6 hours  lacosamide 150 milliGRAM(s) Oral <User Schedule>  melatonin 5 milliGRAM(s) Oral at bedtime  multivitamin 1 Tablet(s) Oral daily  pantoprazole  Injectable 40 milliGRAM(s) IV Push daily  perampanel 8 milliGRAM(s) Oral at bedtime  psyllium Powder 1 Packet(s) Oral three times a day  sildenafil (REVATIO) 20 milliGRAM(s) Oral three times a day    MEDICATIONS  (PRN):  acetaminophen   Oral Liquid .. 650 milliGRAM(s) Oral every 6 hours PRN Temp greater or equal to 38C (100.4F), Mild Pain (1 - 3)  albuterol/ipratropium for Nebulization 3 milliLiter(s) Nebulizer every 6 hours PRN Shortness of Breath and/or Wheezing      PHYSICAL EXAM:    Vital Signs Last 24 Hrs  T(C): 37 (16 Jan 2025 14:00), Max: 37.2 (15 Kam 2025 20:13)  T(F): 98.6 (16 Jan 2025 14:00), Max: 98.9 (15 Kam 2025 20:13)  HR: 70 (16 Jan 2025 14:00) (64 - 95)  BP: 135/69 (16 Jan 2025 14:00) (89/27 - 159/74)  BP(mean): 85 (16 Jan 2025 14:00) (45 - 85)  RR: 14 (16 Jan 2025 14:00) (12 - 20)  SpO2: 100% (16 Jan 2025 14:00) (94% - 100%)    Parameters below as of 16 Jan 2025 14:00  Patient On (Oxygen Delivery Method): ventilator    General: unresponsive trach     HEENT: trach     Lungs: tachypnea/labored breathing  excessive secretions    CV: normal      GI:     : normal  incontinent  oliguria/anuria  fuller    MSK: normal  weakness  edema             ambulatory  bedbound/wheelchair bound    Neuro: no focal deficits cognitive impairment dysphagia dysarthria hemiplegia     Skin: normal  pressure ulcers- Stage_____  no rash    LABS:                        10.8   22.16 )-----------( 293      ( 16 Jan 2025 06:50 )             34.5     01-16    137  |  98  |  24.7[H]  ----------------------------<  176[H]  4.2   |  28.0  |  0.60    Ca    7.8[L]      16 Jan 2025 06:50  Phos  2.0     01-15  Mg     1.7     01-15    TPro  4.6[L]  /  Alb  1.8[L]  /  TBili  <0.2[L]  /  DBili  x   /  AST  28  /  ALT  33[H]  /  AlkPhos  66  01-15    Urinalysis Basic - ( 16 Jan 2025 06:50 )    Color: x / Appearance: x / SG: x / pH: x  Gluc: 176 mg/dL / Ketone: x  / Bili: x / Urobili: x   Blood: x / Protein: x / Nitrite: x   Leuk Esterase: x / RBC: x / WBC x   Sq Epi: x / Non Sq Epi: x / Bacteria: x    I&O's Summary    15 Kam 2025 07:01  -  16 Jan 2025 07:00  --------------------------------------------------------  IN: 990 mL / OUT: 1100 mL / NET: -110 mL    16 Jan 2025 07:01  -  16 Jan 2025 14:20  --------------------------------------------------------  IN: 500 mL / OUT: 0 mL / NET: 500 mL    RADIOLOGY & ADDITIONAL STUDIES:    < from: Xray Chest 1 View- PORTABLE-Urgent (Xray Chest 1 View- PORTABLE-Urgent .) (01.16.25 @ 10:03) >    IMPRESSION: Small left-sided effusion/atelectasis.    --- End of Report ---      < end of copied text >

## 2025-01-16 NOTE — CHART NOTE - NSCHARTNOTEFT_GEN_A_CORE
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   General Surgery called to do trach exchange to distal XLT  Peak pressure and volume are improved   discussed with team, upgrade to stepdown, and pulmonary to follow

## 2025-01-17 NOTE — CHART NOTE - NSCHARTNOTEFT_GEN_A_CORE
Nutrition Note:     Pt was previously receiving enteral feeds. Discussed in IDT rounds. As per palliative, will not be escalating care and possible vent liberation today. RD to respect Pt/family wishes regarding GOC and alternate means nutrition/hydration and remains available.

## 2025-01-17 NOTE — PROGRESS NOTE ADULT - SUBJECTIVE AND OBJECTIVE BOX
OVERNIGHT EVENTS:    Present Symptoms:     Dyspnea: Mild Moderate Severe  Nausea/Vomiting: Yes No  Anxiety:  Yes No  Depression: Yes No  Fatigue: Yes No  Loss of appetite: Yes No  Constipation:     Pain:             Character-            Duration-            Effect-            Factors-            Frequency-            Location-            Severity-    Pain AD Score:  [email or web address] (press ctrl + left click to view)    Review of Systems: Reviewed                     Negative:                     Positive:  Unable to obtain due to poor mentation   All others negative    MEDICATIONS  (STANDING):  ascorbic acid 500 milliGRAM(s) Oral daily  aspirin  chewable 81 milliGRAM(s) Oral daily  atorvastatin 10 milliGRAM(s) Oral at bedtime  brivaracetam 100 milliGRAM(s) Oral two times a day  calcium carbonate 1250 mG  + Vitamin D (OsCal 500 + D) 1 Tablet(s) Oral daily  carvedilol 6.25 milliGRAM(s) Oral every 12 hours  cefepime  Injectable. 2000 milliGRAM(s) IV Push every 12 hours  cefepime  Injectable.      chlorhexidine 0.12% Liquid 15 milliLiter(s) Oral Mucosa every 12 hours  chlorhexidine 2% Cloths 1 Application(s) Topical daily  cloNIDine 0.2 milliGRAM(s) Oral three times a day  cyanocobalamin 1000 MICROGram(s) Oral daily  dexAMETHasone     Tablet   Oral   enoxaparin Injectable 40 milliGRAM(s) SubCutaneous <User Schedule>  insulin lispro (ADMELOG) corrective regimen sliding scale   SubCutaneous every 6 hours  lacosamide 150 milliGRAM(s) Oral <User Schedule>  melatonin 5 milliGRAM(s) Oral at bedtime  multivitamin 1 Tablet(s) Oral daily  pantoprazole  Injectable 40 milliGRAM(s) IV Push daily  perampanel 8 milliGRAM(s) Oral at bedtime  psyllium Powder 1 Packet(s) Oral three times a day  sildenafil (REVATIO) 20 milliGRAM(s) Oral three times a day    MEDICATIONS  (PRN):  acetaminophen   Oral Liquid .. 650 milliGRAM(s) Oral every 6 hours PRN Temp greater or equal to 38C (100.4F), Mild Pain (1 - 3)  albuterol/ipratropium for Nebulization 3 milliLiter(s) Nebulizer every 6 hours PRN Shortness of Breath and/or Wheezing  fentaNYL    Injectable 12.5 MICROGram(s) IV Push every 2 hours PRN Signs and Symptoms of Pain  polyethylene glycol 3350 17 Gram(s) Oral daily PRN Constipation      PHYSICAL EXAM:    Vital Signs Last 24 Hrs  T(C): 37 (17 Jan 2025 04:01), Max: 37 (16 Jan 2025 10:00)  T(F): 98.6 (17 Jan 2025 04:01), Max: 98.6 (16 Jan 2025 10:00)  HR: 60 (17 Jan 2025 08:36) (51 - 104)  BP: 75/48 (17 Jan 2025 08:36) (75/48 - 149/67)  BP(mean): 56 (17 Jan 2025 08:36) (45 - 104)  RR: 12 (17 Jan 2025 08:36) (12 - 24)  SpO2: 100% (17 Jan 2025 08:36) (63% - 100%)    Parameters below as of 17 Jan 2025 08:36  Patient On (Oxygen Delivery Method): ventilator    General: opening her eyes     HEENT: ET tube     Lungs: comfortable     CV: normal      GI: PEG     MSK: bedbound/wheelchair bound    Skin: no rash    LABS:                      10.8   22.16 )-----------( 293      ( 16 Jan 2025 06:50 )             34.5     01-16    137  |  98  |  24.7[H]  ----------------------------<  176[H]  4.2   |  28.0  |  0.60    Ca    7.8[L]      16 Jan 2025 06:50    Urinalysis Basic - ( 16 Jan 2025 06:50 )    Color: x / Appearance: x / SG: x / pH: x  Gluc: 176 mg/dL / Ketone: x  / Bili: x / Urobili: x   Blood: x / Protein: x / Nitrite: x   Leuk Esterase: x / RBC: x / WBC x   Sq Epi: x / Non Sq Epi: x / Bacteria: x    I&O's Summary    16 Jan 2025 07:01  -  17 Jan 2025 07:00  --------------------------------------------------------  IN: 1360 mL / OUT: 400 mL / NET: 960 mL    RADIOLOGY & ADDITIONAL STUDIES:    ADVANCE DIRECTIVES/TREATMENT PREFERENCES:  do not resuscitate, no escalation

## 2025-01-17 NOTE — CHART NOTE - NSCHARTNOTEFT_GEN_A_CORE
Rapid Response called for patient hypoxic in the teens and high peak pressure. MICU saw patient overnight regarding the high peak pressures prior to rapid response. General Surgery called to evaluate trach that was placed yesterday. Patient given 4mg morphine during rapid which made patient comfortable and hypoxia resolved. Started on PRN fentanyl for pain with close monitoring of blood pressure. As per Gen Surg, have ENT see patient tomorrow and MICU re-eval for possible bronch or closer monitoring if needed.     Patient seen at bedside, overall looks comfortable. Peak pressure remains high at 58.   Unable to obtain ROS as pt is nonverbal.    VS: /53, HR 58, RR 19, SpO2 100%    CONSTITUTIONAL: NAD, nonverbal  ENMT: + TRACH  RESPIRATORY: Normal respiratory effort  CARDIOVASCULAR: Regular rate and rhythm, normal S1 and S2, + lower extremity edema  ABDOMEN: + PEG  PSYCH: nonverbal  NEUROLOGY: patient cannot follow commands    A/P: Pt with high peak pressure on trach/vent, unclear if anatomic issues, trach issues, or physiologic.  -fentanyl 12.5 q2 hours PRN for pain  -close BP monitoring, would need upgrade to MICU if needs pressure support and sedation  -remain on SDU    Will endorse to day team to follow up

## 2025-01-17 NOTE — RAPID RESPONSE TEAM SUMMARY - NSSITUATIONBACKGROUNDRRT_GEN_ALL_CORE
Patient rapid overnight for hypoxia down to the teens and high peak pressure in the 70's. Patient already seen by MICU earlier in night for rapid that was resolved as patient was having high peak pressures and hypoxia to the 80's that resolved without intervention. MICU evaluated patient, determined trach likely positional and no intervention to be done overnight as patient looks comfortable and O2 sat has been 100%.     About 4 hours later patient hypoxic, desat down to teens, peak pressure high back into the 70's and holding regardless of position of trach. Nocturnist at bedside throughout rapid response. Patient given 4mg Morphine with relief and improvement in hypoxia however peak pressure still high in the 40's. General surgery called to evaluate as their team replaced the trach less than 24 hours ago. The orginal trach placed on 1/8. As per Surgery would continue to sedate patient as she is comfortable and compliant with the sedation.     Patient yesterday during rapid response did become hypotensive 80's systolic s/p 1mg morphine and 0.5 Dilaudid so will start on fentanyl as per Dr. Tillman and monitor blood pressure. If patient needing pressure support and sedation will upgrade to MICU. MICU to re-eval overnight. 
84yoF hx mild dementia, remote  CVA, HTN, HLD, breast CA s/p L-mastectomy, chemoradiation,  who presented to [facility] on 12/24 for altered mental status, left sided weakness and seizure activity, who was intubated in ED for airway protection    Pt Desat to 17%
Pt is a 85y/o Female admitted 12-24-24 for AMS, status epilepticus, intubated in ED for airway protection, now cent dependent s/p peg, monitored off abx for Citrobacter bacteremia, Pseudomonas PNA. Rapid response last night for desaturation failing CPAP trial. Placed back on full vent support. Per RT, Pt had given neb treatments/ Mucomyst. On pressure control Pt status improved but worsens again if switched to assist mode. ICU called for assist with vent settings, Dr Jordan presented at bedside. Pt in respiratory distress

## 2025-01-17 NOTE — PROGRESS NOTE ADULT - ASSESSMENT
84F with HTN, HLD, osteoporosis, GERD, metastatic breast cancer s/p mastectomy, radiation, with prolonged hospitalization, admitted 12/24 with altered mental status, left sided weakness and seizure activity. 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 During course in NSICU patient was on propofol gtt, Ketamine gtt, high dose steroids for empiric autoimmune encephalitis, drips weaned off. Fever on 12/30/24 found to have Citrobacter bacteremia, pseudomonas pneumonia and was seen by infectious diseases, was on VEEG and epilepsy medications adjusted by epilepsy team, also treated with IVIG, had endotracheal tube leak so was reintubated, s/p trach and PEG 1/8/25. Downgraded to medicine 1/9 continues on steroids now changed to PO with taper will ween as able. otherwise continues on AED guided by neurology, Per Neuro, repeat CTH ordered and without change / anoxic injury. No meaningful recovery. RRT on 1/15 for hypoxia and was placed back on full vent support. Another rapid response on 1/16 for hypoxia, s/p emergent bronch by MICU team showed that the trach was partially occluding with granulation tissue and with only a small amount of purchase in the airway, surgery called and repeat bronch done with placement of a distal XLT.  Palliative consulted for complex medical decision making in the setting of likely life-limiting illness.      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

## 2025-01-17 NOTE — CHART NOTE - NSCHARTNOTEFT_GEN_A_CORE
Called to evaluate Mrs. Dave due to trach malfunctioning and desaturation. She had her trach exchanged for a 5XLT yesterday morning.   On arrival, she had been sedated with 4 of morphine with improvement of her saturation and vent parameters. On exam, there were multiple gauze pads behind the trach phalange with displaced the trach outward. I removed these and advanced the trach gentley. This was tolerated well.   If she continues to have oxygenation/ventilation issues (unclear as ABG was not drawn) we recommend MICU transfer for nursing care and ability to sedate as well as ENT eval in the morning. I would not transport her to CT with a tenuous trach at this time as she is comfortable and compliant with the vent with mild pain control/sedation.   Airway suctioned easily   MICU consult - may need a bronch again    Wei Garrido MD  Acute Care Surgery

## 2025-01-17 NOTE — RAPID RESPONSE TEAM SUMMARY - NSADDTLFINDINGSRRT_GEN_ALL_CORE
VS: /95, HR 77, RR 21, SpO2 97%    CONSTITUTIONAL: NAD, nonverbal  ENMT: + TRACH  RESPIRATORY: Normal respiratory effort  CARDIOVASCULAR: Regular rate and rhythm, normal S1 and S2, + lower extremity edema  ABDOMEN: + PEG  PSYCH: nonverbal  NEUROLOGY: patient cannot follow commands

## 2025-01-17 NOTE — CHART NOTE - NSCHARTNOTEFT_GEN_A_CORE
responded to rapid response.  increased airway resistance noted with increasing peak inspiratory pressure.  artificial airway placement likely causing the increased airway issue.  distal tip of tracheostomy tube possibly resting against posterior wall of trachea suspected.  wedging multiple gauzes under the inferior phalange of tracheostomy tube seemed to addressed the issue and pt. currently ventilating within acceptable range.

## 2025-01-17 NOTE — PROGRESS NOTE ADULT - ASSESSMENT
84F with HTN, HLD, osteoporosis, GERD, metastatic breast cancer s/p mastectomy, radiation, with prolonged hospitalization, admitted 12/24 with altered mental status, left sided weakness and seizure activity. 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 During course in NSICU patient was on propofol gtt, Ketamine gtt, high dose steroids for empiric autoimmune encephalitis, drips weaned off. Fever on 12/30/24 found to have Citrobacter bacteremia, pseudomonas pneumonia and was seen by infectious diseases, was on VEEG and epilepsy medications adjusted by epilepsy team, also treated with IVIG, had endotracheal tube leak so was reintubated, s/p trach and PEG 1/8/25. Downgraded to medicine 1/9 continues on steroids now changed to PO with taper will ween as able. otherwise continues on AED guided by neurology, Per Neuro, repeat CTH ordered and without change / anoxic injury. No meaningful recovery. RRT on 1/15 for hypoxia and was placed back on full vent support. Another rapid response on 1/16 for hypoxia, s/p emergent bronch by MICU team showed that the trach was partially occluding with granulation tissue and with only a small amount of purchase in the airway, surgery called and repeat bronch done with placement of a distal XLT.  Palliative consulted for complex medical decision making in the setting of likely life-limiting illness.      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

## 2025-01-17 NOTE — PROGRESS NOTE ADULT - SUBJECTIVE AND OBJECTIVE BOX
RAÚL JASMINE Patient is a 84y old  Female who presents with a chief complaint of seizures, severe R ICA stenosis (17 Jan 2025 08:42)     HPI:  85yo F with PMHx HTN, HLD, osteoporosis, GERD, h/o breast cancer s/p left mastectomy, left hysterectomy BIBEMs for left-sided weakness and LUE rhythmic shaking. Per family, patient was last known well at 1:30am. When EMS arrived patient was alert but sloughing over the left side. Patient's left arm was noted to be weak and shortly started twitching, was given versed by EMS. By the time patient arrived to ER, Pt was intubated for airway protection. 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 CTA without LVO however with findings of high grade stenosis 75% right  ICA and 30% stenosis L ICA. NeuroICU consulted for further intervention. (24 Dec 2024 20:57)    The patient was seen and evaluated encephalopathic on morphine - sleeping comfortable   The patient is in no acute distress.        Mode: AC/ CMV (Assist Control/ Continuous Mandatory Ventilation), RR (machine): 12, TV (machine): 400, FiO2: 30, PEEP: 6, ITime: 0.64, MAP: 13, PIP: 35I&O's Summary    16 Jan 2025 07:01  -  17 Jan 2025 07:00  --------------------------------------------------------  IN: 1360 mL / OUT: 400 mL / NET: 960 mL      Allergies    No Known Allergies    Intolerances      HEALTH ISSUES - PROBLEM Dx:  Ventilator dependence    Acute hypoxic respiratory failure    Other encephalopathy    Autoimmune encephalitis    Status epilepticus    Citrobacter infection    Pneumonia due to Pseudomonas    Encounter for palliative care          PAST MEDICAL & SURGICAL HISTORY:  Hypertension      Osteoporosis      Dyslipidemia      GERD (gastroesophageal reflux disease)      Breast cancer      History of hysterectomy  total abdominal      S/P mastectomy, left              Vital Signs Last 24 Hrs  T(C): 37 (17 Jan 2025 04:01), Max: 37 (17 Jan 2025 00:03)  T(F): 98.6 (17 Jan 2025 04:01), Max: 98.6 (17 Jan 2025 00:03)  HR: 68 (17 Jan 2025 12:21) (51 - 104)  BP: 107/69 (17 Jan 2025 10:00) (75/48 - 149/67)  BP(mean): 81 (17 Jan 2025 10:00) (56 - 104)  RR: 12 (17 Jan 2025 10:00) (12 - 24)  SpO2: 100% (17 Jan 2025 12:21) (63% - 100%)    Parameters below as of 17 Jan 2025 10:00  Patient On (Oxygen Delivery Method): ventilator    O2 Concentration (%): 30T(C): 37 (01-17-25 @ 04:01), Max: 37 (01-17-25 @ 00:03)  HR: 68 (01-17-25 @ 12:21) (51 - 104)  BP: 107/69 (01-17-25 @ 10:00) (75/48 - 149/67)  RR: 12 (01-17-25 @ 10:00) (12 - 24)  SpO2: 100% (01-17-25 @ 12:21) (63% - 100%)  Wt(kg): --    PHYSICAL EXAM:    GENERAL: NAD  HEAD:  Atraumatic, Normocephalic  NERVOUS SYSTEM: sleeping resting on morphine  CHEST/LUNG: Clear to auscultation bilaterally; No rales, rhonchi, wheezing,   HEART: Regular rate and rhythm; No murmurs,   ABDOMEN: Soft, Nontender, Nondistended; Bowel sounds present  EXTREMITIES:  Peripheral Pulses,       acetaminophen   Oral Liquid .. 650 milliGRAM(s) Oral every 6 hours PRN  albuterol/ipratropium for Nebulization 3 milliLiter(s) Nebulizer every 6 hours PRN  brivaracetam 100 milliGRAM(s) Oral two times a day  fentaNYL    Injectable 12.5 MICROGram(s) IV Push every 2 hours PRN  lacosamide 150 milliGRAM(s) Oral <User Schedule>  LORazepam   Injectable 0.5 milliGRAM(s) IV Push every 2 hours PRN  melatonin 5 milliGRAM(s) Oral at bedtime  morphine  - Injectable 2 milliGRAM(s) IV Push every 4 hours  pantoprazole  Injectable 40 milliGRAM(s) IV Push daily  perampanel 8 milliGRAM(s) Oral at bedtime  sildenafil (REVATIO) 20 milliGRAM(s) Oral three times a day      LABS:  ABG - ( 16 Jan 2025 07:03 )  pH, Arterial: 7.430 pH, Blood: x     /  pCO2: 49    /  pO2: 166   / HCO3: 32    / Base Excess: 8.2   /  SaO2: 100.0                                   10.8   22.16 )-----------( 293      ( 16 Jan 2025 06:50 )             34.5     01-16    137  |  98  |  24.7[H]  ----------------------------<  176[H]  4.2   |  28.0  |  0.60    Ca    7.8[L]      16 Jan 2025 06:50      Urinalysis Basic - ( 16 Jan 2025 06:50 )    Color: x / Appearance: x / SG: x / pH: x  Gluc: 176 mg/dL / Ketone: x  / Bili: x / Urobili: x   Blood: x / Protein: x / Nitrite: x   Leuk Esterase: x / RBC: x / WBC x   Sq Epi: x / Non Sq Epi: x / Bacteria: x      CAPILLARY BLOOD GLUCOSE      POCT Blood Glucose.: 137 mg/dL (17 Jan 2025 11:38)  POCT Blood Glucose.: 130 mg/dL (17 Jan 2025 06:26)  POCT Blood Glucose.: 135 mg/dL (16 Jan 2025 23:57)  POCT Blood Glucose.: 130 mg/dL (16 Jan 2025 23:34)  POCT Blood Glucose.: 202 mg/dL (16 Jan 2025 17:29)  POCT Blood Glucose.: 157 mg/dL (16 Jan 2025 16:05)      RADIOLOGY & ADDITIONAL TESTS:      Consultant notes reviewed  I independently reviwed all images/ labarotory results /cultures/ telemetry/EKG and my findings are indicated above  and discussed care plan with consultants/nursing/ family /patient

## 2025-01-17 NOTE — PHARMACOTHERAPY INTERVENTION NOTE - COMMENTS
Code Rapid Response Attended
Reduced keppra dose to 750 mg q12hr in setting of CrCl ~ 43 mL/min
Code Rapid Response Attended

## 2025-01-17 NOTE — PROGRESS NOTE ADULT - CONVERSATION DETAILS
Discussed overall medical condition, prognosis, and options for plan of care with god son Norberto Castillo as a follow up to conversation held by DANNY Lawson. I addressed with Norberto that despite all medical efforts, patient has continued to get worse. We discussed that she is at end of life and further medical interventions will not be therapeutic to her. He understands and just wants to make due she is not suffering, but he also wants to make sure he is able to relay the information to her siblings ( she has 7 or 8 siblings). We did discuss that she may die before they are able to get here so I encouraged him to get them on the phone when he gets here later so that they at least get the opportunity to say their goodbyes via telephone. I addressed that at this point, we will not be escalating therapy, and not focusing on her numbers anymore but rather focusing on her pain relief, and symptoms.

## 2025-01-18 NOTE — PROGRESS NOTE ADULT - SUBJECTIVE AND OBJECTIVE BOX
[facility]                                        Neurology at [place]                                 [facility]                                  [address]                                        [place], [place], [address]                                             [phone]        CC: altered mental status and seizure.    HPI:   The patient is an 84 year old woman who presented 12/24/24 with seizure/status epilepticus. She was admitted to neuro-ICU.  She had been intubated for mechanical ventilation.  She had some left sided weakness on arrival and was seen by the stroke team (who signed off as presentation was not suggestive of stroke).  She has had a long complicated course.   EEG showed epileptiform abnormalities including ictal-interictal continuum.  She has been on antiseizure drugs.   Currently on Lacosamide 150 TID, Fycompa 8 mg daily (via PEG), Briviact 100 IV BID.  LP done for CSF evaluation. She was treated empirically for autoimmune encephalitis (with IV Ig which is completed, and steroids which is being tapered) despite CSF antibodies being negative.  She is now status post tracheostomy and PEG, and is being downgraded to the medical service. ([facility])    Interim history:  Remains unresponsive  Remains on mechanical ventilator via tracheostomy.    ROS:  Unobtainable due to patient's condition.     MEDICATIONS  (STANDING):  brivaracetam 100 milliGRAM(s) Oral two times a day  lacosamide 150 milliGRAM(s) Oral <User Schedule>  LORazepam   Injectable 0.5 milliGRAM(s) IV Push every 12 hours  morphine  - Injectable 2 milliGRAM(s) IV Push every 4 hours  pantoprazole  Injectable 40 milliGRAM(s) IV Push daily  perampanel 8 milliGRAM(s) Oral at bedtime    MEDICATIONS  (PRN):  acetaminophen   Oral Liquid .. 650 milliGRAM(s) Oral every 6 hours PRN Temp greater or equal to 38C (100.4F), Mild Pain (1 - 3)  albuterol/ipratropium for Nebulization 3 milliLiter(s) Nebulizer every 6 hours PRN Shortness of Breath and/or Wheezing  fentaNYL    Injectable 12.5 MICROGram(s) IV Push every 2 hours PRN Signs and Symptoms of Pain  LORazepam   Injectable 0.5 milliGRAM(s) IV Push every 2 hours PRN restlessness or agtiation  morphine  - Injectable 4 milliGRAM(s) IV Push every 2 hours PRN dyspnea      Vital Signs Last 24 Hrs  T(C): 36.5 (18 Jan 2025 07:43), Max: 36.5 (18 Jan 2025 07:43)  T(F): 97.7 (18 Jan 2025 07:43), Max: 97.7 (18 Jan 2025 07:43)  HR: 63 (18 Jan 2025 04:00) (60 - 69)  BP: --  BP(mean): --  RR: 12 (18 Jan 2025 00:00) (12 - 12)  SpO2: 99% (18 Jan 2025 09:03) (97% - 100%)      Detailed Neurologic Exam:    Mental status: Unresponsive to voice. On mechanical ventilator via tracheostomy. Eyes spontaneously open    Cranial nerves: Pupils equal and react symmetrically to light. no blink to threat. Not tracking with gaze. Eyes move conjugately to oculocephalic maneuvers. Face grossly symmetric. Palate and tongue cannot be assessed.     Motor/Sensory: There is normal bulk and tone.  There is no tremor.  No purposeful movement to noxious stimuli.    Cerebellar: Cannot be tested.    Labs:     no recent CBC/CMP        Rad:  CT Head No Cont (01.14.25 @ 20:32)   IMPRESSION:  Evidence of a prior left parietal parasagittal region insult. This was   seen on the prior 12/24/2024 as well. No new pathology. No intracranial   hemorrhage. No major vessel distribution infarct.           Carac Counseling:  I discussed with the patient the risks of Carac including but not limited to erythema, scaling, itching, weeping, crusting, and pain.

## 2025-01-18 NOTE — CHART NOTE - NSCHARTNOTEFT_GEN_A_CORE
Ethics continues to follow. See consult 12/26/24. Case discussed with Dr. Beck (Palliative Care Attending). Family made patient comfort measures today.     Chart reviewed.     Antonieta Giles MD  Ethics Attending  [phone]

## 2025-01-18 NOTE — PROVIDER CONTACT NOTE (OTHER) - RECOMMENDATIONS
pt is comfort measures, no vitals to be taken & no labs as per MD Lester.  All comfort measure medications to be given.

## 2025-01-18 NOTE — PROGRESS NOTE ADULT - ASSESSMENT
84y Female with status epilepticus now unresponsive on mechanical ventilator via tracheostomy.    Seizures  Appears stable, no new seizures  Continue:  Lacosamide 150 TID  Fycompa 8 mg daily (via PEG)  Briviact 100 IV BID    Altered mental status  Repeat head CT (1/14/25) stable with no new pathology    Ventilator management per pulmonary.    will follow with you    Jose Antonio Drew MD PhD   [id number]

## 2025-01-18 NOTE — PROGRESS NOTE ADULT - ASSESSMENT
84F with HTN, HLD, osteoporosis, GERD, metastatic breast cancer s/p mastectomy, radiation, with prolonged hospitalization, admitted 12/24 with altered mental status, left sided weakness and seizure activity. 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 During course in NSICU patient was on propofol gtt, Ketamine gtt, high dose steroids for empiric autoimmune encephalitis, drips weaned off. Fever on 12/30/24 found to have Citrobacter bacteremia, pseudomonas pneumonia and was seen by infectious diseases, was on VEEG and epilepsy medications adjusted by epilepsy team, also treated with IVIG, had endotracheal tube leak so was reintubated, s/p trach and PEG 1/8/25. Downgraded to medicine 1/9 continues on steroids now changed to PO with taper will ween as able. otherwise continues on AED guided by neurology, Per Neuro, repeat CTH ordered and without change / anoxic injury. No meaningful recovery. RRT on 1/15 for hypoxia and was placed back on full vent support. Another rapid response on 1/16 for hypoxia, s/p emergent bronch by MICU team showed that the trach was partially occluding with granulation tissue and with only a small amount of purchase in the airway, surgery called and repeat bronch done with placement of a distal XLT.  Palliative consulted for complex medical decision making in the setting of likely life-limiting illness.      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

## 2025-01-19 NOTE — PROGRESS NOTE ADULT - ASSESSMENT
84F with HTN, HLD, osteoporosis, GERD, metastatic breast cancer s/p mastectomy, radiation, with prolonged hospitalization, admitted 12/24 with altered mental status, left sided weakness and seizure activity. 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 During course in NSICU patient was on propofol gtt, Ketamine gtt, high dose steroids for empiric autoimmune encephalitis, drips weaned off. Fever on 12/30/24 found to have Citrobacter bacteremia, pseudomonas pneumonia and was seen by infectious diseases, was on VEEG and epilepsy medications adjusted by epilepsy team, also treated with IVIG, had endotracheal tube leak so was reintubated, s/p trach and PEG 1/8/25. Downgraded to medicine 1/9 continues on steroids now changed to PO with taper will ween as able. otherwise continues on AED guided by neurology, Per Neuro, repeat CTH ordered and without change / anoxic injury. No meaningful recovery. RRT on 1/15 for hypoxia and was placed back on full vent support. Another rapid response on 1/16 for hypoxia, s/p emergent bronch by MICU team showed that the trach was partially occluding with granulation tissue and with only a small amount of purchase in the airway, surgery called and repeat bronch done with placement of a distal XLT.  Palliative consulted for complex medical decision making in the setting of likely life-limiting illness, discussion of the family along with Hospitalist team and Palliative care team happened over the course, she is on comfort measure now, waiting for the family to say good buy before libration of the Vent.     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

## 2025-01-19 NOTE — PROGRESS NOTE ADULT - SUBJECTIVE AND OBJECTIVE BOX
RAÚL JASMINE    [id number]    84y      Female    Patient is a 84y old  Female who presents with a chief complaint of seizures, severe R ICA stenosis (18 Jan 2025 13:09)      INTERVAL HPI/OVERNIGHT EVENTS:    Patient is [id number] comfortable on the Vent       Vital Signs Last 24 Hrs  T(C): 36.3 (19 Jan 2025 04:02), Max: 36.3 (19 Jan 2025 00:02)  T(F): 97.4 (19 Jan 2025 04:02), Max: 97.4 (19 Jan 2025 00:02)  HR: 58 (19 Jan 2025 06:00) (56 - 73)  BP: --  BP(mean): --  RR: 12 (19 Jan 2025 06:00) (12 - 17)  SpO2: 98% (19 Jan 2025 06:00) (98% - 100%)        PHYSICAL EXAM:    GENERAL: Elderly female looking comfortable  On Vent has Vent connected with Tracheostomy             I&O's Summary    18 Jan 2025 07:01  -  19 Jan 2025 07:00  --------------------------------------------------------  IN: 200 mL / OUT: 700 mL / NET: -500 mL        MEDICATIONS  (STANDING):  brivaracetam 100 milliGRAM(s) Oral two times a day  chlorhexidine 2% Cloths 1 Application(s) Topical daily  dexAMETHasone     Tablet 30 milliGRAM(s) Oral daily  lacosamide 150 milliGRAM(s) Oral <User Schedule>  LORazepam   Injectable 0.5 milliGRAM(s) IV Push every 12 hours  morphine  - Injectable 2 milliGRAM(s) IV Push every 4 hours  pantoprazole  Injectable 40 milliGRAM(s) IV Push daily  perampanel 8 milliGRAM(s) Oral at bedtime    MEDICATIONS  (PRN):  acetaminophen   Oral Liquid .. 650 milliGRAM(s) Oral every 6 hours PRN Temp greater or equal to 38C (100.4F), Mild Pain (1 - 3)  albuterol/ipratropium for Nebulization 3 milliLiter(s) Nebulizer every 6 hours PRN Shortness of Breath and/or Wheezing  fentaNYL    Injectable 12.5 MICROGram(s) IV Push every 2 hours PRN Signs and Symptoms of Pain  LORazepam   Injectable 0.5 milliGRAM(s) IV Push every 2 hours PRN restlessness or agtiation  morphine  - Injectable 4 milliGRAM(s) IV Push every 2 hours PRN dyspnea

## 2025-01-20 NOTE — PROGRESS NOTE ADULT - ASSESSMENT
84F with HTN, HLD, osteoporosis, GERD, metastatic breast cancer s/p mastectomy, radiation, with prolonged hospitalization, admitted 12/24 with altered mental status, left sided weakness and seizure activity. 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 During course in NSICU patient was on propofol gtt, Ketamine gtt, high dose steroids for empiric autoimmune encephalitis, drips weaned off. Fever on 12/30/24 found to have Citrobacter bacteremia, pseudomonas pneumonia and was seen by infectious diseases, was on VEEG and epilepsy medications adjusted by epilepsy team, also treated with IVIG, had endotracheal tube leak so was reintubated, s/p trach and PEG 1/8/25. Downgraded to medicine 1/9 continues on steroids now changed to PO with taper will ween as able. otherwise continues on AED guided by neurology, Per Neuro, repeat CTH ordered and without change / anoxic injury. No meaningful recovery. RRT on 1/15 for hypoxia and was placed back on full vent support. Another rapid response on 1/16 for hypoxia, s/p emergent bronch by MICU team showed that the trach was partially occluding with granulation tissue and with only a small amount of purchase in the airway, surgery called and repeat bronch done with placement of a distal XLT.  Palliative consulted for complex medical decision making in the setting of likely life-limiting illness, discussion of the family along with Hospitalist team and Palliative care team happened over the course, she is on comfort measure now, waiting for the family to say good buy before libration of the Vent.     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

## 2025-01-20 NOTE — CHART NOTE - NSCHARTNOTEFT_GEN_A_CORE
Patient is now comfort measures only, awaiting family to visit prior to vent liberation  Continue anti-seizure meds and steroids as in line with comfort care.  Will no longer actively follow, please recall if plans for comfort measures only change    Thank you for allowing me to participate in the care of your patient    Jose Antonio Drew MD, PhD   [id number]

## 2025-01-20 NOTE — PROGRESS NOTE ADULT - ASSESSMENT
84F with HTN, HLD, osteoporosis, GERD, metastatic breast cancer s/p mastectomy, radiation, with prolonged hospitalization, admitted 12/24 with altered mental status, left sided weakness and seizure activity. 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 During course in NSICU patient was on propofol gtt, Ketamine gtt, high dose steroids for empiric autoimmune encephalitis, drips weaned off. Fever on 12/30/24 found to have Citrobacter bacteremia, pseudomonas pneumonia and was seen by infectious diseases, was on VEEG and epilepsy medications adjusted by epilepsy team, also treated with IVIG, had endotracheal tube leak so was reintubated, s/p trach and PEG 1/8/25. Downgraded to medicine 1/9 continues on steroids now changed to PO with taper will ween as able. otherwise continues on AED guided by neurology, Per Neuro, repeat CTH ordered and without change / anoxic injury. No meaningful recovery. RRT on 1/15 for hypoxia and was placed back on full vent support. Another rapid response on 1/16 for hypoxia, s/p emergent bronch by MICU team showed that the trach was partially occluding with granulation tissue and with only a small amount of purchase in the airway, surgery called and repeat bronch done with placement of a distal XLT.  Palliative consulted for complex medical decision making in the setting of likely life-limiting illness.      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  84F with HTN, HLD, osteoporosis, GERD, metastatic breast cancer s/p mastectomy, radiation, with prolonged hospitalization, admitted 12/24 with altered mental status, left sided weakness and seizure activity. 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 During course in NSICU patient was on propofol gtt, Ketamine gtt, high dose steroids for empiric autoimmune encephalitis, drips weaned off. Fever on 12/30/24 found to have Citrobacter bacteremia, pseudomonas pneumonia and was seen by infectious diseases, was on VEEG and epilepsy medications adjusted by epilepsy team, also treated with IVIG, had endotracheal tube leak so was reintubated, s/p trach and PEG 1/8/25. Downgraded to medicine 1/9 continues on steroids now changed to PO with taper will ween as able. otherwise continues on AED guided by neurology, Per Neuro, repeat CTH ordered and without change / anoxic injury. No meaningful recovery. RRT on 1/15 for hypoxia and was placed back on full vent support. Another rapid response on 1/16 for hypoxia, s/p emergent bronch by MICU team showed that the trach was partially occluding with granulation tissue and with only a small amount of purchase in the airway, surgery called and repeat bronch done with placement of a distal XLT.  Palliative consulted for complex medical decision making in the setting of likely life-limiting illness.      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   Per [facility] 1/17- Family considering vent liberation once they all come and able to say cammye  Spoke to Hamilton Thornton today.  He was on the phone with one of her sisters inquiring when she will be coming.  He informed them that she is being kept alive by the machines and just waiting for them  Psychosocial support.

## 2025-01-20 NOTE — PROGRESS NOTE ADULT - SUBJECTIVE AND OBJECTIVE BOX
Hospitalist Progress Note    Chief Complaint: Seizures, Severe R ICA Stenosis      SUBJECTIVE / OVERNIGHT EVENTS:  No events overnight, patient seen at bedside, in NAD, unable to obtain ROS due to mental status    MEDICATIONS  (STANDING):  brivaracetam 100 milliGRAM(s) Oral two times a day  chlorhexidine 0.12% Liquid 15 milliLiter(s) Oral Mucosa every 12 hours  chlorhexidine 2% Cloths 1 Application(s) Topical daily  dexAMETHasone     Tablet 30 milliGRAM(s) Oral daily  lacosamide 150 milliGRAM(s) Oral <User Schedule>  LORazepam   Injectable 0.5 milliGRAM(s) IV Push every 12 hours  morphine  - Injectable 2 milliGRAM(s) IV Push every 4 hours  pantoprazole  Injectable 40 milliGRAM(s) IV Push daily  perampanel 8 milliGRAM(s) Oral at bedtime    MEDICATIONS  (PRN):  acetaminophen   Oral Liquid .. 650 milliGRAM(s) Oral every 6 hours PRN Temp greater or equal to 38C (100.4F), Mild Pain (1 - 3)  albuterol/ipratropium for Nebulization 3 milliLiter(s) Nebulizer every 6 hours PRN Shortness of Breath and/or Wheezing  fentaNYL    Injectable 12.5 MICROGram(s) IV Push every 2 hours PRN Signs and Symptoms of Pain  LORazepam   Injectable 0.5 milliGRAM(s) IV Push every 2 hours PRN restlessness or agtiation  morphine  - Injectable 4 milliGRAM(s) IV Push every 2 hours PRN dyspnea        I&O's Summary    19 Jan 2025 07:01  -  20 Jan 2025 07:00  --------------------------------------------------------  IN: 0 mL / OUT: 250 mL / NET: -250 mL        PHYSICAL EXAM:  Vital Signs Last 24 Hrs  T(C): --  T(F): --  HR: 70 (19 Jan 2025 20:00) (70 - 70)  BP: --  BP(mean): --  RR: 12 (19 Jan 2025 20:00) (12 - 12)  SpO2: 99% (20 Jan 2025 04:38) (99% - 100%)    Parameters below as of 19 Jan 2025 20:00  Patient On (Oxygen Delivery Method): room air          GENERAL: Elderly female looking comfortable  On Vent has Vent connected with Tracheostomy     LABS:                    CAPILLARY BLOOD GLUCOSE            RADIOLOGY & ADDITIONAL TESTS:  Results Reviewed: Y  Imaging Personally Reviewed: N  Electrocardiogram Personally Reviewed: MYNOR

## 2025-01-20 NOTE — PROGRESS NOTE ADULT - SUBJECTIVE AND OBJECTIVE BOX
CC:  Follow up GOC , Symptoms    OVERNIGHT EVENTS:  no reported events    Present Symptoms:   Dyspnea:  No    Nausea/Vomiting:  No    Anxiety/ Agitation No   Depression: Unable to assess  Fatigue:   Unable  to assess  Loss of appetite:  NA   Constipation: Not Reported    Pain:   No Signs            Location            Duration            Character            Severity            Factors            Effect    Pain AD Score:  [email or web address] (press ctrl + left click to view)    Review of Systems:   Further ROS unable to  obtain due to poor mentation     MEDICATIONS  (STANDING):  brivaracetam 100 milliGRAM(s) Oral two times a day  chlorhexidine 0.12% Liquid 15 milliLiter(s) Oral Mucosa every 12 hours  chlorhexidine 2% Cloths 1 Application(s) Topical daily  dexAMETHasone     Tablet 30 milliGRAM(s) Oral daily  lacosamide 150 milliGRAM(s) Oral <User Schedule>  LORazepam   Injectable 0.5 milliGRAM(s) IV Push every 12 hours  morphine  - Injectable 2 milliGRAM(s) IV Push every 4 hours  pantoprazole  Injectable 40 milliGRAM(s) IV Push daily  perampanel 8 milliGRAM(s) Oral at bedtime    MEDICATIONS  (PRN):  acetaminophen   Oral Liquid .. 650 milliGRAM(s) Oral every 6 hours PRN Temp greater or equal to 38C (100.4F), Mild Pain (1 - 3)  albuterol/ipratropium for Nebulization 3 milliLiter(s) Nebulizer every 6 hours PRN Shortness of Breath and/or Wheezing  fentaNYL    Injectable 12.5 MICROGram(s) IV Push every 2 hours PRN Signs and Symptoms of Pain  LORazepam   Injectable 0.5 milliGRAM(s) IV Push every 2 hours PRN restlessness or agtiation  morphine  - Injectable 4 milliGRAM(s) IV Push every 2 hours PRN dyspnea      PHYSICAL EXAM:  Vital Signs Last 24 Hrs  T(C): --  T(F): --  HR: 70 (19 Jan 2025 20:00) (70 - 70)  BP: --  BP(mean): --  RR: 12 (19 Jan 2025 20:00) (12 - 12)  SpO2: 99% (20 Jan 2025 04:38) (99% - 100%)    Parameters below as of 19 Jan 2025 20:00  Patient On (Oxygen Delivery Method): room air      Karnofsky:10 %  General: Frail elderly woman NAD        HEENT:  + trach  Lungs: comfortable    GI:  soft NTND   ( x ) PEG  MSK: bedbound  Skin:  warm/dry  Neuro  AOx0  Psych  no agitation    LABS:        I&O's Summary    19 Jan 2025 07:01  -  20 Jan 2025 07:00  --------------------------------------------------------  IN: 0 mL / OUT: 250 mL / NET: -250 mL        RADIOLOGY & ADDITIONAL STUDIES:  Imaging Reviewed  (   )           ADVANCE DIRECTIVE PREFERENCES    DNR/I  and comfort measures

## 2025-01-21 NOTE — PROGRESS NOTE ADULT - SUBJECTIVE AND OBJECTIVE BOX
OVERNIGHT EVENTS: appears comfortable, remains on ventilator, awaiting other family arrival. received one dose of PRN ativan     Present Symptoms: per nursing     Dyspnea: none currently   Nausea/Vomiting: No  Anxiety:  yes periods of restlessness per nursing   Depression: unable   Fatigue: unable   Loss of appetite: unable   Constipation: none     Pain: none             Character-            Duration-            Effect-            Factors-            Frequency-            Location-            Severity-    Pain AD Score:  [email or web address] (press ctrl + left click to view)    Review of Systems: Reviewed                    Unable to obtain due to poor mentation   All others negative    MEDICATIONS  (STANDING):  brivaracetam 100 milliGRAM(s) Oral two times a day  chlorhexidine 0.12% Liquid 15 milliLiter(s) Oral Mucosa every 12 hours  chlorhexidine 2% Cloths 1 Application(s) Topical daily  dexAMETHasone     Tablet 30 milliGRAM(s) Oral daily  lacosamide 150 milliGRAM(s) Oral <User Schedule>  LORazepam   Injectable 0.5 milliGRAM(s) IV Push every 12 hours  morphine  - Injectable 2 milliGRAM(s) IV Push every 4 hours  pantoprazole  Injectable 40 milliGRAM(s) IV Push daily  perampanel 8 milliGRAM(s) Oral at bedtime    MEDICATIONS  (PRN):  acetaminophen   Oral Liquid .. 650 milliGRAM(s) Oral every 6 hours PRN Temp greater or equal to 38C (100.4F), Mild Pain (1 - 3)  albuterol/ipratropium for Nebulization 3 milliLiter(s) Nebulizer every 6 hours PRN Shortness of Breath and/or Wheezing  fentaNYL    Injectable 12.5 MICROGram(s) IV Push every 2 hours PRN Signs and Symptoms of Pain  LORazepam   Injectable 0.5 milliGRAM(s) IV Push every 2 hours PRN restlessness or agtiation  morphine  - Injectable 4 milliGRAM(s) IV Push every 2 hours PRN dyspnea    PHYSICAL EXAM:    Vital Signs Last 24 Hrs  T(C): --  T(F): --  HR: --  BP: --  BP(mean): --  RR: --  SpO2: 99% (20 Jan 2025 16:00) (99% - 99%)    General: unresponsive     HEENT: trach    Lungs: comfortable tachypnea/labored breathing  excessive secretions    CV: normal      GI: normal    : fuller    MSK: weakness    Skin: no rash    LABS:    I&O's Summary    RADIOLOGY & ADDITIONAL STUDIES:    ADVANCE DIRECTIVES/TREATMENT PREFERENCES:  do not resuscitate, currently on ventilator

## 2025-01-21 NOTE — PROGRESS NOTE ADULT - ASSESSMENT
84F with HTN, HLD, osteoporosis, GERD, metastatic breast cancer s/p mastectomy, radiation, with prolonged hospitalization, admitted 12/24 with altered mental status, left sided weakness and seizure activity. 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 During course in NSICU patient was on propofol gtt, Ketamine gtt, high dose steroids for empiric autoimmune encephalitis, drips weaned off. Fever on 12/30/24 found to have Citrobacter bacteremia, pseudomonas pneumonia and was seen by infectious diseases, was on VEEG and epilepsy medications adjusted by epilepsy team, also treated with IVIG, had endotracheal tube leak so was reintubated, s/p trach and PEG 1/8/25. Downgraded to medicine 1/9 continues on steroids now changed to PO with taper will ween as able. otherwise continues on AED guided by neurology, Per Neuro, repeat CTH ordered and without change / anoxic injury. No meaningful recovery. RRT on 1/15 for hypoxia and was placed back on full vent support. Another rapid response on 1/16 for hypoxia, s/p emergent bronch by MICU team showed that the trach was partially occluding with granulation tissue and with only a small amount of purchase in the airway, surgery called and repeat bronch done with placement of a distal XLT.  Palliative consulted for complex medical decision making in the setting of likely life-limiting illness.      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

## 2025-01-21 NOTE — PROGRESS NOTE ADULT - ASSESSMENT
84F with HTN, HLD, osteoporosis, GERD, metastatic breast cancer s/p mastectomy, radiation, with prolonged hospitalization, admitted 12/24 with altered mental status, left sided weakness and seizure activity. 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 During course in NSICU patient was on propofol gtt, Ketamine gtt, high dose steroids for empiric autoimmune encephalitis, drips weaned off. Fever on 12/30/24 found to have Citrobacter bacteremia, pseudomonas pneumonia and was seen by infectious diseases, was on VEEG and epilepsy medications adjusted by epilepsy team, also treated with IVIG, had endotracheal tube leak so was reintubated, s/p trach and PEG 1/8/25. Downgraded to medicine 1/9 continues on steroids now changed to PO with taper will ween as able. otherwise continues on AED guided by neurology, Per Neuro, repeat CTH ordered and without change / anoxic injury. No meaningful recovery. RRT on 1/15 for hypoxia and was placed back on full vent support. Another rapid response on 1/16 for hypoxia, s/p emergent bronch by MICU team showed that the trach was partially occluding with granulation tissue and with only a small amount of purchase in the airway, surgery called and repeat bronch done with placement of a distal XLT.  Palliative consulted for complex medical decision making in the setting of likely life-limiting illness, discussion of the family along with Hospitalist team and Palliative care team happened over the course, she is on comfort measure now, waiting for the family to say good buy before libration of the Vent.     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

## 2025-01-21 NOTE — CHART NOTE - NSCHARTNOTEFT_GEN_A_CORE
To whom it may concern,    Please excuse __ from missed work. A close relative is currently admitted under the care of [facility] in [place], [place]. Thank you for understanding.      If there are any questions, please contact [facility] at [phone]. Thank you.    SignedArgelia PA-C To whom it may concern,    Please excuse Bautista Vasquez from missed work. A close relative is currently admitted under the care of [facility] in [place], [place]. Thank you for understanding.      If there are any questions, please contact [facility] at [phone]. Thank you.    Signed,           Argelia Cabrera PA-C

## 2025-01-21 NOTE — CHART NOTE - NSCHARTNOTEFT_GEN_A_CORE
Ethics continues to follow. See consult 12/26/24. Chart reviewed. Patient is comfort measures.     Case discussed with Dr. Beck (Palliative Care Attending). Awaiting family if they are able to travel from out of state for compassionate extubation.     Antonieta Giles MD  Ethics Attending  [phone]

## 2025-01-21 NOTE — PROGRESS NOTE ADULT - SUBJECTIVE AND OBJECTIVE BOX
Hospitalist Progress Note    Chief Complaint: Seizures, Severe R ICA Stenosis      SUBJECTIVE / OVERNIGHT EVENTS:  No events overnight, patient seen at bedside, in NAD, unable to obtain ROS due to mental status      MEDICATIONS  (STANDING):  brivaracetam 100 milliGRAM(s) Oral two times a day  chlorhexidine 0.12% Liquid 15 milliLiter(s) Oral Mucosa every 12 hours  chlorhexidine 2% Cloths 1 Application(s) Topical daily  dexAMETHasone     Tablet 30 milliGRAM(s) Oral daily  lacosamide 150 milliGRAM(s) Oral <User Schedule>  LORazepam   Injectable 0.5 milliGRAM(s) IV Push every 12 hours  morphine  - Injectable 2 milliGRAM(s) IV Push every 4 hours  pantoprazole  Injectable 40 milliGRAM(s) IV Push daily  perampanel 8 milliGRAM(s) Oral at bedtime    MEDICATIONS  (PRN):  acetaminophen   Oral Liquid .. 650 milliGRAM(s) Oral every 6 hours PRN Temp greater or equal to 38C (100.4F), Mild Pain (1 - 3)  albuterol/ipratropium for Nebulization 3 milliLiter(s) Nebulizer every 6 hours PRN Shortness of Breath and/or Wheezing  fentaNYL    Injectable 12.5 MICROGram(s) IV Push every 2 hours PRN Signs and Symptoms of Pain  LORazepam   Injectable 0.5 milliGRAM(s) IV Push every 2 hours PRN restlessness or agtiation  morphine  - Injectable 4 milliGRAM(s) IV Push every 2 hours PRN dyspnea        I&O's Summary      PHYSICAL EXAM:  Vital Signs Last 24 Hrs  T(C): --  T(F): --  HR: --  BP: --  BP(mean): --  RR: --  SpO2: 99% (20 Jan 2025 16:00) (99% - 99%)          GENERAL: Elderly female looking comfortable  On Vent has Vent connected with Tracheostomy     LABS:                    CAPILLARY BLOOD GLUCOSE            RADIOLOGY & ADDITIONAL TESTS:  Results Reviewed: Y  Imaging Personally Reviewed: N  Electrocardiogram Personally Reviewed: MYNOR

## 2025-01-22 NOTE — GOALS OF CARE CONVERSATION - ADVANCED CARE PLANNING - CONVERSATION DETAILS
Discussed overall medical condition, prognosis, and options for plan of care with family at bedside, including kranthi Castillo, 3 sisters who got here from [place]. Expressed continued poro prognosis and artifical maintaining with machinery. Family has made their peace and they are all ready for liberation from ventilator and full comfort measures. They do not want to stay at the bedside, they will say their goodbyes and leave. I explained medications and process. They are aware and appreciative. New MOLST completed.
Discussed overnight events with [place]. Norberto was informed of multiple episodes of prolonged periods of low oxygen saturation over the past 2 days, despite efforts from ICU, Pulmonary, and surgery. Understands patient has poor prognosis. States he does not want to see his Godmother suffer. Agrees to DNR at this time. Discussed treating her symptoms of pain and increase work of breathing and removing her from ventilator.  States he needs to call the patient's siblings prior to liberation from ventilator so they have option of being in attendance.

## 2025-01-22 NOTE — PROGRESS NOTE ADULT - SUBJECTIVE AND OBJECTIVE BOX
Hospitalist Progress Note    Chief Complaint: Seizures, Severe R ICA Stenosis      SUBJECTIVE / OVERNIGHT EVENTS:  No events overnight, patient seen at bedside, in NAD, unable to obtain ROS due to mental status    MEDICATIONS  (STANDING):  brivaracetam 100 milliGRAM(s) Oral two times a day  chlorhexidine 0.12% Liquid 15 milliLiter(s) Oral Mucosa every 12 hours  chlorhexidine 2% Cloths 1 Application(s) Topical daily  lacosamide 150 milliGRAM(s) Oral <User Schedule>  LORazepam   Injectable 1 milliGRAM(s) IV Push every 4 hours  morphine  Infusion 4 mG/Hr (4 mL/Hr) IV Continuous <Continuous>  pantoprazole  Injectable 40 milliGRAM(s) IV Push daily  perampanel 8 milliGRAM(s) Oral at bedtime    MEDICATIONS  (PRN):  acetaminophen   Oral Liquid .. 650 milliGRAM(s) Oral every 6 hours PRN Temp greater or equal to 38C (100.4F), Mild Pain (1 - 3)  albuterol/ipratropium for Nebulization 3 milliLiter(s) Nebulizer every 6 hours PRN Shortness of Breath and/or Wheezing  fentaNYL    Injectable 12.5 MICROGram(s) IV Push every 2 hours PRN Signs and Symptoms of Pain  LORazepam   Injectable 0.5 milliGRAM(s) IV Push every 2 hours PRN restlessness or agtiation  morphine  - Injectable 4 milliGRAM(s) IV Push every 2 hours PRN dyspnea        I&O's Summary      PHYSICAL EXAM:  Vital Signs Last 24 Hrs  T(C): --  T(F): --  HR: --  BP: --  BP(mean): --  RR: --  SpO2: --        GENERAL: Elderly female looking comfortable  On Vent has Vent connected with Tracheostomy   LABS:                    CAPILLARY BLOOD GLUCOSE            RADIOLOGY & ADDITIONAL TESTS:  Results Reviewed: Y  Imaging Personally Reviewed: N  Electrocardiogram Personally Reviewed: MYNOR

## 2025-01-22 NOTE — DISCHARGE NOTE FOR THE EXPIRED PATIENT - OTHER SIGNIFICANT FINDINGS
Called to the bedside in [facility] [phone] ([facility]) by RN to evaluate the status of patient RAÚL JASMINE. Identity was confirmed by wrist band.     Physical Examination:   Pt unresponsive to verbal, physical and painful stimuli.   Pupils were not reactive. There was no corneal reflex.    No signs of respiratory effort: No spontaneous respirations, No respiratory sounds after 5 minutes of auscultation,   No response to verbal stimuli: eyes remained closed, no facial changes or sounds made by patient   No response to painful stimuli: nonresponsive sternal rub, corneal reflex absent  No pupillary response to light: fixed and dilated  No central pulse: There was no carotid, radial, femoral pulse   No heart sounds after 5 minutes of auscultation     Patient pronounced dead at 1417.  Attending notified.  Family deferred autopsy.

## 2025-01-22 NOTE — PROGRESS NOTE ADULT - TIME BILLING
Chart, labs, orders, vitals, and imaging reviewed and plan of care discussed with consultants and IDR team in detail. Plan of care discussed with patient at bedside.
Chart, labs, orders, vitals, and imaging reviewed and plan of care discussed with consultants and IDR team in detail. Plan of care discussed with patient at bedside.
This includes chart review, patient assessment, discussion with interdisciplinary team members following patient. Coordination of care with providers and care coordination.
Time spent reviewing the chart documentation, reviewing labs and imaging studies, evaluating the patient, discussing the plan of care with the medical team and/or all necessary consultants, and documenting.
Time spent reviewing the chart documentation, reviewing labs and imaging studies, evaluating the patient, discussing the plan of care with the medical team and/or all necessary consultants, and documenting.
greater than 50% of time spent reviewing labs, notes, orders and radiographs, coordinating care  discussed with nursing, primary team
Time spent reviewing the chart documentation, reviewing labs and imaging studies, evaluating the patient, discussing the plan of care with the medical team and/or all necessary consultants, and documenting.
chart review, patient encounter, chart documentation.  Plan discussed with NSICU RN Eunice.
Chart, labs, orders, vitals, and imaging reviewed and plan of care discussed with consultants and IDR team in detail. Plan of care discussed with patient at bedside.
Time spent with review of chart documents, labs, imaging. Direct patient assessment,  formulation of care plan, documentation. Discussion with  Interdisciplinary  team  Dr. Carrizales

## 2025-01-22 NOTE — PROGRESS NOTE ADULT - ASSESSMENT
84F with HTN, HLD, osteoporosis, GERD, metastatic breast cancer s/p mastectomy, radiation, with prolonged hospitalization, admitted 12/24 with altered mental status, left sided weakness and seizure activity. 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 During course in NSICU patient was on propofol gtt, Ketamine gtt, high dose steroids for empiric autoimmune encephalitis, drips weaned off. Fever on 12/30/24 found to have Citrobacter bacteremia, pseudomonas pneumonia and was seen by infectious diseases, was on VEEG and epilepsy medications adjusted by epilepsy team, also treated with IVIG, had endotracheal tube leak so was reintubated, s/p trach and PEG 1/8/25. Downgraded to medicine 1/9 continues on steroids now changed to PO with taper will ween as able. otherwise continues on AED guided by neurology, Per Neuro, repeat CTH ordered and without change / anoxic injury. No meaningful recovery. RRT on 1/15 for hypoxia and was placed back on full vent support. Another rapid response on 1/16 for hypoxia, s/p emergent bronch by MICU team showed that the trach was partially occluding with granulation tissue and with only a small amount of purchase in the airway, surgery called and repeat bronch done with placement of a distal XLT.  Palliative consulted for complex medical decision making in the setting of likely life-limiting illness.      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, s/p status, restlessness   - anti epileptics per neuro  - s/p IViG   - on steroids - being tapered, may be helping with comfort will keep on   - continue ativan to Q 8 hours, required one additional dosage     Citrobacter bacteremia, pseudomonas pneumonia   - s/p 10 days of antibiotics per infectious diseases   - had resolved  - no further escalation or antibiotics     Encounter for palliative care  - awaiting other family - patients siblings from out of town to say their goodbyes prior to vent liberation  - HCP is kranthi Castillo - spoke to him via telephone, he is on his way into the hospital will talk mor when he gets here about liberation from ventilator

## 2025-01-22 NOTE — DISCHARGE NOTE FOR THE EXPIRED PATIENT - HOSPITAL COURSE
84F with HTN, HLD, osteoporosis, GERD, metastatic breast cancer s/p mastectomy, radiation, with prolonged hospitalization, admitted  with altered mental status, left sided weakness and seizure activity. 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 During course in NSICU patient was on propofol gtt, Ketamine gtt, high dose steroids for empiric autoimmune encephalitis, drips weaned off. Fever on 24 found to have Citrobacter bacteremia, pseudomonas pneumonia and was seen by infectious diseases, was on VEEG and epilepsy medications adjusted by epilepsy team, also treated with IVIG, had endotracheal tube leak so was reintubated, s/p trach and PEG 25. Downgraded to medicine  continues on steroids now changed to PO with taper will ween as able. otherwise continues on AED guided by neurology, Per Neuro, repeat CTH ordered and without change / anoxic injury. No meaningful recovery. RRT on 1/15 for hypoxia and was placed back on full vent support. Another rapid response on  for hypoxia, s/p emergent bronch by MICU team showed that the trach was partially occluding with granulation tissue and with only a small amount of purchase in the airway, surgery called and repeat bronch done with placement of a distal XLT.  Palliative consulted for complex medical decision making in the setting of likely life-limiting illness, discussion of the family along with Hospitalist team and Palliative care team happened over the course, she was placed on comfort measures. Overall medical condition, prognosis, and options for plan of care with family at bedside, including kranthi Castillo, 3 sisters who got here from [place]. Expressed continued poor prognosis and artifical maintaining with machinery. Family has made their peace and they are all ready for liberation from ventilator and full comfort measures. They do not want to stay at the bedside, they will say their goodbyes and leave. I explained medications and process. They are aware and appreciative. New MOLST completed.    Medicine ACP called to bedside to assess patient. She was pronounced  at 1417 on 25

## 2025-01-22 NOTE — PROGRESS NOTE ADULT - REASON FOR ADMISSION
seizures, severe R ICA stenosis
seizures
seizures, severe R ICA stenosis
seizures
seizures, severe R ICA stenosis

## 2025-01-22 NOTE — CHART NOTE - NSCHARTNOTEFT_GEN_A_CORE
Palliative care social work note.     and palliative care physician Dr Beck met with patients godson and sisters at bedside to provide support in coping with end of life issues. Family has agreed to proceed with liberation from vent understanding that there is no meaningful recovery. God son report that  has visited already and family ready to proceed with extubation. Education regarding process reviewed.

## 2025-01-22 NOTE — PROGRESS NOTE ADULT - PROVIDER SPECIALTY LIST ADULT
Anesthesia
Hospitalist
NSICU
Neurology
Neurology
Palliative Care
Palliative Care
Physiatry
Physiatry
Surgery
Hospitalist
Hospitalist
Infectious Disease
Internal Medicine
NSICU
NSICU
Neurology
Neurology
Physiatry
Physiatry
Pulmonology
Hospitalist
Infectious Disease
Infectious Disease
NSICU
Neurology
Palliative Care
Surgery
Surgery
Hospitalist
Infectious Disease
Infectious Disease
NSICU
Neurology
Palliative Care
Hospitalist
NSICU
Hospitalist
NSICU
Hospitalist
NSICU

## 2025-01-22 NOTE — PROGRESS NOTE ADULT - NUTRITIONAL ASSESSMENT
This patient has been assessed with a concern for Malnutrition and has been determined to have a diagnosis/diagnoses of Moderate protein-calorie malnutrition.  
This patient has been assessed with a concern for Malnutrition and has been determined to have a diagnosis/diagnoses of Moderate protein-calorie malnutrition.    This patient is being managed with:   Diet NPO after Midnight-     NPO Start Date: 07-Jan-2025   NPO Start Time: 23:59  Entered: Jan 7 2025  7:04AM    Diet NPO with Tube Feed-  Tube Feeding Modality: Orogastric  Jevity 1.5 Farrukh (JEVITY1.5)  Total Volume for 24 Hours (mL): 900  Continuous  Starting Tube Feed Rate {mL per Hour}: 10  Increase Tube Feed Rate by (mL): 10     Every 4 hours  Until Goal Tube Feed Rate (mL per Hour): 50  Tube Feed Duration (in Hours): 18  Tube Feed Start Time: 14:35  Banatrol TF     Qty per Day:  2  Entered: Jan 5 2025 11:44AM  
This patient has been assessed with a concern for Malnutrition and has been determined to have a diagnosis/diagnoses of Moderate protein-calorie malnutrition.    This patient is being managed with:   Diet NPO with Tube Feed-  Tube Feeding Modality: Gastrostomy  Jevity 1.5 Farrukh (JEVITY1.5)  Total Volume for 24 Hours (mL): 1320  Continuous  Starting Tube Feed Rate {mL per Hour}: 10  Increase Tube Feed Rate by (mL): 10     Every 4 hours  Until Goal Tube Feed Rate (mL per Hour): 55  Tube Feed Duration (in Hours): 24  Tube Feed Start Time: 01:00  Entered: Jan 9 2025 12:49PM  
This patient has been assessed with a concern for Malnutrition and has been determined to have a diagnosis/diagnoses of Moderate protein-calorie malnutrition.  
This patient has been assessed with a concern for Malnutrition and has been determined to have a diagnosis/diagnoses of Moderate protein-calorie malnutrition.    This patient is being managed with:   Diet NPO with Tube Feed-  Tube Feeding Modality: Gastrostomy  Jevity 1.5 Farrukh (JEVITY1.5)  Total Volume for 24 Hours (mL): 1320  Continuous  Starting Tube Feed Rate {mL per Hour}: 10  Increase Tube Feed Rate by (mL): 10     Every 4 hours  Until Goal Tube Feed Rate (mL per Hour): 55  Tube Feed Duration (in Hours): 24  Tube Feed Start Time: 01:00  Entered: Jan 9 2025 12:49PM  
This patient has been assessed with a concern for Malnutrition and has been determined to have a diagnosis/diagnoses of Moderate protein-calorie malnutrition.  
This patient has been assessed with a concern for Malnutrition and has been determined to have a diagnosis/diagnoses of Moderate protein-calorie malnutrition.    This patient has been assessed with a concern for Malnutrition and has been determined to have a diagnosis/diagnoses of Moderate protein-calorie malnutrition.  
This patient has been assessed with a concern for Malnutrition and has been determined to have a diagnosis/diagnoses of Moderate protein-calorie malnutrition.    This patient is being managed with:   Diet NPO after Midnight-     NPO Start Date: 07-Jan-2025   NPO Start Time: 23:59  Entered: Jan 7 2025  7:04AM    Diet NPO with Tube Feed-  Tube Feeding Modality: Orogastric  Jevity 1.5 Farrukh (JEVITY1.5)  Total Volume for 24 Hours (mL): 900  Continuous  Starting Tube Feed Rate {mL per Hour}: 10  Increase Tube Feed Rate by (mL): 10     Every 4 hours  Until Goal Tube Feed Rate (mL per Hour): 50  Tube Feed Duration (in Hours): 18  Tube Feed Start Time: 14:35  Banatrol TF     Qty per Day:  2  Entered: Jan 5 2025 11:44AM  
This patient has been assessed with a concern for Malnutrition and has been determined to have a diagnosis/diagnoses of Moderate protein-calorie malnutrition.    This patient is being managed with:   Diet NPO with Tube Feed-  Tube Feeding Modality: Gastrostomy  Jevity 1.5 Farrukh (JEVITY1.5)  Total Volume for 24 Hours (mL): 1320  Continuous  Starting Tube Feed Rate {mL per Hour}: 10  Increase Tube Feed Rate by (mL): 10     Every 4 hours  Until Goal Tube Feed Rate (mL per Hour): 55  Tube Feed Duration (in Hours): 24  Tube Feed Start Time: 01:00  Entered: Jan 9 2025 12:49PM  
This patient has been assessed with a concern for Malnutrition and has been determined to have a diagnosis/diagnoses of Moderate protein-calorie malnutrition.    This patient is being managed with:   Diet NPO with Tube Feed-  Tube Feeding Modality: Gastrostomy  Jevity 1.5 Farrukh (JEVITY1.5)  Total Volume for 24 Hours (mL): 1320  Continuous  Starting Tube Feed Rate {mL per Hour}: 10  Increase Tube Feed Rate by (mL): 10     Every 4 hours  Until Goal Tube Feed Rate (mL per Hour): 55  Tube Feed Duration (in Hours): 24  Tube Feed Start Time: 01:00  Entered: Jan 9 2025 12:49PM  
This patient has been assessed with a concern for Malnutrition and has been determined to have a diagnosis/diagnoses of Moderate protein-calorie malnutrition.    This patient is being managed with:   Diet NPO with Tube Feed-  Tube Feeding Modality: Gastrostomy  Jevity 1.5 Farrukh (JEVITY1.5)  Total Volume for 24 Hours (mL): 240  Continuous  Starting Tube Feed Rate {mL per Hour}: 10  Until Goal Tube Feed Rate (mL per Hour): 10  Tube Feed Duration (in Hours): 24  Tube Feed Start Time: 01:00  Entered: Jan 9 2025 12:56AM  
This patient has been assessed with a concern for Malnutrition and has been determined to have a diagnosis/diagnoses of Moderate protein-calorie malnutrition.    This patient is being managed with:   Diet NPO with Tube Feed-  Tube Feeding Modality: Orogastric  Jevity 1.5 Farrukh (JEVITY1.5)  Total Volume for 24 Hours (mL): 900  Continuous  Starting Tube Feed Rate {mL per Hour}: 10  Increase Tube Feed Rate by (mL): 10     Every 4 hours  Until Goal Tube Feed Rate (mL per Hour): 50  Tube Feed Duration (in Hours): 18  Tube Feed Start Time: 14:35  Entered: Kam  3 2025  3:01PM  
This patient has been assessed with a concern for Malnutrition and has been determined to have a diagnosis/diagnoses of Moderate protein-calorie malnutrition.    This patient is being managed with:   Diet NPO with Tube Feed-  Tube Feeding Modality: Gastrostomy  Jevity 1.5 Farrukh (JEVITY1.5)  Total Volume for 24 Hours (mL): 1320  Continuous  Starting Tube Feed Rate {mL per Hour}: 10  Increase Tube Feed Rate by (mL): 10     Every 4 hours  Until Goal Tube Feed Rate (mL per Hour): 55  Tube Feed Duration (in Hours): 24  Tube Feed Start Time: 01:00  Entered: Jan 9 2025 12:49PM  
This patient has been assessed with a concern for Malnutrition and has been determined to have a diagnosis/diagnoses of Moderate protein-calorie malnutrition.    This patient is being managed with:   Diet NPO with Tube Feed-  Tube Feeding Modality: Gastrostomy  Jevity 1.5 Farrukh (JEVITY1.5)  Total Volume for 24 Hours (mL): 240  Continuous  Starting Tube Feed Rate {mL per Hour}: 10  Until Goal Tube Feed Rate (mL per Hour): 10  Tube Feed Duration (in Hours): 24  Tube Feed Start Time: 01:00  Entered: Jan 9 2025 12:56AM  
This patient has been assessed with a concern for Malnutrition and has been determined to have a diagnosis/diagnoses of Moderate protein-calorie malnutrition.    This patient is being managed with:   Diet NPO with Tube Feed-  Tube Feeding Modality: Orogastric  Jevity 1.5 Farrukh (JEVITY1.5)  Total Volume for 24 Hours (mL): 900  Continuous  Starting Tube Feed Rate {mL per Hour}: 10  Increase Tube Feed Rate by (mL): 10     Every 4 hours  Until Goal Tube Feed Rate (mL per Hour): 50  Tube Feed Duration (in Hours): 18  Tube Feed Start Time: 14:35  Entered: Kam  3 2025  3:01PM  
This patient has been assessed with a concern for Malnutrition and has been determined to have a diagnosis/diagnoses of Moderate protein-calorie malnutrition.    This patient is being managed with:   Diet NPO with Tube Feed-  Tube Feeding Modality: Gastrostomy  Jevity 1.5 Farrukh (JEVITY1.5)  Total Volume for 24 Hours (mL): 1320  Continuous  Starting Tube Feed Rate {mL per Hour}: 10  Increase Tube Feed Rate by (mL): 10     Every 4 hours  Until Goal Tube Feed Rate (mL per Hour): 55  Tube Feed Duration (in Hours): 24  Tube Feed Start Time: 01:00  Entered: Jan 9 2025 12:49PM  
This patient has been assessed with a concern for Malnutrition and has been determined to have a diagnosis/diagnoses of Moderate protein-calorie malnutrition.    This patient is being managed with:   Diet NPO with Tube Feed-  Tube Feeding Modality: Gastrostomy  Jevity 1.5 Farrukh (JEVITY1.5)  Total Volume for 24 Hours (mL): 1320  Continuous  Starting Tube Feed Rate {mL per Hour}: 10  Increase Tube Feed Rate by (mL): 10     Every 4 hours  Until Goal Tube Feed Rate (mL per Hour): 55  Tube Feed Duration (in Hours): 24  Tube Feed Start Time: 01:00  Entered: Jan 9 2025 12:49PM  
This patient has been assessed with a concern for Malnutrition and has been determined to have a diagnosis/diagnoses of Moderate protein-calorie malnutrition.  
This patient has been assessed with a concern for Malnutrition and has been determined to have a diagnosis/diagnoses of Moderate protein-calorie malnutrition.  
This patient has been assessed with a concern for Malnutrition and has been determined to have a diagnosis/diagnoses of Moderate protein-calorie malnutrition.    This patient is being managed with:   Diet NPO with Tube Feed-  Tube Feeding Modality: Gastrostomy  Jevity 1.5 Farrukh (JEVITY1.5)  Total Volume for 24 Hours (mL): 1320  Continuous  Starting Tube Feed Rate {mL per Hour}: 10  Increase Tube Feed Rate by (mL): 10     Every 4 hours  Until Goal Tube Feed Rate (mL per Hour): 55  Tube Feed Duration (in Hours): 24  Tube Feed Start Time: 01:00  Entered: Jan 9 2025 12:49PM  
This patient has been assessed with a concern for Malnutrition and has been determined to have a diagnosis/diagnoses of Moderate protein-calorie malnutrition.    This patient is being managed with:   Diet NPO with Tube Feed-  Tube Feeding Modality: Orogastric  Jevity 1.5 Farrukh (JEVITY1.5)  Total Volume for 24 Hours (mL): 900  Continuous  Starting Tube Feed Rate {mL per Hour}: 10  Increase Tube Feed Rate by (mL): 10     Every 4 hours  Until Goal Tube Feed Rate (mL per Hour): 50  Tube Feed Duration (in Hours): 18  Tube Feed Start Time: 14:35  Banatrol TF     Qty per Day:  2  Entered: Jan 5 2025 11:44AM

## 2025-01-22 NOTE — PROGRESS NOTE ADULT - SUBJECTIVE AND OBJECTIVE BOX
OVERNIGHT EVENTS:     Present Symptoms:     Dyspnea: Mild Moderate Severe  Nausea/Vomiting: Yes No  Anxiety:  Yes No  Depression: Yes No  Fatigue: Yes No  Loss of appetite: Yes No  Constipation:     Pain:             Character-            Duration-            Effect-            Factors-            Frequency-            Location-            Severity-    Pain AD Score:  [email or web address] (press ctrl + left click to view)    Review of Systems: Reviewed                     Negative:                     Positive:  Unable to obtain due to poor mentation   All others negative    MEDICATIONS  (STANDING):  brivaracetam 100 milliGRAM(s) Oral two times a day  chlorhexidine 0.12% Liquid 15 milliLiter(s) Oral Mucosa every 12 hours  chlorhexidine 2% Cloths 1 Application(s) Topical daily  lacosamide 150 milliGRAM(s) Oral <User Schedule>  LORazepam   Injectable 0.5 milliGRAM(s) IV Push every 8 hours  morphine  - Injectable 2 milliGRAM(s) IV Push every 4 hours  pantoprazole  Injectable 40 milliGRAM(s) IV Push daily  perampanel 8 milliGRAM(s) Oral at bedtime    MEDICATIONS  (PRN):  acetaminophen   Oral Liquid .. 650 milliGRAM(s) Oral every 6 hours PRN Temp greater or equal to 38C (100.4F), Mild Pain (1 - 3)  albuterol/ipratropium for Nebulization 3 milliLiter(s) Nebulizer every 6 hours PRN Shortness of Breath and/or Wheezing  fentaNYL    Injectable 12.5 MICROGram(s) IV Push every 2 hours PRN Signs and Symptoms of Pain  LORazepam   Injectable 0.5 milliGRAM(s) IV Push every 2 hours PRN restlessness or agtiation  morphine  - Injectable 4 milliGRAM(s) IV Push every 2 hours PRN dyspnea      PHYSICAL EXAM:    Vital Signs Last 24 Hrs  T(C): --  T(F): --  HR: --  BP: --  BP(mean): --  RR: --  SpO2: --        General: alert  oriented x ____ lethargic agitated                  cachexia  nonverbal  coma    Karnofsky:  %    HEENT: normal  dry mouth  ET tube/trach    Lungs: comfortable tachypnea/labored breathing  excessive secretions    CV: normal  tachycardia    GI: normal  distended  tender  no BS               PEG/NG/OG tube  constipation  last BM:     : normal  incontinent  oliguria/anuria  fuller    MSK: normal  weakness  edema             ambulatory  bedbound/wheelchair bound    Skin: normal  pressure ulcers- Stage_____  no rash    LABS:                I&O's Summary      RADIOLOGY & ADDITIONAL STUDIES:    ADVANCE DIRECTIVES/TREATMENT PREFERENCES:  DNR YES NO  Completed on:                     MOLST  YES NO   Completed on:  Living Will  YES NO   Completed on: OVERNIGHT EVENTS: remains unresponsive, comfortable, on ventilator     Present Symptoms:     Dyspnea: none   Nausea/Vomiting: No  Anxiety:  No  Depression: unable   Fatigue: unable   Loss of appetite: unable   Constipation: none     Pain: none currently             Character-            Duration-            Effect-            Factors-            Frequency-            Location-            Severity-    Pain AD Score:  [email or web address] (press ctrl + left click to view)    Review of Systems: Reviewed                 Unable to obtain due to poor mentation   All others negative    MEDICATIONS  (STANDING):  brivaracetam 100 milliGRAM(s) Oral two times a day  chlorhexidine 0.12% Liquid 15 milliLiter(s) Oral Mucosa every 12 hours  chlorhexidine 2% Cloths 1 Application(s) Topical daily  lacosamide 150 milliGRAM(s) Oral <User Schedule>  LORazepam   Injectable 0.5 milliGRAM(s) IV Push every 8 hours  morphine  - Injectable 2 milliGRAM(s) IV Push every 4 hours  pantoprazole  Injectable 40 milliGRAM(s) IV Push daily  perampanel 8 milliGRAM(s) Oral at bedtime    MEDICATIONS  (PRN):  acetaminophen   Oral Liquid .. 650 milliGRAM(s) Oral every 6 hours PRN Temp greater or equal to 38C (100.4F), Mild Pain (1 - 3)  albuterol/ipratropium for Nebulization 3 milliLiter(s) Nebulizer every 6 hours PRN Shortness of Breath and/or Wheezing  fentaNYL    Injectable 12.5 MICROGram(s) IV Push every 2 hours PRN Signs and Symptoms of Pain  LORazepam   Injectable 0.5 milliGRAM(s) IV Push every 2 hours PRN restlessness or agtiation  morphine  - Injectable 4 milliGRAM(s) IV Push every 2 hours PRN dyspnea      PHYSICAL EXAM:    Vital Signs Last 24 Hrs  T(C): --  T(F): --  HR: --  BP: --  BP(mean): --  RR: --  SpO2: --        General: alert  oriented x ____ lethargic agitated                  cachexia  nonverbal  coma    Karnofsky:  %    HEENT: normal  dry mouth  ET tube/trach    Lungs: comfortable tachypnea/labored breathing  excessive secretions    CV: normal  tachycardia    GI: normal  distended  tender  no BS               PEG/NG/OG tube  constipation  last BM:     : normal  incontinent  oliguria/anuria  fuller    MSK: normal  weakness  edema             ambulatory  bedbound/wheelchair bound    Skin: normal  pressure ulcers- Stage_____  no rash    LABS:                I&O's Summary      RADIOLOGY & ADDITIONAL STUDIES:    ADVANCE DIRECTIVES/TREATMENT PREFERENCES:  DNR YES NO  Completed on:                     MOLST  YES NO   Completed on:  Living Will  YES NO   Completed on:

## 2025-01-25 LAB
CULTURE RESULTS: SIGNIFICANT CHANGE UP
SPECIMEN SOURCE: SIGNIFICANT CHANGE UP

## 2025-02-12 LAB
CULTURE RESULTS: SIGNIFICANT CHANGE UP
SPECIMEN SOURCE: SIGNIFICANT CHANGE UP

## 2025-02-14 NOTE — CHART NOTE - NSCHARTNOTEFT_GEN_A_CORE
Palliative care social work note.    Bereavement call made to patients kranthi Thornton. Support and resources offered.

## 2025-02-14 NOTE — CHART NOTE - NSCHARTNOTESELECT_GEN_ALL_CORE
Anesthesia/Event Note
Cancel PEG/Trach/Event Note
ETHICS/Event Note
Event Note
Nutrition Services
Off Service Note
Progress Note
RC
Rapid Response
Surgery follou-up
Transfer Note/Event Note
Critical Care/Event Note
ETHICS/Event Note
ETHICS/Event Note
Event Note
Follow Up/Rapid Response
Nutrition Services
RRT 2 hour F/U/Event Note
Rapid Response 2 hr Follow up/Event Note
Tracheostomy exchange/Event Note

## 2025-04-14 ENCOUNTER — APPOINTMENT (OUTPATIENT)
Dept: HEMATOLOGY ONCOLOGY | Facility: CLINIC | Age: 85
End: 2025-04-14

## 2025-08-12 ENCOUNTER — OFFICE (OUTPATIENT)
Dept: URBAN - METROPOLITAN AREA CLINIC 12 | Facility: CLINIC | Age: 85
Setting detail: OPHTHALMOLOGY
End: 2025-08-12
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
## 2025-08-12 PROCEDURE — NO SHOW FE NO SHOW FEE: Performed by: OPHTHALMOLOGY

## 2025-08-25 ENCOUNTER — OFFICE (OUTPATIENT)
Dept: URBAN - METROPOLITAN AREA CLINIC 12 | Facility: CLINIC | Age: 85
Setting detail: OPHTHALMOLOGY
End: 2025-08-25
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
## 2025-08-25 DIAGNOSIS — H90.3: ICD-10-CM

## 2025-08-25 PROCEDURE — NO SHOW FE NO SHOW FEE

## (undated) DEVICE — GLV 8 PROTEXIS (WHITE)

## (undated) DEVICE — VISITEC 4X4

## (undated) DEVICE — GLV 7.5 PROTEXIS (WHITE)

## (undated) DEVICE — LAP PAD 18 X 18"

## (undated) DEVICE — TUBING SUCTION 20FT

## (undated) DEVICE — DRAPE LIGHT HANDLE COVER (BLUE)

## (undated) DEVICE — SYR LUER LOK 10CC

## (undated) DEVICE — ELCTR BOVIE PENCIL HANDPIECE

## (undated) DEVICE — FOLEY HOLDER STATLOCK 2 WAY ADULT

## (undated) DEVICE — SOL IRR POUR H2O 250ML

## (undated) DEVICE — WARMING BLANKET UPPER ADULT

## (undated) DEVICE — MARKING PEN W RULER

## (undated) DEVICE — POSITIONER FOAM EGG CRATE ULNAR 2PCS (PINK)

## (undated) DEVICE — SPECIMEN CONTAINER 100ML

## (undated) DEVICE — SOL IRR POUR NS 0.9% 500ML

## (undated) DEVICE — BLADE SCALPEL SAFETYLOCK #10

## (undated) DEVICE — ELCTR BOVIE PENCIL SMOKE EVACUATION

## (undated) DEVICE — MEDICATION LABELS W MARKER

## (undated) DEVICE — VENODYNE/SCD SLEEVE CALF MEDIUM

## (undated) DEVICE — DRAPE MAYO STAND 30"

## (undated) DEVICE — PREP CHLORAPREP HI-LITE ORANGE 26ML

## (undated) DEVICE — DRAPE 3/4 SHEET W REINFORCEMENT 56X77"

## (undated) DEVICE — NDL COUNTER FOAM AND MAGNET 40-70

## (undated) DEVICE — SYR ASEPTO

## (undated) DEVICE — SUCTION CATH ARGYLE WHISTLE TIP 14FR STRAIGHT PACKED

## (undated) DEVICE — DRAPE TOWEL BLUE 17" X 24"

## (undated) DEVICE — STAPLER SKIN VISI-STAT 35 WIDE

## (undated) DEVICE — GOWN TRIMAX LG

## (undated) DEVICE — FOLEY TRAY 16FR 5CC LTX UMETER CLOSED

## (undated) DEVICE — DRAPE 1/2 SHEET 40X57"

## (undated) DEVICE — PACK MINOR NO DRAPE

## (undated) DEVICE — DRSG DERMABOND 0.7ML

## (undated) DEVICE — DRAPE MAGNETIC INSTRUMENT MEDIUM

## (undated) DEVICE — WARMING BLANKET LOWER ADULT

## (undated) DEVICE — BLADE SCALPEL SAFETYLOCK #15

## (undated) DEVICE — SUCTION YANKAUER NO CONTROL VENT

## (undated) DEVICE — DRAPE INSTRUMENT POUCH 6.75" X 11"